# Patient Record
Sex: FEMALE | Race: OTHER | Employment: UNEMPLOYED | URBAN - NONMETROPOLITAN AREA
[De-identification: names, ages, dates, MRNs, and addresses within clinical notes are randomized per-mention and may not be internally consistent; named-entity substitution may affect disease eponyms.]

---

## 2018-11-07 ENCOUNTER — OFFICE VISIT (OUTPATIENT)
Dept: FAMILY MEDICINE CLINIC | Age: 38
End: 2018-11-07
Payer: COMMERCIAL

## 2018-11-07 VITALS
SYSTOLIC BLOOD PRESSURE: 102 MMHG | WEIGHT: 293 LBS | RESPIRATION RATE: 12 BRPM | TEMPERATURE: 98.6 F | OXYGEN SATURATION: 97 % | HEART RATE: 71 BPM | DIASTOLIC BLOOD PRESSURE: 78 MMHG | HEIGHT: 68 IN | BODY MASS INDEX: 44.41 KG/M2

## 2018-11-07 DIAGNOSIS — H04.123 DRY EYES: ICD-10-CM

## 2018-11-07 DIAGNOSIS — M79.18 MYOFASCIAL PAIN: ICD-10-CM

## 2018-11-07 DIAGNOSIS — R13.10 DYSPHAGIA, UNSPECIFIED TYPE: ICD-10-CM

## 2018-11-07 DIAGNOSIS — M25.50 ARTHRALGIA, UNSPECIFIED JOINT: ICD-10-CM

## 2018-11-07 DIAGNOSIS — R20.0 FACIAL NUMBNESS: ICD-10-CM

## 2018-11-07 DIAGNOSIS — F41.9 ANXIETY: ICD-10-CM

## 2018-11-07 DIAGNOSIS — C07 CANCER OF PAROTID GLAND (HCC): Primary | ICD-10-CM

## 2018-11-07 DIAGNOSIS — H92.01 RIGHT EAR PAIN: ICD-10-CM

## 2018-11-07 DIAGNOSIS — Z13.220 SCREENING FOR HYPERLIPIDEMIA: ICD-10-CM

## 2018-11-07 DIAGNOSIS — G47.30 SLEEP APNEA, UNSPECIFIED TYPE: ICD-10-CM

## 2018-11-07 DIAGNOSIS — H91.91 HEARING DIFFICULTY OF RIGHT EAR: ICD-10-CM

## 2018-11-07 DIAGNOSIS — M54.2 NECK PAIN: ICD-10-CM

## 2018-11-07 DIAGNOSIS — E03.9 HYPOTHYROIDISM, UNSPECIFIED TYPE: ICD-10-CM

## 2018-11-07 DIAGNOSIS — E55.9 VITAMIN D DEFICIENCY: ICD-10-CM

## 2018-11-07 DIAGNOSIS — H53.8 BLURRY VISION: ICD-10-CM

## 2018-11-07 DIAGNOSIS — Z00.00 BLOOD TESTS FOR ROUTINE GENERAL PHYSICAL EXAMINATION: ICD-10-CM

## 2018-11-07 DIAGNOSIS — Z13.31 POSITIVE DEPRESSION SCREENING: ICD-10-CM

## 2018-11-07 DIAGNOSIS — Z11.4 ENCOUNTER FOR SCREENING FOR HIV: ICD-10-CM

## 2018-11-07 DIAGNOSIS — F33.0 MILD EPISODE OF RECURRENT MAJOR DEPRESSIVE DISORDER (HCC): ICD-10-CM

## 2018-11-07 DIAGNOSIS — Z30.432 ENCOUNTER FOR REMOVAL OF INTRAUTERINE CONTRACEPTIVE DEVICE (IUD): ICD-10-CM

## 2018-11-07 DIAGNOSIS — Z23 NEED FOR PROPHYLACTIC VACCINATION AND INOCULATION AGAINST INFLUENZA: ICD-10-CM

## 2018-11-07 DIAGNOSIS — R26.89 IMBALANCE: ICD-10-CM

## 2018-11-07 LAB — HBA1C MFR BLD: 5.6 % (ref 4.3–5.7)

## 2018-11-07 PROCEDURE — 90471 IMMUNIZATION ADMIN: CPT | Performed by: NURSE PRACTITIONER

## 2018-11-07 PROCEDURE — 4004F PT TOBACCO SCREEN RCVD TLK: CPT | Performed by: NURSE PRACTITIONER

## 2018-11-07 PROCEDURE — G8431 POS CLIN DEPRES SCRN F/U DOC: HCPCS | Performed by: NURSE PRACTITIONER

## 2018-11-07 PROCEDURE — G8417 CALC BMI ABV UP PARAM F/U: HCPCS | Performed by: NURSE PRACTITIONER

## 2018-11-07 PROCEDURE — 90688 IIV4 VACCINE SPLT 0.5 ML IM: CPT | Performed by: NURSE PRACTITIONER

## 2018-11-07 PROCEDURE — 96160 PT-FOCUSED HLTH RISK ASSMT: CPT | Performed by: NURSE PRACTITIONER

## 2018-11-07 PROCEDURE — 99204 OFFICE O/P NEW MOD 45 MIN: CPT | Performed by: NURSE PRACTITIONER

## 2018-11-07 PROCEDURE — G8482 FLU IMMUNIZE ORDER/ADMIN: HCPCS | Performed by: NURSE PRACTITIONER

## 2018-11-07 PROCEDURE — G8427 DOCREV CUR MEDS BY ELIG CLIN: HCPCS | Performed by: NURSE PRACTITIONER

## 2018-11-07 ASSESSMENT — PATIENT HEALTH QUESTIONNAIRE - PHQ9
6. FEELING BAD ABOUT YOURSELF - OR THAT YOU ARE A FAILURE OR HAVE LET YOURSELF OR YOUR FAMILY DOWN: 3
8. MOVING OR SPEAKING SO SLOWLY THAT OTHER PEOPLE COULD HAVE NOTICED. OR THE OPPOSITE, BEING SO FIGETY OR RESTLESS THAT YOU HAVE BEEN MOVING AROUND A LOT MORE THAN USUAL: 1
SUM OF ALL RESPONSES TO PHQ QUESTIONS 1-9: 22
4. FEELING TIRED OR HAVING LITTLE ENERGY: 3
9. THOUGHTS THAT YOU WOULD BE BETTER OFF DEAD, OR OF HURTING YOURSELF: 1
5. POOR APPETITE OR OVEREATING: 3
2. FEELING DOWN, DEPRESSED OR HOPELESS: 3
1. LITTLE INTEREST OR PLEASURE IN DOING THINGS: 2
3. TROUBLE FALLING OR STAYING ASLEEP: 3
7. TROUBLE CONCENTRATING ON THINGS, SUCH AS READING THE NEWSPAPER OR WATCHING TELEVISION: 3
10. IF YOU CHECKED OFF ANY PROBLEMS, HOW DIFFICULT HAVE THESE PROBLEMS MADE IT FOR YOU TO DO YOUR WORK, TAKE CARE OF THINGS AT HOME, OR GET ALONG WITH OTHER PEOPLE: 2
SUM OF ALL RESPONSES TO PHQ QUESTIONS 1-9: 22
SUM OF ALL RESPONSES TO PHQ9 QUESTIONS 1 & 2: 5

## 2018-11-07 ASSESSMENT — ENCOUNTER SYMPTOMS
CHEST TIGHTNESS: 0
BACK PAIN: 1
TROUBLE SWALLOWING: 1
GASTROINTESTINAL NEGATIVE: 1
EYE ITCHING: 1
EYE DISCHARGE: 1
SHORTNESS OF BREATH: 1

## 2018-11-07 NOTE — PATIENT INSTRUCTIONS
It lowers the chance of getting and spreading the flu. The vaccine is very important for people who are at high risk for getting other health problems from the flu. This includes:  · Anyone 48years of age or older. · People who live in a long-term care center, such as a nursing home. · All children 6 months through 25years of age. · Adults and children 6 months and older who have long-term heart or lung problems, such as asthma. · Adults and children 6 months and older who needed medical care or were in a hospital during the past year because of diabetes, chronic kidney disease, or a weak immune system (including HIV or AIDS). · Women who will be pregnant during the flu season. · People who have any condition that can make it hard to breathe or swallow (such as a brain injury or muscle disorders). · People who can give the flu to others who are at high risk for problems from the flu. This includes all health care workers and close contacts of people age 72 or older. Who should not get the flu vaccine? The person who gives the vaccine may tell you not to get it if you:  · Have a severe allergy to eggs or any part of the vaccine. · Have had a severe reaction to a flu vaccine in the past.  · Have had Guillain-Barré syndrome (GBS). · Are sick with a fever. (Get the vaccine when symptoms are gone.)  How can you care for yourself at home? · If you or your child has a sore arm or a slight fever after the shot, take an over-the-counter pain medicine, such as acetaminophen (Tylenol) or ibuprofen (Advil, Motrin). Read and follow all instructions on the label. Do not give aspirin to anyone younger than 20. It has been linked to Reye syndrome, a serious illness. · Do not take two or more pain medicines at the same time unless the doctor told you to. Many pain medicines have acetaminophen, which is Tylenol. Too much acetaminophen (Tylenol) can be harmful. When should you call for help?   Call 911 anytime you think Health. If you have questions about a medical condition or this instruction, always ask your healthcare professional. [address] any warranty or liability for your use of this information. Patient Education        Neck: Exercises  Your Care Instructions  Here are some examples of typical rehabilitation exercises for your condition. Start each exercise slowly. Ease off the exercise if you start to have pain. Your doctor or physical therapist will tell you when you can start these exercises and which ones will work best for you. How to do the exercises  Neck stretch    4. This stretch works best if you keep your shoulder down as you lean away from it. To help you remember to do this, start by relaxing your shoulders and lightly holding on to your thighs or your chair. 5. Tilt your head toward your shoulder and hold for 15 to 30 seconds. Let the weight of your head stretch your muscles. 6. If you would like a little added stretch, use your hand to gently and steadily pull your head toward your shoulder. For example, keeping your right shoulder down, lean your head to the left. 7. Repeat 2 to 4 times toward each shoulder. Diagonal neck stretch    7. Turn your head slightly toward the direction you will be stretching, and tilt your head diagonally toward your chest and hold for 15 to 30 seconds. 8. If you would like a little added stretch, use your hand to gently and steadily pull your head forward on the diagonal.  9. Repeat 2 to 4 times toward each side. Dorsal glide stretch    1. Sit or stand tall and look straight ahead. 2. Slowly tuck your chin as you glide your head backward over your body  3. Hold for a count of 6, and then relax for up to 10 seconds. 4. Repeat 8 to 12 times. Chest and shoulder stretch    1. Sit or stand tall and glide your head backward as in the dorsal glide stretch. 2. Raise both arms so that your hands are next to your ears.   3. Take a deep exercise    5. Lie on your back with your knees bent and your feet flat on the floor. 6. Bring one knee to your chest, keeping the other foot flat on the floor (or keeping the other leg straight, whichever feels better on your lower back). 7. Keep your lower back pressed to the floor. Hold for at least 15 to 30 seconds. 8. Relax, and lower the knee to the starting position. 9. Repeat with the other leg. Repeat 2 to 4 times with each leg. 10. To get more stretch, put your other leg flat on the floor while pulling your knee to your chest.    Curl-ups    6. Lie on the floor on your back with your knees bent at a 90-degree angle. Your feet should be flat on the floor, about 12 inches from your buttocks. 7. Cross your arms over your chest. If this bothers your neck, try putting your hands behind your neck (not your head), with your elbows spread apart. 8. Slowly tighten your belly muscles and raise your shoulder blades off the floor. 9. Keep your head in line with your body, and do not press your chin to your chest.  10. Hold this position for 1 or 2 seconds, then slowly lower yourself back down to the floor. 11. Repeat 8 to 12 times. Pelvic tilt exercise    3. Lie on your back with your knees bent. 4. \"Brace\" your stomach. This means to tighten your muscles by pulling in and imagining your belly button moving toward your spine. You should feel like your back is pressing to the floor and your hips and pelvis are rocking back. 5. Hold for about 6 seconds while you breathe smoothly. 6. Repeat 8 to 12 times. Heel dig bridging    1. Lie on your back with both knees bent and your ankles bent so that only your heels are digging into the floor. Your knees should be bent about 90 degrees. 2. Then push your heels into the floor, squeeze your buttocks, and lift your hips off the floor until your shoulders, hips, and knees are all in a straight line.   3. Hold for about 6 seconds as you continue to breathe (life-threatening) allergies. If you ever had a life-threatening allergic reaction after a dose of flu vaccine, or have a severe allergy to any part of this vaccine, you may be advised not to get vaccinated. Most, but not all, types of flu vaccine contain a small amount of egg protein. · If you ever had Guillain-Barré syndrome (also called GBS) Some people with a history of GBS should not get this vaccine. This should be discussed with your doctor. · If you are not feeling well. It is usually okay to get flu vaccine when you have a mild illness, but you might be asked to come back when you feel better. Risks of a vaccine reaction  With any medicine, including vaccines, there is a chance of reactions. These are usually mild and go away on their own, but serious reactions are also possible. Most people who get a flu shot do not have any problems with it. Minor problems following a flu shot include:  · Soreness, redness, or swelling where the shot was given  · Hoarseness  · Sore, red or itchy eyes  · Cough  · Fever  · Aches  · Headache  · Itching  · Fatigue  If these problems occur, they usually begin soon after the shot and last 1 or 2 days. More serious problems following a flu shot can include the following:  · There may be a small increased risk of Guillain-Barré Syndrome (GBS) after inactivated flu vaccine. This risk has been estimated at 1 or 2 additional cases per million people vaccinated. This is much lower than the risk of severe complications from flu, which can be prevented by flu vaccine. · Anastasiya Caputo children who get the flu shot along with pneumococcal vaccine (PCV13) and/or DTaP vaccine at the same time might be slightly more likely to have a seizure caused by fever. Ask your doctor for more information.  Tell your doctor if a child who is getting flu vaccine has ever had a seizure  Problems that could happen after any injected vaccine:  · People sometimes faint after a medical procedure, including

## 2018-11-07 NOTE — PROGRESS NOTES
On the basis of positive PHQ-9 screening (PHQ-9 Total Score: 22), the following plan was implemented: referral to psychiatry provided.   Patient will follow-up in 1 month(s) with psychiatrist.

## 2018-11-07 NOTE — PROGRESS NOTES
prescriptions on file. No current facility-administered medications for this visit. No Known Allergies    Health Maintenance   Topic Date Due    HIV screen  05/25/1994    DTaP/Tdap/Td vaccine (1 - Tdap) 05/25/1998    Cervical cancer screen  05/25/2000    Flu vaccine  Completed       Subjective:      Review of Systems   Constitutional: Positive for fatigue. HENT: Positive for dental problem, drooling, ear discharge, hearing loss (right ear, canal is very small) and trouble swallowing. Eyes: Positive for discharge (Right eye does not close all the way) and itching. Respiratory: Positive for shortness of breath (occasionally ). Negative for chest tightness. Cardiovascular: Negative for chest pain. Gastrointestinal: Negative. Endocrine: Positive for cold intolerance (gets muscle spasms ). Genitourinary: Negative for difficulty urinating, dysuria, vaginal bleeding (no periods- has mirena) and vaginal discharge. Musculoskeletal: Positive for back pain and gait problem (balance problem ). Objective:     Vitals:    11/07/18 1106   BP: 102/78   Pulse: 71   Resp: 12   Temp: 98.6 °F (37 °C)   TempSrc: Oral   SpO2: 97%   Weight: (!) 322 lb 12.8 oz (146.4 kg)   Height: 5' 7.72\" (1.72 m)       Body mass index is 49.49 kg/m². Wt Readings from Last 3 Encounters:   11/07/18 (!) 322 lb 12.8 oz (146.4 kg)     BP Readings from Last 3 Encounters:   11/07/18 102/78       Physical Exam   Constitutional: She is oriented to person, place, and time. She appears well-developed and well-nourished. No distress. HENT:   Head: Normocephalic and atraumatic. Right Ear: Hearing and external ear normal. No swelling. Left Ear: Hearing and external ear normal. No swelling. Ears:    Nose: No mucosal edema or rhinorrhea. Right sinus exhibits no maxillary sinus tenderness and no frontal sinus tenderness. Left sinus exhibits no maxillary sinus tenderness and no frontal sinus tenderness.    Mouth/Throat: Oropharynx is clear and moist. No oropharyngeal exudate or posterior oropharyngeal erythema. Eyes: Pupils are equal, round, and reactive to light. Conjunctivae are normal. Right eye exhibits no discharge. Left eye exhibits no discharge. Neck: Normal range of motion. Cardiovascular: Normal rate. No lower extremity edema   Pulmonary/Chest: Effort normal and breath sounds normal. No respiratory distress. Musculoskeletal: She exhibits no tenderness or deformity. Full ROM of upper and lower extremities    Neurological: She is alert and oriented to person, place, and time. Coordination and gait normal.   Skin: Skin is warm and dry. No rash noted. She is not diaphoretic. No cyanosis. Nails show no clubbing. Psychiatric: She has a normal mood and affect. Her speech is normal and behavior is normal. Judgment and thought content normal. Cognition and memory are normal.     Lab Results   Component Value Date    LABA1C 5.6 11/07/2018       Imaging Results:    No results found. Assessment:      Diagnosis Orders   1. Cancer of parotid gland [facility])  [facility] for Mayi Westfall MD   2. Myofascial pain     3. Imbalance     4. Neck pain     5. Need for prophylactic vaccination and inoculation against influenza  INFLUENZA, QUADV, 3 YRS AND OLDER, IM, MDV, 0.5ML ([facility])   6. Encounter for screening for HIV  HIV Screen   7. Positive depression screening  Positive Screen for Clinical Depression with a Documented Follow-up Plan    8. Arthralgia, unspecified joint  CBC Auto Differential    Hepatic Function Panel    Lipid Panel    Magnesium    Comprehensive Metabolic Panel    Rheumatoid Factor    Sedimentation Rate    FIDENCIO Screen with Reflex   9. Facial numbness     10. Sleep apnea, unspecified type     11. Blood tests for routine general physical examination  CBC Auto Differential    Hepatic Function Panel    Lipid Panel    Magnesium    Comprehensive Metabolic Panel   12.  Screening for hyperlipidemia  CBC Auto Differential    Hepatic Function Panel    Lipid Panel    Magnesium    Comprehensive Metabolic Panel   13. Hypothyroidism, unspecified type  T4    TSH with Reflex    Comprehensive Metabolic Panel   14. Vitamin D deficiency  Vitamin D 25 Hydroxy   15. Encounter for removal of intrauterine contraceptive device (IUD)  [facility]   16. Anxiety     17. Mild episode of recurrent major depressive disorder [facility])  [facility], [place]    [address] [place] [address] Dr Cristel Hairston, [address]   18. Dry eyes  [facility] [address] [facility] [address] Tushar Sullivan MD ([place])   19. Leanna Logan MD ([place])   20. Dysphagia, unspecified type  [facility] Sandoval MD ([place])   21. Right ear pain  Kenny Mcguire MD   22. Hearing difficulty of right ear  Kenny Mcguire MD       Plan:         A1C today    Flu shot today    Referral to OBGYN     Referral to Psychiatry     · Will order basic blood work and required blood screening test  · Will order the necessary vaccinations  · Discussed the Genesight testing - she will look into it and let us know    · Will refer to a new Lung doctor for the CPAP  · Will refer to an ENT  · Will refer to an eye doctor  · Will refer to Dr. Kincaid [facility] - Psychology  · Will refer to a new Psychiatrist   · Will refer to a new OB-Gyn      Please call you insurance company before getting labs done, see where they would like you to have them completed. If the cost is too much please call the office and let us know before you have them done and we will do a couple at a time to decrease the cost.        No Follow-up on file.     Orders Placed:  Orders Placed This Encounter   Procedures    INFLUENZA, QUADV, 3 YRS AND OLDER,

## 2018-11-07 NOTE — PROGRESS NOTES
Health Maintenance Due   Topic Date Due    HIV screen  05/25/1994    DTaP/Tdap/Td vaccine (1 - Tdap) 05/25/1998    Cervical cancer screen  05/25/2000    Flu vaccine (1) 09/01/2018

## 2018-11-28 ENCOUNTER — OFFICE VISIT (OUTPATIENT)
Dept: PSYCHIATRY | Age: 38
End: 2018-11-28
Payer: COMMERCIAL

## 2018-11-28 DIAGNOSIS — F32.A DEPRESSIVE DISORDER: ICD-10-CM

## 2018-11-28 DIAGNOSIS — F41.9 ANXIETY: ICD-10-CM

## 2018-11-28 DIAGNOSIS — F43.10 PTSD (POST-TRAUMATIC STRESS DISORDER): Primary | ICD-10-CM

## 2018-11-28 DIAGNOSIS — F42.3 HOARDING DISORDER: ICD-10-CM

## 2018-11-28 DIAGNOSIS — F33.1 MODERATE EPISODE OF RECURRENT MAJOR DEPRESSIVE DISORDER (HCC): ICD-10-CM

## 2018-11-28 PROCEDURE — 90792 PSYCH DIAG EVAL W/MED SRVCS: CPT | Performed by: PSYCHIATRY & NEUROLOGY

## 2018-11-28 PROCEDURE — 4004F PT TOBACCO SCREEN RCVD TLK: CPT | Performed by: PSYCHIATRY & NEUROLOGY

## 2018-11-28 RX ORDER — FLUOXETINE HYDROCHLORIDE 40 MG/1
40 CAPSULE ORAL DAILY
Qty: 30 CAPSULE | Refills: 3 | Status: SHIPPED | OUTPATIENT
Start: 2018-11-28 | End: 2019-03-08 | Stop reason: SDUPTHER

## 2018-11-28 RX ORDER — CLONAZEPAM 0.5 MG/1
0.5 TABLET ORAL NIGHTLY PRN
Qty: 60 TABLET | Refills: 0 | Status: SHIPPED | OUTPATIENT
Start: 2018-11-28 | End: 2018-12-26 | Stop reason: SDUPTHER

## 2018-11-28 RX ORDER — FLUOXETINE 10 MG/1
10 CAPSULE ORAL DAILY
Qty: 30 CAPSULE | Refills: 3 | Status: SHIPPED | OUTPATIENT
Start: 2018-11-28 | End: 2019-02-08

## 2018-11-28 NOTE — PROGRESS NOTES
Experience/[place] Status:   no   Spiritual Beliefs:   Raised fundamentalist Jainism, no longer            Mental Status Evaluation:   Appearance: Neatly groomed, appropriately dressed   Behavior: Cooperative and Appropriate   Eye Contact: Maintained good eye contact   Psychomotor Activity: Normal     Speech: Rate, volume, and prosody were normal   Mood: Sad   Affect: Full range, appropriate, and mood congruent   Thought Process: Goal directed and coherent   Thought content: The thought content was appropriate to questions. The patient denies any suicidal ideation, homicidal ideation, or paranoid ideation. There are no signs of psychosis    Perceptions: Perceptions were normal, the patient denied any auditory, tactile or visual hallucinations   Cognition: Patient is alert and oriented to time, place and person, no gross cognitive deficits   Judgment: Judgment appeared normal and appropriate   Insight: Good insight into illness   Impulse control: Fair     Assessment:   Severe trauma history now with multiple diagnoses contributing to low mood, anxiety, and difficulty functioning     Diagnosis:   1. PTSD (post-traumatic stress disorder)    2. Depressive disorder    3. Hoarding disorder    4. Anxiety    5.  Moderate episode of recurrent major depressive disorder (HCC)        Plan:  Begin trial fluoxetine  Clonazepam prn anxiety  To schedule appt with Arabella Edmonds PhD  Will get ordered lab work done  Return x 4 weeks

## 2018-12-05 DIAGNOSIS — H92.01 RIGHT EAR PAIN: ICD-10-CM

## 2018-12-05 DIAGNOSIS — H91.93 HEARING DIFFICULTY OF BOTH EARS: Primary | ICD-10-CM

## 2018-12-06 ENCOUNTER — HOSPITAL ENCOUNTER (OUTPATIENT)
Age: 38
Discharge: HOME | End: 2018-12-06
Payer: COMMERCIAL

## 2018-12-06 ENCOUNTER — HOSPITAL ENCOUNTER (OUTPATIENT)
Dept: AUDIOLOGY | Age: 38
Discharge: HOME | End: 2018-12-06
Payer: COMMERCIAL

## 2018-12-06 ENCOUNTER — OFFICE VISIT (OUTPATIENT)
Dept: ENT CLINIC | Age: 38
End: 2018-12-06
Payer: COMMERCIAL

## 2018-12-06 VITALS
HEART RATE: 72 BPM | HEIGHT: 67 IN | BODY MASS INDEX: 45.99 KG/M2 | RESPIRATION RATE: 20 BRPM | WEIGHT: 293 LBS | DIASTOLIC BLOOD PRESSURE: 70 MMHG | TEMPERATURE: 97.9 F | SYSTOLIC BLOOD PRESSURE: 110 MMHG

## 2018-12-06 DIAGNOSIS — E03.9 HYPOTHYROIDISM, UNSPECIFIED TYPE: ICD-10-CM

## 2018-12-06 DIAGNOSIS — Z00.00 BLOOD TESTS FOR ROUTINE GENERAL PHYSICAL EXAMINATION: ICD-10-CM

## 2018-12-06 DIAGNOSIS — Z11.4 ENCOUNTER FOR SCREENING FOR HIV: ICD-10-CM

## 2018-12-06 DIAGNOSIS — Z13.220 SCREENING FOR HYPERLIPIDEMIA: ICD-10-CM

## 2018-12-06 DIAGNOSIS — H92.11 DRAINAGE FROM EAR, RIGHT: Primary | ICD-10-CM

## 2018-12-06 DIAGNOSIS — E55.9 VITAMIN D DEFICIENCY: ICD-10-CM

## 2018-12-06 DIAGNOSIS — Z85.818: ICD-10-CM

## 2018-12-06 DIAGNOSIS — M25.50 ARTHRALGIA, UNSPECIFIED JOINT: ICD-10-CM

## 2018-12-06 DIAGNOSIS — R68.2 DRY MOUTH: ICD-10-CM

## 2018-12-06 LAB
ALBUMIN SERPL-MCNC: 4.1 G/DL (ref 3.5–5.1)
ALP BLD-CCNC: 77 U/L (ref 38–126)
ALT SERPL-CCNC: 12 U/L (ref 11–66)
ANION GAP SERPL CALCULATED.3IONS-SCNC: 13 MEQ/L (ref 8–16)
AST SERPL-CCNC: 13 U/L (ref 5–40)
BASOPHILS # BLD: 0.6 %
BASOPHILS ABSOLUTE: 0.1 THOU/MM3 (ref 0–0.1)
BILIRUB SERPL-MCNC: 0.4 MG/DL (ref 0.3–1.2)
BILIRUBIN DIRECT: < 0.2 MG/DL (ref 0–0.3)
BUN BLDV-MCNC: 15 MG/DL (ref 7–22)
CALCIUM SERPL-MCNC: 9.5 MG/DL (ref 8.5–10.5)
CHLORIDE BLD-SCNC: 103 MEQ/L (ref 98–111)
CHOLESTEROL, TOTAL: 156 MG/DL (ref 100–199)
CO2: 26 MEQ/L (ref 23–33)
CREAT SERPL-MCNC: 0.8 MG/DL (ref 0.4–1.2)
EOSINOPHIL # BLD: 1.7 %
EOSINOPHILS ABSOLUTE: 0.1 THOU/MM3 (ref 0–0.4)
ERYTHROCYTE [DISTWIDTH] IN BLOOD BY AUTOMATED COUNT: 13.5 % (ref 11.5–14.5)
ERYTHROCYTE [DISTWIDTH] IN BLOOD BY AUTOMATED COUNT: 41.9 FL (ref 35–45)
GFR SERPL CREATININE-BSD FRML MDRD: 80 ML/MIN/1.73M2
GLUCOSE BLD-MCNC: 95 MG/DL (ref 70–108)
HCT VFR BLD CALC: 41.2 % (ref 37–47)
HDLC SERPL-MCNC: 36 MG/DL
HEMOGLOBIN: 13.4 GM/DL (ref 12–16)
IMMATURE GRANS (ABS): 0.03 THOU/MM3 (ref 0–0.07)
IMMATURE GRANULOCYTES: 0.4 %
LDL CHOLESTEROL CALCULATED: 98 MG/DL
LYMPHOCYTES # BLD: 16.4 %
LYMPHOCYTES ABSOLUTE: 1.4 THOU/MM3 (ref 1–4.8)
MAGNESIUM: 2.3 MG/DL (ref 1.6–2.4)
MCH RBC QN AUTO: 28 PG (ref 26–33)
MCHC RBC AUTO-ENTMCNC: 32.5 GM/DL (ref 32.2–35.5)
MCV RBC AUTO: 86.2 FL (ref 81–99)
MONOCYTES # BLD: 4.7 %
MONOCYTES ABSOLUTE: 0.4 THOU/MM3 (ref 0.4–1.3)
NUCLEATED RED BLOOD CELLS: 0 /100 WBC
PLATELET # BLD: 238 THOU/MM3 (ref 130–400)
PMV BLD AUTO: 9.6 FL (ref 9.4–12.4)
POTASSIUM SERPL-SCNC: 4.3 MEQ/L (ref 3.5–5.2)
RBC # BLD: 4.78 MILL/MM3 (ref 4.2–5.4)
RHEUMATOID FACTOR: 15 IU/ML (ref 0–13)
SEDIMENTATION RATE, ERYTHROCYTE: 20 MM/HR (ref 0–20)
SEG NEUTROPHILS: 76.2 %
SEGMENTED NEUTROPHILS ABSOLUTE COUNT: 6.4 THOU/MM3 (ref 1.8–7.7)
SODIUM BLD-SCNC: 142 MEQ/L (ref 135–145)
T4 FREE: 0.81 NG/DL (ref 0.93–1.76)
THYROXINE (T4): 6.7 UG/DL (ref 4.5–12)
TOTAL PROTEIN: 7.5 G/DL (ref 6.1–8)
TRIGL SERPL-MCNC: 111 MG/DL (ref 0–199)
TSH SERPL DL<=0.05 MIU/L-ACNC: 5 UIU/ML (ref 0.4–4.2)
VITAMIN D 25-HYDROXY: 15 NG/ML (ref 30–100)
WBC # BLD: 8.4 THOU/MM3 (ref 4.8–10.8)

## 2018-12-06 PROCEDURE — 84436 ASSAY OF TOTAL THYROXINE: CPT

## 2018-12-06 PROCEDURE — 86430 RHEUMATOID FACTOR TEST QUAL: CPT

## 2018-12-06 PROCEDURE — 99203 OFFICE O/P NEW LOW 30 MIN: CPT | Performed by: OTOLARYNGOLOGY

## 2018-12-06 PROCEDURE — G8482 FLU IMMUNIZE ORDER/ADMIN: HCPCS | Performed by: OTOLARYNGOLOGY

## 2018-12-06 PROCEDURE — 4004F PT TOBACCO SCREEN RCVD TLK: CPT | Performed by: OTOLARYNGOLOGY

## 2018-12-06 PROCEDURE — 87389 HIV-1 AG W/HIV-1&-2 AB AG IA: CPT

## 2018-12-06 PROCEDURE — 92567 TYMPANOMETRY: CPT | Performed by: AUDIOLOGIST

## 2018-12-06 PROCEDURE — 80053 COMPREHEN METABOLIC PANEL: CPT

## 2018-12-06 PROCEDURE — 92557 COMPREHENSIVE HEARING TEST: CPT | Performed by: AUDIOLOGIST

## 2018-12-06 PROCEDURE — 83735 ASSAY OF MAGNESIUM: CPT

## 2018-12-06 PROCEDURE — G8417 CALC BMI ABV UP PARAM F/U: HCPCS | Performed by: OTOLARYNGOLOGY

## 2018-12-06 PROCEDURE — 85651 RBC SED RATE NONAUTOMATED: CPT

## 2018-12-06 PROCEDURE — 82248 BILIRUBIN DIRECT: CPT

## 2018-12-06 PROCEDURE — 84443 ASSAY THYROID STIM HORMONE: CPT

## 2018-12-06 PROCEDURE — 80061 LIPID PANEL: CPT

## 2018-12-06 PROCEDURE — 84439 ASSAY OF FREE THYROXINE: CPT

## 2018-12-06 PROCEDURE — G8427 DOCREV CUR MEDS BY ELIG CLIN: HCPCS | Performed by: OTOLARYNGOLOGY

## 2018-12-06 PROCEDURE — 85025 COMPLETE CBC W/AUTO DIFF WBC: CPT

## 2018-12-06 PROCEDURE — 36415 COLL VENOUS BLD VENIPUNCTURE: CPT

## 2018-12-06 PROCEDURE — 86038 ANTINUCLEAR ANTIBODIES: CPT

## 2018-12-06 PROCEDURE — 82306 VITAMIN D 25 HYDROXY: CPT

## 2018-12-06 RX ORDER — PILOCARPINE HYDROCHLORIDE 5 MG/1
5 TABLET, FILM COATED ORAL 3 TIMES DAILY
Qty: 30 TABLET | Refills: 0 | Status: SHIPPED | OUTPATIENT
Start: 2018-12-06 | End: 2019-01-04 | Stop reason: SDUPTHER

## 2018-12-06 RX ORDER — CIPROFLOXACIN AND DEXAMETHASONE 3; 1 MG/ML; MG/ML
4 SUSPENSION/ DROPS AURICULAR (OTIC) 2 TIMES DAILY
Qty: 1 BOTTLE | Refills: 0 | Status: SHIPPED | OUTPATIENT
Start: 2018-12-06 | End: 2019-03-14

## 2018-12-06 ASSESSMENT — ENCOUNTER SYMPTOMS
SINUS PRESSURE: 0
BLOOD IN STOOL: 0
ABDOMINAL PAIN: 0
ABDOMINAL DISTENTION: 0
PHOTOPHOBIA: 0
COUGH: 0
CONSTIPATION: 0
VOMITING: 0
CHEST TIGHTNESS: 0
SORE THROAT: 0
EYE PAIN: 0
RHINORRHEA: 0
EYE ITCHING: 0
ANAL BLEEDING: 0
COLOR CHANGE: 0
NAUSEA: 0
RECTAL PAIN: 0
TROUBLE SWALLOWING: 1
STRIDOR: 0
APNEA: 1
DIARRHEA: 0
CHOKING: 0
FACIAL SWELLING: 1
EYE REDNESS: 0
SHORTNESS OF BREATH: 0
EYE DISCHARGE: 0
WHEEZING: 0
BACK PAIN: 1
VOICE CHANGE: 0

## 2018-12-06 NOTE — PROGRESS NOTES
ACCOUNT #: [de-identified]      AUDIOLOGICAL EVALUATION      REASON FOR TESTING:  The patient is noticing decreasing hearing and tinnitus in her right ear during the last 9 months. She also states that her right ear feels \"plugged\". 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 The tumor was removed and she did receive radiation 6 years ago. Following treatment she has been somewhat imbalanced although her balance problems are increasing and she is experiencing vertigo when laying down or sitting up and with head turns. She has also had recurring problems with cerumen and debris accumulation in her right ear. She commented that she removed foul smelling debris from her right ear several months ago. Audiogram completed after ENT physician evaluation on the same date. OTOSCOPY: Clear canal with normal appearing tympanic membrane- left. Surgically altered EAC with what appears to be dark debris or cerumen near the tympanic membrane- Right. The pinna is also very firm.        AUDIOGRAM                    Reliability: good  Audiometer Used:  GSI-61    PURE TONES     RE    LE     []   [x] WNL        []   [] Mild    [x]   [] Moderate       []   [] Mod-Severe   [x]   [] Severe    []   [] Profound    TYPE     RE    LE    [x]   [] SNHL    []   []  Conductive HL    [x]   [] Mixed HL      CONFIGURATION    RE    LE    []   [x] Essentially Flat     [x]   []  Sloping  []   [] Steeply Sloping  []   []  Rising  []   [] Cookie Bite    SPEECH AUDIOMETRY   Right Left Sound Field Aided   PTA 42 dB 10 dB     SRT 35 dB 5 dB     SAT       MASKING 45 dB      % WRS   QUIET 28% 100%      40 dBSL 40 dBSL     %WRS   NOISE              [facility]            Live Voice  [x]     Recorded  []     List   []     WORD RECOGNITION   RE    LE  []   [x]  Excellent    []   []  Good  []   [] Fair  []   [] Poor  [x]   [] Very Poor      TYMPANOGRAMS  RE    LE  []   [x]  WNL    []   []  WNL w/reduced mobility  []

## 2018-12-07 ENCOUNTER — TELEPHONE (OUTPATIENT)
Dept: FAMILY MEDICINE CLINIC | Age: 38
End: 2018-12-07

## 2018-12-07 DIAGNOSIS — E03.9 HYPOTHYROIDISM, UNSPECIFIED TYPE: Primary | ICD-10-CM

## 2018-12-07 LAB — HIV-2 AB: NEGATIVE

## 2018-12-07 RX ORDER — CHOLECALCIFEROL (VITAMIN D3) 50 MCG
2000 TABLET ORAL DAILY
Qty: 30 TABLET | Refills: 5 | Status: SHIPPED | OUTPATIENT
Start: 2018-12-07 | End: 2019-02-14

## 2018-12-08 LAB — ANA SCREEN: NORMAL

## 2018-12-10 RX ORDER — LEVOTHYROXINE SODIUM 0.03 MG/1
25 TABLET ORAL DAILY
Qty: 90 TABLET | Refills: 1 | Status: SHIPPED | OUTPATIENT
Start: 2018-12-10 | End: 2019-02-14

## 2018-12-10 NOTE — TELEPHONE ENCOUNTER
I sent in the synthroid at 25mcg - a very small dose to see it that will be enough - but let's go over all the other labs and not get the tsh again since we put you on the medication for about 6 weeks and then get the labs at that time between visits

## 2018-12-19 ENCOUNTER — OFFICE VISIT (OUTPATIENT)
Dept: FAMILY MEDICINE CLINIC | Age: 38
End: 2018-12-19
Payer: COMMERCIAL

## 2018-12-19 VITALS
DIASTOLIC BLOOD PRESSURE: 86 MMHG | HEART RATE: 73 BPM | OXYGEN SATURATION: 98 % | SYSTOLIC BLOOD PRESSURE: 126 MMHG | TEMPERATURE: 98.4 F

## 2018-12-19 DIAGNOSIS — F33.9 RECURRENT MAJOR DEPRESSIVE DISORDER, REMISSION STATUS UNSPECIFIED (HCC): ICD-10-CM

## 2018-12-19 DIAGNOSIS — H91.91 HEARING LOSS OF RIGHT EAR, UNSPECIFIED HEARING LOSS TYPE: ICD-10-CM

## 2018-12-19 DIAGNOSIS — E03.9 HYPOTHYROIDISM, UNSPECIFIED TYPE: ICD-10-CM

## 2018-12-19 DIAGNOSIS — E78.6 LOW HDL (UNDER 40): ICD-10-CM

## 2018-12-19 DIAGNOSIS — G47.30 SLEEP APNEA, UNSPECIFIED TYPE: ICD-10-CM

## 2018-12-19 DIAGNOSIS — H04.123 DRY EYES: ICD-10-CM

## 2018-12-19 DIAGNOSIS — F41.1 GAD (GENERALIZED ANXIETY DISORDER): ICD-10-CM

## 2018-12-19 DIAGNOSIS — R76.8 ELEVATED RHEUMATOID FACTOR: ICD-10-CM

## 2018-12-19 DIAGNOSIS — H53.8 BLURRY VISION: ICD-10-CM

## 2018-12-19 DIAGNOSIS — E55.9 VITAMIN D DEFICIENCY: ICD-10-CM

## 2018-12-19 DIAGNOSIS — M25.50 POLYARTHRALGIA: Primary | ICD-10-CM

## 2018-12-19 DIAGNOSIS — E28.2 PCOS (POLYCYSTIC OVARIAN SYNDROME): ICD-10-CM

## 2018-12-19 PROCEDURE — 99214 OFFICE O/P EST MOD 30 MIN: CPT | Performed by: NURSE PRACTITIONER

## 2018-12-19 RX ORDER — LEVOTHYROXINE SODIUM 0.05 MG/1
50 TABLET ORAL DAILY
Qty: 30 TABLET | Refills: 3 | Status: SHIPPED | OUTPATIENT
Start: 2019-01-02 | End: 2019-03-14 | Stop reason: SDUPTHER

## 2018-12-19 RX ORDER — CHLORAL HYDRATE 500 MG
1000 CAPSULE ORAL DAILY
Qty: 30 CAPSULE | Refills: 2 | Status: SHIPPED | OUTPATIENT
Start: 2018-12-19 | End: 2019-03-14 | Stop reason: SDUPTHER

## 2018-12-19 ASSESSMENT — ENCOUNTER SYMPTOMS
EYE PAIN: 0
SHORTNESS OF BREATH: 0
SORE THROAT: 0
EYE DISCHARGE: 1
COUGH: 0
TROUBLE SWALLOWING: 0
RHINORRHEA: 0

## 2018-12-19 NOTE — PATIENT INSTRUCTIONS
sugar.  What else should you know? · Limit saturated fat, such as the fat from meat and dairy products. This is a healthy choice because people who have diabetes are at higher risk of heart disease. So choose lean cuts of meat and nonfat or low-fat dairy products. Use olive or canola oil instead of butter or shortening when cooking. · Don't skip meals. Your blood sugar may drop too low if you skip meals and take insulin or certain medicines for diabetes. · Check with your doctor before you drink alcohol. Alcohol can cause your blood sugar to drop too low. Alcohol can also cause a bad reaction if you take certain diabetes medicines. Follow-up care is a key part of your treatment and safety. Be sure to make and go to all appointments, and call your doctor if you are having problems. It's also a good idea to know your test results and keep a list of the medicines you take. Where can you learn more? Go to https://PhotobucketpekanaInstallFree.Flavourly. org and sign in to your [employer] Website account. Enter X938 in the Atterocor box to learn more about \"Learning About Diabetes Food Guidelines. \"     If you do not have an account, please click on the \"Sign Up Now\" link. Current as of: December 7, 2017  Content Version: 11.8  © 9386-9982 Healthwise, Incorporated. Care instructions adapted under license by [facility] ([facility]). If you have questions about a medical condition or this instruction, always ask your healthcare professional. [place] [address] any warranty or liability for your use of this information.

## 2018-12-19 NOTE — PROGRESS NOTES
[address] [facility] [address]  Dept: [phone]  Dept Fax: [phone]  Loc: [facility]       Chief Complaint   Patient presents with    Discuss Labs     tsh not completed = she call back stating she did not audra her medication = nurse said it was okay to wait on the tsh        Nurses Notes reviewed and I agree except as noted in the HPI. HISTORY OF PRESENT ILLNESS   Zohaib Whitley is a 43 y.o. female who presents for follow up. TSH - Borderline elevate. History of hypothyroid. Recently started on Levothyroxine. Has been taking for 2 weeks. Doesn't really notice a change for the better with her symptoms. Polyarthralgia - Elevated RF. Negative FIDENCIO and Sed Rate. Refer to Rheumatology. Vit D Deficiency - Will check on increasing Vit D to 4,000. Will send "[employer] message. Cholesterol - HDL low, LDL borderline. Start Fish Oil 1000 mg daily. Diet/exercise. PCOS/implantable contraceptive - had appt with ObGYN. Not scheduled for surgery yet. Has to meet with the  \"Sharlene\" first.    [facility] [place] [facility] Dr. Shanice Ayala. Dx with PTSD. Made her happy to get Dx. Also OCD, Major Depression, and CORIE. On Fluoxetine 20 mg with plans to increase, Clonazepam for 1-2 weeks. Has follow up plan in place with Dr. Shanice Ayala. To also f/u with Dr. Radha Welch 1/2/19. Dry eyes/Blurry visition - Rx eye drop and eye gel for under eyelid for sleep. Getting new glasses. ENT - Hearing loss right ear. Working on cerumen impaction with ENT. Using ear wax softener. F/u with ENT 12/28/18. Dysphagia - Miss GI appointment, plans to reschedule. Sleep apnea - On BiPAP. Has not made appt for Pulmonary yet. REVIEW OF SYSTEMS     Review of Systems   Constitutional: Negative for chills, diaphoresis, fatigue, fever and unexpected weight change.    HENT: Positive for ear discharge (followed by ENT). Negative for congestion, ear pain, rhinorrhea, sore throat and trouble swallowing. Eyes: Positive for discharge (eye doesn't fully close at times) and visual disturbance (blurry vision, dry eyes). Negative for pain. Respiratory: Negative for cough and shortness of breath. Cardiovascular: Negative for chest pain, palpitations and leg swelling. Endocrine: Positive for cold intolerance (\"I'm always cold. \"). Negative for heat intolerance, polydipsia, polyphagia and polyuria. Musculoskeletal: Positive for arthralgias (polyarthralgia). Negative for neck pain and neck stiffness. Skin: Negative for rash. Neurological: Negative for dizziness, light-headedness and headaches. Hematological: Negative for adenopathy. Psychiatric/Behavioral: Positive for decreased concentration. Negative for confusion, hallucinations, self-injury, sleep disturbance and suicidal ideas. The patient is nervous/anxious. Recent diagnosis of MDD, CORIE, OCD, PTSD       PAST MEDICAL HISTORY         Diagnosis Date    Anxiety     Cancer ([facility] [address]     Depression     Hypothyroidism     PTSD (post-traumatic stress disorder)        SURGICAL HISTORY     Patient  has a past surgical history that includes tumor excision; Nerve graft; [place] tooth extraction; and Tympanostomy tube placement.     CURRENT MEDICATIONS       Outpatient Medications Prior to Visit   Medication Sig Dispense Refill    levothyroxine (SYNTHROID) 25 MCG tablet Take 1 tablet by mouth daily 90 tablet 1    Cholecalciferol (VITAMIN D) 2000 units TABS tablet Take 1 tablet by mouth daily 30 tablet 5    Nicotine (NICODERM CQ TD) Place onto the skin daily      pilocarpine (SALAGEN) 5 MG tablet Take 1 tablet by mouth 3 times daily 30 tablet 0    ciprofloxacin-dexamethasone (CIPRODEX) 0.3-0.1 % otic suspension Place 4 drops into the right ear 2 times daily 1 Bottle 0    FLUoxetine (PROZAC) 10 MG capsule Take 1 capsule by mouth daily Increase as orders placed in the encounter. CLINICAL COURSE COURSE:     Vitals:    12/19/18 1227   BP: 126/86   Site: Right Upper Arm   Position: Sitting   Cuff Size: Large Adult   Pulse: 73   Temp: 98.4 °F (36.9 °C)   TempSrc: Oral   SpO2: 98%         PROCEDURES:  None  FINAL IMPRESSION      1. Polyarthralgia    2. Elevated rheumatoid factor    3. Hypothyroidism, unspecified type    4. Vitamin D deficiency    5. Low HDL (under 40)    6. PCOS (polycystic ovarian syndrome)    7. Recurrent major depressive disorder, remission status unspecified ([facility] [address]    8. CORIE (generalized anxiety disorder)    9. Dry eyes    10. Blurry vision    11. Hearing loss of right ear, unspecified hearing loss type    12. Sleep apnea, unspecified type         DISPOSITION/PLAN     TSH - After speaking with Dr. Leigha Diaz. Increase to 50 mcg in 2 weeks and recheck TSH. Polyarthralgia - Elevated RF. Negative FIDENCIO and Sed Rate. Refer to Rheumatology. Vit D Deficiency - Will check on increasing Vit D to 4,000. Will send [employer] message. Per Dr. Darren dalal to increase Vit D to 4,000 units daily. Advised to take 4,000 units until bottle finished. Then start 5,000 units daily. Cholesterol - HDL low, LDL borderline. Start Fish Oil 1000 mg daily. Diet/exercise. PCOS/implantable contraceptive - had appt with ObGYN. Not scheduled for surgery yet. Has to meet with the  \"Sharlene\" first.    Maria Esther Howard. Dx with PTSD. Made her happy to get Dx. Also OCD, Major Depression, and CORIE. On Fluoxetine 20 mg with plans to increase, Clonazepam for 1-2 weeks. Has follow up plan in place with Dr. Vincent Howard. To also f/u with Dr. Librado Schmidt 1/2/19. Dry eyes/Blurry visition - Rx eye drop and eye gel for under eyelid for sleep. Getting new glasses. ENT - Hearing loss right ear. Working on cerumen impaction with ENT. Using ear wax softener. F/u with ENT 12/28/18.     Dysphagia - Miss GI appointment, plans to Bilateral Helical Rim Advancement Flap Text: The defect edges were debeveled with a #15 blade scalpel.  Given the location of the defect and the proximity to free margins (helical rim) a bilateral helical rim advancement flap was deemed most appropriate.  Using a sterile surgical marker, the appropriate advancement flaps were drawn incorporating the defect and placing the expected incisions between the helical rim and antihelix where possible.  The area thus outlined was incised through and through with a #15 scalpel blade.  With a skin hook and iris scissors, the flaps were gently and sharply undermined and freed up.

## 2018-12-26 ENCOUNTER — OFFICE VISIT (OUTPATIENT)
Dept: PSYCHIATRY | Age: 38
End: 2018-12-26
Payer: COMMERCIAL

## 2018-12-26 DIAGNOSIS — F41.9 ANXIETY: ICD-10-CM

## 2018-12-26 DIAGNOSIS — F32.A DEPRESSIVE DISORDER: Primary | ICD-10-CM

## 2018-12-26 PROCEDURE — 99214 OFFICE O/P EST MOD 30 MIN: CPT | Performed by: PSYCHIATRY & NEUROLOGY

## 2018-12-26 PROCEDURE — 4004F PT TOBACCO SCREEN RCVD TLK: CPT | Performed by: PSYCHIATRY & NEUROLOGY

## 2018-12-26 PROCEDURE — G8428 CUR MEDS NOT DOCUMENT: HCPCS | Performed by: PSYCHIATRY & NEUROLOGY

## 2018-12-26 PROCEDURE — G8482 FLU IMMUNIZE ORDER/ADMIN: HCPCS | Performed by: PSYCHIATRY & NEUROLOGY

## 2018-12-26 PROCEDURE — G8417 CALC BMI ABV UP PARAM F/U: HCPCS | Performed by: PSYCHIATRY & NEUROLOGY

## 2018-12-26 RX ORDER — CLONAZEPAM 0.5 MG/1
0.5 TABLET ORAL NIGHTLY PRN
Qty: 60 TABLET | Refills: 0 | Status: ON HOLD | OUTPATIENT
Start: 2018-12-26 | End: 2019-02-21 | Stop reason: HOSPADM

## 2018-12-26 NOTE — LETTER
[address] [facility]  Psychiatry  [address]  Phone: [phone]  Fax: [phone]    Pedro Almazan MD    December 26, 2018      Kortney Freeman    To Whom It May Concern:  Ms. Teresa Cardenas is under my care for the treatment of anxiety and depression. Due to the episodic nature of these conditions there are days when she is unable to leave the house, these times are currently unpredictable. She is compliant with all recommended treatment. Sincerely,  Pedro Almazan                  .

## 2019-01-02 ENCOUNTER — OFFICE VISIT (OUTPATIENT)
Dept: BEHAVIORAL/MENTAL HEALTH CLINIC | Age: 39
End: 2019-01-02
Payer: COMMERCIAL

## 2019-01-02 DIAGNOSIS — F32.2 MAJOR DEPRESSIVE DISORDER, SINGLE EPISODE, SEVERE WITH MELANCHOLIC FEATURES (HCC): Primary | ICD-10-CM

## 2019-01-02 DIAGNOSIS — F43.10 PTSD (POST-TRAUMATIC STRESS DISORDER): ICD-10-CM

## 2019-01-02 PROCEDURE — 90791 PSYCH DIAGNOSTIC EVALUATION: CPT | Performed by: PSYCHOLOGIST

## 2019-01-07 ENCOUNTER — OFFICE VISIT (OUTPATIENT)
Dept: ENT CLINIC | Age: 39
End: 2019-01-07
Payer: COMMERCIAL

## 2019-01-07 VITALS
HEART RATE: 84 BPM | SYSTOLIC BLOOD PRESSURE: 138 MMHG | WEIGHT: 293 LBS | DIASTOLIC BLOOD PRESSURE: 92 MMHG | RESPIRATION RATE: 18 BRPM | BODY MASS INDEX: 45.99 KG/M2 | HEIGHT: 67 IN | TEMPERATURE: 98 F

## 2019-01-07 DIAGNOSIS — H72.01 CENTRAL PERFORATION OF TYMPANIC MEMBRANE OF RIGHT EAR: ICD-10-CM

## 2019-01-07 DIAGNOSIS — H66.001 RIGHT ACUTE SUPPURATIVE OTITIS MEDIA: Primary | ICD-10-CM

## 2019-01-07 PROCEDURE — 99213 OFFICE O/P EST LOW 20 MIN: CPT | Performed by: NURSE PRACTITIONER

## 2019-01-07 PROCEDURE — 4004F PT TOBACCO SCREEN RCVD TLK: CPT | Performed by: NURSE PRACTITIONER

## 2019-01-07 PROCEDURE — G8482 FLU IMMUNIZE ORDER/ADMIN: HCPCS | Performed by: NURSE PRACTITIONER

## 2019-01-07 PROCEDURE — G8427 DOCREV CUR MEDS BY ELIG CLIN: HCPCS | Performed by: NURSE PRACTITIONER

## 2019-01-07 PROCEDURE — G8417 CALC BMI ABV UP PARAM F/U: HCPCS | Performed by: NURSE PRACTITIONER

## 2019-01-07 RX ORDER — CIPROFLOXACIN HYDROCHLORIDE 3.5 MG/ML
SOLUTION/ DROPS TOPICAL
Qty: 1 BOTTLE | Refills: 2 | Status: SHIPPED | OUTPATIENT
Start: 2019-01-07 | End: 2019-02-14

## 2019-01-07 RX ORDER — CIPROFLOXACIN HYDROCHLORIDE 3.5 MG/ML
SOLUTION/ DROPS TOPICAL
Qty: 1 BOTTLE | Refills: 2 | Status: SHIPPED | OUTPATIENT
Start: 2019-01-07 | End: 2019-01-07 | Stop reason: SDUPTHER

## 2019-01-07 ASSESSMENT — ENCOUNTER SYMPTOMS
SORE THROAT: 0
ANAL BLEEDING: 0
CONSTIPATION: 0
TROUBLE SWALLOWING: 0
FACIAL SWELLING: 0
WHEEZING: 0
EYE PAIN: 0
RECTAL PAIN: 0
EYE ITCHING: 0
EYE REDNESS: 0
DIARRHEA: 0
SHORTNESS OF BREATH: 0
EYE DISCHARGE: 0
ABDOMINAL DISTENTION: 0
COLOR CHANGE: 0
VOMITING: 0
STRIDOR: 0
BACK PAIN: 0
ABDOMINAL PAIN: 0
BLOOD IN STOOL: 0
NAUSEA: 0
SINUS PRESSURE: 0
RHINORRHEA: 0
COUGH: 0
APNEA: 0
CHEST TIGHTNESS: 0
VOICE CHANGE: 0
CHOKING: 0
PHOTOPHOBIA: 0

## 2019-01-08 RX ORDER — PILOCARPINE HYDROCHLORIDE 5 MG/1
TABLET, FILM COATED ORAL
Qty: 30 TABLET | Refills: 0 | Status: SHIPPED | OUTPATIENT
Start: 2019-01-08 | End: 2019-01-23 | Stop reason: SDUPTHER

## 2019-01-09 LAB
AEROBIC CULTURE: NORMAL
GRAM STAIN RESULT: NORMAL

## 2019-01-10 RX ORDER — AMOXICILLIN AND CLAVULANATE POTASSIUM 875; 125 MG/1; MG/1
1 TABLET, FILM COATED ORAL 2 TIMES DAILY
Qty: 28 TABLET | Refills: 0 | Status: SHIPPED | OUTPATIENT
Start: 2019-01-10 | End: 2019-01-24

## 2019-01-23 ENCOUNTER — OFFICE VISIT (OUTPATIENT)
Dept: BEHAVIORAL/MENTAL HEALTH CLINIC | Age: 39
End: 2019-01-23
Payer: COMMERCIAL

## 2019-01-23 ENCOUNTER — TELEPHONE (OUTPATIENT)
Dept: ENT CLINIC | Age: 39
End: 2019-01-23

## 2019-01-23 ENCOUNTER — OFFICE VISIT (OUTPATIENT)
Dept: ENT CLINIC | Age: 39
End: 2019-01-23
Payer: COMMERCIAL

## 2019-01-23 VITALS
RESPIRATION RATE: 16 BRPM | TEMPERATURE: 98.1 F | WEIGHT: 293 LBS | SYSTOLIC BLOOD PRESSURE: 136 MMHG | DIASTOLIC BLOOD PRESSURE: 78 MMHG | HEART RATE: 68 BPM | BODY MASS INDEX: 51.69 KG/M2

## 2019-01-23 DIAGNOSIS — H66.011 ACUTE SUPPURATIVE OTITIS MEDIA OF RIGHT EAR WITH SPONTANEOUS RUPTURE OF TYMPANIC MEMBRANE, RECURRENCE NOT SPECIFIED: Primary | ICD-10-CM

## 2019-01-23 DIAGNOSIS — K11.7 XEROSTOMIA DUE TO HYPOSECRETION OF SALIVARY GLAND: ICD-10-CM

## 2019-01-23 DIAGNOSIS — H72.01 CENTRAL PERFORATION OF TYMPANIC MEMBRANE OF RIGHT EAR: ICD-10-CM

## 2019-01-23 DIAGNOSIS — F43.10 PTSD (POST-TRAUMATIC STRESS DISORDER): ICD-10-CM

## 2019-01-23 DIAGNOSIS — F32.2 MAJOR DEPRESSIVE DISORDER, SINGLE EPISODE, SEVERE WITH MELANCHOLIC FEATURES (HCC): Primary | ICD-10-CM

## 2019-01-23 PROCEDURE — 90832 PSYTX W PT 30 MINUTES: CPT | Performed by: PSYCHOLOGIST

## 2019-01-23 PROCEDURE — G8482 FLU IMMUNIZE ORDER/ADMIN: HCPCS | Performed by: NURSE PRACTITIONER

## 2019-01-23 PROCEDURE — 4004F PT TOBACCO SCREEN RCVD TLK: CPT | Performed by: NURSE PRACTITIONER

## 2019-01-23 PROCEDURE — 99213 OFFICE O/P EST LOW 20 MIN: CPT | Performed by: NURSE PRACTITIONER

## 2019-01-23 PROCEDURE — G8417 CALC BMI ABV UP PARAM F/U: HCPCS | Performed by: NURSE PRACTITIONER

## 2019-01-23 PROCEDURE — G8427 DOCREV CUR MEDS BY ELIG CLIN: HCPCS | Performed by: NURSE PRACTITIONER

## 2019-01-23 RX ORDER — PILOCARPINE HYDROCHLORIDE 5 MG/1
5 TABLET, FILM COATED ORAL 3 TIMES DAILY
Qty: 90 TABLET | Refills: 3 | Status: SHIPPED | OUTPATIENT
Start: 2019-01-23 | End: 2020-03-12

## 2019-01-23 ASSESSMENT — ENCOUNTER SYMPTOMS
CHOKING: 0
SINUS PRESSURE: 0
ABDOMINAL PAIN: 0
EYE PAIN: 0
ABDOMINAL DISTENTION: 0
PHOTOPHOBIA: 0
VOMITING: 0
SHORTNESS OF BREATH: 0
VOICE CHANGE: 0
CHEST TIGHTNESS: 0
TROUBLE SWALLOWING: 0
RECTAL PAIN: 0
ANAL BLEEDING: 0
STRIDOR: 0
EYE ITCHING: 0
SORE THROAT: 0
DIARRHEA: 0
WHEEZING: 0
CONSTIPATION: 0
BLOOD IN STOOL: 0
RHINORRHEA: 0
APNEA: 0
EYE REDNESS: 0
NAUSEA: 0
FACIAL SWELLING: 0
COLOR CHANGE: 0
BACK PAIN: 0
COUGH: 0
EYE DISCHARGE: 0

## 2019-01-23 ASSESSMENT — PATIENT HEALTH QUESTIONNAIRE - PHQ9
SUM OF ALL RESPONSES TO PHQ9 QUESTIONS 1 & 2: 5
3. TROUBLE FALLING OR STAYING ASLEEP: 3
5. POOR APPETITE OR OVEREATING: 3
4. FEELING TIRED OR HAVING LITTLE ENERGY: 3
1. LITTLE INTEREST OR PLEASURE IN DOING THINGS: 2
10. IF YOU CHECKED OFF ANY PROBLEMS, HOW DIFFICULT HAVE THESE PROBLEMS MADE IT FOR YOU TO DO YOUR WORK, TAKE CARE OF THINGS AT HOME, OR GET ALONG WITH OTHER PEOPLE: 2
SUM OF ALL RESPONSES TO PHQ QUESTIONS 1-9: 20
SUM OF ALL RESPONSES TO PHQ QUESTIONS 1-9: 20
6. FEELING BAD ABOUT YOURSELF - OR THAT YOU ARE A FAILURE OR HAVE LET YOURSELF OR YOUR FAMILY DOWN: 3
7. TROUBLE CONCENTRATING ON THINGS, SUCH AS READING THE NEWSPAPER OR WATCHING TELEVISION: 3
2. FEELING DOWN, DEPRESSED OR HOPELESS: 3
8. MOVING OR SPEAKING SO SLOWLY THAT OTHER PEOPLE COULD HAVE NOTICED. OR THE OPPOSITE, BEING SO FIGETY OR RESTLESS THAT YOU HAVE BEEN MOVING AROUND A LOT MORE THAN USUAL: 0
9. THOUGHTS THAT YOU WOULD BE BETTER OFF DEAD, OR OF HURTING YOURSELF: 0

## 2019-01-25 LAB
AEROBIC CULTURE: NORMAL
GRAM STAIN RESULT: NORMAL

## 2019-02-08 ENCOUNTER — OFFICE VISIT (OUTPATIENT)
Dept: PSYCHIATRY | Age: 39
End: 2019-02-08
Payer: COMMERCIAL

## 2019-02-08 ENCOUNTER — TELEPHONE (OUTPATIENT)
Dept: PSYCHIATRY | Age: 39
End: 2019-02-08

## 2019-02-08 DIAGNOSIS — F33.1 MODERATE EPISODE OF RECURRENT MAJOR DEPRESSIVE DISORDER (HCC): ICD-10-CM

## 2019-02-08 DIAGNOSIS — F43.10 PTSD (POST-TRAUMATIC STRESS DISORDER): Primary | ICD-10-CM

## 2019-02-08 PROCEDURE — 90833 PSYTX W PT W E/M 30 MIN: CPT | Performed by: PSYCHIATRY & NEUROLOGY

## 2019-02-08 PROCEDURE — G8417 CALC BMI ABV UP PARAM F/U: HCPCS | Performed by: PSYCHIATRY & NEUROLOGY

## 2019-02-08 PROCEDURE — 4004F PT TOBACCO SCREEN RCVD TLK: CPT | Performed by: PSYCHIATRY & NEUROLOGY

## 2019-02-08 PROCEDURE — G8482 FLU IMMUNIZE ORDER/ADMIN: HCPCS | Performed by: PSYCHIATRY & NEUROLOGY

## 2019-02-08 PROCEDURE — G8428 CUR MEDS NOT DOCUMENT: HCPCS | Performed by: PSYCHIATRY & NEUROLOGY

## 2019-02-08 PROCEDURE — 99213 OFFICE O/P EST LOW 20 MIN: CPT | Performed by: PSYCHIATRY & NEUROLOGY

## 2019-02-08 RX ORDER — PRAZOSIN HYDROCHLORIDE 2 MG/1
4 CAPSULE ORAL NIGHTLY
Qty: 60 CAPSULE | Refills: 3 | Status: SHIPPED | OUTPATIENT
Start: 2019-02-08 | End: 2019-02-08

## 2019-02-08 RX ORDER — PRAZOSIN HYDROCHLORIDE 1 MG/1
4 CAPSULE ORAL NIGHTLY
Qty: 120 CAPSULE | Refills: 3 | Status: SHIPPED | OUTPATIENT
Start: 2019-02-08 | End: 2019-12-11 | Stop reason: SDUPTHER

## 2019-02-11 LAB
FUNGUS IDENTIFIED: NORMAL
FUNGUS SMEAR: NORMAL

## 2019-02-13 ENCOUNTER — OFFICE VISIT (OUTPATIENT)
Dept: BEHAVIORAL/MENTAL HEALTH CLINIC | Age: 39
End: 2019-02-13
Payer: COMMERCIAL

## 2019-02-13 DIAGNOSIS — F43.10 PTSD (POST-TRAUMATIC STRESS DISORDER): ICD-10-CM

## 2019-02-13 DIAGNOSIS — F32.2 MAJOR DEPRESSIVE DISORDER, SINGLE EPISODE, SEVERE WITH MELANCHOLIC FEATURES (HCC): Primary | ICD-10-CM

## 2019-02-13 PROCEDURE — 90834 PSYTX W PT 45 MINUTES: CPT | Performed by: PSYCHOLOGIST

## 2019-02-14 RX ORDER — M-VIT,TX,IRON,MINS/CALC/FOLIC 27MG-0.4MG
1 TABLET ORAL DAILY
COMMUNITY
End: 2021-04-07

## 2019-02-14 RX ORDER — AMOXICILLIN AND CLAVULANATE POTASSIUM 875; 125 MG/1; MG/1
1 TABLET, FILM COATED ORAL 2 TIMES DAILY
COMMUNITY
End: 2019-03-14

## 2019-02-21 ENCOUNTER — ANESTHESIA EVENT (OUTPATIENT)
Dept: OPERATING ROOM | Age: 39
End: 2019-02-21
Payer: COMMERCIAL

## 2019-02-21 ENCOUNTER — HOSPITAL ENCOUNTER (OUTPATIENT)
Age: 39
Setting detail: OUTPATIENT SURGERY
Discharge: HOME | End: 2019-02-21
Attending: OBSTETRICS & GYNECOLOGY | Admitting: OBSTETRICS & GYNECOLOGY
Payer: COMMERCIAL

## 2019-02-21 ENCOUNTER — ANESTHESIA (OUTPATIENT)
Dept: OPERATING ROOM | Age: 39
End: 2019-02-21
Payer: COMMERCIAL

## 2019-02-21 VITALS
DIASTOLIC BLOOD PRESSURE: 79 MMHG | SYSTOLIC BLOOD PRESSURE: 144 MMHG | RESPIRATION RATE: 23 BRPM | OXYGEN SATURATION: 91 %

## 2019-02-21 VITALS
WEIGHT: 293 LBS | HEART RATE: 67 BPM | HEIGHT: 68 IN | RESPIRATION RATE: 12 BRPM | BODY MASS INDEX: 44.41 KG/M2 | OXYGEN SATURATION: 95 % | TEMPERATURE: 97 F | DIASTOLIC BLOOD PRESSURE: 93 MMHG | SYSTOLIC BLOOD PRESSURE: 132 MMHG

## 2019-02-21 PROBLEM — Z30.433 ENCOUNTER FOR REMOVAL AND REINSERTION OF INTRAUTERINE CONTRACEPTIVE DEVICE (IUD): Status: ACTIVE | Noted: 2019-02-21

## 2019-02-21 LAB — PREGNANCY, URINE: NEGATIVE

## 2019-02-21 PROCEDURE — 81025 URINE PREGNANCY TEST: CPT

## 2019-02-21 PROCEDURE — 3600000012 HC SURGERY LEVEL 2 ADDTL 15MIN: Performed by: OBSTETRICS & GYNECOLOGY

## 2019-02-21 PROCEDURE — 3600000002 HC SURGERY LEVEL 2 BASE: Performed by: OBSTETRICS & GYNECOLOGY

## 2019-02-21 PROCEDURE — 3700000001 HC ADD 15 MINUTES (ANESTHESIA): Performed by: OBSTETRICS & GYNECOLOGY

## 2019-02-21 PROCEDURE — 2580000003 HC RX 258: Performed by: OBSTETRICS & GYNECOLOGY

## 2019-02-21 PROCEDURE — 6360000002 HC RX W HCPCS: Performed by: REGISTERED NURSE

## 2019-02-21 PROCEDURE — 7100000011 HC PHASE II RECOVERY - ADDTL 15 MIN: Performed by: OBSTETRICS & GYNECOLOGY

## 2019-02-21 PROCEDURE — 7100000010 HC PHASE II RECOVERY - FIRST 15 MIN: Performed by: OBSTETRICS & GYNECOLOGY

## 2019-02-21 PROCEDURE — 3700000000 HC ANESTHESIA ATTENDED CARE: Performed by: OBSTETRICS & GYNECOLOGY

## 2019-02-21 PROCEDURE — 2709999900 HC NON-CHARGEABLE SUPPLY: Performed by: OBSTETRICS & GYNECOLOGY

## 2019-02-21 RX ORDER — SODIUM CHLORIDE 9 MG/ML
INJECTION, SOLUTION INTRAVENOUS CONTINUOUS
Status: DISCONTINUED | OUTPATIENT
Start: 2019-02-21 | End: 2019-02-21 | Stop reason: HOSPADM

## 2019-02-21 RX ORDER — IBUPROFEN 800 MG/1
800 TABLET ORAL EVERY 8 HOURS PRN
Status: DISCONTINUED | OUTPATIENT
Start: 2019-02-21 | End: 2019-02-21 | Stop reason: HOSPADM

## 2019-02-21 RX ORDER — ONDANSETRON 2 MG/ML
4 INJECTION INTRAMUSCULAR; INTRAVENOUS EVERY 6 HOURS PRN
Status: DISCONTINUED | OUTPATIENT
Start: 2019-02-21 | End: 2019-02-21 | Stop reason: HOSPADM

## 2019-02-21 RX ORDER — PROPOFOL 10 MG/ML
INJECTION, EMULSION INTRAVENOUS CONTINUOUS PRN
Status: DISCONTINUED | OUTPATIENT
Start: 2019-02-21 | End: 2019-02-21 | Stop reason: SDUPTHER

## 2019-02-21 RX ORDER — SODIUM CHLORIDE 0.9 % (FLUSH) 0.9 %
10 SYRINGE (ML) INJECTION PRN
Status: DISCONTINUED | OUTPATIENT
Start: 2019-02-21 | End: 2019-02-21 | Stop reason: HOSPADM

## 2019-02-21 RX ORDER — SODIUM CHLORIDE 0.9 % (FLUSH) 0.9 %
10 SYRINGE (ML) INJECTION EVERY 12 HOURS SCHEDULED
Status: DISCONTINUED | OUTPATIENT
Start: 2019-02-21 | End: 2019-02-21 | Stop reason: HOSPADM

## 2019-02-21 RX ORDER — DOCUSATE SODIUM 100 MG/1
100 CAPSULE, LIQUID FILLED ORAL 2 TIMES DAILY
Status: DISCONTINUED | OUTPATIENT
Start: 2019-02-21 | End: 2019-02-21 | Stop reason: HOSPADM

## 2019-02-21 RX ORDER — PROPOFOL 10 MG/ML
INJECTION, EMULSION INTRAVENOUS PRN
Status: DISCONTINUED | OUTPATIENT
Start: 2019-02-21 | End: 2019-02-21 | Stop reason: SDUPTHER

## 2019-02-21 RX ORDER — TRAMADOL HYDROCHLORIDE 50 MG/1
50 TABLET ORAL EVERY 6 HOURS PRN
Status: DISCONTINUED | OUTPATIENT
Start: 2019-02-21 | End: 2019-02-21 | Stop reason: HOSPADM

## 2019-02-21 RX ADMIN — SODIUM CHLORIDE: 9 INJECTION, SOLUTION INTRAVENOUS at 08:18

## 2019-02-21 RX ADMIN — PROPOFOL 100 MCG/KG/MIN: 10 INJECTION, EMULSION INTRAVENOUS at 08:22

## 2019-02-21 RX ADMIN — PROPOFOL 60 MG: 10 INJECTION, EMULSION INTRAVENOUS at 08:22

## 2019-02-21 ASSESSMENT — PULMONARY FUNCTION TESTS
PIF_VALUE: 0
PIF_VALUE: 1
PIF_VALUE: 0

## 2019-02-21 ASSESSMENT — PAIN - FUNCTIONAL ASSESSMENT: PAIN_FUNCTIONAL_ASSESSMENT: 0-10

## 2019-02-21 ASSESSMENT — PAIN SCALES - GENERAL: PAINLEVEL_OUTOF10: 0

## 2019-02-25 LAB
FUNGUS IDENTIFIED: NORMAL
FUNGUS SMEAR: NORMAL

## 2019-03-08 ENCOUNTER — OFFICE VISIT (OUTPATIENT)
Dept: PSYCHIATRY | Age: 39
End: 2019-03-08
Payer: COMMERCIAL

## 2019-03-08 ENCOUNTER — OFFICE VISIT (OUTPATIENT)
Dept: PSYCHOLOGY | Age: 39
End: 2019-03-08
Payer: COMMERCIAL

## 2019-03-08 DIAGNOSIS — F33.1 MAJOR DEPRESSIVE DISORDER, RECURRENT EPISODE, MODERATE (HCC): Primary | ICD-10-CM

## 2019-03-08 DIAGNOSIS — F43.10 PTSD (POST-TRAUMATIC STRESS DISORDER): ICD-10-CM

## 2019-03-08 DIAGNOSIS — F33.1 MODERATE EPISODE OF RECURRENT MAJOR DEPRESSIVE DISORDER (HCC): Primary | ICD-10-CM

## 2019-03-08 PROCEDURE — G8482 FLU IMMUNIZE ORDER/ADMIN: HCPCS | Performed by: PSYCHIATRY & NEUROLOGY

## 2019-03-08 PROCEDURE — 99213 OFFICE O/P EST LOW 20 MIN: CPT | Performed by: PSYCHIATRY & NEUROLOGY

## 2019-03-08 PROCEDURE — 90833 PSYTX W PT W E/M 30 MIN: CPT | Performed by: PSYCHIATRY & NEUROLOGY

## 2019-03-08 PROCEDURE — 90837 PSYTX W PT 60 MINUTES: CPT | Performed by: PSYCHOLOGIST

## 2019-03-08 PROCEDURE — 4004F PT TOBACCO SCREEN RCVD TLK: CPT | Performed by: PSYCHIATRY & NEUROLOGY

## 2019-03-08 PROCEDURE — G8428 CUR MEDS NOT DOCUMENT: HCPCS | Performed by: PSYCHIATRY & NEUROLOGY

## 2019-03-08 PROCEDURE — G8417 CALC BMI ABV UP PARAM F/U: HCPCS | Performed by: PSYCHIATRY & NEUROLOGY

## 2019-03-08 RX ORDER — FLUOXETINE HYDROCHLORIDE 20 MG/1
20 CAPSULE ORAL DAILY
Qty: 30 CAPSULE | Refills: 3 | Status: SHIPPED | OUTPATIENT
Start: 2019-03-08 | End: 2019-04-16 | Stop reason: SDUPTHER

## 2019-03-08 RX ORDER — CLONAZEPAM 0.5 MG/1
0.5 TABLET ORAL 2 TIMES DAILY PRN
Qty: 30 TABLET | Refills: 0 | Status: SHIPPED | OUTPATIENT
Start: 2019-03-08 | End: 2019-12-02 | Stop reason: SDUPTHER

## 2019-03-08 RX ORDER — FLUOXETINE HYDROCHLORIDE 40 MG/1
40 CAPSULE ORAL DAILY
Qty: 30 CAPSULE | Refills: 3 | Status: SHIPPED | OUTPATIENT
Start: 2019-03-08 | End: 2019-03-14

## 2019-03-14 ENCOUNTER — OFFICE VISIT (OUTPATIENT)
Dept: FAMILY MEDICINE CLINIC | Age: 39
End: 2019-03-14
Payer: COMMERCIAL

## 2019-03-14 VITALS
HEIGHT: 68 IN | HEART RATE: 74 BPM | TEMPERATURE: 98.1 F | DIASTOLIC BLOOD PRESSURE: 74 MMHG | RESPIRATION RATE: 12 BRPM | BODY MASS INDEX: 44.41 KG/M2 | OXYGEN SATURATION: 99 % | SYSTOLIC BLOOD PRESSURE: 100 MMHG | WEIGHT: 293 LBS

## 2019-03-14 DIAGNOSIS — B34.9 VIRAL ILLNESS: Primary | ICD-10-CM

## 2019-03-14 DIAGNOSIS — E03.9 HYPOTHYROIDISM, UNSPECIFIED TYPE: ICD-10-CM

## 2019-03-14 PROCEDURE — 99213 OFFICE O/P EST LOW 20 MIN: CPT | Performed by: FAMILY MEDICINE

## 2019-03-14 PROCEDURE — G8482 FLU IMMUNIZE ORDER/ADMIN: HCPCS | Performed by: FAMILY MEDICINE

## 2019-03-14 PROCEDURE — G8427 DOCREV CUR MEDS BY ELIG CLIN: HCPCS | Performed by: FAMILY MEDICINE

## 2019-03-14 PROCEDURE — G8417 CALC BMI ABV UP PARAM F/U: HCPCS | Performed by: FAMILY MEDICINE

## 2019-03-14 PROCEDURE — 4004F PT TOBACCO SCREEN RCVD TLK: CPT | Performed by: FAMILY MEDICINE

## 2019-03-14 RX ORDER — CHLORAL HYDRATE 500 MG
1000 CAPSULE ORAL DAILY
Qty: 30 CAPSULE | Refills: 2 | Status: SHIPPED | OUTPATIENT
Start: 2019-03-14 | End: 2019-07-29 | Stop reason: SDUPTHER

## 2019-03-14 RX ORDER — LEVOTHYROXINE SODIUM 0.05 MG/1
50 TABLET ORAL DAILY
Qty: 30 TABLET | Refills: 3 | Status: SHIPPED | OUTPATIENT
Start: 2019-03-14 | End: 2019-04-16 | Stop reason: SDUPTHER

## 2019-03-14 ASSESSMENT — ENCOUNTER SYMPTOMS
CONSTIPATION: 0
COUGH: 1
SHORTNESS OF BREATH: 1
DIARRHEA: 0
VOMITING: 0
EYE PAIN: 0
RHINORRHEA: 1
NAUSEA: 0
BLOOD IN STOOL: 0
SINUS PRESSURE: 1
ABDOMINAL PAIN: 0
TROUBLE SWALLOWING: 0
SINUS PAIN: 1

## 2019-04-01 ENCOUNTER — OFFICE VISIT (OUTPATIENT)
Dept: PSYCHIATRY | Age: 39
End: 2019-04-01
Payer: COMMERCIAL

## 2019-04-01 DIAGNOSIS — F33.41 RECURRENT MAJOR DEPRESSIVE DISORDER, IN PARTIAL REMISSION (HCC): Primary | ICD-10-CM

## 2019-04-01 DIAGNOSIS — F43.10 PTSD (POST-TRAUMATIC STRESS DISORDER): ICD-10-CM

## 2019-04-01 PROCEDURE — 90833 PSYTX W PT W E/M 30 MIN: CPT | Performed by: PSYCHIATRY & NEUROLOGY

## 2019-04-01 PROCEDURE — 99212 OFFICE O/P EST SF 10 MIN: CPT | Performed by: PSYCHIATRY & NEUROLOGY

## 2019-04-01 PROCEDURE — G8417 CALC BMI ABV UP PARAM F/U: HCPCS | Performed by: PSYCHIATRY & NEUROLOGY

## 2019-04-01 PROCEDURE — G8428 CUR MEDS NOT DOCUMENT: HCPCS | Performed by: PSYCHIATRY & NEUROLOGY

## 2019-04-01 PROCEDURE — 4004F PT TOBACCO SCREEN RCVD TLK: CPT | Performed by: PSYCHIATRY & NEUROLOGY

## 2019-04-01 NOTE — PROGRESS NOTES
PSYCHIATRIC FOLLOW UP NOTE     PATIENT NAME: Grace Freeman     : 1980   DATE of VISIT: 2019   Chief Complaint   Patient presents with    Depression          Interval History: Today I met with Ms. Leon Camilo in follow up. We spent 24 minutes on psychotherapy and the remainder on symptom evaluation and medication management. She has been doing some part time work at [employer] and reports that she made a friend. Mood and sleep seem somewhat improved. Notes that it is still a conscious effort to get out every day and be sociable. Current meds:   Current Outpatient Medications   Medication Sig Dispense Refill    levothyroxine (SYNTHROID) 50 MCG tablet Take 1 tablet by mouth Daily 30 tablet 3    Cholecalciferol (VITAMIN D3) 5000 units CAPS Take 1 capsule by mouth daily 30 capsule 3    Omega-3 Fatty Acids (FISH OIL) 1000 MG CAPS Take 1 capsule by mouth daily 30 capsule 2    FLUoxetine (PROZAC) 20 MG capsule Take 1 capsule by mouth daily (Patient taking differently: Take 60 mg by mouth daily ) 30 capsule 3    clonazePAM (KLONOPIN) 0.5 MG tablet Take 1 tablet by mouth 2 times daily as needed for Anxiety for up to 30 days. 30 tablet 0    Multiple Vitamins-Minerals (THERAPEUTIC MULTIVITAMIN-MINERALS) tablet Take 1 tablet by mouth daily      prazosin (MINIPRESS) 1 MG capsule Take 4 capsules by mouth nightly Take 1 capsule at night for 4 nights and increase by 1 mg every 4 nights up to a dose of 4 mg nightly 120 capsule 3    pilocarpine (SALAGEN) 5 MG tablet Take 1 tablet by mouth 3 times daily 90 tablet 3     No current facility-administered medications for this visit.          Mental Status Exam:   Appearance: Neatly groomed, appropriately dressed   Gait:normal  Behavior: Cooperative and Appropriate   Eye Contact: Maintained good eye contact   Psychomotor Activity: Normal   Speech: Rate, volume, and prosody were normal   Mood: Ok   Affect: Full range, appropriate, and mood congruent   Thought Process: Goal

## 2019-04-16 ENCOUNTER — OFFICE VISIT (OUTPATIENT)
Dept: FAMILY MEDICINE CLINIC | Age: 39
End: 2019-04-16
Payer: COMMERCIAL

## 2019-04-16 VITALS
TEMPERATURE: 97.8 F | DIASTOLIC BLOOD PRESSURE: 82 MMHG | OXYGEN SATURATION: 97 % | BODY MASS INDEX: 45.99 KG/M2 | SYSTOLIC BLOOD PRESSURE: 138 MMHG | WEIGHT: 293 LBS | HEART RATE: 75 BPM | HEIGHT: 67 IN

## 2019-04-16 DIAGNOSIS — F33.1 MODERATE EPISODE OF RECURRENT MAJOR DEPRESSIVE DISORDER (HCC): Primary | ICD-10-CM

## 2019-04-16 DIAGNOSIS — M54.2 NECK PAIN: ICD-10-CM

## 2019-04-16 DIAGNOSIS — H83.2X1 BALANCE PROBLEM DUE TO VESTIBULAR DYSFUNCTION OF RIGHT EAR: ICD-10-CM

## 2019-04-16 PROBLEM — H81.91: Status: ACTIVE | Noted: 2019-04-16

## 2019-04-16 PROCEDURE — G8417 CALC BMI ABV UP PARAM F/U: HCPCS | Performed by: FAMILY MEDICINE

## 2019-04-16 PROCEDURE — 4004F PT TOBACCO SCREEN RCVD TLK: CPT | Performed by: FAMILY MEDICINE

## 2019-04-16 PROCEDURE — G8427 DOCREV CUR MEDS BY ELIG CLIN: HCPCS | Performed by: FAMILY MEDICINE

## 2019-04-16 PROCEDURE — 99214 OFFICE O/P EST MOD 30 MIN: CPT | Performed by: FAMILY MEDICINE

## 2019-04-16 RX ORDER — LEVOTHYROXINE SODIUM 0.05 MG/1
50 TABLET ORAL DAILY
Qty: 30 TABLET | Refills: 3 | Status: SHIPPED | OUTPATIENT
Start: 2019-04-16 | End: 2019-10-23 | Stop reason: SDUPTHER

## 2019-04-16 RX ORDER — FLUOXETINE HYDROCHLORIDE 20 MG/1
60 CAPSULE ORAL DAILY
Qty: 90 CAPSULE | Refills: 3 | Status: SHIPPED | OUTPATIENT
Start: 2019-04-16 | End: 2019-12-11 | Stop reason: DRUGHIGH

## 2019-04-16 NOTE — PROGRESS NOTES
[address] [facility] [address] [facility]  [address]  Dept: [phone]  Dept Fax: [phone]  Loc: [address] Maintenance Due   Topic Date Due    Cervical cancer screen  05/25/2000 830           Patient:  Mino Freeman  Visit Date: 4/16/2019    ANTICIPATORY GUIDANCE : ADULT     WELL QUESTIONS  1. How many cups of caffeine do you drink per day? 2 cups of coffee   2. Do you exercise a minimum of 30 minutes per day, above normal activity? No, on average the patient performs 0 minutes of exercise per day    3. Do you eat a minimum of 8-10 servings of fruits and vegetables per day? No, on average the patient consumes 0 servings of fruits and vegetables per day  4. Do you drink alcohol? No  5.  How many oz of water do you drink per day?  (64 oz per day recommended) 2 bottles   EDUCATION PROVIDED  Discussed and/or Handout Given on the following items:            [] Caffeine Use: Limit to 2 cups per day   (400mg of caffeine a day)     [] Exercise: Ideal 30 minutes per day, above normal activity              [] Eating Fruits and Vegetables: Studies show 8-10 servings per day decrease BP  [] Alcohol: Ideal maximum of one cup per day for females and two cups per day for a male         Dylan Coles is a 43 y.o. female who presents today for:  Chief Complaint   Patient presents with    Annual Exam       Goals      Exercise 3x per week (30 min per time)           HPI:     HPI   Her balance has not been well - she needs a nubbin for the cane - wondering how to get that - her cane slips = was told she has the vestibular issues and is a fall risk - and was told to get a cane - did not get a prescription -but wondering about it.    taking only the 40mg of the prozac the last few weeks as she did not have the extra 20mg to take - she can't find the bottle of the 20s - she had a better response to the 60mg - and will see Dr Cooper Sensing mouth daily 30 capsule 2    Multiple Vitamins-Minerals (THERAPEUTIC MULTIVITAMIN-MINERALS) tablet Take 1 tablet by mouth daily Indications: once weekly when remembered       pilocarpine (SALAGEN) 5 MG tablet Take 1 tablet by mouth 3 times daily 90 tablet 3    clonazePAM (KLONOPIN) 0.5 MG tablet Take 1 tablet by mouth 2 times daily as needed for Anxiety for up to 30 days. 30 tablet 0    prazosin (MINIPRESS) 1 MG capsule Take 4 capsules by mouth nightly Take 1 capsule at night for 4 nights and increase by 1 mg every 4 nights up to a dose of 4 mg nightly 120 capsule 3     No current facility-administered medications for this visit. No Known Allergies    Health Maintenance   Topic Date Due    Cervical cancer screen  05/25/2000    DTaP/Tdap/Td vaccine (2 - Td) 10/06/2027    Flu vaccine  Completed    Pneumococcal 0-64 years Vaccine  Completed    HIV screen  Completed       Subjective:      Review of Systems   Constitutional: Negative for chills, fatigue and fever. HENT: Negative for ear pain, postnasal drip and trouble swallowing. Eyes: Negative for pain and visual disturbance. Respiratory: Negative for cough and shortness of breath. Cardiovascular: Negative for chest pain and palpitations. Gastrointestinal: Negative for abdominal pain, blood in stool, constipation, diarrhea, nausea and vomiting. Musculoskeletal: Positive for myalgias and neck pain. Skin: Negative for rash. Neurological: Negative for headaches. Objective:     Vitals:    04/16/19 1415 04/16/19 1532   BP: (!) 133/91 138/82   Site: Left Upper Arm Left Upper Arm   Position: Sitting Sitting   Cuff Size: Large Adult Large Adult   Pulse: 75    Temp: 97.8 °F (36.6 °C)    TempSrc: Oral    SpO2: 97%    Weight: (!) 316 lb 6.4 oz (143.5 kg)    Height: 5' 6.54\" (1.69 m)        Body mass index is 50.25 kg/m².     Wt Readings from Last 3 Encounters:   04/16/19 (!) 316 lb 6.4 oz (143.5 kg)   03/14/19 (!) 320 lb (145.2 kg)   02/21/19 (!) 326 lb 9.6 oz (148.1 kg)     BP Readings from Last 3 Encounters:   04/16/19 138/82   03/14/19 100/74   02/21/19 (!) 132/93       Physical Exam   Constitutional: She is oriented to person, place, and time. She appears well-developed and well-nourished. No distress. HENT:   Head: Normocephalic and atraumatic. Right Ear: External ear normal.   Left Ear: External ear normal.   Eyes: Conjunctivae and EOM are normal. Right eye exhibits no discharge. Left eye exhibits no discharge. No scleral icterus. Neck: Normal range of motion. Cardiovascular: Normal rate, regular rhythm and normal heart sounds. No murmur heard. Pulmonary/Chest: Effort normal and breath sounds normal.   Musculoskeletal: She exhibits no edema. Neurological: She is alert and oriented to person, place, and time. No cranial nerve deficit. Skin: Skin is warm and dry. No rash noted. She is not diaphoretic. No erythema. Psychiatric: She has a normal mood and affect. Her behavior is normal. Judgment and thought content normal.   Nursing note and vitals reviewed. Lab Results   Component Value Date    WBC 8.4 12/06/2018    HGB 13.4 12/06/2018    HCT 41.2 12/06/2018     12/06/2018    CHOL 156 12/06/2018    TRIG 111 12/06/2018    HDL 36 12/06/2018    LDLCALC 98 12/06/2018    AST 13 12/06/2018     12/06/2018    K 4.3 12/06/2018     12/06/2018    CREATININE 0.8 12/06/2018    BUN 15 12/06/2018    CO2 26 12/06/2018    TSH 5.000 (H) 12/06/2018    LABA1C 5.6 11/07/2018    LABGLOM 80 (A) 12/06/2018    MG 2.3 12/06/2018    CALCIUM 9.5 12/06/2018    VITD25 15 (L) 12/06/2018       Imaging Results:    No results found. Assessment:      Diagnosis Orders   1. Moderate episode of recurrent major depressive disorder ([address]     2. Neck pain  XR CERVICAL SPINE (2-3 VIEWS)   3. Balance problem due to vestibular dysfunction of right ear  Cane adjustable narrow base quad       Plan:          Will call in the 60mg of the prozac    Will do the synthroid refill and check the labs    Can see you back in a month    Will do the narrow based cane - can call if you need some PT for the vestibular problems but will order the PT for the neck pain and spasms    Will keep an eye on the blood pressure - and may be thinking about the possibility of a medication - will ask how old your mom was when she started with medication       No follow-ups on file. Orders Placed:  Orders Placed This Encounter   Procedures    XR CERVICAL SPINE (2-3 VIEWS)    Cane adjustable narrow base quad     Medications Prescribed:  Orders Placed This Encounter   Medications    levothyroxine (SYNTHROID) 50 MCG tablet     Sig: Take 1 tablet by mouth Daily     Dispense:  30 tablet     Refill:  3    FLUoxetine (PROZAC) 20 MG capsule     Sig: Take 3 capsules by mouth daily     Dispense:  90 capsule     Refill:  3       Future Appointments   Date Time Provider Aure Torres   4/22/2019  1:00 PM TIMOTHY Nogueira.RUTH [address] [facility]   5/1/2019  2:00 PM Kobi Webb MD [address] [place] [address] Dr CARLOS Looney   8/8/2019  7:30 AM DO ALOK Adamson [facility] [place]   4/27/2020  2:30 PM DO ALOK Adamson [facility] [address] [facility]       Patient given educational materials - see patient instructions. Discussed use, benefit, and sideeffects of prescribed medications. All patient questions answered. Pt voiced understanding. Reviewed health maintenance. Instructed to continue current medications, diet and exercise. Patient agreed with treatment plan. Follow up as directed.      Electronically signed by Isaías Thapa DO on 4/17/2019 at 7:28 AM

## 2019-04-16 NOTE — PROGRESS NOTES
Pharmacy Medication History Note      List of current medications patient is taking is complete. Source of information: Patient    Medications removed (include reason, ex. therapy complete or physician discontinued):  · none    Medications added/doses adjusted:  · none    Other notes (ex. Recent course of antibiotics, Coumadin dosing):  · Patient has been taking 40 mg of Prozac instead of prescribed 60mg (20mg and 40mg tablet)- she states she lost her 20 mg tablets  · Patient only takes multivitamin about once weekly, she forgets to take it  · Patient often forgets to take evening dose of prazosin as well  · Patient has an eye drop to help with dry eye but does not recall what brand  · Denies use of other OTC or herbal medications.       Allergies reviewed

## 2019-04-17 ASSESSMENT — ENCOUNTER SYMPTOMS
BLOOD IN STOOL: 0
NAUSEA: 0
SHORTNESS OF BREATH: 0
ABDOMINAL PAIN: 0
DIARRHEA: 0
VOMITING: 0
CONSTIPATION: 0
COUGH: 0
TROUBLE SWALLOWING: 0
EYE PAIN: 0

## 2019-05-01 ENCOUNTER — OFFICE VISIT (OUTPATIENT)
Dept: PSYCHIATRY | Age: 39
End: 2019-05-01
Payer: COMMERCIAL

## 2019-05-01 DIAGNOSIS — F33.41 RECURRENT MAJOR DEPRESSIVE DISORDER, IN PARTIAL REMISSION (HCC): Primary | ICD-10-CM

## 2019-05-01 DIAGNOSIS — F43.10 PTSD (POST-TRAUMATIC STRESS DISORDER): ICD-10-CM

## 2019-05-01 PROCEDURE — 4004F PT TOBACCO SCREEN RCVD TLK: CPT | Performed by: PSYCHIATRY & NEUROLOGY

## 2019-05-01 PROCEDURE — G8428 CUR MEDS NOT DOCUMENT: HCPCS | Performed by: PSYCHIATRY & NEUROLOGY

## 2019-05-01 PROCEDURE — G8417 CALC BMI ABV UP PARAM F/U: HCPCS | Performed by: PSYCHIATRY & NEUROLOGY

## 2019-05-01 PROCEDURE — 99213 OFFICE O/P EST LOW 20 MIN: CPT | Performed by: PSYCHIATRY & NEUROLOGY

## 2019-05-01 PROCEDURE — 90833 PSYTX W PT W E/M 30 MIN: CPT | Performed by: PSYCHIATRY & NEUROLOGY
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
## 2019-05-24 ENCOUNTER — OFFICE VISIT (OUTPATIENT)
Dept: PSYCHOLOGY | Age: 39
End: 2019-05-24
Payer: COMMERCIAL

## 2019-05-24 DIAGNOSIS — F33.1 MAJOR DEPRESSIVE DISORDER, RECURRENT EPISODE, MODERATE (HCC): Primary | ICD-10-CM

## 2019-05-24 PROCEDURE — 90834 PSYTX W PT 45 MINUTES: CPT | Performed by: PSYCHOLOGIST

## 2019-05-24 NOTE — PROGRESS NOTES
to the emergency room and/or call 911 if any suicidal or homicidal ideations. Patient stated understanding and is agreeable to treatment and crisis plan. Provider Signature:  Electronically signed by Roman Apgar, PSY. D on 2019 at 10:40 AM

## 2019-06-03 ENCOUNTER — TELEPHONE (OUTPATIENT)
Dept: PSYCHIATRY | Age: 39
End: 2019-06-03

## 2019-06-12 ENCOUNTER — OFFICE VISIT (OUTPATIENT)
Dept: PSYCHIATRY | Age: 39
End: 2019-06-12
Payer: COMMERCIAL

## 2019-06-12 DIAGNOSIS — F33.0 MILD EPISODE OF RECURRENT MAJOR DEPRESSIVE DISORDER (HCC): Primary | ICD-10-CM

## 2019-06-12 DIAGNOSIS — F43.10 PTSD (POST-TRAUMATIC STRESS DISORDER): ICD-10-CM

## 2019-06-12 PROCEDURE — G8417 CALC BMI ABV UP PARAM F/U: HCPCS | Performed by: PSYCHIATRY & NEUROLOGY

## 2019-06-12 PROCEDURE — 99213 OFFICE O/P EST LOW 20 MIN: CPT | Performed by: PSYCHIATRY & NEUROLOGY

## 2019-06-12 PROCEDURE — G8428 CUR MEDS NOT DOCUMENT: HCPCS | Performed by: PSYCHIATRY & NEUROLOGY

## 2019-06-12 PROCEDURE — 4004F PT TOBACCO SCREEN RCVD TLK: CPT | Performed by: PSYCHIATRY & NEUROLOGY

## 2019-06-12 PROCEDURE — 90833 PSYTX W PT W E/M 30 MIN: CPT | Performed by: PSYCHIATRY & NEUROLOGY

## 2019-06-12 NOTE — PROGRESS NOTES
PSYCHIATRIC FOLLOW UP NOTE     PATIENT NAME: Christina Freeman     : 1980   DATE of VISIT: 2019   Chief Complaint   Patient presents with    Depression          Interval History: Today I met with Ms. Amara Kuhn in follow up. We spent 27 minutes on psychotherapy and the remainder of the time on symptom evaluation and medication management. Sleep ok, appetite ok, pushing herself to participate in activities and be socially active. For the most part is coping and feeling accomplished but spends occassional days inside unable to leave the house       Current meds:   Current Outpatient Medications   Medication Sig Dispense Refill    levothyroxine (SYNTHROID) 50 MCG tablet Take 1 tablet by mouth Daily 30 tablet 3    FLUoxetine (PROZAC) 20 MG capsule Take 3 capsules by mouth daily 90 capsule 3    Cholecalciferol (VITAMIN D3) 5000 units CAPS Take 1 capsule by mouth daily 30 capsule 3    Omega-3 Fatty Acids (FISH OIL) 1000 MG CAPS Take 1 capsule by mouth daily 30 capsule 2    clonazePAM (KLONOPIN) 0.5 MG tablet Take 1 tablet by mouth 2 times daily as needed for Anxiety for up to 30 days. 30 tablet 0    Multiple Vitamins-Minerals (THERAPEUTIC MULTIVITAMIN-MINERALS) tablet Take 1 tablet by mouth daily Indications: once weekly when remembered       prazosin (MINIPRESS) 1 MG capsule Take 4 capsules by mouth nightly Take 1 capsule at night for 4 nights and increase by 1 mg every 4 nights up to a dose of 4 mg nightly 120 capsule 3    pilocarpine (SALAGEN) 5 MG tablet Take 1 tablet by mouth 3 times daily 90 tablet 3     No current facility-administered medications for this visit.          Mental Status Exam:   Appearance: Neatly groomed, appropriately dressed   Gait:walks with cane  Behavior: Cooperative and Appropriate   Eye Contact: Maintained good eye contact   Psychomotor Activity: Normal   Speech: Rate, volume, and prosody were normal   Mood: Ok   Affect: Full range, appropriate, and mood congruent   Thought

## 2019-07-16 ENCOUNTER — OFFICE VISIT (OUTPATIENT)
Dept: PSYCHIATRY | Age: 39
End: 2019-07-16
Payer: COMMERCIAL

## 2019-07-16 DIAGNOSIS — F33.41 RECURRENT MAJOR DEPRESSIVE DISORDER, IN PARTIAL REMISSION (HCC): Primary | ICD-10-CM

## 2019-07-16 DIAGNOSIS — F43.10 PTSD (POST-TRAUMATIC STRESS DISORDER): ICD-10-CM

## 2019-07-16 PROCEDURE — 99213 OFFICE O/P EST LOW 20 MIN: CPT | Performed by: PSYCHIATRY & NEUROLOGY

## 2019-07-16 PROCEDURE — G8428 CUR MEDS NOT DOCUMENT: HCPCS | Performed by: PSYCHIATRY & NEUROLOGY

## 2019-07-16 PROCEDURE — G8417 CALC BMI ABV UP PARAM F/U: HCPCS | Performed by: PSYCHIATRY & NEUROLOGY

## 2019-07-16 PROCEDURE — 4004F PT TOBACCO SCREEN RCVD TLK: CPT | Performed by: PSYCHIATRY & NEUROLOGY

## 2019-07-16 PROCEDURE — 90833 PSYTX W PT W E/M 30 MIN: CPT | Performed by: PSYCHIATRY & NEUROLOGY

## 2019-07-26 ENCOUNTER — OFFICE VISIT (OUTPATIENT)
Dept: PSYCHOLOGY | Age: 39
End: 2019-07-26
Payer: COMMERCIAL

## 2019-07-26 DIAGNOSIS — F33.1 MAJOR DEPRESSIVE DISORDER, RECURRENT EPISODE, MODERATE (HCC): Primary | ICD-10-CM

## 2019-07-26 PROCEDURE — 90834 PSYTX W PT 45 MINUTES: CPT | Performed by: PSYCHOLOGIST

## 2019-07-29 RX ORDER — OMEGA-3 FATTY ACIDS/FISH OIL 300-500 MG
CAPSULE ORAL
Qty: 30 CAPSULE | Refills: 2 | Status: SHIPPED | OUTPATIENT
Start: 2019-07-29 | End: 2020-03-13

## 2019-08-08 ENCOUNTER — TELEPHONE (OUTPATIENT)
Dept: FAMILY MEDICINE CLINIC | Age: 39
End: 2019-08-08

## 2019-08-08 ENCOUNTER — TELEPHONE (OUTPATIENT)
Dept: PSYCHIATRY | Age: 39
End: 2019-08-08

## 2019-08-08 ENCOUNTER — TELEPHONE (OUTPATIENT)
Dept: ENT CLINIC | Age: 39
End: 2019-08-08

## 2019-08-08 ENCOUNTER — NURSE ONLY (OUTPATIENT)
Dept: LAB | Age: 39
End: 2019-08-08

## 2019-08-08 ENCOUNTER — OFFICE VISIT (OUTPATIENT)
Dept: FAMILY MEDICINE CLINIC | Age: 39
End: 2019-08-08
Payer: COMMERCIAL

## 2019-08-08 VITALS
DIASTOLIC BLOOD PRESSURE: 86 MMHG | HEART RATE: 73 BPM | BODY MASS INDEX: 45.99 KG/M2 | RESPIRATION RATE: 12 BRPM | HEIGHT: 67 IN | SYSTOLIC BLOOD PRESSURE: 115 MMHG | TEMPERATURE: 98.4 F | WEIGHT: 293 LBS | OXYGEN SATURATION: 98 %

## 2019-08-08 DIAGNOSIS — E03.9 HYPOTHYROIDISM, UNSPECIFIED TYPE: ICD-10-CM

## 2019-08-08 DIAGNOSIS — R13.12 OROPHARYNGEAL DYSPHAGIA: Primary | ICD-10-CM

## 2019-08-08 DIAGNOSIS — M54.2 NECK PAIN: ICD-10-CM

## 2019-08-08 DIAGNOSIS — F33.1 MODERATE EPISODE OF RECURRENT MAJOR DEPRESSIVE DISORDER (HCC): ICD-10-CM

## 2019-08-08 DIAGNOSIS — G47.39 OTHER SLEEP APNEA: ICD-10-CM

## 2019-08-08 DIAGNOSIS — M25.552 PAIN OF LEFT HIP JOINT: ICD-10-CM

## 2019-08-08 DIAGNOSIS — K04.7 TOOTH INFECTION: ICD-10-CM

## 2019-08-08 DIAGNOSIS — C80.1 ADENOID CYSTIC CARCINOMA (HCC): ICD-10-CM

## 2019-08-08 DIAGNOSIS — E03.9 ACQUIRED HYPOTHYROIDISM: ICD-10-CM

## 2019-08-08 DIAGNOSIS — E55.9 VITAMIN D DEFICIENCY: ICD-10-CM

## 2019-08-08 DIAGNOSIS — R53.83 FATIGUE, UNSPECIFIED TYPE: ICD-10-CM

## 2019-08-08 DIAGNOSIS — E78.6 LOW HDL (UNDER 40): ICD-10-CM

## 2019-08-08 DIAGNOSIS — H83.2X1 BALANCE PROBLEM DUE TO VESTIBULAR DYSFUNCTION OF RIGHT EAR: ICD-10-CM

## 2019-08-08 LAB
THYROXINE (T4): 6.3 UG/DL (ref 4.5–12)
TSH SERPL DL<=0.05 MIU/L-ACNC: 4.74 UIU/ML (ref 0.4–4.2)

## 2019-08-08 PROCEDURE — 99214 OFFICE O/P EST MOD 30 MIN: CPT | Performed by: FAMILY MEDICINE

## 2019-08-08 PROCEDURE — G8428 CUR MEDS NOT DOCUMENT: HCPCS | Performed by: FAMILY MEDICINE

## 2019-08-08 PROCEDURE — 4004F PT TOBACCO SCREEN RCVD TLK: CPT | Performed by: FAMILY MEDICINE

## 2019-08-08 PROCEDURE — G8417 CALC BMI ABV UP PARAM F/U: HCPCS | Performed by: FAMILY MEDICINE

## 2019-08-08 RX ORDER — CAPSAICIN 0.025 %
CREAM (GRAM) TOPICAL
Qty: 1 TUBE | Refills: 1 | Status: SHIPPED | OUTPATIENT
Start: 2019-08-08 | End: 2019-09-07

## 2019-08-08 RX ORDER — ACETAMINOPHEN 500 MG
1000 TABLET ORAL EVERY 6 HOURS PRN
COMMUNITY

## 2019-08-08 RX ORDER — AMOXICILLIN 500 MG/1
500 CAPSULE ORAL 3 TIMES DAILY
Qty: 30 CAPSULE | Refills: 0 | Status: SHIPPED | OUTPATIENT
Start: 2019-08-08 | End: 2019-08-18

## 2019-08-08 RX ORDER — IBUPROFEN 200 MG
600 TABLET ORAL EVERY 6 HOURS PRN
COMMUNITY

## 2019-08-08 ASSESSMENT — ENCOUNTER SYMPTOMS
EYE PAIN: 0
BLOOD IN STOOL: 0
SHORTNESS OF BREATH: 0
CONSTIPATION: 0
ABDOMINAL PAIN: 0
COUGH: 0
VOMITING: 0
TROUBLE SWALLOWING: 0
NAUSEA: 0
DIARRHEA: 0

## 2019-08-08 NOTE — TELEPHONE ENCOUNTER
Advised pt of results and she is taking with all her other pills. I advised her to take 1 hour before anything else and without food. She will start that tomorrow.

## 2019-08-08 NOTE — TELEPHONE ENCOUNTER
Shorty Mayorga, I spoke to Yasmany Queen and told her I would find something for her next week and that you would call her in the morning. Can you please offer her next Wednesday 8/14 at 2?   Thank you

## 2019-08-08 NOTE — PROGRESS NOTES
graft) - radiation    TYMPANOSTOMY TUBE PLACEMENT      age 14years old   [facility] WISDOM TOOTH EXTRACTION  2018     Family History   Problem Relation Age of Onset    Diabetes Mother     Arthritis Mother     Obesity Mother     Depression Mother     No Known Problems Father     Depression Brother      Social History     Tobacco Use    Smoking status: Current Every Day Smoker     Packs/day: 0.50     Years: 20.00     Pack years: 10.00     Types: Cigarettes     Start date: 4/16/1999    Smokeless tobacco: Never Used    Tobacco comment: wearing the patch for the first time today   Substance Use Topics    Alcohol use: Yes     Comment: social      Current Outpatient Medications   Medication Sig Dispense Refill    acetaminophen (TYLENOL) 500 MG tablet Take 1,000 mg by mouth every 6 hours as needed for Pain      ibuprofen (ADVIL;MOTRIN) 200 MG tablet Take 600 mg by mouth every 6 hours as needed for Pain      amoxicillin (AMOXIL) 500 MG capsule Take 1 capsule by mouth 3 times daily for 10 days 30 capsule 0    capsaicin (ZOSTRIX) 0.025 % cream Apply topically 2 times daily. 1 Tube 1    Omega-3 Fatty Acids (RA FISH OIL) 1000 MG CAPS take 1 capsule by mouth once daily 30 capsule 2    levothyroxine (SYNTHROID) 50 MCG tablet Take 1 tablet by mouth Daily 30 tablet 3    FLUoxetine (PROZAC) 20 MG capsule Take 3 capsules by mouth daily 90 capsule 3    Cholecalciferol (VITAMIN D3) 5000 units CAPS Take 1 capsule by mouth daily 30 capsule 3    clonazePAM (KLONOPIN) 0.5 MG tablet Take 1 tablet by mouth 2 times daily as needed for Anxiety for up to 30 days.  30 tablet 0    pilocarpine (SALAGEN) 5 MG tablet Take 1 tablet by mouth 3 times daily 90 tablet 3    Multiple Vitamins-Minerals (THERAPEUTIC MULTIVITAMIN-MINERALS) tablet Take 1 tablet by mouth daily Indications: once weekly when remembered       prazosin (MINIPRESS) 1 MG capsule Take 4 capsules by mouth nightly Take 1 capsule at night for 4 nights and increase by 1 mg every 4 nights up to a dose of 4 mg nightly (Patient not taking: Reported on 8/8/2019) 120 capsule 3     No current facility-administered medications for this visit. No Known Allergies    Health Maintenance   Topic Date Due    Cervical cancer screen  05/25/2000    Flu vaccine (1) 09/01/2019    Lipid screen  12/06/2023    DTaP/Tdap/Td vaccine (2 - Td) 10/06/2027    Pneumococcal 0-64 years Vaccine  Completed    HIV screen  Completed       Subjective:      Review of Systems   Constitutional: Positive for fatigue. Negative for chills and fever. HENT: Negative for ear pain, postnasal drip and trouble swallowing. Eyes: Negative for pain and visual disturbance. Respiratory: Negative for cough and shortness of breath. Cardiovascular: Negative for chest pain and palpitations. Gastrointestinal: Negative for abdominal pain, blood in stool, constipation, diarrhea, nausea and vomiting. Skin: Negative for rash. Neurological: Negative for headaches. Sleep apnea she had also before thesurgery    Objective:     Vitals:    08/08/19 0737   BP: 115/86   Pulse: 73   Resp: 12   Temp: 98.4 °F (36.9 °C)   TempSrc: Oral   SpO2: 98%   Weight: (!) 315 lb 12.8 oz (143.2 kg)   Height: 5' 6.54\" (1.69 m)       Body mass index is 50.16 kg/m². Wt Readings from Last 3 Encounters:   08/08/19 (!) 315 lb 12.8 oz (143.2 kg)   04/16/19 (!) 316 lb 6.4 oz (143.5 kg)   03/14/19 (!) 320 lb (145.2 kg)     BP Readings from Last 3 Encounters:   08/08/19 115/86   04/16/19 138/82   03/14/19 100/74       Physical Exam   Constitutional: She is oriented to person, place, and time. She appears well-developed and well-nourished. No distress. HENT:   Head: Normocephalic and atraumatic. Right Ear: External ear normal.   Left Ear: External ear normal.   Eyes: Conjunctivae and EOM are normal. Right eye exhibits no discharge. Left eye exhibits no discharge. No scleral icterus. Neck: Normal range of motion.    Cardiovascular: Normal plan.Follow up as directed.      Electronically signed by Yareli Mccray DO on 8/8/2019 at 8:25 AM

## 2019-08-08 NOTE — PATIENT INSTRUCTIONS
range-of-motion exercises. Ask your doctor for exercises that will make the muscles around the hip joint stronger. Do these as directed. · You can slowly return to the activity that caused the pain, but do it with less effort until you can do it without pain or swelling. Be sure to warm up before and stretch after you do the activity. When should you call for help? Call your doctor now or seek immediate medical care if:    · You have a fever.     · You have increased swelling or redness in your hip.     · You cannot use your hip, or the pain in your hip gets worse.    Watch closely for changes in your health, and be sure to contact your doctor if:    · You have pain for 2 weeks or longer despite home treatment. Where can you learn more? Go to [email or web address] and sign in to your [employer] account. Enter D036 in the Wireless Environment box to learn more about \"Hip Bursitis: Care Instructions. \"     If you do not have an account, please click on the \"Sign Up Now\" link. Current as of: September 20, 2018  Content Version: 12.1  © 5795-8268 [employer]. Care instructions adapted under license by [facility] ([facility]). If you have questions about a medical condition or this instruction, always ask your healthcare professional. [address] any warranty or liability for your use of this information. Patient Education        Hip Bursitis: Exercises  Introduction  Here are some examples of exercises for you to try. The exercises may be suggested for a condition or for rehabilitation. Start each exercise slowly. Ease off the exercises if you start to have pain. You will be told when to start these exercises and which ones will work best for you. How to do the exercises  Hip rotator stretch    1. Lie on your back with both knees bent and your feet flat on the floor. 2. Put the ankle of your affected leg on your opposite thigh near your knee.   3. Use your

## 2019-08-08 NOTE — TELEPHONE ENCOUNTER
----- Message from Fco Moeller DO sent at 8/8/2019  5:16 PM EDT -----  tsh is still a little high which means we may need to increase the synthroid dose - or we can ask if you are taking it with food? Ideally you would take it 1 hour before any other meds or food . . .

## 2019-08-14 ENCOUNTER — OFFICE VISIT (OUTPATIENT)
Dept: PSYCHIATRY | Age: 39
End: 2019-08-14
Payer: COMMERCIAL

## 2019-08-14 DIAGNOSIS — F43.10 PTSD (POST-TRAUMATIC STRESS DISORDER): ICD-10-CM

## 2019-08-14 DIAGNOSIS — F33.1 MODERATE EPISODE OF RECURRENT MAJOR DEPRESSIVE DISORDER (HCC): Primary | ICD-10-CM

## 2019-08-14 PROCEDURE — 4004F PT TOBACCO SCREEN RCVD TLK: CPT | Performed by: PSYCHIATRY & NEUROLOGY

## 2019-08-14 PROCEDURE — G8417 CALC BMI ABV UP PARAM F/U: HCPCS | Performed by: PSYCHIATRY & NEUROLOGY

## 2019-08-14 PROCEDURE — G8428 CUR MEDS NOT DOCUMENT: HCPCS | Performed by: PSYCHIATRY & NEUROLOGY

## 2019-08-14 PROCEDURE — 90833 PSYTX W PT W E/M 30 MIN: CPT | Performed by: PSYCHIATRY & NEUROLOGY

## 2019-08-14 PROCEDURE — 99213 OFFICE O/P EST LOW 20 MIN: CPT | Performed by: PSYCHIATRY & NEUROLOGY

## 2019-08-16 ENCOUNTER — OFFICE VISIT (OUTPATIENT)
Dept: PSYCHOLOGY | Age: 39
End: 2019-08-16
Payer: COMMERCIAL

## 2019-08-16 DIAGNOSIS — F33.1 MAJOR DEPRESSIVE DISORDER, RECURRENT EPISODE, MODERATE (HCC): Primary | ICD-10-CM

## 2019-08-16 PROCEDURE — 90834 PSYTX W PT 45 MINUTES: CPT | Performed by: PSYCHOLOGIST

## 2019-08-16 NOTE — PATIENT INSTRUCTIONS
1.  \"Life is short, so here's the deal, don't be fake, just be real.\"    2.  Return to see Dr. Gaudencio Jeronimo in 2-4 weeks.

## 2019-09-05 RX ORDER — CHOLECALCIFEROL (VITAMIN D3) 125 MCG
CAPSULE ORAL
Qty: 30 CAPSULE | Refills: 3 | Status: SHIPPED | OUTPATIENT
Start: 2019-09-05 | End: 2020-03-13

## 2019-09-13 ENCOUNTER — OFFICE VISIT (OUTPATIENT)
Dept: ENT CLINIC | Age: 39
End: 2019-09-13
Payer: COMMERCIAL

## 2019-09-13 VITALS
SYSTOLIC BLOOD PRESSURE: 122 MMHG | DIASTOLIC BLOOD PRESSURE: 70 MMHG | HEART RATE: 68 BPM | RESPIRATION RATE: 20 BRPM | BODY MASS INDEX: 45.99 KG/M2 | TEMPERATURE: 98 F | HEIGHT: 67 IN | WEIGHT: 293 LBS

## 2019-09-13 DIAGNOSIS — H91.91 HEARING LOSS OF RIGHT EAR, UNSPECIFIED HEARING LOSS TYPE: ICD-10-CM

## 2019-09-13 DIAGNOSIS — C80.1 ADENOID CYSTIC CARCINOMA (HCC): ICD-10-CM

## 2019-09-13 DIAGNOSIS — T66.XXXD ADVERSE EFFECT OF RADIATION, SUBSEQUENT ENCOUNTER: ICD-10-CM

## 2019-09-13 DIAGNOSIS — H65.21 RIGHT CHRONIC SEROUS OTITIS MEDIA: ICD-10-CM

## 2019-09-13 DIAGNOSIS — H61.21 IMPACTED CERUMEN OF RIGHT EAR: ICD-10-CM

## 2019-09-13 DIAGNOSIS — R13.12 OROPHARYNGEAL DYSPHAGIA: Primary | ICD-10-CM

## 2019-09-13 PROCEDURE — 4004F PT TOBACCO SCREEN RCVD TLK: CPT | Performed by: OTOLARYNGOLOGY

## 2019-09-13 PROCEDURE — G8417 CALC BMI ABV UP PARAM F/U: HCPCS | Performed by: OTOLARYNGOLOGY

## 2019-09-13 PROCEDURE — 92504 EAR MICROSCOPY EXAMINATION: CPT | Performed by: OTOLARYNGOLOGY

## 2019-09-13 PROCEDURE — 31575 DIAGNOSTIC LARYNGOSCOPY: CPT | Performed by: OTOLARYNGOLOGY

## 2019-09-13 PROCEDURE — G8427 DOCREV CUR MEDS BY ELIG CLIN: HCPCS | Performed by: OTOLARYNGOLOGY

## 2019-09-13 PROCEDURE — 99214 OFFICE O/P EST MOD 30 MIN: CPT | Performed by: OTOLARYNGOLOGY

## 2019-09-13 ASSESSMENT — ENCOUNTER SYMPTOMS
VOMITING: 0
TROUBLE SWALLOWING: 0
SHORTNESS OF BREATH: 0
VOICE CHANGE: 0
SINUS PRESSURE: 0
FACIAL SWELLING: 0
CHOKING: 0
ABDOMINAL PAIN: 0
COLOR CHANGE: 0
SORE THROAT: 0
DIARRHEA: 0
CHEST TIGHTNESS: 0
NAUSEA: 0
WHEEZING: 0
RHINORRHEA: 0
COUGH: 0
STRIDOR: 0
APNEA: 0

## 2019-09-13 NOTE — PROGRESS NOTES
[facility]  [facility]  Dept: [phone]  Dept Fax: [phone]  Loc: [phone]    Yony Macias is a 35 y.o. female who was referred Pop Lawrence DO for:  Chief Complaint   Patient presents with   [facility] Check-Up     Patient here due to Oropharyngeal dysphagia and Adenoid cystic carcinoma   . HPI:     Yony Macias is a 35 y.o. female who presents today for evaluation of her ears. She has Oropharyngeal dysphagia and adenoid cystic carcinoma. She has hearing loss. She has balance issues and uses a cane. She had SNHL and fluid in ear. She did not have a tube put in her ear. When she blew her nose she could hear the fhole. Went on antibiotic. She had a hearing test about a year ago and thinks her hearing has gotten worse. She has ringing in her ears and pain. No dramatic increase in pain. She can't keep her ear dry. She has a hole in her ear. Her ear canal is small and builds up wax. She last used antibiotic ear drops 9 months ago with Dr. Kingsley Key. They did a full nerve graft. It has been since 2011. Her right eye and eye lid does not close all the way. She used paper tape when she was first out of surgery. She sleeps with a sleeping mask and uses eye drops. She can close her right side of her mouth better. She has trouble swallowing soft foods and hard foods. When she swallows it feels like it is stuck or getting caught in throat. She is fine when she drinks liquids. Her throat is constantly dry. Her teeth are deteorating. She did not have floride treatments. She did not have them removed before surgery. She had hyperbariac treatments. She is still following with [facility] with her cancer. She has a follow up in October. She cannot find a dentist in [facility] that accepts her insurance. Trouble swallowing in the last 2 years. Due to treatments.   She SURGERY CENTER OR    SELECTIVE NECK DISSECTION Right 10/2011    Radiacal Neck for CA    TUMOR EXCISION  2011    excision salivary gland (radical neck disection & nerve graft) - radiation    TYMPANOSTOMY TUBE PLACEMENT      age 14years old   [facility] WISDOM TOOTH EXTRACTION  2018     Family History   Problem Relation Age of Onset    Diabetes Mother     Arthritis Mother     Obesity Mother     Depression Mother     No Known Problems Father     Depression Brother      Social History     Tobacco Use    Smoking status: Current Every Day Smoker     Packs/day: 0.50     Years: 20.00     Pack years: 10.00     Types: Cigarettes     Start date: 4/16/1999    Smokeless tobacco: Never Used    Tobacco comment: wearing the patch for the first time today   Substance Use Topics    Alcohol use: Yes     Comment: social       Subjective:       Review of Systems   Constitutional: Negative for activity change, appetite change, chills, diaphoresis, fatigue, fever and unexpected weight change. HENT: Negative for congestion, dental problem, ear discharge, ear pain, facial swelling, hearing loss, mouth sores, nosebleeds, postnasal drip, rhinorrhea, sinus pressure, sneezing, sore throat, tinnitus, trouble swallowing and voice change. Eyes: Negative for visual disturbance. Respiratory: Negative for apnea, cough, choking, chest tightness, shortness of breath, wheezing and stridor. Cardiovascular: Negative for chest pain, palpitations and leg swelling. Gastrointestinal: Negative for abdominal pain, diarrhea, nausea and vomiting. Endocrine: Negative for cold intolerance, heat intolerance, polydipsia and polyuria. Genitourinary: Negative for dysuria, enuresis and hematuria. Musculoskeletal: Negative for arthralgias, gait problem, neck pain and neck stiffness. Skin: Negative for color change and rash. Allergic/Immunologic: Negative for environmental allergies, food allergies and immunocompromised state.    Neurological: Negative for dizziness, syncope, facial asymmetry, speech difficulty, light-headedness and headaches. Hematological: Negative for adenopathy. Does not bruise/bleed easily. Psychiatric/Behavioral: Negative for confusion and sleep disturbance. The patient is not nervous/anxious. Objective:     /70 (Site: Right Lower Arm, Position: Sitting, Cuff Size: Medium Adult)   Pulse 68   Temp 98 °F (36.7 °C) (Oral)   Resp 20   Ht 5' 6.5\" (1.689 m)   Wt (!) 321 lb 6.4 oz (145.8 kg)   BMI 51.10 kg/m²     Physical Exam   Constitutional: She is oriented to person, place, and time. She appears well-developed and well-nourished. She is cooperative. HENT:   Head: Normocephalic and atraumatic. Head is without laceration. Right Ear: Hearing, external ear and ear canal normal. No drainage or swelling. Tympanic membrane is not perforated and not erythematous. No middle ear effusion. Left Ear: Hearing, tympanic membrane, external ear and ear canal normal. No drainage or swelling. Tympanic membrane is not perforated and not erythematous. No middle ear effusion. Nose: Nose normal. No mucosal edema, rhinorrhea or septal deviation. Mouth/Throat: Uvula is midline, oropharynx is clear and moist and mucous membranes are normal. Mucous membranes are not pale and not dry. No oral lesions. No uvula swelling. No oropharyngeal exudate, posterior oropharyngeal edema or posterior oropharyngeal erythema. Ears: Right cerumen impaction, small external auditory canal.  Turbinates: normal  LIps: lips normal    Teeth and gums:poor dentition   Mallampati 1  Tonsils:Unremarkable  Base of tongue: symmetric,  Larynx, mirror exam: unable due to gag reflex     Eyes: Right eye exhibits normal extraocular motion and no nystagmus. Left eye exhibits normal extraocular motion and no nystagmus. Conjugate gaze   Neck: Trachea normal and phonation normal. Neck supple. No tracheal deviation present.  No thyroid mass and no thyromegaly

## 2019-09-18 RX ORDER — FLUOXETINE HYDROCHLORIDE 40 MG/1
CAPSULE ORAL
Qty: 30 CAPSULE | Refills: 3 | Status: SHIPPED | OUTPATIENT
Start: 2019-09-18 | End: 2019-12-11

## 2019-10-02 ENCOUNTER — HOSPITAL ENCOUNTER (OUTPATIENT)
Dept: AUDIOLOGY | Age: 39
Discharge: HOME | End: 2019-10-02
Payer: COMMERCIAL

## 2019-10-02 PROCEDURE — 92567 TYMPANOMETRY: CPT | Performed by: AUDIOLOGIST

## 2019-10-02 PROCEDURE — 92557 COMPREHENSIVE HEARING TEST: CPT | Performed by: AUDIOLOGIST

## 2019-10-04 ENCOUNTER — OFFICE VISIT (OUTPATIENT)
Dept: ENT CLINIC | Age: 39
End: 2019-10-04
Payer: COMMERCIAL

## 2019-10-04 VITALS
WEIGHT: 293 LBS | SYSTOLIC BLOOD PRESSURE: 104 MMHG | TEMPERATURE: 98 F | DIASTOLIC BLOOD PRESSURE: 82 MMHG | RESPIRATION RATE: 14 BRPM | HEART RATE: 64 BPM | BODY MASS INDEX: 45.99 KG/M2 | HEIGHT: 67 IN

## 2019-10-04 DIAGNOSIS — H72.01 CENTRAL PERFORATION OF TYMPANIC MEMBRANE OF RIGHT EAR: ICD-10-CM

## 2019-10-04 DIAGNOSIS — H90.A31 MIXED CONDUCTIVE AND SENSORINEURAL HEARING LOSS OF RIGHT EAR WITH RESTRICTED HEARING OF LEFT EAR: Primary | ICD-10-CM

## 2019-10-04 DIAGNOSIS — T66.XXXD ADVERSE EFFECT OF RADIATION, SUBSEQUENT ENCOUNTER: ICD-10-CM

## 2019-10-04 DIAGNOSIS — H61.21 IMPACTED CERUMEN OF RIGHT EAR: ICD-10-CM

## 2019-10-04 DIAGNOSIS — Z85.818: ICD-10-CM

## 2019-10-04 PROCEDURE — G8417 CALC BMI ABV UP PARAM F/U: HCPCS | Performed by: OTOLARYNGOLOGY

## 2019-10-04 PROCEDURE — G8484 FLU IMMUNIZE NO ADMIN: HCPCS | Performed by: OTOLARYNGOLOGY

## 2019-10-04 PROCEDURE — 4004F PT TOBACCO SCREEN RCVD TLK: CPT | Performed by: OTOLARYNGOLOGY

## 2019-10-04 PROCEDURE — 69210 REMOVE IMPACTED EAR WAX UNI: CPT | Performed by: OTOLARYNGOLOGY

## 2019-10-04 PROCEDURE — G8427 DOCREV CUR MEDS BY ELIG CLIN: HCPCS | Performed by: OTOLARYNGOLOGY

## 2019-10-04 PROCEDURE — 99213 OFFICE O/P EST LOW 20 MIN: CPT | Performed by: OTOLARYNGOLOGY

## 2019-10-04 ASSESSMENT — ENCOUNTER SYMPTOMS
ABDOMINAL PAIN: 0
SINUS PRESSURE: 0
APNEA: 0
CHEST TIGHTNESS: 0
RHINORRHEA: 0
SHORTNESS OF BREATH: 0
DIARRHEA: 0
COLOR CHANGE: 0
WHEEZING: 0
SORE THROAT: 0
COUGH: 0
NAUSEA: 0
STRIDOR: 0
FACIAL SWELLING: 0
VOICE CHANGE: 0
VOMITING: 0
TROUBLE SWALLOWING: 0
CHOKING: 0

## 2019-10-23 ENCOUNTER — TELEPHONE (OUTPATIENT)
Dept: FAMILY MEDICINE CLINIC | Age: 39
End: 2019-10-23

## 2019-10-23 RX ORDER — LEVOTHYROXINE SODIUM 0.05 MG/1
TABLET ORAL
Qty: 30 TABLET | Refills: 3 | Status: SHIPPED | OUTPATIENT
Start: 2019-10-23 | End: 2020-03-27 | Stop reason: SDUPTHER

## 2019-11-08 ENCOUNTER — OFFICE VISIT (OUTPATIENT)
Dept: ENT CLINIC | Age: 39
End: 2019-11-08
Payer: COMMERCIAL

## 2019-11-08 ENCOUNTER — HOSPITAL ENCOUNTER (OUTPATIENT)
Dept: AUDIOLOGY | Age: 39
Discharge: HOME | End: 2019-11-08
Payer: COMMERCIAL

## 2019-11-08 VITALS
SYSTOLIC BLOOD PRESSURE: 110 MMHG | HEIGHT: 67 IN | WEIGHT: 293 LBS | HEART RATE: 72 BPM | TEMPERATURE: 98.1 F | DIASTOLIC BLOOD PRESSURE: 80 MMHG | BODY MASS INDEX: 45.99 KG/M2 | RESPIRATION RATE: 20 BRPM

## 2019-11-08 DIAGNOSIS — Z85.818: ICD-10-CM

## 2019-11-08 DIAGNOSIS — H93.291 IMPAIRMENT OF AUDITORY DISCRIMINATION OF RIGHT EAR: ICD-10-CM

## 2019-11-08 DIAGNOSIS — C80.1 ADENOID CYSTIC CARCINOMA (HCC): ICD-10-CM

## 2019-11-08 DIAGNOSIS — H90.A31 MIXED CONDUCTIVE AND SENSORINEURAL HEARING LOSS OF RIGHT EAR WITH RESTRICTED HEARING OF LEFT EAR: Primary | ICD-10-CM

## 2019-11-08 DIAGNOSIS — H65.31 CHRONIC MUCOID OTITIS MEDIA, RIGHT EAR: ICD-10-CM

## 2019-11-08 DIAGNOSIS — H73.891 CRUSTED TYMPANIC MEMBRANE, RIGHT: ICD-10-CM

## 2019-11-08 PROCEDURE — G8427 DOCREV CUR MEDS BY ELIG CLIN: HCPCS | Performed by: OTOLARYNGOLOGY

## 2019-11-08 PROCEDURE — 92567 TYMPANOMETRY: CPT | Performed by: AUDIOLOGIST

## 2019-11-08 PROCEDURE — G8417 CALC BMI ABV UP PARAM F/U: HCPCS | Performed by: OTOLARYNGOLOGY

## 2019-11-08 PROCEDURE — 99213 OFFICE O/P EST LOW 20 MIN: CPT | Performed by: OTOLARYNGOLOGY

## 2019-11-08 PROCEDURE — 4004F PT TOBACCO SCREEN RCVD TLK: CPT | Performed by: OTOLARYNGOLOGY

## 2019-11-08 PROCEDURE — 92504 EAR MICROSCOPY EXAMINATION: CPT | Performed by: OTOLARYNGOLOGY

## 2019-11-08 PROCEDURE — G8484 FLU IMMUNIZE NO ADMIN: HCPCS | Performed by: OTOLARYNGOLOGY

## 2019-11-08 PROCEDURE — 92557 COMPREHENSIVE HEARING TEST: CPT | Performed by: AUDIOLOGIST

## 2019-11-08 ASSESSMENT — ENCOUNTER SYMPTOMS
STRIDOR: 0
CHOKING: 0
DIARRHEA: 0
VOMITING: 0
VOICE CHANGE: 0
RHINORRHEA: 0
FACIAL SWELLING: 0
SHORTNESS OF BREATH: 0
WHEEZING: 0
CHEST TIGHTNESS: 0
TROUBLE SWALLOWING: 0
APNEA: 0
SORE THROAT: 0
SINUS PRESSURE: 0
COUGH: 0
COLOR CHANGE: 0
NAUSEA: 0
ABDOMINAL PAIN: 0

## 2019-11-14 ENCOUNTER — TELEPHONE (OUTPATIENT)
Dept: ENT CLINIC | Age: 39
End: 2019-11-14

## 2019-11-18 ENCOUNTER — OFFICE VISIT (OUTPATIENT)
Dept: ENT CLINIC | Age: 39
End: 2019-11-18
Payer: COMMERCIAL

## 2019-11-18 DIAGNOSIS — H92.11 OTORRHEA OF RIGHT EAR: Primary | ICD-10-CM

## 2019-11-18 PROCEDURE — G8417 CALC BMI ABV UP PARAM F/U: HCPCS | Performed by: PHYSICIAN ASSISTANT

## 2019-11-18 PROCEDURE — G8484 FLU IMMUNIZE NO ADMIN: HCPCS | Performed by: PHYSICIAN ASSISTANT

## 2019-11-18 PROCEDURE — 4004F PT TOBACCO SCREEN RCVD TLK: CPT | Performed by: PHYSICIAN ASSISTANT

## 2019-11-18 PROCEDURE — G8428 CUR MEDS NOT DOCUMENT: HCPCS | Performed by: PHYSICIAN ASSISTANT

## 2019-11-18 PROCEDURE — 99212 OFFICE O/P EST SF 10 MIN: CPT | Performed by: PHYSICIAN ASSISTANT

## 2019-11-18 RX ORDER — CIPROFLOXACIN HYDROCHLORIDE 3.5 MG/ML
4 SOLUTION/ DROPS TOPICAL 2 TIMES DAILY
Qty: 5 ML | Refills: 1 | Status: SHIPPED | OUTPATIENT
Start: 2019-11-18 | End: 2020-12-14

## 2019-11-18 ASSESSMENT — ENCOUNTER SYMPTOMS
ALLERGIC/IMMUNOLOGIC NEGATIVE: 1
CHOKING: 0
DIARRHEA: 0
SINUS PRESSURE: 0
COUGH: 0
SHORTNESS OF BREATH: 0
GASTROINTESTINAL NEGATIVE: 1
COLOR CHANGE: 0
WHEEZING: 0
EYE PAIN: 0
SORE THROAT: 0
CHEST TIGHTNESS: 0
FACIAL SWELLING: 0
ABDOMINAL PAIN: 0
TROUBLE SWALLOWING: 0
EYE DISCHARGE: 0
APNEA: 0
RESPIRATORY NEGATIVE: 1
STRIDOR: 0
RHINORRHEA: 0
NAUSEA: 0
VOMITING: 0
VOICE CHANGE: 0

## 2019-11-23 LAB
AEROBIC CULTURE: NORMAL
ANAEROBIC CULTURE: NORMAL
GRAM STAIN RESULT: NORMAL

## 2019-12-02 DIAGNOSIS — F43.10 PTSD (POST-TRAUMATIC STRESS DISORDER): ICD-10-CM

## 2019-12-02 DIAGNOSIS — F33.1 MODERATE EPISODE OF RECURRENT MAJOR DEPRESSIVE DISORDER (HCC): ICD-10-CM

## 2019-12-02 RX ORDER — PRAZOSIN HYDROCHLORIDE 1 MG/1
4 CAPSULE ORAL NIGHTLY
Qty: 120 CAPSULE | Refills: 3 | OUTPATIENT
Start: 2019-12-02

## 2019-12-02 RX ORDER — CLONAZEPAM 0.5 MG/1
0.5 TABLET ORAL 2 TIMES DAILY PRN
Qty: 20 TABLET | Refills: 0 | Status: SHIPPED | OUTPATIENT
Start: 2019-12-02 | End: 2019-12-11 | Stop reason: SDUPTHER

## 2019-12-04 ENCOUNTER — TELEPHONE (OUTPATIENT)
Dept: ENT CLINIC | Age: 39
End: 2019-12-04

## 2019-12-11 ENCOUNTER — OFFICE VISIT (OUTPATIENT)
Dept: PSYCHIATRY | Age: 39
End: 2019-12-11
Payer: COMMERCIAL

## 2019-12-11 ENCOUNTER — OFFICE VISIT (OUTPATIENT)
Dept: FAMILY MEDICINE CLINIC | Age: 39
End: 2019-12-11
Payer: COMMERCIAL

## 2019-12-11 VITALS
TEMPERATURE: 98.1 F | DIASTOLIC BLOOD PRESSURE: 84 MMHG | WEIGHT: 293 LBS | HEIGHT: 66 IN | BODY MASS INDEX: 47.09 KG/M2 | OXYGEN SATURATION: 99 % | RESPIRATION RATE: 12 BRPM | SYSTOLIC BLOOD PRESSURE: 100 MMHG | HEART RATE: 74 BPM

## 2019-12-11 DIAGNOSIS — Z23 NEED FOR INFLUENZA VACCINATION: ICD-10-CM

## 2019-12-11 DIAGNOSIS — E03.9 ACQUIRED HYPOTHYROIDISM: ICD-10-CM

## 2019-12-11 DIAGNOSIS — H83.2X1 BALANCE PROBLEM DUE TO VESTIBULAR DYSFUNCTION OF RIGHT EAR: ICD-10-CM

## 2019-12-11 DIAGNOSIS — R53.1 WEAKNESS: ICD-10-CM

## 2019-12-11 DIAGNOSIS — R26.9 GAIT ABNORMALITY: ICD-10-CM

## 2019-12-11 DIAGNOSIS — M54.2 NECK PAIN: ICD-10-CM

## 2019-12-11 DIAGNOSIS — F43.10 PTSD (POST-TRAUMATIC STRESS DISORDER): ICD-10-CM

## 2019-12-11 DIAGNOSIS — R53.83 FATIGUE, UNSPECIFIED TYPE: ICD-10-CM

## 2019-12-11 DIAGNOSIS — Z12.39 BREAST CANCER SCREENING: ICD-10-CM

## 2019-12-11 DIAGNOSIS — G47.39 OTHER SLEEP APNEA: ICD-10-CM

## 2019-12-11 DIAGNOSIS — C80.1 ADENOID CYSTIC CARCINOMA (HCC): ICD-10-CM

## 2019-12-11 DIAGNOSIS — C07 CANCER OF PAROTID GLAND (HCC): ICD-10-CM

## 2019-12-11 DIAGNOSIS — G89.29 CHRONIC RIGHT SHOULDER PAIN: ICD-10-CM

## 2019-12-11 DIAGNOSIS — M25.50 POLYARTHRALGIA: ICD-10-CM

## 2019-12-11 DIAGNOSIS — M25.511 CHRONIC RIGHT SHOULDER PAIN: ICD-10-CM

## 2019-12-11 DIAGNOSIS — F33.1 MODERATE EPISODE OF RECURRENT MAJOR DEPRESSIVE DISORDER (HCC): ICD-10-CM

## 2019-12-11 DIAGNOSIS — R13.12 OROPHARYNGEAL DYSPHAGIA: Primary | ICD-10-CM

## 2019-12-11 PROCEDURE — 4004F PT TOBACCO SCREEN RCVD TLK: CPT | Performed by: PSYCHIATRY & NEUROLOGY

## 2019-12-11 PROCEDURE — G8482 FLU IMMUNIZE ORDER/ADMIN: HCPCS | Performed by: PSYCHIATRY & NEUROLOGY

## 2019-12-11 PROCEDURE — 99213 OFFICE O/P EST LOW 20 MIN: CPT | Performed by: PSYCHIATRY & NEUROLOGY

## 2019-12-11 PROCEDURE — 4004F PT TOBACCO SCREEN RCVD TLK: CPT | Performed by: NURSE PRACTITIONER

## 2019-12-11 PROCEDURE — G8417 CALC BMI ABV UP PARAM F/U: HCPCS | Performed by: NURSE PRACTITIONER

## 2019-12-11 PROCEDURE — 99214 OFFICE O/P EST MOD 30 MIN: CPT | Performed by: NURSE PRACTITIONER

## 2019-12-11 PROCEDURE — 90686 IIV4 VACC NO PRSV 0.5 ML IM: CPT | Performed by: NURSE PRACTITIONER

## 2019-12-11 PROCEDURE — 90471 IMMUNIZATION ADMIN: CPT | Performed by: NURSE PRACTITIONER

## 2019-12-11 PROCEDURE — G8417 CALC BMI ABV UP PARAM F/U: HCPCS | Performed by: PSYCHIATRY & NEUROLOGY

## 2019-12-11 PROCEDURE — G8482 FLU IMMUNIZE ORDER/ADMIN: HCPCS | Performed by: NURSE PRACTITIONER

## 2019-12-11 PROCEDURE — 90833 PSYTX W PT W E/M 30 MIN: CPT | Performed by: PSYCHIATRY & NEUROLOGY

## 2019-12-11 PROCEDURE — G8428 CUR MEDS NOT DOCUMENT: HCPCS | Performed by: PSYCHIATRY & NEUROLOGY

## 2019-12-11 PROCEDURE — G8427 DOCREV CUR MEDS BY ELIG CLIN: HCPCS | Performed by: NURSE PRACTITIONER

## 2019-12-11 RX ORDER — FLUOXETINE HYDROCHLORIDE 40 MG/1
40 CAPSULE ORAL DAILY
Qty: 30 CAPSULE | Refills: 3 | Status: SHIPPED | OUTPATIENT
Start: 2019-12-11 | End: 2020-02-19 | Stop reason: SDUPTHER

## 2019-12-11 RX ORDER — CLONAZEPAM 0.5 MG/1
0.5 TABLET ORAL 2 TIMES DAILY PRN
Qty: 60 TABLET | Refills: 1 | Status: SHIPPED | OUTPATIENT
Start: 2019-12-11 | End: 2021-03-11 | Stop reason: SDUPTHER

## 2019-12-11 RX ORDER — FLUOXETINE HYDROCHLORIDE 20 MG/1
20 CAPSULE ORAL DAILY
Qty: 30 CAPSULE | Refills: 3 | Status: SHIPPED | OUTPATIENT
Start: 2019-12-11 | End: 2020-02-19 | Stop reason: SDUPTHER

## 2019-12-11 RX ORDER — PRAZOSIN HYDROCHLORIDE 2 MG/1
4 CAPSULE ORAL NIGHTLY
Qty: 60 CAPSULE | Refills: 2 | Status: SHIPPED | OUTPATIENT
Start: 2019-12-11 | End: 2020-02-19 | Stop reason: SDUPTHER

## 2019-12-12 PROBLEM — R26.9 GAIT ABNORMALITY: Status: ACTIVE | Noted: 2019-12-12

## 2019-12-12 PROBLEM — C07 CANCER OF PAROTID GLAND (HCC): Status: ACTIVE | Noted: 2019-12-12

## 2019-12-12 PROBLEM — R53.83 FATIGUE: Status: ACTIVE | Noted: 2019-12-12

## 2019-12-12 ASSESSMENT — ENCOUNTER SYMPTOMS
BLOOD IN STOOL: 0
SINUS PRESSURE: 1
COUGH: 0
ANAL BLEEDING: 0
RHINORRHEA: 0
EYE DISCHARGE: 0
SHORTNESS OF BREATH: 0
CONSTIPATION: 0
NAUSEA: 0
COLOR CHANGE: 0
SINUS PAIN: 1
ABDOMINAL PAIN: 0
DIARRHEA: 0
EYE REDNESS: 0
TROUBLE SWALLOWING: 1
ABDOMINAL DISTENTION: 0
SORE THROAT: 0

## 2019-12-23 LAB
FUNGUS IDENTIFIED: NORMAL
FUNGUS SMEAR: NORMAL

## 2019-12-27 ENCOUNTER — OFFICE VISIT (OUTPATIENT)
Dept: PSYCHOLOGY | Age: 39
End: 2019-12-27
Payer: COMMERCIAL

## 2019-12-27 DIAGNOSIS — F33.1 MAJOR DEPRESSIVE DISORDER, RECURRENT EPISODE, MODERATE (HCC): Primary | ICD-10-CM

## 2019-12-27 PROCEDURE — 90834 PSYTX W PT 45 MINUTES: CPT | Performed by: PSYCHOLOGIST

## 2020-01-20 ENCOUNTER — OFFICE VISIT (OUTPATIENT)
Dept: PSYCHIATRY | Age: 40
End: 2020-01-20
Payer: COMMERCIAL

## 2020-01-20 PROCEDURE — 4004F PT TOBACCO SCREEN RCVD TLK: CPT | Performed by: PSYCHIATRY & NEUROLOGY

## 2020-01-20 PROCEDURE — G8428 CUR MEDS NOT DOCUMENT: HCPCS | Performed by: PSYCHIATRY & NEUROLOGY

## 2020-01-20 PROCEDURE — G8417 CALC BMI ABV UP PARAM F/U: HCPCS | Performed by: PSYCHIATRY & NEUROLOGY

## 2020-01-20 PROCEDURE — G8482 FLU IMMUNIZE ORDER/ADMIN: HCPCS | Performed by: PSYCHIATRY & NEUROLOGY

## 2020-01-20 PROCEDURE — 90833 PSYTX W PT W E/M 30 MIN: CPT | Performed by: PSYCHIATRY & NEUROLOGY

## 2020-01-20 PROCEDURE — 99213 OFFICE O/P EST LOW 20 MIN: CPT | Performed by: PSYCHIATRY & NEUROLOGY

## 2020-01-30 ENCOUNTER — OFFICE VISIT (OUTPATIENT)
Dept: PSYCHOLOGY | Age: 40
End: 2020-01-30
Payer: COMMERCIAL

## 2020-01-30 PROCEDURE — 90837 PSYTX W PT 60 MINUTES: CPT | Performed by: PSYCHOLOGIST

## 2020-01-30 NOTE — PROGRESS NOTES
Behavioral Health Consultation/Psychotherapy Note  Evangelist Chau. Jolly Armando Psy.D. Visit Date:  1/30/2020    Patient:  Gino Lechuga  YOB: 1980  Chief Complaint:  No chief complaint on file. Duration of session:  60 minutes      S:     Ct said she's not been doing the best.  One of the major stressors she's dealing with now is her friend who lives in the neighboring [facility], who has exhibited some recent psychotic symptoms. She knows he needs psychiatric help but she's afraid to tell him since she believes he will think she's part of \"the enemy. \"  She said he's made references to angels telling him he's not bryan, and not mentally ill, and how demons try to tell him he is ill and needs help. She said he has a history of bizarre behavior, but not the delusional thinking like this. She's been avoiding him and doesn't know what to do. We discussed how it may be difficult but it will be best for both if she tells him the truth, even if it costs her a friendship. She wanted to think things over. 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 She clarified that the money she makes from this job is put towards her own poetry submissions to [employer] for [employer]. 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         O:    Appearance    Patient presents as alert, oriented, and cooperative, with moderate distress, tearful  Appetite abnormal: fluctuates, but often low  Sleep disturbance Yes  Loss of pleasure Yes  Speech    normal rate, normal volume, well articulated and clear and understandable  Mood    Depressed  Low self-esteem  Affect    depressed affect  Thought Process    linear, goal directed, coherent and linear and coherent  Insight    Great  Judgment    Intact  Memory    recent and remote memory intact  Suicide Assessment    no suicidal ideations      A:    1. Major depressive disorder, recurrent episode, moderate ([address]      Pt really seems to appreciate the validation of her experience.  She has great insight into her problems and participates and listens well.  Continued psychotherapy needed. P:    Return in 2-4 weeks. All questions about treatment plan answered. Patient instructed to go immediately to the emergency room and/or call 911 if any suicidal or homicidal ideations. Patient stated understanding and is agreeable to treatment and crisis plan.           Provider Signature:  Electronically signed by Roberto Lopez PSYD on 1/30/2020 at 4:10 PM

## 2020-02-19 ENCOUNTER — OFFICE VISIT (OUTPATIENT)
Dept: PSYCHIATRY | Age: 40
End: 2020-02-19
Payer: COMMERCIAL

## 2020-02-19 PROCEDURE — 99214 OFFICE O/P EST MOD 30 MIN: CPT | Performed by: PSYCHIATRY & NEUROLOGY

## 2020-02-19 PROCEDURE — G8428 CUR MEDS NOT DOCUMENT: HCPCS | Performed by: PSYCHIATRY & NEUROLOGY

## 2020-02-19 PROCEDURE — 4004F PT TOBACCO SCREEN RCVD TLK: CPT | Performed by: PSYCHIATRY & NEUROLOGY

## 2020-02-19 PROCEDURE — G8482 FLU IMMUNIZE ORDER/ADMIN: HCPCS | Performed by: PSYCHIATRY & NEUROLOGY

## 2020-02-19 PROCEDURE — 90833 PSYTX W PT W E/M 30 MIN: CPT | Performed by: PSYCHIATRY & NEUROLOGY

## 2020-02-19 PROCEDURE — G8417 CALC BMI ABV UP PARAM F/U: HCPCS | Performed by: PSYCHIATRY & NEUROLOGY

## 2020-02-19 RX ORDER — FLUOXETINE HYDROCHLORIDE 20 MG/1
20 CAPSULE ORAL DAILY
Qty: 30 CAPSULE | Refills: 3 | Status: SHIPPED | OUTPATIENT
Start: 2020-02-19 | End: 2020-05-28

## 2020-02-19 RX ORDER — FLUOXETINE HYDROCHLORIDE 40 MG/1
40 CAPSULE ORAL DAILY
Qty: 30 CAPSULE | Refills: 3 | Status: SHIPPED | OUTPATIENT
Start: 2020-02-19 | End: 2020-05-28 | Stop reason: SDUPTHER

## 2020-02-19 RX ORDER — PRAZOSIN HYDROCHLORIDE 2 MG/1
2 CAPSULE ORAL NIGHTLY
Qty: 30 CAPSULE | Refills: 2 | Status: SHIPPED | OUTPATIENT
Start: 2020-02-19 | End: 2020-05-28

## 2020-02-19 RX ORDER — PRAZOSIN HYDROCHLORIDE 5 MG/1
5 CAPSULE ORAL NIGHTLY
Qty: 30 CAPSULE | Refills: 3 | Status: SHIPPED | OUTPATIENT
Start: 2020-02-19 | End: 2020-05-28 | Stop reason: SDUPTHER

## 2020-02-19 NOTE — PROGRESS NOTES
PSYCHIATRIC FOLLOW UP NOTE     PATIENT NAME: Edenilson Freeman     : 1980   DATE of VISIT: 2020   Chief Complaint   Patient presents with    Depression          Interval History: Today I met with Ms. Rodolfo Hope in follow up. We spent 25 minutes on psychotherapy and the remainder of the time on symptom evaluation and medication management. She has resumed taking prazosin regularly with no effect on nightmares. Has not taken fluoxetine or thyroid x 3 days. Mood is variable. Rosalie Olivarez is making an effort to participate in more activities, has resumed going to [employer], is working at [employer]     Current meds:   Current Outpatient Medications   Medication Sig Dispense Refill    prazosin (MINIPRESS) 2 MG capsule Take 1 capsule by mouth nightly 30 capsule 2    prazosin (MINIPRESS) 5 MG capsule Take 1 capsule by mouth nightly 30 capsule 3    FLUoxetine (PROZAC) 20 MG capsule Take 1 capsule by mouth daily 30 capsule 3    FLUoxetine (PROZAC) 40 MG capsule Take 1 capsule by mouth daily 30 capsule 3    clonazePAM (KLONOPIN) 0.5 MG tablet Take 1 tablet by mouth 2 times daily as needed for Anxiety for up to 60 days.  60 tablet 1    ciprofloxacin (CILOXAN) 0.3 % ophthalmic solution Place 4 drops in ear(s) 2 times daily right ear, keep instilled ear up for 10 min 5 mL 1    levothyroxine (SYNTHROID) 50 MCG tablet take 1 tablet by mouth once daily 30 tablet 3    RA VITAMIN D-3 5000 units CAPS capsule take 1 capsule by mouth once daily 30 capsule 3    acetaminophen (TYLENOL) 500 MG tablet Take 1,000 mg by mouth every 6 hours as needed for Pain      ibuprofen (ADVIL;MOTRIN) 200 MG tablet Take 600 mg by mouth every 6 hours as needed for Pain      Omega-3 Fatty Acids (RA FISH OIL) 1000 MG CAPS take 1 capsule by mouth once daily 30 capsule 2    Multiple Vitamins-Minerals (THERAPEUTIC MULTIVITAMIN-MINERALS) tablet Take 1 tablet by mouth daily Indications: once weekly when remembered       pilocarpine (SALAGEN) 5 MG

## 2020-03-11 ENCOUNTER — OFFICE VISIT (OUTPATIENT)
Dept: PSYCHIATRY | Age: 40
End: 2020-03-11
Payer: COMMERCIAL

## 2020-03-11 PROCEDURE — 99213 OFFICE O/P EST LOW 20 MIN: CPT | Performed by: PSYCHIATRY & NEUROLOGY

## 2020-03-11 PROCEDURE — 90833 PSYTX W PT W E/M 30 MIN: CPT | Performed by: PSYCHIATRY & NEUROLOGY

## 2020-03-11 PROCEDURE — G8417 CALC BMI ABV UP PARAM F/U: HCPCS | Performed by: PSYCHIATRY & NEUROLOGY

## 2020-03-11 PROCEDURE — 4004F PT TOBACCO SCREEN RCVD TLK: CPT | Performed by: PSYCHIATRY & NEUROLOGY

## 2020-03-11 PROCEDURE — G8482 FLU IMMUNIZE ORDER/ADMIN: HCPCS | Performed by: PSYCHIATRY & NEUROLOGY

## 2020-03-11 PROCEDURE — G8428 CUR MEDS NOT DOCUMENT: HCPCS | Performed by: PSYCHIATRY & NEUROLOGY

## 2020-03-11 NOTE — PROGRESS NOTES
PSYCHIATRIC FOLLOW UP NOTE     PATIENT NAME: Ten Freeman     : 1980   DATE of VISIT: 3/11/2020   Chief Complaint   Patient presents with    Depression          Interval History: Today I met with Ms. Yvette Brianne in follow up. We spent 27 minutes on psychotherapy and the remainder of the time on symptom evaluation and medication management. Today is our last appointment. Treasure Rosas continues to have difficult days, trouble getting the day started, realistic worries about her health, as well as worries about the world and fears of other people. Nevertheless, she continues to push herself more and more; she is now working part time, going to writers' group, and has just begun a creative writing class at [employer]. Current meds:   Current Outpatient Medications   Medication Sig Dispense Refill    prazosin (MINIPRESS) 2 MG capsule Take 1 capsule by mouth nightly 30 capsule 2    prazosin (MINIPRESS) 5 MG capsule Take 1 capsule by mouth nightly 30 capsule 3    FLUoxetine (PROZAC) 20 MG capsule Take 1 capsule by mouth daily 30 capsule 3    FLUoxetine (PROZAC) 40 MG capsule Take 1 capsule by mouth daily 30 capsule 3    clonazePAM (KLONOPIN) 0.5 MG tablet Take 1 tablet by mouth 2 times daily as needed for Anxiety for up to 60 days.  60 tablet 1    ciprofloxacin (CILOXAN) 0.3 % ophthalmic solution Place 4 drops in ear(s) 2 times daily right ear, keep instilled ear up for 10 min 5 mL 1    levothyroxine (SYNTHROID) 50 MCG tablet take 1 tablet by mouth once daily 30 tablet 3    RA VITAMIN D-3 5000 units CAPS capsule take 1 capsule by mouth once daily 30 capsule 3    acetaminophen (TYLENOL) 500 MG tablet Take 1,000 mg by mouth every 6 hours as needed for Pain      ibuprofen (ADVIL;MOTRIN) 200 MG tablet Take 600 mg by mouth every 6 hours as needed for Pain      Omega-3 Fatty Acids (RA FISH OIL) 1000 MG CAPS take 1 capsule by mouth once daily 30 capsule 2    Multiple Vitamins-Minerals (THERAPEUTIC MULTIVITAMIN-MINERALS)

## 2020-03-12 RX ORDER — PILOCARPINE HYDROCHLORIDE 5 MG/1
TABLET, FILM COATED ORAL
Qty: 90 TABLET | Refills: 3 | Status: SHIPPED | OUTPATIENT
Start: 2020-03-12 | End: 2020-05-28 | Stop reason: SDUPTHER

## 2020-03-12 NOTE — TELEPHONE ENCOUNTER
Last visit- 12/11/2019  Next visit- 4/27/2020 with Dr. Jak Matos    Requested Prescriptions     Pending Prescriptions Disp Refills    Omega-3 Fatty Acids (RA FISH OIL) 1000 MG CAPS [Pharmacy Med Name: RA FISH OIL 1,000 MG SOFTGEL] 30 capsule 2     Sig: TAKE 1 SOFTGEL BY MOUTH ONCE DAILY       Please approve/deny

## 2020-03-13 RX ORDER — OMEGA-3 FATTY ACIDS/FISH OIL 300-500 MG
CAPSULE ORAL
Qty: 30 CAPSULE | Refills: 2 | Status: SHIPPED | OUTPATIENT
Start: 2020-03-13 | End: 2020-05-28 | Stop reason: SDUPTHER

## 2020-03-13 RX ORDER — CHOLECALCIFEROL (VITAMIN D3) 125 MCG
CAPSULE ORAL
Qty: 30 CAPSULE | Refills: 3 | Status: SHIPPED | OUTPATIENT
Start: 2020-03-13 | End: 2020-05-28 | Stop reason: SDUPTHER

## 2020-03-27 RX ORDER — LEVOTHYROXINE SODIUM 0.05 MG/1
TABLET ORAL
Qty: 30 TABLET | Refills: 3 | Status: SHIPPED | OUTPATIENT
Start: 2020-03-27 | End: 2020-05-28 | Stop reason: SDUPTHER

## 2020-03-27 RX ORDER — LEVOTHYROXINE SODIUM 0.05 MG/1
TABLET ORAL
Qty: 30 TABLET | Refills: 3 | OUTPATIENT
Start: 2020-03-27

## 2020-03-27 NOTE — TELEPHONE ENCOUNTER
Trudy Freeman called requesting a refill on the following medications:  Requested Prescriptions     Pending Prescriptions Disp Refills    levothyroxine (SYNTHROID) 50 MCG tablet 30 tablet 3     Pharmacy verified:  [email or web address]      Date of last visit:  12/11/19  Date of next visit (if applicable): 2/15/1186

## 2020-04-01 ENCOUNTER — TELEPHONE (OUTPATIENT)
Dept: FAMILY MEDICINE CLINIC | Age: 40
End: 2020-04-01

## 2020-05-28 ENCOUNTER — TELEMEDICINE (OUTPATIENT)
Dept: FAMILY MEDICINE CLINIC | Age: 40
End: 2020-05-28
Payer: COMMERCIAL

## 2020-05-28 PROCEDURE — G8427 DOCREV CUR MEDS BY ELIG CLIN: HCPCS | Performed by: FAMILY MEDICINE

## 2020-05-28 PROCEDURE — 99214 OFFICE O/P EST MOD 30 MIN: CPT | Performed by: FAMILY MEDICINE

## 2020-05-28 RX ORDER — PRAZOSIN HYDROCHLORIDE 5 MG/1
5 CAPSULE ORAL NIGHTLY
Qty: 30 CAPSULE | Refills: 3 | Status: SHIPPED | OUTPATIENT
Start: 2020-05-28 | End: 2021-03-11 | Stop reason: SDUPTHER

## 2020-05-28 RX ORDER — PILOCARPINE HYDROCHLORIDE 5 MG/1
TABLET, FILM COATED ORAL
Qty: 90 TABLET | Refills: 3 | Status: SHIPPED | OUTPATIENT
Start: 2020-05-28 | End: 2021-01-20

## 2020-05-28 RX ORDER — FLUOXETINE HYDROCHLORIDE 40 MG/1
40 CAPSULE ORAL DAILY
Qty: 30 CAPSULE | Refills: 3 | Status: SHIPPED | OUTPATIENT
Start: 2020-05-28 | End: 2020-11-24

## 2020-05-28 RX ORDER — CHOLECALCIFEROL (VITAMIN D3) 125 MCG
CAPSULE ORAL
Qty: 30 CAPSULE | Refills: 3 | Status: SHIPPED | OUTPATIENT
Start: 2020-05-28 | End: 2020-11-24

## 2020-05-28 RX ORDER — OMEGA-3 FATTY ACIDS/FISH OIL 300-500 MG
CAPSULE ORAL
Qty: 30 CAPSULE | Refills: 2 | Status: SHIPPED | OUTPATIENT
Start: 2020-05-28 | End: 2020-10-07

## 2020-05-28 RX ORDER — LEVOTHYROXINE SODIUM 0.05 MG/1
TABLET ORAL
Qty: 30 TABLET | Refills: 3 | Status: SHIPPED | OUTPATIENT
Start: 2020-05-28 | End: 2020-11-24

## 2020-05-28 RX ORDER — FLUOXETINE HYDROCHLORIDE 20 MG/1
20 CAPSULE ORAL DAILY
Qty: 30 CAPSULE | Refills: 3 | Status: SHIPPED | OUTPATIENT
Start: 2020-05-28 | End: 2020-11-24

## 2020-05-28 ASSESSMENT — ENCOUNTER SYMPTOMS
NAUSEA: 0
EYE PAIN: 0
VOMITING: 0
SHORTNESS OF BREATH: 0
ABDOMINAL PAIN: 0
TROUBLE SWALLOWING: 0
COUGH: 0
CONSTIPATION: 0
DIARRHEA: 0
BLOOD IN STOOL: 0

## 2020-05-28 NOTE — PROGRESS NOTES
acetaminophen (TYLENOL) 500 MG tablet Take 1,000 mg by mouth every 6 hours as needed for Pain      ibuprofen (ADVIL;MOTRIN) 200 MG tablet Take 600 mg by mouth every 6 hours as needed for Pain      Multiple Vitamins-Minerals (THERAPEUTIC MULTIVITAMIN-MINERALS) tablet Take 1 tablet by mouth daily Indications: once weekly when remembered        No current facility-administered medications for this visit. No Known Allergies    Health Maintenance   Topic Date Due    Cervical cancer screen  05/25/2000    TSH testing  08/08/2020    Lipid screen  12/06/2023    DTaP/Tdap/Td vaccine (2 - Td) 10/06/2027    Flu vaccine  Completed    Pneumococcal 0-64 years Vaccine  Completed    HIV screen  Completed    Hepatitis A vaccine  Aged Out    Hepatitis B vaccine  Aged Out    Hib vaccine  Aged Out    Meningococcal (ACWY) vaccine  Aged Out       Subjective:      Review of Systems   Constitutional: Negative for chills, fatigue and fever. HENT: Negative for ear pain, postnasal drip and trouble swallowing. Eyes: Negative for pain and visual disturbance. Respiratory: Negative for cough and shortness of breath. Cardiovascular: Negative for chest pain and palpitations. Gastrointestinal: Negative for abdominal pain, blood in stool, constipation, diarrhea, nausea and vomiting. Genitourinary: Negative for difficulty urinating. Skin: Negative for rash. Neurological: Negative for headaches. Psychiatric/Behavioral: Negative for agitation. Objective:     As this is a video visit new vitals are not able to be obtained - the vitals listed below are from previous visits to our facility. There were no vitals filed for this visit. There is no height or weight on file to calculate BMI.     Wt Readings from Last 3 Encounters:   12/11/19 (!) 324 lb 12.8 oz (147.3 kg)   11/08/19 (!) 330 lb 14.4 oz (150.1 kg)   10/04/19 (!) 329 lb (149.2 kg)     BP Readings from Last 3 Encounters:   12/11/19 100/84   11/08/19 110/80   10/04/19 104/82       Physical Exam  Vitals signs and nursing note reviewed. Constitutional:       General: She is not in acute distress. Appearance: She is well-developed. She is not diaphoretic. HENT:      Head: Normocephalic and atraumatic. Right Ear: External ear normal.      Left Ear: External ear normal.   Eyes:      General: No scleral icterus. Right eye: No discharge. Left eye: No discharge. Conjunctiva/sclera: Conjunctivae normal.   Neck:      Musculoskeletal: Normal range of motion. Pulmonary:      Effort: Pulmonary effort is normal.   Skin:     Findings: No erythema or rash. Neurological:      Mental Status: She is alert and oriented to person, place, and time. Cranial Nerves: No cranial nerve deficit. Psychiatric:         Behavior: Behavior normal.         Thought Content: Thought content normal.         Judgment: Judgment normal.           Lab Results   Component Value Date    WBC 8.4 12/06/2018    HGB 13.4 12/06/2018    HCT 41.2 12/06/2018     12/06/2018    CHOL 156 12/06/2018    TRIG 111 12/06/2018    HDL 36 12/06/2018    LDLCALC 98 12/06/2018    AST 13 12/06/2018     12/06/2018    K 4.3 12/06/2018     12/06/2018    CREATININE 0.8 12/06/2018    BUN 15 12/06/2018    CO2 26 12/06/2018    TSH 4.740 (H) 08/08/2019    LABA1C 5.6 11/07/2018    LABGLOM 80 (A) 12/06/2018    MG 2.3 12/06/2018    CALCIUM 9.5 12/06/2018    VITD25 15 (L) 12/06/2018       Imaging Results:    No results found. Assessment:      Diagnosis Orders   1. Cancer of parotid gland ([address]  MRI IAC POSTERIOR FOSSA W WO CONTRAST    MRI NECK SOFT TISSUE WO CONTRAST   2. Acquired hypothyroidism     3. Moderate episode of recurrent major depressive disorder ([address]     4. Vitamin D deficiency     5. Fatigue, unspecified type     6. Balance problem due to vestibular dysfunction of right ear     7.  Mass of jaw  MRI IAC POSTERIOR FOSSA W WO CONTRAST    MRI NECK SOFT TISSUE WO CONTRAST   8. Infection of right inner ear  MRI IAC POSTERIOR FOSSA W WO CONTRAST    MRI NECK SOFT TISSUE WO CONTRAST       Plan: Will get some labs    Will order the refills of the medications    Will see Dr Christiano Maddox can do the klonopin    Will try to see Kelly Encarnacion also - hard to do the video visit    Will try to see the dentist after the hyperbaric chamber consult    Will try to see hyperbaric oxygen therapy before having teeth removed due to the fear of bone necrosis    Can order the MRI or have the oncologist do that - you will call for that appointment also    Can order the MRI of the ear - as you are still dripping    Will see you back in the next 3 months to see if you had gone to your specialists and how the dizziness and the anxiety is going       Return in about 4 weeks (around 6/25/2020).     Orders Placed:  Orders Placed This Encounter   Procedures    MRI IAC POSTERIOR FOSSA W WO CONTRAST    MRI NECK SOFT TISSUE WO CONTRAST     Medications Prescribed:  Orders Placed This Encounter   Medications    levothyroxine (SYNTHROID) 50 MCG tablet     Sig: take 1 tablet by mouth once daily     Dispense:  30 tablet     Refill:  3    vitamin D (RA VITAMIN D-3) 125 MCG (5000 UT) CAPS capsule     Sig: take 1 capsule by mouth once daily     Dispense:  30 capsule     Refill:  3    Omega-3 Fatty Acids (RA FISH OIL) 1000 MG CAPS     Sig: TAKE 1 SOFTGEL BY MOUTH ONCE DAILY     Dispense:  30 capsule     Refill:  2    pilocarpine (SALAGEN) 5 MG tablet     Sig: take 1 tablet by mouth three times a day     Dispense:  90 tablet     Refill:  3    prazosin (MINIPRESS) 5 MG capsule     Sig: Take 1 capsule by mouth nightly     Dispense:  30 capsule     Refill:  3    FLUoxetine (PROZAC) 40 MG capsule     Sig: Take 1 capsule by mouth daily     Dispense:  30 capsule     Refill:  3    FLUoxetine (PROZAC) 20 MG capsule     Sig: Take 1 capsule by mouth daily     Dispense:  30 capsule     Refill:  3       Future

## 2020-06-17 ENCOUNTER — NURSE ONLY (OUTPATIENT)
Dept: LAB | Age: 40
End: 2020-06-17

## 2020-06-17 LAB
ANION GAP SERPL CALCULATED.3IONS-SCNC: 8 MEQ/L (ref 8–16)
BASOPHILS # BLD: 0.6 %
BASOPHILS ABSOLUTE: 0 THOU/MM3 (ref 0–0.1)
BUN BLDV-MCNC: 10 MG/DL (ref 7–22)
CALCIUM SERPL-MCNC: 9.3 MG/DL (ref 8.5–10.5)
CHLORIDE BLD-SCNC: 104 MEQ/L (ref 98–111)
CHOLESTEROL, TOTAL: 183 MG/DL (ref 100–199)
CO2: 26 MEQ/L (ref 23–33)
CREAT SERPL-MCNC: 0.8 MG/DL (ref 0.4–1.2)
EOSINOPHIL # BLD: 2 %
EOSINOPHILS ABSOLUTE: 0.2 THOU/MM3 (ref 0–0.4)
ERYTHROCYTE [DISTWIDTH] IN BLOOD BY AUTOMATED COUNT: 13.8 % (ref 11.5–14.5)
ERYTHROCYTE [DISTWIDTH] IN BLOOD BY AUTOMATED COUNT: 45 FL (ref 35–45)
GFR SERPL CREATININE-BSD FRML MDRD: 79 ML/MIN/1.73M2
GLUCOSE BLD-MCNC: 98 MG/DL (ref 70–108)
HCT VFR BLD CALC: 43.1 % (ref 37–47)
HDLC SERPL-MCNC: 35 MG/DL
HEMOGLOBIN: 13.5 GM/DL (ref 12–16)
IMMATURE GRANS (ABS): 0.04 THOU/MM3 (ref 0–0.07)
IMMATURE GRANULOCYTES: 0.5 %
LDL CHOLESTEROL CALCULATED: 121 MG/DL
LYMPHOCYTES # BLD: 16 %
LYMPHOCYTES ABSOLUTE: 1.3 THOU/MM3 (ref 1–4.8)
MAGNESIUM: 2.1 MG/DL (ref 1.6–2.4)
MCH RBC QN AUTO: 28 PG (ref 26–33)
MCHC RBC AUTO-ENTMCNC: 31.3 GM/DL (ref 32.2–35.5)
MCV RBC AUTO: 89.2 FL (ref 81–99)
MONOCYTES # BLD: 5.5 %
MONOCYTES ABSOLUTE: 0.5 THOU/MM3 (ref 0.4–1.3)
NUCLEATED RED BLOOD CELLS: 0 /100 WBC
PLATELET # BLD: 234 THOU/MM3 (ref 130–400)
PMV BLD AUTO: 9.7 FL (ref 9.4–12.4)
POTASSIUM SERPL-SCNC: 4.4 MEQ/L (ref 3.5–5.2)
RBC # BLD: 4.83 MILL/MM3 (ref 4.2–5.4)
SEG NEUTROPHILS: 75.4 %
SEGMENTED NEUTROPHILS ABSOLUTE COUNT: 6.3 THOU/MM3 (ref 1.8–7.7)
SODIUM BLD-SCNC: 138 MEQ/L (ref 135–145)
TRIGL SERPL-MCNC: 133 MG/DL (ref 0–199)
TSH SERPL DL<=0.05 MIU/L-ACNC: 3.74 UIU/ML (ref 0.4–4.2)
VITAMIN D 25-HYDROXY: 32 NG/ML (ref 30–100)
WBC # BLD: 8.3 THOU/MM3 (ref 4.8–10.8)

## 2020-06-18 ENCOUNTER — TELEPHONE (OUTPATIENT)
Dept: FAMILY MEDICINE CLINIC | Age: 40
End: 2020-06-18

## 2020-06-18 LAB — THYROXINE (T4): 6.6 UG/DL (ref 4.5–12)

## 2020-06-18 NOTE — TELEPHONE ENCOUNTER
Left detailed message per HIPAA. Informed patient to call office with any questions or concerns that she may have.

## 2020-06-20 LAB — DHEAS (DHEA SULFATE): 62 UG/DL (ref 32–240)

## 2020-06-22 ENCOUNTER — OFFICE VISIT (OUTPATIENT)
Dept: FAMILY MEDICINE CLINIC | Age: 40
End: 2020-06-22
Payer: COMMERCIAL

## 2020-06-22 VITALS
OXYGEN SATURATION: 98 % | DIASTOLIC BLOOD PRESSURE: 82 MMHG | SYSTOLIC BLOOD PRESSURE: 112 MMHG | HEART RATE: 93 BPM | BODY MASS INDEX: 53.84 KG/M2 | TEMPERATURE: 97 F | RESPIRATION RATE: 18 BRPM | WEIGHT: 293 LBS

## 2020-06-22 LAB — DHEA UNCONJUGATED: 0.87 NG/ML (ref 0.63–4.7)

## 2020-06-22 PROCEDURE — G8417 CALC BMI ABV UP PARAM F/U: HCPCS | Performed by: STUDENT IN AN ORGANIZED HEALTH CARE EDUCATION/TRAINING PROGRAM

## 2020-06-22 PROCEDURE — 4004F PT TOBACCO SCREEN RCVD TLK: CPT | Performed by: STUDENT IN AN ORGANIZED HEALTH CARE EDUCATION/TRAINING PROGRAM

## 2020-06-22 PROCEDURE — 99214 OFFICE O/P EST MOD 30 MIN: CPT | Performed by: STUDENT IN AN ORGANIZED HEALTH CARE EDUCATION/TRAINING PROGRAM

## 2020-06-22 PROCEDURE — G8427 DOCREV CUR MEDS BY ELIG CLIN: HCPCS | Performed by: STUDENT IN AN ORGANIZED HEALTH CARE EDUCATION/TRAINING PROGRAM

## 2020-06-22 ASSESSMENT — ENCOUNTER SYMPTOMS
SINUS PAIN: 0
COUGH: 1
SHORTNESS OF BREATH: 0
SORE THROAT: 0
EYE DISCHARGE: 1
BACK PAIN: 1
VOMITING: 0
SINUS PRESSURE: 0
NAUSEA: 0
CONSTIPATION: 0
DIARRHEA: 0
ABDOMINAL PAIN: 0
EYE REDNESS: 0

## 2020-06-22 NOTE — PROGRESS NOTES
S: 38 y.o. female with   Chief Complaint   Patient presents with    Discuss Labs    Discuss Medications    Otalgia     right ear x1 year        HPI: depression and anxiety: 1 month f/u  For labs and prazosin and prozac 60mg daily. Mood little better. MRI head and neck to eval new mass. Has not been done yet. Right ear pain: h/o CA right parotid gland. Lots of drainage from the ear. Tube placed over a year ago in the right side. Does not want to take abx. Stress incontinence symptoms that are new. Right knee pain but doesn't want to do anything about it just yet. BP Readings from Last 3 Encounters:   06/22/20 112/82   12/11/19 100/84   11/08/19 110/80     Wt Readings from Last 3 Encounters:   06/22/20 (!) 338 lb 9.6 oz (153.6 kg)   12/11/19 (!) 324 lb 12.8 oz (147.3 kg)   11/08/19 (!) 330 lb 14.4 oz (150.1 kg)           O: VS:  weight is 338 lb 9.6 oz (153.6 kg) (abnormal). Her temporal temperature is 97 °F (36.1 °C). Her blood pressure is 112/82 and her pulse is 93. Her respiration is 18 and oxygen saturation is 98%. Diagnosis Orders   1. Cancer of parotid gland [facility])  MRI NECK SOFT TISSUE W WO CONTRAST   2. Mass of jaw  MRI NECK SOFT TISSUE W WO CONTRAST   3. Infection of right inner ear  MRI NECK SOFT TISSUE W WO CONTRAST       Plan:  Mri reordered. Encouraged to f/u with ENT and is declining abx. Cont prazosin and prozac at current doses. Stress incontinence education provided. Health Maintenance Due   Topic Date Due    Cervical cancer screen  05/25/2000         Attending Physician Statement  I have discussed the case, including pertinent history and exam findings with the resident. I also have seen the patient and performed key portions of the examination. I agree with the documented assessment and plan as documented by the resident.   GE modifier added to this encounter      Shirley Hastings DO 6/22/2020 3:27 PM

## 2020-06-22 NOTE — PROGRESS NOTES
Jorje Recinos is a 38 y.o. female who presents today for:  Chief Complaint   Patient presents with    Discuss Labs    Discuss Medications    Otalgia     right ear x1 year        Goals      Exercise 3x per week (30 min per time)           HPI:     Heddy Ahumada is doing well today. She is complaining of right-sided ear drainage and knee pain. She follows with ENT in [facility] related to her history of cancer. She was given Cipro eardrops and she thinks that the drops are not working. She states that wakes up with drainage and crust in ear. Hearing loss in R ear. Had cancer on right side with radiation treatment. Knee Pain    The incident occurred 5 to 7 days ago. The injury mechanism is unknown. The pain is present in the right knee. Quality: sharp pointy. The pain is at a severity of 3/10 (7 when started). The pain is mild. Associated symptoms include muscle weakness and tingling. Pertinent negatives include no inability to bear weight, loss of motion, loss of sensation or numbness. She reports no foreign bodies present. The symptoms are aggravated by weight bearing. She has tried acetaminophen and heat (stretching) for the symptoms. The treatment provided moderate relief. Otalgia    There is pain in the right ear. This is a chronic problem. The current episode started more than 1 year ago. The problem occurs constantly. The problem has been unchanged. There has been no fever. The patient is experiencing no pain. Associated symptoms include coughing, ear discharge, headaches and neck pain. Pertinent negatives include no abdominal pain, diarrhea, sore throat or vomiting. She states that her anxiety and depression are stable possibly a little better. She also states that she is having incontinence when she coughs, and laughs. Reviewed recent labs with patient. She states that she has been unable to get the MRIs that were previously ordered. No question data found.     Past Medical History:   Diagnosis Date Psychiatric/Behavioral: Positive for dysphoric mood and sleep disturbance. The patient is nervous/anxious. Objective:     Vitals:    06/22/20 1349   BP: 112/82   Site: Right Upper Arm   Pulse: 93   Resp: 18   Temp: 97 °F (36.1 °C)   TempSrc: Temporal   SpO2: 98%   Weight: (!) 338 lb 9.6 oz (153.6 kg)       Body mass index is 53.84 kg/m². Wt Readings from Last 3 Encounters:   06/22/20 (!) 338 lb 9.6 oz (153.6 kg)   12/11/19 (!) 324 lb 12.8 oz (147.3 kg)   11/08/19 (!) 330 lb 14.4 oz (150.1 kg)     BP Readings from Last 3 Encounters:   06/22/20 112/82   12/11/19 100/84   11/08/19 110/80       Physical Exam  Vitals signs and nursing note reviewed. Constitutional:       General: She is not in acute distress. Appearance: Normal appearance. She is obese. She is not ill-appearing. HENT:      Head: Normocephalic. Comments: R sided surgical scar. Post radiation skin radiation changes present     Right Ear: Drainage present. No tenderness. A PE tube is present. Tympanic membrane is not erythematous. Left Ear: Ear canal and external ear normal. No drainage or tenderness. No PE tube. Tympanic membrane is not erythematous. Nose: Nose normal. No congestion or rhinorrhea. Mouth/Throat:      Mouth: Mucous membranes are moist.   Eyes:      General:         Right eye: No discharge. Left eye: No discharge. Conjunctiva/sclera: Conjunctivae normal.      Pupils: Pupils are equal, round, and reactive to light. Cardiovascular:      Rate and Rhythm: Normal rate and regular rhythm. Pulses: Normal pulses. Heart sounds: Normal heart sounds. No murmur. Pulmonary:      Effort: Pulmonary effort is normal. No respiratory distress. Breath sounds: Normal breath sounds. No wheezing, rhonchi or rales. Abdominal:      General: Abdomen is flat. Bowel sounds are normal. There is no distension. Palpations: Abdomen is soft. Tenderness: There is no abdominal tenderness. this time. Station tube in right eardrum is still in place. She is advised to schedule follow-up appointment with ENT for further management.  - MRI NECK SOFT TISSUE W [address]; Future    4. Stress incontinence in female  Patient advised to limit caffeine and alcohol intake. Also advised to perform [place] exercises. Patient was provided with educational materials. We will follow-up at her next appointment. 5. Moderate episode of recurrent major depressive disorder (HCC)  Stable, will continue current medications. 6. Acute pain of the right knee  The pain is less likely musculoskeletal in nature. Patient provided with stretches for leg and advised to use heat, ice, and Tylenol and ibuprofen for pain relief. Advised to walk 30 minutes/day and increase fruit and vegetable intake. Return in about 2 months (around 8/22/2020) for f/u MRI and anxiety and depression. Medications Prescribed:  No orders of the defined types were placed in this encounter. No future appointments. Patient given educational materials - see patient instructions. Discussed use, benefit, and side effects of prescribed medications. All patient questions answered. Patient voiced understanding. Reviewed health maintenance. Instructed to continue current medications, diet and exercise. Patient agreed with treatment plan. Follow up as directed.      Electronically signed by Chris Galarza MD on 6/22/2020 at 4:48 PM

## 2020-06-22 NOTE — PATIENT INSTRUCTIONS
always ask your healthcare professional. [place] [address] any warranty or liability for your use of this information. Patient Education        Hamstring Strain: Rehab Exercises  Introduction  Here are some examples of exercises for you to try. The exercises may be suggested for a condition or for rehabilitation. Start each exercise slowly. Ease off the exercises if you start to have pain. You will be told when to start these exercises and which ones will work best for you. How to do the exercises  Hamstring set (heel dig)   11. Sit with your affected leg bent. Your good leg should be straight and supported on the floor. 12. Tighten the muscles on the back of your bent leg (hamstring) by pressing your heel into the floor. 13. Hold for about 6 seconds, and then rest for up to 10 seconds. 14. Repeat 8 to 12 times. Hamstring curl   10. Lie on your stomach with your knees straight. Place a pillow under your stomach. If your kneecap is uncomfortable, roll up a washcloth and put it under your leg just above your kneecap. 11. Lift the foot of your affected leg by bending your knee so that you bring your foot up toward your buttock. If this motion hurts, try it without bending your knee quite as far. This may help you avoid any painful motion. 12. Slowly move your leg up and down. 13. Repeat 8 to 12 times. 14. When you can do this exercise with ease and no pain, add some resistance. To do this:  15. Tie the ends of an exercise band together to form a loop. Attach one end of the loop to a secure object or shut a door on it to hold it in place. (Or you can have someone hold one end of the loop to provide resistance.)  16. Loop the other end of the exercise band around the lower part of your affected leg. 17. Repeat steps 1 through 4, slowly pulling back on the exercise band with your leg. Hip extension   15.  Stand facing a wall with your hands on the wall at about chest

## 2020-06-24 ENCOUNTER — TELEPHONE (OUTPATIENT)
Dept: FAMILY MEDICINE CLINIC | Age: 40
End: 2020-06-24

## 2020-07-02 ENCOUNTER — TELEPHONE (OUTPATIENT)
Dept: FAMILY MEDICINE CLINIC | Age: 40
End: 2020-07-02

## 2020-07-02 NOTE — TELEPHONE ENCOUNTER
Orders were sent to [facility] and Auth was done on the Soft neck tissue MRI     Patient is aware

## 2020-08-03 ENCOUNTER — OFFICE VISIT (OUTPATIENT)
Dept: FAMILY MEDICINE CLINIC | Age: 40
End: 2020-08-03
Payer: COMMERCIAL

## 2020-08-03 ENCOUNTER — TELEPHONE (OUTPATIENT)
Dept: FAMILY MEDICINE CLINIC | Age: 40
End: 2020-08-03

## 2020-08-03 VITALS
TEMPERATURE: 97.3 F | SYSTOLIC BLOOD PRESSURE: 122 MMHG | WEIGHT: 293 LBS | OXYGEN SATURATION: 96 % | HEART RATE: 90 BPM | HEIGHT: 67 IN | BODY MASS INDEX: 45.99 KG/M2 | RESPIRATION RATE: 16 BRPM | DIASTOLIC BLOOD PRESSURE: 82 MMHG

## 2020-08-03 PROBLEM — E66.01 MORBIDLY OBESE (HCC): Status: ACTIVE | Noted: 2020-08-03

## 2020-08-03 PROCEDURE — 99214 OFFICE O/P EST MOD 30 MIN: CPT | Performed by: STUDENT IN AN ORGANIZED HEALTH CARE EDUCATION/TRAINING PROGRAM

## 2020-08-03 PROCEDURE — G8417 CALC BMI ABV UP PARAM F/U: HCPCS | Performed by: STUDENT IN AN ORGANIZED HEALTH CARE EDUCATION/TRAINING PROGRAM

## 2020-08-03 PROCEDURE — G8427 DOCREV CUR MEDS BY ELIG CLIN: HCPCS | Performed by: STUDENT IN AN ORGANIZED HEALTH CARE EDUCATION/TRAINING PROGRAM

## 2020-08-03 PROCEDURE — 4004F PT TOBACCO SCREEN RCVD TLK: CPT | Performed by: STUDENT IN AN ORGANIZED HEALTH CARE EDUCATION/TRAINING PROGRAM

## 2020-08-03 RX ORDER — PREDNISONE 20 MG/1
40 TABLET ORAL DAILY
Qty: 10 TABLET | Refills: 0 | Status: SHIPPED | OUTPATIENT
Start: 2020-08-03 | End: 2020-08-08

## 2020-08-03 ASSESSMENT — ENCOUNTER SYMPTOMS
SINUS PRESSURE: 0
VOMITING: 0
ABDOMINAL PAIN: 0
DIARRHEA: 0
EYE PAIN: 0
COUGH: 0
CONSTIPATION: 0
SINUS PAIN: 0
RHINORRHEA: 0
BLOOD IN STOOL: 0
SORE THROAT: 0
SHORTNESS OF BREATH: 0
NAUSEA: 0

## 2020-08-03 NOTE — PROGRESS NOTES
Winter Quiroz is a 38 y.o. female who presents today for:  Chief Complaint   Patient presents with    Follow-up     MRI Brain results completed 07/30/2020       Goals      Exercise 3x per week (30 min per time)           HPI:     Abimael Alonso presents to clinic today for follow-up of brain and head and neck MRIs. I do not have the images here as images were done at [facility].  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 No new lymph nodes were seen however the impression did recommend a possible follow-up PET CT. Patient reports that she has not been seen for surveillance since 2017. [facility] but since moving to [address] [place] [address] she has been unable to get back down to [place] for further follow-up. Patient has a history of right parotid adenoid cystic carcinoma T4N2b. Patient is S/P right radical parotidectomy with facial nerve dissection and sacrifice, gortex sling, partial auriculectomy, skull base and partial temporal bone resection, right neck dissection, facial nerve graft on 10/29/11. She is s/p proton beam radiation treatment in [place], [place], completed in March 2012. Dizziness   This is a chronic problem. The current episode started more than 1 month ago. The problem occurs daily. The problem has been gradually worsening (Over the past week). Associated symptoms include fatigue. Pertinent negatives include no abdominal pain, arthralgias, chest pain, chills, congestion, coughing, fever, headaches, myalgias, nausea, numbness, rash, sore throat, vomiting or weakness. The symptoms are aggravated by walking. She has tried rest for the symptoms. The treatment provided no relief. No question data found.     Past Medical History:   Diagnosis Date    Adenoid cystic carcinoma ([facility] [address] 10/2011    Removed tumor, salivary glands, facial nerve graft    Anxiety     Arthritis     knees & right shoulder, hip    Depression     Hypothyroidism     PTSD (post-traumatic stress disorder)     Sleep apnea       Past Surgical History:   Procedure Laterality Date    EUA PELVIC N/A 2/21/2019    REMOVAL AND REINSERTION OF MIRENA, EXAM UNDER ANESTHESIA, PAP performed by Praveen Arroyo MD at [facility] 10/2011    Radiacal Neck for CA    TUMOR EXCISION  2011    excision salivary gland (radical neck disection & nerve graft) - radiation    TYMPANOSTOMY TUBE PLACEMENT      age 14years old   Maren Greenberg WISDOM TOOTH EXTRACTION  2018     Family History   Problem Relation Age of Onset    Diabetes Mother    Maren Greenberg Arthritis Mother     Obesity Mother     Depression Mother     No Known Problems Father     Depression Brother      Social History     Tobacco Use    Smoking status: Current Every Day Smoker     Packs/day: 0.50     Years: 20.00     Pack years: 10.00     Types: Cigarettes     Start date: 4/16/1999    Smokeless tobacco: Never Used    Tobacco comment: wearing the patch for the first time today   Substance Use Topics    Alcohol use: Yes     Comment: social      Current Outpatient Medications   Medication Sig Dispense Refill    Handicap Placard MISC by Does not apply route Expires on 8/3/2022 1 each 0    predniSONE (DELTASONE) 20 MG tablet Take 2 tablets by mouth daily for 5 days 10 tablet 0    levothyroxine (SYNTHROID) 50 MCG tablet take 1 tablet by mouth once daily 30 tablet 3    vitamin D (RA VITAMIN D-3) 125 MCG (5000 UT) CAPS capsule take 1 capsule by mouth once daily 30 capsule 3    Omega-3 Fatty Acids (RA FISH OIL) 1000 MG CAPS TAKE 1 SOFTGEL BY MOUTH ONCE DAILY 30 capsule 2    pilocarpine (SALAGEN) 5 MG tablet take 1 tablet by mouth three times a day 90 tablet 3    prazosin (MINIPRESS) 5 MG capsule Take 1 capsule by mouth nightly 30 capsule 3    FLUoxetine (PROZAC) 40 MG capsule Take 1 capsule by mouth daily 30 capsule 3    FLUoxetine (PROZAC) 20 MG capsule Take 1 capsule by mouth daily 30 capsule 3    clonazePAM (KLONOPIN) 0.5 MG tablet Take 1 tablet by mouth 2 times daily as needed for Anxiety for up to 60 days. 60 tablet 1    ciprofloxacin (CILOXAN) 0.3 % ophthalmic solution Place 4 drops in ear(s) 2 times daily right ear, keep instilled ear up for 10 min 5 mL 1    acetaminophen (TYLENOL) 500 MG tablet Take 1,000 mg by mouth every 6 hours as needed for Pain      ibuprofen (ADVIL;MOTRIN) 200 MG tablet Take 600 mg by mouth every 6 hours as needed for Pain      Multiple Vitamins-Minerals (THERAPEUTIC MULTIVITAMIN-MINERALS) tablet Take 1 tablet by mouth daily Indications: when remembered        No current facility-administered medications for this visit. No Known Allergies    Health Maintenance   Topic Date Due    Cervical cancer screen  05/25/2000    Flu vaccine (1) 09/01/2020    TSH testing  06/17/2021    Lipid screen  06/17/2025    DTaP/Tdap/Td vaccine (2 - Td) 10/06/2027    Pneumococcal 0-64 years Vaccine  Completed    HIV screen  Completed    Hepatitis A vaccine  Aged Out    Hepatitis B vaccine  Aged Out    Hib vaccine  Aged Out    Meningococcal (ACWY) vaccine  Aged Out       Subjective:      Review of Systems   Constitutional: Positive for fatigue. Negative for chills, fever and unexpected weight change. HENT: Positive for hearing loss (Right-sided). Negative for congestion, ear pain, rhinorrhea, sinus pressure, sinus pain and sore throat. Eyes: Negative for pain and visual disturbance. Respiratory: Negative for cough and shortness of breath. Cardiovascular: Negative for chest pain and palpitations. Gastrointestinal: Negative for abdominal pain, blood in stool, constipation, diarrhea, nausea and vomiting. Genitourinary: Negative for difficulty urinating and dysuria. Musculoskeletal: Negative for arthralgias and myalgias. Skin: Negative for rash. Neurological: Positive for dizziness.  Negative for weakness, light-headedness, numbness and headaches. Psychiatric/Behavioral: Positive for decreased concentration. Negative for dysphoric mood and sleep disturbance. The patient is nervous/anxious. Objective:     Vitals:    08/03/20 1449   BP: 122/82   Site: Right Lower Arm   Position: Sitting   Cuff Size: Medium Adult   Pulse: 90   Resp: 16   Temp: 97.3 °F (36.3 °C)   TempSrc: Temporal   SpO2: 96%   Weight: (!) 340 lb 3.2 oz (154.3 kg)   Height: 5' 6.5\" (1.689 m)       Body mass index is 54.09 kg/m². Wt Readings from Last 3 Encounters:   08/03/20 (!) 340 lb 3.2 oz (154.3 kg)   06/22/20 (!) 338 lb 9.6 oz (153.6 kg)   12/11/19 (!) 324 lb 12.8 oz (147.3 kg)     BP Readings from Last 3 Encounters:   08/03/20 122/82   06/22/20 112/82   12/11/19 100/84       Physical Exam  Vitals signs and nursing note reviewed. Constitutional:       General: She is not in acute distress. Appearance: Normal appearance. She is well-developed. She is morbidly obese. She is not ill-appearing or diaphoretic. HENT:      Head: Normocephalic. Right Ear: External ear normal.      Left Ear: External ear normal.      Nose: Nose normal. No congestion or rhinorrhea. Mouth/Throat:      Mouth: Mucous membranes are moist.      Pharynx: Oropharynx is clear. No oropharyngeal exudate or posterior oropharyngeal erythema. Eyes:      General: No scleral icterus. Right eye: No discharge. Left eye: No discharge. Extraocular Movements: Extraocular movements intact. Conjunctiva/sclera: Conjunctivae normal.      Pupils: Pupils are equal, round, and reactive to light. Neck:      Musculoskeletal: Normal range of motion and neck supple. Cardiovascular:      Rate and Rhythm: Normal rate and regular rhythm. Pulses: Normal pulses. Heart sounds: Normal heart sounds. No murmur. Pulmonary:      Effort: Pulmonary effort is normal. No respiratory distress. Breath sounds: Normal breath sounds. No wheezing, rhonchi or rales.    Abdominal: General: Abdomen is flat. Bowel sounds are normal. There is no distension. Palpations: Abdomen is soft. Tenderness: There is no abdominal tenderness. Musculoskeletal:      Right lower leg: No edema. Left lower leg: No edema. Skin:     General: Skin is warm and dry. Capillary Refill: Capillary refill takes less than 2 seconds. Findings: No erythema or rash. Neurological:      General: No focal deficit present. Mental Status: She is alert and oriented to person, place, and time. Mental status is at baseline. Cranial Nerves: No cranial nerve deficit. Psychiatric:         Attention and Perception: Attention and perception normal.         Mood and Affect: Mood and affect normal.         Speech: Speech normal.         Behavior: Behavior normal. Behavior is cooperative. Thought Content: Thought content normal.         Judgment: Judgment normal.           Lab Results   Component Value Date    WBC 8.3 2020    HGB 13.5 2020    HCT 43.1 2020     2020    CHOL 183 2020    TRIG 133 2020    HDL 35 2020    LDLCALC 121 2020    AST 13 2018     2020    K 4.4 2020     2020    CREATININE 0.8 2020    BUN 10 2020    CO2 26 2020    TSH 3.740 2020    LABA1C 5.6 2018    LABGLOM 79 (A) 2020    MG 2.1 2020    CALCIUM 9.3 2020    VITD25 32 2020       Imaging Results:    Mri Orbits Face Neck W Wo Contrast    Result Date: 2020  Ordering Provider: [address] [place] [address]    Radiology Department  Patient:  Dem[facility]. :  1980   Sex:   [place], [address] [facility]  Location:  [place] [address]   Unit #:   [id number]    Acct #:  [de-identified]    Ordering Phys: Angus Flores MD      Exam Date: 20  Accession #:  [id number]  Exam:  MRI   MRI Orbit Face Neck w/wo Contr  Result: See Report STUDY:   MRI SOFT TISSUE NECK WITH AND WITHOUT CONTRAST    REASON FOR EXAM:   Female, 38years old. 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 COMPARISON:   None.  ___________________________________    FINDINGS:  Postop change status post right parotidectomy. There appears to be  residual fibrosis at the operative site without well-defined focal  enhancing mass. Normal bilateral  spaces. Normal bilateral  parapharyngeal spaces. Normal bilateral carotid spaces. Normal bilateral sublingual and submandibular glands and spaces. Normal visualized nasopharynx. Normal retropharyngeal space. Normal  perivertebral space. There is low signal change noted within the right mandible on T1 which  comes increased signal on T2 and STIR which may be consistent with known  inflammatory disease. .  There is mild enhancement following contrast  administration. Normal visualized bilateral faucial tonsils. The visualized tongue,  tongue base and oropharynx are normal.    The visualized cervical lymph nodes (levels I-VI) are within normal size  limits, and maintain normal morphology. There is no demonstrated solid or  cystic mass lesion. There is no abnormal contrast enhancement. Normal epiglottis, bilateral vallecula and hypopharynx. The  pre-epiglottic and paraglottic adipose spaces are normal.  Normal  visualized bilateral piriform sinuses, aryepiglottic folds, vocal cords,  and arytenoid-cricoid articulations. Normal subglottic trachea. Normal bilateral lobes of the thyroid gland. Normal visualized pulmonary  apices.   There is fluid signal seen within the right mastoid air cells consistent  with inflammatory disease. Normal visualized paranasal sinuses. Normal visualized cervical spine.  ___________________________________    IMPRESSION:  Post surgical changes status post right parotidectomy and postsurgical  fibrosis at the operative site without well-defined enhancing nodule to  suggest recurrent neoplasm however PET scan would be helpful for further  evaluation in this regard if clinically warranted. No pathologic  adenopathy utilizing size and morphologic criteria. . Abnormal signal  intensity within the right mandible which may be due to known infection,  however clinical correlation is recommended  Fluid signal noted within the right mastoid air cells consistent with  mastoiditis of indeterminate chronicity. Electronically Signed:  Vu Yost MD   at 17:34 EDT  Tel [phone], Service support  [phone], Fax [phone]          cc: Beryl Bhardwaj DO; Jorge Cali MD      Dictated by:  Rogelio Calle MD on 20  Technologist:  Jaron Brink RT(MR)  Transcribed by:  Rogelio Calle on 20    Report Signed by:  Urmila Bains on 20      Mri Brain W Wo Contrast    Result Date: 2020  Ordering Provider: [facility]    Radiology Department  Patient:  [facility]  Aure Andre. :  1980   Sex: Eg[place], [address] [facility]  Location:  [place] [address]   Unit #:   [id number]    Acct #:  [de-identified]    Ordering Phys: Beryl Bhardwaj DO      Exam Date: 20  Accession #:  [id number]  Exam:  MRI   MRI Brain w/wo Contrast  Result: See Report      STUDY:   MRI BRAIN WITH AND WITHOUT CONTRAST (ATTENTION INTERNAL AUDITORY  CANALS - I.A.C.''s)    REASON FOR EXAM:   Female, 38years old.   CA OF PAROTID GLAND, LOSS OF  HEARING IN RT EAR AFTER RADIATION FROM PAROTID CA, BLANCE ISSUES    TECHNIQUE:   Standardized multiplanar fat and water weighted pulse  sequences were obtained. IV Gadavist 15 ML was administered for the  contrast portion of the examination. COMPARISON:   July 22 2020  ___________________________________    FINDINGS:  Examination is technically suboptimal.  Post contrast images are not fat  suppressed and detection of contrast enhancement is difficult with  resultant loss of diagnostic accuracy. Diagnostic information is  available. There is right parotid gland resection with post surgical change. Right  mastoid is opacified with mixed fluid and mucosal reactive change. There is new right cranial nerve 8 neuritis with severe enhancement of the  vestibule and semicircular canals and moderate enhancement of the cochlea  consistent with radiation injury. There is post radiation change in the  deep temporal white matter superiorly. There is a small chronic infarct in the right caudate body and adjacent  white matter. Sella turcica is mildly enlarged and has an empty  appearance with the gland flattened along the floor of the sella. There  is no pituitary mass. Contralateral left parotid is unremarkable on the visualized upper portion  images. ___________________________________    IMPRESSION:  1. 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 2.  Right mastoid effusion. 3.  Right parotid resection. 4.  Possible intracranial hypertension. Neurology and ophthalmology  referrals are advised. Electronically Signed:  Larry Richardson,  2020/07/31 at 14:44 EDT  Tel [phone], Service support  [phone], Fax [phone]          cc: [facility]      Dictated by:  Larry Richardson MD on 07/31/20 1441  Technologist:   RT() [facility]  Transcribed by:  Larry Richardson MD on 07/31/20 1449    Report Signed by:  Soumya Fuentes on 07/31/20 1449          Assessment / Plan:     1. Cancer of parotid gland [facility])  Patient has a history of right parotid adenoid cystic carcinoma T4N2b. Patient is S/P right radical parotidectomy with facial nerve dissection and sacrifice, gortex sling, partial auriculectomy, skull base and partial temporal bone resection, right neck dissection, facial nerve graft on 10/29/11. She is s/p proton beam radiation treatment in [place], [place], completed in March 2012. William Caballero has not been seen for surveillance since 2017. Will refer to oncology for surveillance. At this time patient imaging appears to be stable based on report, however we do not have the images. We will send for images from [facility].  The patient can be scheduled with any member of the group, including the provider with the first available appointments. - Eron Madrid MD, Oncology, [facility]    2. Adenoid cystic carcinoma ([facility] [address]  Plan as above  - Eron Madrid MD, Oncology, [facility]    3. Balance problem due to vestibular dysfunction of right ear  Likely secondary to vestibular neuritis as seen on imaging. Patient also states that she has been having decreased hearing on the right side. Will refer to neurology for further work-up. We will follow-up with her at her next appointment in 1 month. We will also prescribe prednisone to see if that helps decrease some of the inflammation on the cranial nerve VIII. - Handicap Placard MISC; by Does not apply route Expires on 8/3/2022  Dispense: 1 each; Refill: 0  - predniSONE (DELTASONE) 20 MG tablet; Take 2 tablets by mouth daily for 5 days  Dispense: 10 tablet; Refill: 0  - Kaycee St MD, Neurology, [facility]    4. Vestibular neuronitis of right ear  Plan as above. - Handicap Placard MISC; by Does not apply route Expires on 8/3/2022  Dispense: 1 each; Refill: 0  - predniSONE (DELTASONE) 20 MG tablet; Take 2 tablets by mouth daily for 5 days  Dispense: 10 tablet; Refill: 0  - Kaycee St MD, Neurology, [facility]    5.  Intracranial hypertension  Intracranial hypertension mentioned on MRI impression however there is no comment in the finding section. We will try to obtain films from outside hospital and will refer to neurology as per above. Was instructed to follow-up with her ophthalmologist.We will follow-up with her at her next appointment in 1 month. - Alesha Niño MD, Neurology, [facility]           Return in about 4 weeks (around 8/31/2020) for f/u dizziness. Medications Prescribed:  Orders Placed This Encounter   Medications    Handicap Placard MISC     Sig: by Does not apply route Expires on 8/3/2022     Dispense:  1 each     Refill:  0    predniSONE (DELTASONE) 20 MG tablet     Sig: Take 2 tablets by mouth daily for 5 days     Dispense:  10 tablet     Refill:  0       No future appointments. Patient given educational materials - see patient instructions. Discussed use, benefit, and side effects of prescribed medications. All patient questions answered. Patient voiced understanding. Reviewed health maintenance. Instructed to continue current medications, diet and exercise. Patient agreed with treatment plan. Follow up as directed.      Electronically signed by Noe Bean MD on 8/3/2020 at 4:57 PM

## 2020-08-03 NOTE — TELEPHONE ENCOUNTER
----- Message from Juliet Callejas DO sent at 8/3/2020  8:00 AM EDT -----  The results are back and there may be some higher pressure in the brain. Can we see you in the office the next few days for an exam and to make some referrals? Can we bring her in to be seen today or tomorrow?

## 2020-08-03 NOTE — PROGRESS NOTES
S: 38 y.o. female with   Chief Complaint   Patient presents with    Follow-up     MRI Brain results completed 2020       Here for reading from the mri of the head - she was worried about the recurrence of cancer    Has not been to an oncologist since moving here    BP Readings from Last 3 Encounters:   20 122/82   20 112/82   19 100/84     Wt Readings from Last 3 Encounters:   20 (!) 340 lb 3.2 oz (154.3 kg)   20 (!) 338 lb 9.6 oz (153.6 kg)   19 (!) 324 lb 12.8 oz (147.3 kg)           O: VS:   Vitals:    20 1449   BP: 122/82   Site: Right Lower Arm   Position: Sitting   Cuff Size: Medium Adult   Pulse: 90   Resp: 16   Temp: 97.3 °F (36.3 °C)   TempSrc: Temporal   SpO2: 96%   Weight: (!) 340 lb 3.2 oz (154.3 kg)   Height: 5' 6.5\" (1.689 m)     Body mass index is 54.09 kg/m². AAO/NAD, appropriate affect for mood  Normocephalic, atraumatic, eyes - conjunctiva and sclera normal,   skin no rashes on exposed areas   Insight, judgement normal and in no acute distress      Lab Results   Component Value Date    WBC 8.3 2020    HGB 13.5 2020    HCT 43.1 2020     2020    CHOL 183 2020    TRIG 133 2020    HDL 35 2020    LDLCALC 121 2020    AST 13 2018     2020    K 4.4 2020     2020    CREATININE 0.8 2020    BUN 10 2020    CO2 26 2020    TSH 3.740 2020    LABA1C 5.6 2018    LABGLOM 79 (A) 2020    MG 2.1 2020    CALCIUM 9.3 2020    VITD25 32 2020       Mri Orbits Face Neck W Wo Contrast    Result Date: 2020  Ordering Provider: [address] [facility]    Radiology Department  Patient:  Eliza Andre. :  1980   [place]:   [place] [address] [facility] [place]  [place] [address]   Unit #:   [id number]    [facility]    Ordering Phys: Audi Chau MD      Exam Date: 20 Accession #:  [id number]  Exam:  MRI   MRI Orbit Face Neck w/wo Contr  Result: See Report      STUDY:   MRI SOFT TISSUE NECK WITH AND WITHOUT CONTRAST    REASON FOR EXAM:   Female, 38years old. 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 COMPARISON:   None.  ___________________________________    FINDINGS:  Postop change status post right parotidectomy. There appears to be  residual fibrosis at the operative site without well-defined focal  enhancing mass. Normal bilateral  spaces. Normal bilateral  parapharyngeal spaces. Normal bilateral carotid spaces. Normal bilateral sublingual and submandibular glands and spaces. Normal visualized nasopharynx. Normal retropharyngeal space. Normal  perivertebral space. There is low signal change noted within the right mandible on T1 which  comes increased signal on T2 and STIR which may be consistent with known  inflammatory disease. .  There is mild enhancement following contrast  administration. Normal visualized bilateral faucial tonsils. The visualized tongue,  tongue base and oropharynx are normal.    The visualized cervical lymph nodes (levels I-VI) are within normal size  limits, and maintain normal morphology. There is no demonstrated solid or  cystic mass lesion. There is no abnormal contrast enhancement. Normal epiglottis, bilateral vallecula and hypopharynx. The  pre-epiglottic and paraglottic adipose spaces are normal.  Normal  visualized bilateral piriform sinuses, aryepiglottic folds, vocal cords,  and arytenoid-cricoid articulations. Normal subglottic trachea.     Normal FROM PAROTID CA, BLANCE ISSUES    TECHNIQUE:   Standardized multiplanar fat and water weighted pulse  sequences were obtained. IV Gadavist 15 ML was administered for the  contrast portion of the examination. COMPARISON:   July 22 2020  ___________________________________        FINDINGS:  Examination is technically suboptimal.  Post contrast images are not fat  suppressed and detection of contrast enhancement is difficult with  resultant loss of diagnostic accuracy. Diagnostic information is  available. There is right parotid gland resection with post surgical change. Right  mastoid is opacified with mixed fluid and mucosal reactive change. There is new right cranial nerve 8 neuritis with severe enhancement of the  vestibule and semicircular canals and moderate enhancement of the cochlea  consistent with radiation injury. There is post radiation change in the  deep temporal white matter superiorly. There is a small chronic infarct in the right caudate body and adjacent  white matter. Sella turcica is mildly enlarged and has an empty  appearance with the gland flattened along the floor of the sella. There  is no pituitary mass. Contralateral left parotid is unremarkable on the visualized upper portion  images. ___________________________________        IMPRESSION:  1. 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 2.  Right mastoid effusion. 3.  Right parotid resection. 4.  Possible intracranial hypertension. Neurology and ophthalmology  referrals are advised.     Electronically Signed:  Idaila Maguire,  2020/07/31 at 14:44 EDT  Tel [phone], Service support  [phone], Fax [phone]          cc: Kiley Colmenares DO      Dictated by:  Idalia Maguire MD on 07/31/20 1444  Technologist:   RT(RENETTA Maldonado  Transcribed by:  Idalia Maguire MD on 07/31/20 1444    Report Signed by:  Avani Sanches on 07/31/20 1444             Diagnosis Orders   1. 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 Balance problem due to vestibular dysfunction of right ear  Handicap Placard MISC    predniSONE (DELTASONE) 20 MG tablet    Mannie Howell MD, Neurology, [place]   4. Vestibular neuronitis of right ear  Handicap Placard MISC    predniSONE (DELTASONE) 20 MG tablet    Mannie Howell MD, Neurology, [place]   5. Intracranial hypertension  Mannie Howell MD, Neurology, [facility]  Refer to oncology    Refer to neurology - mri said possible intracranial htn    Can do steroids for the inner ear inflammation    Will see you back in the next month to ask about symptoms - no pituitary labs as of yet     Return in about 4 weeks (around 8/31/2020) for f/u dizziness. Orders Placed:  Orders Placed This Encounter   Procedures   Agustin Henriquez MD, Oncology, Neetu Link [facility], [facility] [place]     Medications Prescribed:  Orders Placed This Encounter   Medications    Handicap Placard MISC     Sig: by Does not apply route Expires on 8/3/2022     Dispense:  1 each     Refill:  0    predniSONE (DELTASONE) 20 MG tablet     Sig: Take 2 tablets by mouth daily for 5 days     Dispense:  10 tablet     Refill:  0       Future Appointments   Date Time Provider Aure Torres   8/27/2020  2:00 PM EDEN Melgar - [facility] [place]   9/1/2020  1:30 PM Denver Moreno MD Oncology [facility] Due   Topic Date Due    Cervical cancer screen  05/25/2000         Attending Physician Statement  I have discussed the case, including pertinent history and exam findings with the resident. I also have seen the patient and performed key portions of the examination. I agree with the documented assessment and plan as documented by the resident.   GE modifier added to this encounter      Samantha Oakley DO 8/25/2020 8:04 PM

## 2020-08-27 ENCOUNTER — INITIAL CONSULT (OUTPATIENT)
Dept: NEUROLOGY | Age: 40
End: 2020-08-27
Payer: COMMERCIAL

## 2020-08-27 VITALS
DIASTOLIC BLOOD PRESSURE: 72 MMHG | BODY MASS INDEX: 47.09 KG/M2 | HEART RATE: 86 BPM | WEIGHT: 293 LBS | HEIGHT: 66 IN | OXYGEN SATURATION: 97 % | SYSTOLIC BLOOD PRESSURE: 128 MMHG

## 2020-08-27 PROCEDURE — G8417 CALC BMI ABV UP PARAM F/U: HCPCS | Performed by: PSYCHIATRY & NEUROLOGY

## 2020-08-27 PROCEDURE — 4004F PT TOBACCO SCREEN RCVD TLK: CPT | Performed by: PSYCHIATRY & NEUROLOGY

## 2020-08-27 PROCEDURE — G8427 DOCREV CUR MEDS BY ELIG CLIN: HCPCS | Performed by: PSYCHIATRY & NEUROLOGY

## 2020-08-27 PROCEDURE — 99214 OFFICE O/P EST MOD 30 MIN: CPT | Performed by: PSYCHIATRY & NEUROLOGY

## 2020-08-27 NOTE — LETTER
[address] [place] [address]  Dept: [phone]  Dept Fax: [phone]  Loc: [address], APRN - CNP        8/27/2020      Patient:  Rosana Urbano  MRN:  [id number]  YOB: 1980  Date of Visit:  8/27/2020    Dear Dr. Oj Barone,    Thank you for referring Sergio Getting to me for consultation. Please see attached visit summary with my findings. If you have any questions, please do not hesitate to call me.       Sincerely,         EDEN Holder - CNP

## 2020-08-27 NOTE — PATIENT INSTRUCTIONS
1. Obtain MRI brain W/WO contrast as well as MRI orbits, face, and neck W/WO contrast images from [facility]  2. Ophthalmology evaluation  3. Vitamin B12, folate  4. Vitamin B6 level  5. Call with any new symptoms or concerns.

## 2020-08-30 NOTE — PROGRESS NOTES
Chief Complaint   Patient presents with    Consultation     Balance issues, vestibular neuronitis right ear, intracranial hypertension         Shraddha Schneider is a 38 y.o. female who presents today for evaluation of balance trouble and headache for the past 9 years. 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 Her symptoms began following surgery to remove the adenoid cystic carcinoma. She was also treated with radiation. 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 Right mastoid effusion. Right parotid resection. Possible intracranial hypertension. MRI orbits, face, neck done W/WO contrast showed Post surgical changes status post right parotidectomy and postsurgical fibrosis at the operative site without well-defined enhancing nodule to   suggest recurrent neoplasm however PET scan would be helpful for further evaluation in this regard if clinically warranted. She has a follow up with oncology in 2 weeks to address these findings. No pathologic  adenopathy utilizing size and morphologic criteria. . Abnormal signal intensity within the right mandible which may be due to known infection,  however clinical correlation is recommended Fluid signal noted within the right mastoid air cells consistent with  mastoiditis of indeterminate chronicity. She does have headaches. Location of her pain is right occipital area and radiates to her right temple and frontal area. Frequency is 2-3 per week. She can have right eye blurred vision at times. She did have a dilated eye exam 2 years ago that she reports showed no concerning findings. In the past 1 year she has gained 30 pounds, unintentionally. She is not currently on any oral birth control. She is not at risk of pregnancy as she has IUD in place. She  denies chest pain. No shortness of breath, no neck pain. No vision changes. No dysphagia. No fever. No rash.  No weight loss.   History provided by patient accompanied by her . Past Medical History:   Diagnosis Date    Adenoid cystic carcinoma ([facility] [address] 10/2011    Removed tumor, salivary glands, facial nerve graft    Anxiety     Arthritis     knees & right shoulder, hip    Depression     Hypothyroidism     PTSD (post-traumatic stress disorder)     Sleep apnea        Patient Active Problem List   Diagnosis    Moderate episode of recurrent major depressive disorder ([facility] [address] [facility]    Encounter for removal and reinsertion of intrauterine contraceptive device (IUD)    Neck pain    Balance problem due to vestibular dysfunction of right ear    Pain of left hip joint    Vitamin D deficiency    Acquired hypothyroidism    Oropharyngeal dysphagia    Adenoid cystic carcinoma ([facility] [address] [facility]    Other sleep apnea    Cancer of parotid gland (HCC)    Fatigue    Gait abnormality    Morbidly obese (HCC)       No Known Allergies    Current Outpatient Medications   Medication Sig Dispense Refill    Handicap Placard MISC by Does not apply route Expires on 8/3/2022 1 each 0    levothyroxine (SYNTHROID) 50 MCG tablet take 1 tablet by mouth once daily 30 tablet 3    vitamin D (RA VITAMIN D-3) 125 MCG (5000 UT) CAPS capsule take 1 capsule by mouth once daily 30 capsule 3    Omega-3 Fatty Acids (RA FISH OIL) 1000 MG CAPS TAKE 1 SOFTGEL BY MOUTH ONCE DAILY 30 capsule 2    pilocarpine (SALAGEN) 5 MG tablet take 1 tablet by mouth three times a day 90 tablet 3    prazosin (MINIPRESS) 5 MG capsule Take 1 capsule by mouth nightly 30 capsule 3    FLUoxetine (PROZAC) 40 MG capsule Take 1 capsule by mouth daily 30 capsule 3    FLUoxetine (PROZAC) 20 MG capsule Take 1 capsule by mouth daily 30 capsule 3    clonazePAM (KLONOPIN) 0.5 MG tablet Take 1 tablet by mouth 2 times daily as needed for Anxiety for up to 60 days.  60 tablet 1    acetaminophen (TYLENOL) 500 MG tablet Take 1,000 mg by mouth every 6 hours as needed for Pain      ibuprofen (ADVIL;MOTRIN) 200 MG tablet Take 600 mg by mouth every 6 hours as needed for Pain      Multiple Vitamins-Minerals (THERAPEUTIC MULTIVITAMIN-MINERALS) tablet Take 1 tablet by mouth daily Indications: when remembered       ciprofloxacin (CILOXAN) 0.3 % ophthalmic solution Place 4 drops in ear(s) 2 times daily right ear, keep instilled ear up for 10 min (Patient not taking: Reported on 8/27/2020) 5 mL 1     No current facility-administered medications for this visit.         Social History     Socioeconomic History    Marital status: Single     Spouse name: None    Number of children: None    Years of education: None    Highest education level: None   Occupational History    None   Social Needs    Financial resource strain: None    Food insecurity     Worry: None     Inability: None    Transportation needs     Medical: None     Non-medical: None   Tobacco Use    Smoking status: Current Every Day Smoker     Packs/day: 0.50     Years: 20.00     Pack years: 10.00     Types: Cigarettes     Start date: 4/16/1999    Smokeless tobacco: Never Used    Tobacco comment: wearing the patch for the first time today   Substance and Sexual Activity    Alcohol use: Yes     Comment: social    Drug use: Yes     Frequency: 5.0 times per week     Types: Marijuana    Sexual activity: Never     Partners: Male   Lifestyle    Physical activity     Days per week: None     Minutes per session: None    Stress: None   Relationships    Social connections     Talks on phone: None     Gets together: None     Attends Christianity service: None     Active member of club or organization: None     Attends meetings of clubs or organizations: None     Relationship status: None    Intimate partner violence     Fear of current or ex partner: None     Emotionally abused: None     Physically abused: None     Forced sexual activity: None   Other Topics Concern    None   Social History Narrative    None       Family History   Problem Relation Age of Onset    Diabetes Mother    [facility] Arthritis Mother     Obesity Mother     Depression Mother     No Known Problems Father     Depression Brother        Review of Systems   All systems reviewed, and are all negative, except what is mentioned in HPI      Vitals:    08/27/20 1404   BP: 128/72   Site: Left Lower Arm   Position: Sitting   Cuff Size: Small Adult   Pulse: 86   SpO2: 97%   Weight: (!) 336 lb (152.4 kg)   Height: 5' 6\" (1.676 m)       Physical Examination:  General appearance - alert, well appearing, and in no distress, oriented to person, place, and time and over weight  Mental status- Level of Alertness: awake  Orientation: person, place, time  Memory: normal  Fund of Knowledge: normal  Attention/Concentration: normal  Language: normal. Mood is normal.   Neck - supple, no significant adenopathy, carotids upstroke normal bilaterally. There is no axillary lymphadenopathy. There is no carotid bruit. No neck lymphadenopathy. No thyroid enlargement   Neurological -   Cranial Alhmds-YW-PBS:.   Cranial nerve II: Normal   Cranial nerve III: Pupils: equal, round, reactive to light  Cranial nerves III, IV, VI: Extraocular Movements: intact   Cranial nerve V: Facial sensation: intact   Cranial nerve VII:Facial strength: there is right facial asymmetry  Cranial nerve VIII: Hearing: intact on the left, she is deaf with the right ear  Cranial nerve IX: Palate Elevation intact bilaterally  Cranial nerve XI: Shoulder shrug intact bilaterally  Cranial nerve XII: Tongue midline   neck supple without rigidity, there is no limitation of range of motion of the neck. DTR's are intact distal and symmetric  Babinski sign negative  Motor exam is 5/5 in the upper and lower extremities. Normal muscle tone. There is no muscle atrophy. Sensory is intact for light touch.    Coordination: finger to nose,  intact  Gait and station intact, uses cane   Abnormal movement none, vibration normal, proprioception normal  Skin - warm, dry to touch, normal coloration, no rashes, no suspicious skin lesions  Superficial temporal artery pulses are normal.   There is no limitation of range of motion of the neck. There is no resting tremor, no pin rolling, no bradykinesia, no Hypohonia, normal blink rate. Musculoskeletal: Has no hand arthritis, no limitation of ROM in any of the four extremities. There is no leg edema. The Heart was regular in rate and rhythm. No heart murmur  Chest Clear, with  good effort. Abdomen soft, intact bowel sounds. Results for orders placed in visit on 20   MRI BRAIN W WO CONTRAST    Narrative Ordering Provider: [address] [place] [address]        Radiology Department  Patient:  [facility]    Marquita Miranda. :  1980   Sex: Cruz, Laure [facility] [place] [facility]  Location:  [address] [place] [address]   Unit #:   [id number]      Acct #:  [de-identified]      Ordering Phys: Beryl Bhardwaj DO          Exam Date: 20    Accession #:  [id number]    Exam:  MRI   MRI Brain w/wo Contrast    Result: See Report          STUDY:   MRI BRAIN WITH AND WITHOUT CONTRAST (ATTENTION INTERNAL AUDITORY    CANALS - I.A.C.''s)        REASON FOR EXAM:   Female, 38years old. CA OF PAROTID GLAND, LOSS OF    HEARING IN RT EAR AFTER RADIATION FROM PAROTID CA, BLANCE ISSUES        TECHNIQUE:   Standardized multiplanar fat and water weighted pulse    sequences were obtained. IV Gadavist 15 ML was administered for the    contrast portion of the examination. COMPARISON:   2020    ___________________________________        FINDINGS:    Examination is technically suboptimal.  Post contrast images are not fat    suppressed and detection of contrast enhancement is difficult with    resultant loss of diagnostic accuracy. Diagnostic information is    available. There is right parotid gland resection with post surgical change. Right    mastoid is opacified with mixed fluid and mucosal reactive change. There is new right cranial nerve 8 neuritis with severe enhancement of the    vestibule and semicircular canals and moderate enhancement of the cochlea    consistent with radiation injury. There is post radiation change in the    deep temporal white matter superiorly. There is a small chronic infarct in the right caudate body and adjacent    white matter. Sella turcica is mildly enlarged and has an empty    appearance with the gland flattened along the floor of the sella. There    is no pituitary mass. Contralateral left parotid is unremarkable on the visualized upper portion    images. ___________________________________        IMPRESSION:    1. 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 2.  Right mastoid effusion. 3.  Right parotid resection. 4.  Possible intracranial hypertension. Neurology and ophthalmology    referrals are advised. Electronically Signed:    Liza Hess,    2020/07/31 at 14:44 EDT    Tel [phone], Service support  [phone], Fax [phone]                    cc: Chuck Goltz DO            Dictated by:  Liza Hess MD on 07/31/20 1444    Technologist:   RT(MR) Tennis Brittle    Transcribed by:  Liza Hess MD on 07/31/20 1444        Report Signed by:  Ganga Barrow on 07/31/20 1444         We reviewed the patient records from referring provider and available information in the EHR       ASSESSMENT:      Diagnosis Orders   1. Other headache syndrome     2. Visual disturbance        This is a 14-year-old female who presents with headache that is been ongoing for the last 9 years that has increased in frequency and severity. She can have associated blurred vision with her headache. She does have history of adenoid cystic carcinoma and her symptoms began following surgery and radiation to treat this cancer.   She underwent an MRI brain with and without contrast on 7/30/2020 that showed right post radiation cranial nerve VIII neuritis and labyrinthitis. Right mastoid effusion. Right parotid resection. Possible intracranial hypertension. Of note, she has gained approximately 30 pounds in the last 1 year. She is not currently on any oral birth control. We will attempt to obtain the MRI brain with and without contrast and MRI orbits face and neck with and without contrast images from [facility] for review with our radiologist.  She would benefit from formal evaluation with ophthalmology for formal dilated eye exam to evaluate for optic nerve edema. We will consider a lumbar puncture as a next step based on ophthalmologic findings. We will also order lab work checking for vitamin levels. After detailed discussion with the patient and her  we agreed on the following plan       Plan    1. Obtain MRI brain W/WO contrast as well as MRI orbits, face, and neck W/WO contrast images from [facility]  2. Ophthalmology evaluation  3. Consider lumbar puncture as a next step  4. Vitamin B12, folate  5. Vitamin B6 level  6. Call with any new symptoms or concerns.             Ko Dove MD

## 2020-09-16 ENCOUNTER — OFFICE VISIT (OUTPATIENT)
Dept: ONCOLOGY | Age: 40
End: 2020-09-16
Payer: COMMERCIAL

## 2020-09-16 ENCOUNTER — HOSPITAL ENCOUNTER (OUTPATIENT)
Dept: INFUSION THERAPY | Age: 40
Discharge: HOME | End: 2020-09-16
Payer: COMMERCIAL

## 2020-09-16 VITALS
SYSTOLIC BLOOD PRESSURE: 131 MMHG | TEMPERATURE: 97.3 F | HEIGHT: 67 IN | RESPIRATION RATE: 19 BRPM | DIASTOLIC BLOOD PRESSURE: 88 MMHG | BODY MASS INDEX: 45.99 KG/M2 | HEART RATE: 78 BPM | WEIGHT: 293 LBS | OXYGEN SATURATION: 98 %

## 2020-09-16 PROCEDURE — 99205 OFFICE O/P NEW HI 60 MIN: CPT | Performed by: INTERNAL MEDICINE

## 2020-09-16 PROCEDURE — 99211 OFF/OP EST MAY X REQ PHY/QHP: CPT

## 2020-09-16 PROCEDURE — G8427 DOCREV CUR MEDS BY ELIG CLIN: HCPCS | Performed by: INTERNAL MEDICINE

## 2020-09-16 PROCEDURE — 4004F PT TOBACCO SCREEN RCVD TLK: CPT | Performed by: INTERNAL MEDICINE

## 2020-09-16 PROCEDURE — G8417 CALC BMI ABV UP PARAM F/U: HCPCS | Performed by: INTERNAL MEDICINE

## 2020-09-16 NOTE — PATIENT INSTRUCTIONS
1.  Referral to ENT Dr. Domitila Nichols  2. Referral to the physical therapist  3. Referral to the ophthalmologist  4. PET scan in the next 1 to 2 weeks  5. Please obtain actual imaging studies of MRI of the orbit and brain from [facility]  6.   RTC to see me in 1 year

## 2020-09-16 NOTE — PROGRESS NOTES
[facility] [address] [facility] [address], [address] [facility] [address]  [place] [facility]  Dept: [phone]  Dept Fax: [phone]: [phone]    Visit Date:9/16/2020     Jennifer Potts is a 38 y.o. 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 She underwent a right radical parotidectomy with facial nerve dissection and sacrifice, gortex sling, partial auriculectomy, skull base and partial temporal bone resection, right neck dissection and facial nerve graft on October 29, 2011 by Dr. Vania Samuels at [facility]. She was staged as T4, N2b. Subsequently she underwent proton beam radiation treatment in [place] which she completed in March 2012. In January 2013 she had FNA of the 2 cm left cervical lymph node that showed reactive lymphoid hyperplasia. She has numbness of her right face and neck due to Bell's palsy. She has multiple postsurgical complaints including numbness of the right half of the tongue, mouth dryness, neck spasms, mouth drooping and drooling difficulty with speaking attributed to cold. She also has difficulty hearing and occasional right ear pain. At [facility] she was receiving physical therapy till 2018 to help her with those symptoms. She had PET scan in August 2017 that showed persistent asymmetric FDG activity overlying the left tonsillar soft tissues. Similarly, there were some persistent mildly hypermetabolic left cervical lymph nodes  No new FDG avid lesion or lymphadenopathy  to suggest recurrent malignancy/metastatic  disease. The patient moved to [facility] in 2018 and she established care with ENT Dr. Krysten Espinosa.   Primary care physician Dr. Krysten Espinosa ordered MRI of the orbit in July 2020 it showed postsurgical changes from right parotidectomy and postsurgical fibrosis at the operative site without well defined enhancing nodule to suggest recurrent neoplasm. 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 Due to empty sella possible intracranial hypertension.     HPI   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    Removed tumor, salivary glands, facial nerve graft    Anxiety     Arthritis     knees & right shoulder, hip    Depression     Hypothyroidism     PTSD (post-traumatic stress disorder)     Sleep apnea       Past Surgical History:   Procedure Laterality Date    EUA PELVIC N/A 2/21/2019    REMOVAL AND REINSERTION OF MIRENA, EXAM UNDER ANESTHESIA, PAP performed by Ngozi Mccoy MD at [facility] Right 10/2011    Radiacal Neck for CA    TUMOR EXCISION  2011    excision salivary gland (radical neck disection & nerve graft) - radiation    TYMPANOSTOMY TUBE PLACEMENT      age 14years old   Vashti Swann WISDOM TOOTH EXTRACTION  2018      Family History   Problem Relation Age of Onset    Diabetes Mother    Vashti Swann Arthritis Mother     Obesity Mother     Depression Mother     No Known Problems Father     Depression Brother       Social History     Tobacco Use    Smoking status: Current Every Day Smoker     Packs/day: 0.50     Years: 20.00     Pack years: 10.00     Types: Cigarettes     Start date: 4/16/1999    Smokeless tobacco: Never Used    Tobacco comment: wearing the patch for the first time today   Substance Use Topics    Alcohol use: Yes     Comment: social      Current Outpatient Medications   Medication Sig Dispense Refill    Handicap Placard MISC by Does not apply route Expires on 8/3/2022 1 each 0    levothyroxine (SYNTHROID) 50 MCG tablet take 1 tablet by mouth once daily 30 tablet 3    vitamin D (RA VITAMIN D-3) 125 MCG (5000 UT) CAPS capsule take 1 capsule by mouth once daily 30 capsule 3    Omega-3 Fatty Acids (RA FISH OIL) 1000 MG CAPS TAKE 1 SOFTGEL BY MOUTH ONCE DAILY 30 capsule 2    pilocarpine (SALAGEN) 5 MG tablet take 1 tablet by mouth three times a day 90 tablet 3    prazosin (MINIPRESS) 5 MG capsule Take 1 capsule by mouth nightly 30 capsule 3    FLUoxetine (PROZAC) 40 MG capsule Take 1 capsule by mouth daily 30 capsule 3    FLUoxetine (PROZAC) 20 MG capsule Take 1 capsule by mouth daily 30 capsule 3    ciprofloxacin (CILOXAN) 0.3 % ophthalmic solution Place 4 drops in ear(s) 2 times daily right ear, keep instilled ear up for 10 min 5 mL 1    acetaminophen (TYLENOL) 500 MG tablet Take 1,000 mg by mouth every 6 hours as needed for Pain      ibuprofen (ADVIL;MOTRIN) 200 MG tablet Take 600 mg by mouth every 6 hours as needed for Pain      Multiple Vitamins-Minerals (THERAPEUTIC MULTIVITAMIN-MINERALS) tablet Take 1 tablet by mouth daily Indications: when remembered       clonazePAM (KLONOPIN) 0.5 MG tablet Take 1 tablet by mouth 2 times daily as needed for Anxiety for up to 60 days. 60 tablet 1     No current facility-administered medications for this visit. No Known Allergies   Health Maintenance   Topic Date Due    Cervical cancer screen  05/25/2000    Flu vaccine (1) 09/01/2020    TSH testing  06/17/2021    Lipid screen  06/17/2025    DTaP/Tdap/Td vaccine (2 - Td) 10/06/2027    Pneumococcal 0-64 years Vaccine  Completed    HIV screen  Completed    Hepatitis A vaccine  Aged Out    Hepatitis B vaccine  Aged Out    Hib vaccine  Aged Out    Meningococcal (ACWY) vaccine  Aged Out        Subjective:   Review of Systems   Constitutional: Negative for activity change, appetite change, fatigue and fever. HENT: Positive for drooling, ear pain, hearing loss, sinus pressure and voice change. Negative for congestion, dental problem, facial swelling, mouth sores, nosebleeds, sore throat, tinnitus and trouble swallowing. Eyes: Negative for discharge and visual disturbance.    Respiratory: Negative for cough, chest tightness, shortness of breath and wheezing. Cardiovascular: Negative for chest pain, palpitations and leg swelling. Gastrointestinal: Negative for abdominal distention, abdominal pain, blood in stool, constipation, diarrhea, nausea, rectal pain and vomiting. Endocrine: Negative for cold intolerance, polydipsia and polyuria. Genitourinary: Negative for decreased urine volume, difficulty urinating, dysuria, flank pain, hematuria and urgency. Musculoskeletal: Positive for neck pain and neck stiffness. Negative for arthralgias, back pain, gait problem, joint swelling and myalgias. Skin: Negative for color change, rash and wound. Neurological: Positive for dizziness and headaches. Negative for tremors, seizures, speech difficulty, weakness, light-headedness and numbness. Hematological: Negative for adenopathy. Does not bruise/bleed easily. Psychiatric/Behavioral: Negative for confusion and sleep disturbance. The patient is not nervous/anxious. Objective:   Physical Exam  Vitals signs reviewed. Constitutional:       General: She is not in acute distress. Appearance: She is well-developed. HENT:      Head: Normocephalic. Mouth/Throat:      Pharynx: No oropharyngeal exudate. Eyes:      General: No scleral icterus. Right eye: No discharge. Left eye: No discharge. Pupils: Pupils are equal, round, and reactive to light. Neck:      Musculoskeletal: Normal range of motion and neck supple. Thyroid: No thyromegaly. Vascular: No JVD. Trachea: No tracheal deviation. Cardiovascular:      Rate and Rhythm: Normal rate. Heart sounds: Normal heart sounds. No murmur. No friction rub. No gallop. Pulmonary:      Effort: Pulmonary effort is normal. No respiratory distress. Breath sounds: Normal breath sounds. No stridor. No wheezing or rales. Chest:      Chest wall: No tenderness. Abdominal:      General: Bowel sounds are normal. There is no distension. Palpations: Abdomen is soft. There is no mass. Tenderness: There is no abdominal tenderness. There is no rebound. Musculoskeletal: Normal range of motion. Comments: Good range of motion in all four extremities. Lymphadenopathy:      Cervical: No cervical adenopathy. Skin:     General: Skin is warm. Findings: No erythema or rash. Neurological:      Mental Status: She is alert and oriented to person, place, and time. Cranial Nerves: Dysarthria and facial asymmetry present. No cranial nerve deficit. Sensory: Sensory deficit (Right facial numbness and weakness) present. Motor: No abnormal muscle tone. Deep Tendon Reflexes: Reflexes are normal and symmetric. Psychiatric:         Behavior: Behavior normal.         Thought Content: Thought content normal.         Judgment: Judgment normal.         /88 (Site: Left Upper Arm, Position: Sitting, Cuff Size: Medium Adult)   Pulse 78   Temp 97.3 °F (36.3 °C) (Infrared)   Resp 19   Ht 5' 7\" (1.702 m)   Wt (!) 330 lb (149.7 kg)   SpO2 98%   BMI 51.69 kg/m²      ECOG status is 1    Imaging studies and labs:   MRI of the brain on July 30, 2020 showed:  IMPRESSION:      1.  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             2.  Right mastoid effusion.            3.  Right parotid resection.            4.  Possible intracranial hypertension.  Neurology and ophthalmology      referrals are advised.           Lab Results   Component Value Date    WBC 8.3 06/17/2020    HGB 13.5 06/17/2020    HCT 43.1 06/17/2020    MCV 89.2 06/17/2020     06/17/2020       Chemistry        Component Value Date/Time     06/17/2020 1231    K 4.4 06/17/2020 1231     06/17/2020 1231    CO2 26 06/17/2020 1231    BUN 10 06/17/2020 1231    CREATININE 0.8 07/22/2020 1401        Component Value Date/Time    CALCIUM 9.3 06/17/2020 1231    ALKPHOS 77 12/06/2018 1158    AST 13 12/06/2018 1158    ALT 12 12/06/2018 1158    BILITOT 0.4 12/06/2018 1158        TSH on June 17, 2020: 3.74    Assessment/Plan:   1. 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 Patient is s/p right radical parotidectomy with facial nerve dissection and sacrifice, gortex sling, partial auriculectomy, skull base and partial temporal bone resection, right neck dissection and facial nerve graft performed on October 29, 2011 by Dr. Ke Gonzalez at [address] [place] [address]. Subsequently she underwent proton beam radiation treatment in [place] which she completed in March 2012. The patient has multiple sequela of her surgery and radiation treatment. She has numbness of her right face and neck due to Bell's palsy. She has multiple postsurgical complaints including numbness of the right half of the tongue, mouth dryness, neck spasms, mouth drooping and drooling difficulty with speaking attributed to cold. She also has difficulty hearing and occasional right ear pain. At [address] [place] [address] she was receiving physical therapy till 2018 to help her with those symptoms. Referral is made to physical therapy at [facility]. Patient would like to establish care with new ENT specialist.  Referral is made to see Dr. Carolan Bence. 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 It also showed empty sella through cycle raising differential diagnosis of cranial hypertension. The patient is referred to the ophthalmologist.  Her last PET scan in 2017 showed FDG avid in the left tonsillar soft tissue left cervical lymph nodes. Therefore the patient will have PET scan        is     Diagnosis Orders   1. Adenoid cystic carcinoma [facility])  PET CT Skull Base Mid Thigh Restage    Adrienne Jin MD, Otolaryngology, [place]    Ambulatory referral to Physical Therapy    [facility] - Loni Stallworth MD, Opthalmology, [place]   2.  Cancer of parotid gland ([facility])  PET CT Skull Base Mid Thigh Restage    Marina Glass MD, Otolaryngology, [place]    Ambulatory referral to Physical Therapy    MCKENNA Marquez MD, Opthalmologsalazar, [place]   3. History of parotidectomy  PET CT Skull Base Mid Thigh Restage    Marina Glass MD, Otolaryngology, [place]    Ambulatory referral to Physical Therapy    MCKENNA Marquez MD, Opthalmologsalazar, [place]   4. Cranial nerve palsy     5. Encounter for follow-up surveillance of head and neck cancer            No follow-ups on file. Orders Placed:   Orders Placed This Encounter   Procedures    PET CT Skull Base Mid Thigh Restage     Standing Status:   Future     Standing Expiration Date:   9/16/2021   Marina Glass MD, Otolaryngology, [place]     Referral Priority:   Routine     Referral Type:   Eval and Treat     Referral Reason:   Specialty Services Required     Referred to Provider:   Camacho Harvey MD     Requested Specialty:   Otolaryngology     Number of Visits Requested:   1    Ambulatory referral to Physical Therapy     Referral Priority:   Routine     Referral Type:   Eval and Treat     Referral Reason:   Specialty Services Required     Requested Specialty:   Physical Therapy     Number of Visits Requested:   1    MCKENNA Marquez MD, Opthalmology, [place]     Referral Priority:   Routine     Referral Type:   Eval and Treat     Referral Reason:   Specialty Services Required     Referred to Provider:   Zenaida Beatty MD     Requested Specialty:   Ophthalmology     Number of Visits Requested:   1        Medications Prescribed:   No orders of the defined types were placed in this encounter. Discussed use, benefit, and side effectsof prescribed medications. All patient questions answered. Pt voiced understanding. Instructed to continue current medications, diet and exercise. Patient agreed with treatment plan. Follow up as directed.     Electronically signed by Soheila Franco MD on 9/16/20 at 3:52 PM EDT

## 2020-09-17 ENCOUNTER — TELEPHONE (OUTPATIENT)
Dept: ENT CLINIC | Age: 40
End: 2020-09-17

## 2020-09-17 NOTE — TELEPHONE ENCOUNTER
Patient referred to us for   C80.1 (ICD-10-CM) - Adenoid cystic carcinoma ([address]    C07 (ICD-10-CM) - Cancer of parotid gland ([address]    Z90.49 (ICD-10-CM) - History of parotidectomy      Please review chart and advise as to how soon patient needs to be seen.

## 2020-09-17 NOTE — LETTER
[address] EAR, NOSE AND THROAT  [address] [id number] [facility]  Dept: [phone]  Dept Fax: [phone]  [address] [place] [address]          9/22/2020    Dear Ms. Braxton Augustine: We were unable to reach you by phone. Please call our office at your earliest convenience. This message is regarding:  Scheduling an appointment . Please call between the hours of 8:00am and 4:30pm Monday through Friday if possible. Our number is [phone]. Thank you.        [facility] ENT Office

## 2020-09-23 ENCOUNTER — HOSPITAL ENCOUNTER (OUTPATIENT)
Dept: NUCLEAR MEDICINE | Age: 40
Discharge: HOME | End: 2020-09-23

## 2020-09-29 ASSESSMENT — ENCOUNTER SYMPTOMS
RECTAL PAIN: 0
CONSTIPATION: 0
DIARRHEA: 0
CHEST TIGHTNESS: 0
VOICE CHANGE: 1
VOMITING: 0
WHEEZING: 0
NAUSEA: 0
EYE DISCHARGE: 0
TROUBLE SWALLOWING: 0
BACK PAIN: 0
SINUS PRESSURE: 1
COLOR CHANGE: 0
ABDOMINAL DISTENTION: 0
ABDOMINAL PAIN: 0
SHORTNESS OF BREATH: 0
SORE THROAT: 0
BLOOD IN STOOL: 0
FACIAL SWELLING: 0
COUGH: 0

## 2020-10-05 NOTE — TELEPHONE ENCOUNTER
Patient's last appointment was : 8/3/2020  Patient's next appointment is : Visit date not found  Last refilled:  5/28/2020

## 2020-10-07 RX ORDER — OMEGA-3 FATTY ACIDS/FISH OIL 300-500 MG
CAPSULE ORAL
Qty: 30 CAPSULE | Refills: 2 | Status: SHIPPED | OUTPATIENT
Start: 2020-10-07 | End: 2020-10-28 | Stop reason: SDUPTHER

## 2020-10-15 ENCOUNTER — HOSPITAL ENCOUNTER (OUTPATIENT)
Dept: MRI IMAGING | Age: 40
Discharge: HOME | End: 2020-10-15

## 2020-10-15 ENCOUNTER — HOSPITAL ENCOUNTER (OUTPATIENT)
Dept: PET IMAGING | Age: 40
Discharge: HOME | End: 2020-10-15
Payer: COMMERCIAL

## 2020-10-15 PROCEDURE — 3209999900 MRI COMPARISON OF OUTSIDE FILMS

## 2020-10-15 PROCEDURE — 78815 PET IMAGE W/CT SKULL-THIGH: CPT

## 2020-10-15 PROCEDURE — 3430000000 HC RX DIAGNOSTIC RADIOPHARMACEUTICAL: Performed by: INTERNAL MEDICINE

## 2020-10-15 PROCEDURE — A9552 F18 FDG: HCPCS | Performed by: INTERNAL MEDICINE

## 2020-10-15 RX ORDER — FLUDEOXYGLUCOSE F 18 200 MCI/ML
13.2 INJECTION, SOLUTION INTRAVENOUS
Status: COMPLETED | OUTPATIENT
Start: 2020-10-15 | End: 2020-10-15

## 2020-10-15 RX ADMIN — FLUDEOXYGLUCOSE F 18 13.2 MILLICURIE: 200 INJECTION, SOLUTION INTRAVENOUS at 10:22

## 2020-10-28 RX ORDER — OMEGA-3 FATTY ACIDS/FISH OIL 300-500 MG
CAPSULE ORAL
Qty: 30 CAPSULE | Refills: 2 | Status: SHIPPED | OUTPATIENT
Start: 2020-10-28 | End: 2020-12-16 | Stop reason: SDUPTHER

## 2020-10-29 ENCOUNTER — VIRTUAL VISIT (OUTPATIENT)
Dept: PSYCHOLOGY | Age: 40
End: 2020-10-29
Payer: COMMERCIAL

## 2020-10-29 PROCEDURE — 90832 PSYTX W PT 30 MINUTES: CPT | Performed by: PSYCHOLOGIST

## 2020-10-29 NOTE — LETTER
[address]  [facility] [address] [place] [address]  Phone: [phone]  Fax: [phone]    Krystal Salazar PSYD      October 29, 2020       Patient: Elidia Jolly   MR Number: [id number]   YOB: 1980   Date of Visit: 10/29/2020     Dear Provider:    I am referring my patient, Dunia May, to you for evaluation of Major Depressive Disorder, as well as medication management as you see fit. She also has a history of PTSD, and associated anxiety. She  has a past medical history of Adenoid cystic carcinoma ([facility] Anxiety, Arthritis, Depression, Hypothyroidism, PTSD (post-traumatic stress disorder), and Sleep apnea. Her  has a past surgical history that includes tumor excision (2011); [place] tooth extraction (2018); Tympanostomy tube placement; Selective Neck Dissection (Right, 10/2011); and EUA PELVIC (N/A, 2/21/2019). She  reports that she has been smoking cigarettes. She started smoking about 21 years ago. She has a 10.00 pack-year smoking history. She has never used smokeless tobacco. She reports current alcohol use. She reports current drug use. Frequency: 5.00 times per week. Drug: Marijuana. She has a current medication list which includes the following prescription(s): ra fish oil, handicap placard, levothyroxine, ra vitamin d-3, pilocarpine, prazosin, fluoxetine, fluoxetine, clonazepam, ciprofloxacin, acetaminophen, ibuprofen, and therapeutic multivitamin-minerals. She has No Known Allergies. I appreciate your assistance in her care and look forward to your findings and recommendations.     Sincerely,                             Krystal Salazar PSYD

## 2020-10-29 NOTE — PROGRESS NOTES
Behavioral Health Consultation/Psychotherapy Note  Mahnaz Villagomez Psy.D. Visit Date:  10/29/2020    Patient:  Lawyer Ballard  YOB: 1980  Chief Complaint:  Follow-up; Depression; and Stress    Duration of session:  30 minutes      S:     This session was conducted as a telepsychology visit due to [facility] restrictions placed on in-person visits d/t the COVID-19 outbreak. Patient Location: Home       Provider Location ([facility]    Patient gave verbal consent for teleservices and will sign a consent form when feasible. This virtual visit was conducted via interactive/real-time audio/video, using phone (audio only). Ct said she's been doing OK with COVID-19 but decided she should keep this writer updated on her situation. She said she's struggling with her daily life. She had a good friend who  this past spring, and she's still grieving. She also got denied disability and went through the appeal process; this continues to be an ongoing stressor that she is getting further  on. She said she hasn't seen a psychiatrist yet, as she hasn't followed up with R/S with Dr. Daria Macdonald yet. She hasn't been working at [employer]. 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 We discussed and processed this.       She said she would like a new referral to psychiatry, and is requesting records from psychology in support of her disability appeal.          O:    Appetite abnormal: fluctuates, but often low  Sleep disturbance Yes  Loss of pleasure Yes  Speech    normal rate, normal volume, well articulated and clear and understandable  Mood    Depressed  Low self-esteem  Thought Process    linear, goal directed, coherent and linear and coherent  Insight    Great  Judgment    Intact  Memory    recent and remote memory intact  Suicide Assessment    no suicidal ideations      A:    1. Major depressive disorder, recurrent episode, moderate ([facility]         Ct really seems to appreciate the validation of her experience.  She has great insight into her problems and participates and listens well.  Continued psychotherapy needed to assist with depression management, emotional management, and decision making. P:    [facility] Mail record request for client to sign and return so psychological records can be provided to her. Phone session in 2 weeks. All questions about treatment plan answered. Patient instructed to go immediately to the emergency room and/or call 911 if any suicidal or homicidal ideations. Patient stated understanding and is agreeable to treatment and crisis plan.           Provider Signature:  Electronically signed by Karl Lombard, PSYD on 10/29/2020 at 12:03 PM

## 2020-11-12 ENCOUNTER — VIRTUAL VISIT (OUTPATIENT)
Dept: PSYCHOLOGY | Age: 40
End: 2020-11-12
Payer: COMMERCIAL

## 2020-11-12 PROCEDURE — 90832 PSYTX W PT 30 MINUTES: CPT | Performed by: PSYCHOLOGIST

## 2020-11-12 NOTE — PROGRESS NOTES
Behavioral Health Consultation/Psychotherapy Note  Enrico Schofield Psy.D. Visit Date:  11/12/2020    Patient:  Priti Way  YOB: 1980  Chief Complaint:  Follow-up; Depression; and Stress    Duration of session:  30 minutes      S:     This session was conducted as a telepsychology visit due to [facility] restrictions placed on in-person visits d/t the COVID-19 outbreak. Patient Location: Home       Provider Location ([facility]    Patient gave verbal consent for teleservices and will sign a consent form when feasible. This virtual visit was conducted via interactive/real-time audio/video, using phone (audio only). Ct said her aunt has been tested for COVID-19, so she is anxiously awaiting results. She talked about how her energy is increasing a little, so she's been more productive at home. We talked about how she needs to continue to focus on getting her own needs met until she's secure enough to be extending her help toward others again. She talked about wanting to get tested for Autism Spectrum D/O, as well as ADHD. She may need a referral to neuropsychology for this. She also plans to call back to get scheduled with Dr. Karena Rockwell for psychiatry. O:    Appetite abnormal: fluctuates, but often low  Sleep disturbance Yes  Loss of pleasure Yes  Speech    normal rate, normal volume, well articulated and clear and understandable  Mood    Dysthymic, pleasant  Thought Process    linear, goal directed, coherent and linear and coherent  Insight    Great  Judgment    Intact  Memory    recent and remote memory intact  Suicide Assessment    no suicidal ideations    A:    1.  Major depressive disorder, recurrent episode, moderate ([address]        Ct really seems to appreciate the validation of her experience.  She has great insight into her problems and participates and listens well.  Continued psychotherapy needed to assist with depression management, emotional management, and decision making. P:    Phone session in 2 weeks. All questions about treatment plan answered. Patient instructed to go immediately to the emergency room and/or call 911 if any suicidal or homicidal ideations. Patient stated understanding and is agreeable to treatment and crisis plan.           Provider Signature:  Electronically signed by Carmela Ma PSYD on 11/12/2020 at 11:41 AM

## 2020-11-12 NOTE — PATIENT INSTRUCTIONS
Managing Attention Deficits  Increase Attention by:   Decreasing Stress  Increasing Interest in a Task  Prioritizing  Saying No to Unreasonable Demands  Increase Focus by:   Setting Goals  Creating a Ransom Statement  Creating a Not-To-Do List  Managing Impulsivity and Hyperactivity  Decrease Impulsivity by:   Pausing  Increasing Detachment  Asking \"What Did You Mean By That? \"  Managing Anger  Listing Consequences  Translating Complaints into Requests  Practicing Communication Skills  Increasing Positive Self-talk  Decrease Hyperactivity by: Increasing Self-Care  Increasing Exercise and Relaxation  Increase Persistence by:   Managing Emotions  Increasing Awareness of Benefits  Decreasing Self-Criticism  Increasing Rewards  Increase Task Completion by:   Predicting Obstacles  Identifying Unmet Need  Asking for Support  Create Real World Success  Increase Motivation by:   Decreasing Depletion  Noticing Progress  Focusing on Positive Feelings  Asking \"Why do I want to change? \"  Increase Confidence by:    Identifying Strengths  Listing Past Successes  Increase Organization by:   Reframing  Decreasing Emotional Upsets  with Technology  Increase Time Management by:   Taking 10 minutes Each Morning  Building New Habits  Using Prompts and Reminders

## 2020-11-23 NOTE — TELEPHONE ENCOUNTER
Odell Newman pscrxrequest  Refill: Levothyroxine, Fluoxetine 20 mg and 40 mg, Vitamin D  Pharmacy: [facility]  Patient last OV: 8/3/2020    Patient called on 11/20/20 and request was made for refills of these medications. Patient has not heard anything back from provider and pharmacy has not received order for refill.  Please call patient Ph; [phone]

## 2020-11-24 ENCOUNTER — VIRTUAL VISIT (OUTPATIENT)
Dept: PSYCHOLOGY | Age: 40
End: 2020-11-24
Payer: COMMERCIAL

## 2020-11-24 PROCEDURE — 90832 PSYTX W PT 30 MINUTES: CPT | Performed by: PSYCHOLOGIST

## 2020-11-24 RX ORDER — CHOLECALCIFEROL (VITAMIN D3) 125 MCG
CAPSULE ORAL
Qty: 30 CAPSULE | Refills: 3 | OUTPATIENT
Start: 2020-11-24

## 2020-11-24 RX ORDER — FLUOXETINE HYDROCHLORIDE 20 MG/1
20 CAPSULE ORAL DAILY
Qty: 30 CAPSULE | Refills: 3 | OUTPATIENT
Start: 2020-11-24

## 2020-11-24 RX ORDER — LEVOTHYROXINE SODIUM 0.05 MG/1
TABLET ORAL
Qty: 30 TABLET | Refills: 3 | OUTPATIENT
Start: 2020-11-24

## 2020-11-24 RX ORDER — FLUOXETINE HYDROCHLORIDE 40 MG/1
40 CAPSULE ORAL DAILY
Qty: 30 CAPSULE | Refills: 3 | OUTPATIENT
Start: 2020-11-24

## 2020-11-24 NOTE — PROGRESS NOTES
Behavioral Health Consultation/Psychotherapy Note  Yarelis Ovalle. Julio Nowak Psy.D. Visit Date:  11/24/2020    Patient:  Pedro Duong  YOB: 1980  Chief Complaint:  Follow-up; Depression; and Stress    Duration of session:  30 minutes      S:     This session was conducted as a telepsychology visit due to [facility] restrictions placed on in-person visits d/t the COVID-19 outbreak. Patient Location: Home       Provider Location ([facility]): [facility]    Patient gave verbal consent for teleservices and will sign a consent form when feasible. This virtual visit was conducted via interactive/real-time audio/video, using phone (audio only). Ct said she hasn't been able to get her medication refills since last week, which has cause her mood to fluctuate. She also talked about how her right eyelid won't shut properly so she's crying all the time. She talked about how her aunt Greta Armstrong tried to talk to her firmly about her issues but she felt like her aunt bullied her. She said her aunt doesn't know how to talk to her. She has made some lists just to keep her on track, but it's been very difficult organizing, prioritizing and keeping track of things in her life. She's been trying some yoga, and going to bed early and getting up early. She has continued to write poetry and try to incorporate humor in her life, but it's been an ongoing struggle to stay motivated. O:    Appetite abnormal: fluctuates, but often low  Sleep disturbance Yes  Loss of pleasure Yes  Speech    normal rate, normal volume, well articulated and clear and understandable  Mood    Dysthymic, pleasant  Thought Process    linear, goal directed, coherent and linear and coherent  Insight    Great  Judgment    Intact  Memory    recent and remote memory intact  Suicide Assessment    no suicidal ideations    A:    1.  Major depressive disorder, recurrent episode, moderate ([address]        Ct really seems to appreciate the validation of her experience.  She has great insight into her problems and participates and listens well.  Continued psychotherapy needed to assist with depression management, emotional management, and decision making. She has been struggling with day to day tasks, and ADL's not having the motivation or drive to complete these tasks due to the depression. P:    Letter for disability written and notes need printed to provide for ct. Phone session in 2 weeks. All questions about treatment plan answered. Patient instructed to go immediately to the emergency room and/or call 911 if any suicidal or homicidal ideations. Patient stated understanding and is agreeable to treatment and crisis plan.           Provider Signature:  Electronically signed by Karl Lombard, PSYD on 11/24/2020 at 12:39 PM

## 2020-11-24 NOTE — LETTER
[address]  Dept: [phone]  Dept Fax: [phone]      2020      To Whom It May Concern:    I am writing this letter in support of [address]  (:  1980) appeal for the denial of disability. Allison Nava has been in psychotherapy (with intermittent episodes) with me since 2019. During that time we have addressed her diagnoses of Major Depressive Disorder, Recurrent Episode, Moderate; and Post-traumatic Stress Disorder. During that time we have also addressed grief from the deaths of those close to her. Allison Nava has given admirable effort within the context of psychotherapy despite being undercut by the diagnoses themselves. Her stated intentions in therapy are good, but follow through has been a challenge for her. Her diagnoses have made it difficult to keep up with appointments, keep up with her activities of daily living, or maintain meaningful employment. Furthermore, her diagnoses have negatively impacted the following areas as related to the prospect of employment:    -Time management  -Organization  -Stress management  -Focus/Concentration/Attention  -Short-term memory and transitional follow-through    As someone who has been able to get to know Trudy for the past couple of years, I believe her mental health issues preclude her from meaningful employment at this time. Should Trudy be able to re-establish with psychiatric services, continue with psychotherapy for a period of time, and maintain stability over a significant period of time, then a re-evaluation may reveal a different clinical picture. If you have any further questions or need additional information, please feel free to contact me. Sincerely,       ARRON Cunningham

## 2020-12-10 ENCOUNTER — VIRTUAL VISIT (OUTPATIENT)
Dept: PSYCHOLOGY | Age: 40
End: 2020-12-10
Payer: COMMERCIAL

## 2020-12-10 PROCEDURE — 90832 PSYTX W PT 30 MINUTES: CPT | Performed by: PSYCHOLOGIST

## 2020-12-10 NOTE — PROGRESS NOTES
Behavioral Health Consultation/Psychotherapy Note  Mahnaz Villagomez Psy.D. Visit Date:  12/10/2020    Patient:  Lawyer Ballard  YOB: 1980  Chief Complaint:  Follow-up; Depression; and Stress    Duration of session:  30 minutes      S:     This session was conducted as a telepsychology visit due to [facility] restrictions placed on in-person visits d/t the COVID-19 outbreak. Patient Location: Home       Provider Location ([facility]): [facility]    Patient gave verbal consent for teleservices and will sign a consent form when feasible. This virtual visit was conducted via interactive/real-time audio/video, using phone (audio only). Ct said she got her appeal turned in on the last day. She got her meds refilled, and got an appt with Dr. Daria Macdonald for June 2021. She said she still hasn't contacted the lady from the meeting place, saying she's probably upset with ct. Ct was open to trying a new anti-depressant but not Effexor, since she's been on this before and it didn't work. We talked about some things she can do to improve her mood and her perspective. O:    Appetite abnormal: fluctuates, but often low  Sleep disturbance Yes  Loss of pleasure Yes  Speech    normal rate, normal volume, well articulated and clear and understandable  Mood    Dysthymic, pleasant  Thought Process    linear, goal directed, coherent and linear and coherent  Insight    Great  Judgment    Intact  Memory    recent and remote memory intact  Suicide Assessment    no suicidal ideations    A:    1.  Major depressive disorder, recurrent episode, moderate ([facility] [address]        Ct really seems to appreciate the validation of her experience.  She has great insight into her problems and participates and listens well.  Continued psychotherapy needed to assist with depression management, emotional management, and decision making.     She has been struggling with day to day tasks, and ADL's not having the motivation or drive to complete these tasks due to the depression. P:    Consult with PCP about trying Cymbalta. Phone session in 2-4  weeks. All questions about treatment plan answered. Patient instructed to go immediately to the emergency room and/or call 911 if any suicidal or homicidal ideations. Patient stated understanding and is agreeable to treatment and crisis plan.           Provider Signature:  Electronically signed by Shirley Ponce PSYD on 12/10/2020 at 10:53 AM

## 2020-12-14 ENCOUNTER — OFFICE VISIT (OUTPATIENT)
Dept: ENT CLINIC | Age: 40
End: 2020-12-14
Payer: COMMERCIAL

## 2020-12-14 VITALS
TEMPERATURE: 97.3 F | HEART RATE: 68 BPM | BODY MASS INDEX: 52.64 KG/M2 | DIASTOLIC BLOOD PRESSURE: 84 MMHG | RESPIRATION RATE: 16 BRPM | WEIGHT: 293 LBS | SYSTOLIC BLOOD PRESSURE: 122 MMHG

## 2020-12-14 PROBLEM — H92.11 CHRONIC OTORRHEA OF RIGHT EAR: Status: ACTIVE | Noted: 2020-12-14

## 2020-12-14 PROBLEM — E28.2 PCOS (POLYCYSTIC OVARIAN SYNDROME): Status: ACTIVE | Noted: 2020-12-14

## 2020-12-14 PROBLEM — G47.30 SLEEP APNEA: Status: ACTIVE | Noted: 2019-08-08

## 2020-12-14 PROBLEM — Z96.22: Status: ACTIVE | Noted: 2020-12-14

## 2020-12-14 PROBLEM — H66.91: Status: ACTIVE | Noted: 2020-12-14

## 2020-12-14 PROCEDURE — 4004F PT TOBACCO SCREEN RCVD TLK: CPT | Performed by: OTOLARYNGOLOGY

## 2020-12-14 PROCEDURE — G8484 FLU IMMUNIZE NO ADMIN: HCPCS | Performed by: OTOLARYNGOLOGY

## 2020-12-14 PROCEDURE — 99215 OFFICE O/P EST HI 40 MIN: CPT | Performed by: OTOLARYNGOLOGY

## 2020-12-14 PROCEDURE — G8417 CALC BMI ABV UP PARAM F/U: HCPCS | Performed by: OTOLARYNGOLOGY

## 2020-12-14 PROCEDURE — G8427 DOCREV CUR MEDS BY ELIG CLIN: HCPCS | Performed by: OTOLARYNGOLOGY

## 2020-12-14 RX ORDER — ACETIC ACID 20.65 MG/ML
4 SOLUTION AURICULAR (OTIC) 3 TIMES DAILY
Qty: 1 BOTTLE | Refills: 5 | Status: SHIPPED | OUTPATIENT
Start: 2020-12-14 | End: 2020-12-16 | Stop reason: ALTCHOICE

## 2020-12-14 NOTE — PROGRESS NOTES
[facility] PROFESSIONAL SERVS  [facility] [place] EAR, NOSE AND THROAT  [facility]  Dept: [phone]  Dept Fax: [phone]  Loc: Marion Lilly is a 38 y.o. female who was referred by No ref. provider found for:  Chief Complaint   Patient presents with    Hearing Loss     New patient here for evaluation of her hearing loss, right and right ear pain. Patient has previously been seen by Dr Duran Stephen but would like to be seen by a different provider.  Otalgia       HPI:     Clara Mac is a 38 y.o. 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 She underwent a radical parotidectomy and facial nerve sacrifice with nerve grafts intended to enable her buccal and zygomaticotemporal branches of her facial nerve to work as well as possible. This was followed by proton beam therapy to the region. Adenoid cystic carcinoma is notoriously insensitive to radiation therapy. When it is used it is usually under relatively dire circumstances. The patient had a lymph node 2 cm in diameter found 2 years after her operation which was removed and found to be reactive in nature. She is currently complaining of multiple organ system problems including chronic ear drainage from her right middle ear space status post pressure equalizing tube placement last year. She has been on numerous eardrops and had little abatement of the problem. She has decreased hearing on that side possibly as a primary process from inner ear radiation toxicity but also as a conductor process given surgical distortions to her external auditory canal and chronic ear drainage likely with eustachian tube dysfunction. In addition she has postural vertigo from almost any postural change including standing, sitting, rolling over in bed, and others.   Additionally she came planes of regular episodes of choking on liquids and other food matter causing paroxysmal coughing. Her chronic pain is in the region of her cervical plexus extending from her postauricular space down the neck medially and inferiorly all the way almost to her clavicle. Her numbness starts in the preauricular skin and extends down the jaw of the mandible to the similar region that is painful. She is scheduled for a full mouth extraction after pretreatment with hyperbaric oxygen therapy and posttreatment following the extraction. She has had recent surgery to improve her eye closure in the last few weeks. She is still recovering from that procedure.      History:     No Known Allergies  Current Outpatient Medications   Medication Sig Dispense Refill    acetic acid (VOSOL) 2 % otic solution Place 4 drops into the right ear 3 times daily for 7 days 1 Bottle 5    RA VITAMIN D-3 125 MCG (5000 UT) CAPS capsule take 1 capsule by mouth once daily 30 capsule 2    FLUoxetine (PROZAC) 20 MG capsule take 1 capsule by mouth once daily 30 capsule 2    FLUoxetine (PROZAC) 40 MG capsule take 1 capsule by mouth once daily 30 capsule 2    levothyroxine (SYNTHROID) 50 MCG tablet take 1 tablet by mouth once daily 30 tablet 2    Omega-3 Fatty Acids (RA FISH OIL) 1000 MG CAPS take 1 capsule by mouth once daily 30 capsule 2    Handicap Placard MISC by Does not apply route Expires on 8/3/2022 1 each 0    pilocarpine (SALAGEN) 5 MG tablet take 1 tablet by mouth three times a day 90 tablet 3    prazosin (MINIPRESS) 5 MG capsule Take 1 capsule by mouth nightly 30 capsule 3    acetaminophen (TYLENOL) 500 MG tablet Take 1,000 mg by mouth every 6 hours as needed for Pain      ibuprofen (ADVIL;MOTRIN) 200 MG tablet Take 600 mg by mouth every 6 hours as needed for Pain      Multiple Vitamins-Minerals (THERAPEUTIC MULTIVITAMIN-MINERALS) tablet Take 1 tablet by mouth daily Indications: when remembered       clonazePAM (KLONOPIN) 0.5 MG tablet Take 1 tablet by mouth 2 times daily as needed for Anxiety for up to 60 days. 60 tablet 1     No current facility-administered medications for this visit. Past Medical History:   Diagnosis Date    Adenoid cystic carcinoma ([address] 10/2011    Removed tumor, salivary glands, facial nerve graft    Anxiety     Arthritis     knees & right shoulder, hip    Depression     Hypothyroidism     PTSD (post-traumatic stress disorder)     Sleep apnea       Past Surgical History:   Procedure Laterality Date    EUA PELVIC N/A 2/21/2019    REMOVAL AND REINSERTION OF MIRENA, EXAM UNDER ANESTHESIA, PAP performed by Afshan Juan MD at [address] Right     SELECTIVE NECK DISSECTION Right 10/2011    Radiacal Neck for CA    TUMOR EXCISION  2011    excision salivary gland (radical neck disection & nerve graft) - radiation    TYMPANOSTOMY TUBE PLACEMENT      age 14years old   [employer] WISDOM TOOTH EXTRACTION  2018     Family History   Problem Relation Age of Onset    Diabetes Mother    [employer] Arthritis Mother     Obesity Mother     Depression Mother     No Known Problems Father     Depression Brother      Social History     Tobacco Use    Smoking status: Current Every Day Smoker     Packs/day: 0.50     Years: 20.00     Pack years: 10.00     Types: Cigarettes     Start date: 4/16/1999    Smokeless tobacco: Never Used    Tobacco comment: wearing the patch for the first time today   Substance Use Topics    Alcohol use: Yes     Comment: social        Subjective:      Review of Systems  Rest of review of systems are negative, except as noted in HPI. Objective:     /84 (Site: Left Upper Arm, Position: Sitting)   Pulse 68   Temp 97.3 °F (36.3 °C) (Infrared)   Resp 16   Wt (!) 336 lb 1.6 oz (152.5 kg)   BMI 52.64 kg/m²     Physical Exam       On general physical exam the patient is a pleasant alert well oriented adult female in no acute distress. She does have an ill appearance overall. She is morbidly obese with a BMI of 52.6.   Her voice is within normal limits for her age and gender. Her speech pattern is also normal, neither being hyper nor hyponasal.  I heard no throat clearing coughing or inspiratory stridor. On otoscopy, her left middle ear structures, tympanic membrane, ear canal and pinna are all normal.  On the right, her ear canal is collapsed in an AP direction and difficult to examine. Her medial canal is covered with mucopurulent fluid. Her tympanic membrane is not directly visible. I could not see her pressure equalizing tube. However when she attempts to clear her ears, she makes a coarse whistling sound through that ear which proves that the tympanotomy is still patent. Her face is abnormal for right-sided facial nerve palsy. She has a House-Brackman grade III-IV temporal branch with adequate eye closure (postop). She has a grade II-III buccal branch palsy with some sphincteric function. The patient's oral cavity and oropharynx were abnormal for the presence of numerous carious and fractured teeth with extensive periodontal disease consistent with radiation therapy. She had a class I Mallampati aperture with an easy to see oropharynx but a narrow nasopharyngeal inlet. No purulence was seen posteriorly. Her neck was abnormal for extensive postsurgical changes with radiation changes evident posterior laterally and superiorly in the region of her pinna and upper neck. I detected no adenopathy or thyromegaly. Examining her extraocular movements, the patient had a scleral hemorrhage from postop changes but appeared to have conjugate gaze. I saw no obvious nystagmus. Her hearing was poor on her right side, forcing her to put her hand behind her left ear to improve communication. Her face was markedly hirsute with full lip and moderate chin and cheek area growth. Vitals reviewed. No results found.    Lab Results   Component Value Date     06/17/2020     12/06/2018    K 4.4 06/17/2020    K 4.3 12/06/2018     06/17/2020     12/06/2018    CO2 26 06/17/2020    CO2 26 12/06/2018    BUN 10 06/17/2020    BUN 15 12/06/2018    CREATININE 0.8 07/22/2020    CREATININE 0.8 06/17/2020    CREATININE 0.8 12/06/2018    CALCIUM 9.3 06/17/2020    CALCIUM 9.5 12/06/2018    PROT 7.5 12/06/2018    LABALBU 4.1 12/06/2018    BILITOT 0.4 12/06/2018    ALKPHOS 77 12/06/2018    AST 13 12/06/2018    ALT 12 12/06/2018       All of the past medical history, past surgical history, family history,social history, allergies and current medications were reviewed with the patient. Assessment & Plan   Diagnoses and all orders for this visit:     Diagnosis Orders   1. Balance problem due to vestibular dysfunction of right ear  Audiometry with tympanometry    Basic Vestibular Evaluation   2. Oropharyngeal dysphagia  FL MODIFIED BARIUM SWALLOW W VIDEO   3. Adenoid cystic carcinoma ([address]     4. Neck pain     5. Cancer of parotid gland ([address]     6. Morbidly obese (HCC)     7. Partial facial nerve palsy     8. Cervical neuralgia     9. 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 Chronic otitis media after insertion of tympanic ventilation tube, right  Audiometry with tympanometry   11. Chronic otorrhea of right ear  Audiometry with tympanometry   12. Osteoradionecrosis of jaw     13. Other specified disorders of the skin and subcutaneous tissue related to radiation     14. PCOS (polycystic ovarian syndrome)         Based on the patient's history and these physical findings, she has extensive severe manifestations of dysfunction status post the necessary surgical and postsurgical treatment for an advanced adenoid cystic carcinoma 9 years ago. She is so far free from signs of recurrence but will be at risk for recurrence for another 11 years.   Her dysfunction is substantially debilitating and may be characterized by some additional diagnostic studies which I will order. They will include a modified barium swallow study to determine the degree of neurological dysphagia that she may have. A repeat audiogram and vestibular studies will quantitate her hearing dysfunction and vestibulopathy. After this I will get together with the patient by phone to review the results of her findings. This will be compiled in her residual functional assessment, the document she needs for pursuing her disability designation which I support. Regarding her chronic otitis media with drainage, I recommended that she try VoSol drops, which is acetic acid into her right ear canal.  They may migrate into her middle ear space in which case the may sting significantly. If that happens she is to stop using the drops. If she tolerates them, it may help her ear recover her normal tesfaye and decrease the amount of discharge that she has to suffer with. I will see her back in approximately 6 months for an interval evaluation. I answered all her questions and addressed her concerns. She reported being pleased with the outcome of the visit and being willing to proceed as such. I spent over 45 minutes of face-to-face time with the patient more than half of which was spent reviewing her conditions and planning her diagnostic and therapeutic care. I will review with her the results of her studies either through [facility] or phone. Return in about 6 months (around 6/14/2021) for Follow-up care. .           **This report has been created using voice recognition software. It may contain minor errors which are inherent in voice recognition technology. **

## 2020-12-16 ENCOUNTER — OFFICE VISIT (OUTPATIENT)
Dept: FAMILY MEDICINE CLINIC | Age: 40
End: 2020-12-16
Payer: COMMERCIAL

## 2020-12-16 VITALS
HEART RATE: 102 BPM | SYSTOLIC BLOOD PRESSURE: 122 MMHG | WEIGHT: 293 LBS | DIASTOLIC BLOOD PRESSURE: 86 MMHG | RESPIRATION RATE: 20 BRPM | TEMPERATURE: 96.9 F | BODY MASS INDEX: 45.99 KG/M2 | HEIGHT: 67 IN | OXYGEN SATURATION: 97 %

## 2020-12-16 PROCEDURE — G8484 FLU IMMUNIZE NO ADMIN: HCPCS | Performed by: NURSE PRACTITIONER

## 2020-12-16 PROCEDURE — 99396 PREV VISIT EST AGE 40-64: CPT | Performed by: NURSE PRACTITIONER

## 2020-12-16 RX ORDER — OMEGA-3 FATTY ACIDS/FISH OIL 300-500 MG
CAPSULE ORAL
Qty: 30 CAPSULE | Refills: 2 | Status: SHIPPED | OUTPATIENT
Start: 2020-12-16 | End: 2021-02-11 | Stop reason: SDUPTHER

## 2020-12-16 NOTE — PROGRESS NOTES
[address] [facility]  [phone] (phone)  [phone] (fax)    Visit Date: 12/16/2020    Alis Snell is a 38 y.o. female who presents today for:  Chief Complaint   Patient presents with    3 Month Follow-Up     increased dizziness     HPI:     Health Habits: With regard to her health habits, she eats 2 meals and 1 snacks per day. She does not exercise regularly. She does not take over the counter vitamins. She never had a blood transfusion. Does have tattoo. She wears seatbelts while riding a car. She does not text or talk on the phone while driving. She performs all of her ADL's without problem. She is independent, she cooks, drives, bathes, and gets dressed without assistance. She is not . She has 0 children. She does not work. Health Maintenance   Topic Date Due    Cervical cancer screen  05/25/2000    Flu vaccine (1) 09/01/2020    TSH testing  06/17/2021    Lipid screen  06/17/2025    DTaP/Tdap/Td vaccine (2 - Td) 10/06/2027    Pneumococcal 0-64 years Vaccine  Completed    HIV screen  Completed    Hepatitis A vaccine  Aged Out    Hepatitis B vaccine  Aged Out    Hib vaccine  Aged Out    Meningococcal (ACWY) vaccine  Aged Out       She  reports that she has been smoking cigarettes. She started smoking about 21 years ago. She has a 10.00 pack-year smoking history. She has never used smokeless tobacco. She reports current alcohol use. She reports current drug use. Frequency: 5.00 times per week. Drug: Marijuana. .  Her last pap smear was 1-2  years and it was normal.  She is sexually active. She has had the same sexual partner for the last 10 years. She does not perform regular breast self exams. Has not had a mammogram yet    Has been trying to get social security - denied -  said she needs an MD to complete the forms.      Had corrective eye surgery 12/10 - Dr. Mildred Dillon from [address] come to [facility] -  Lower lid was not functioning - wouldn't go up, put a stich in bottom and pulled it up so tears do not go that way - able to get a firm seal now. Goes back for follow up tomorrow.        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    Removed tumor, salivary glands, facial nerve graft    Anxiety     Arthritis     knees & right shoulder, hip    Depression     Hypothyroidism     PTSD (post-traumatic stress disorder)     Sleep apnea       Past Surgical History:   Procedure Laterality Date    EUA PELVIC N/A 02/21/2019    REMOVAL AND REINSERTION OF MIRENA, EXAM UNDER ANESTHESIA, PAP performed by Apolinar Jj MD at [address] Right 12/10/2020    SELECTIVE NECK DISSECTION Right 10/2011    Radiacal Neck for CA    TUMOR EXCISION  2011    excision salivary gland (radical neck disection & nerve graft) - radiation    TYMPANOSTOMY TUBE PLACEMENT      age 14years old   [facility] WISDOM TOOTH EXTRACTION  2018     Family History   Problem Relation Age of Onset    Diabetes Mother     Arthritis Mother     Obesity Mother     Depression Mother     No Known Problems Father     Depression Brother      Social History     Tobacco Use    Smoking status: Current Every Day Smoker     Packs/day: 0.50     Years: 20.00     Pack years: 10.00     Types: Cigarettes     Start date: 4/16/1999    Smokeless tobacco: Never Used    Tobacco comment: wearing the patch for the first time today   Substance Use Topics    Alcohol use: Yes     Comment: social      Current Outpatient Medications   Medication Sig Dispense Refill    Omega-3 Fatty Acids (RA FISH OIL) 1000 MG CAPS take 1 capsule by mouth once daily 30 capsule 2    RA VITAMIN D-3 125 MCG (5000 UT) CAPS capsule take 1 capsule by mouth once daily 30 capsule 2    FLUoxetine (PROZAC) 20 MG capsule take 1 capsule by mouth once daily 30 capsule 2    FLUoxetine (PROZAC) 40 MG capsule take 1 capsule by mouth once daily 30 capsule 2    levothyroxine (SYNTHROID) 50 MCG tablet take 1 tablet by mouth once daily 30 tablet 2    Handicap Placard MISC by Does not apply route Expires on 8/3/2022 1 each 0    pilocarpine (SALAGEN) 5 MG tablet take 1 tablet by mouth three times a day 90 tablet 3    prazosin (MINIPRESS) 5 MG capsule Take 1 capsule by mouth nightly 30 capsule 3    clonazePAM (KLONOPIN) 0.5 MG tablet Take 1 tablet by mouth 2 times daily as needed for Anxiety for up to 60 days. 60 tablet 1    acetaminophen (TYLENOL) 500 MG tablet Take 1,000 mg by mouth every 6 hours as needed for Pain      ibuprofen (ADVIL;MOTRIN) 200 MG tablet Take 600 mg by mouth every 6 hours as needed for Pain      Multiple Vitamins-Minerals (THERAPEUTIC MULTIVITAMIN-MINERALS) tablet Take 1 tablet by mouth daily Indications: when remembered        No current facility-administered medications for this visit. No Known Allergies    Subjective:    Review of Systems   Constitutional: Negative for chills, fatigue and fever. HENT: Positive for congestion, ear pain, postnasal drip and rhinorrhea. Negative for sore throat. Eyes: Negative for discharge and redness. Respiratory: Negative for cough and shortness of breath. Cardiovascular: Negative for chest pain and leg swelling. Gastrointestinal: Negative for abdominal distention, abdominal pain, anal bleeding, blood in stool, constipation, diarrhea and nausea. Musculoskeletal: Positive for arthralgias and myalgias. Skin: Negative for color change and rash.    Neurological: Negative for facial asymmetry, speech difficulty and weakness. Hematological: Does not bruise/bleed easily. Psychiatric/Behavioral: Negative for agitation and confusion. Objective:     Vitals:    12/16/20 1202   BP: 122/86   Site: Left Upper Arm   Position: Sitting   Cuff Size: Large Adult   Pulse: 102   Resp: 20   Temp: 96.9 °F (36.1 °C)   TempSrc: Temporal   SpO2: 97%   Weight: (!) 334 lb (151.5 kg)   Height: 5' 7\" (1.702 m)       Body mass index is 52.31 kg/m². Wt Readings from Last 3 Encounters:   12/16/20 (!) 334 lb (151.5 kg)   12/14/20 (!) 336 lb 1.6 oz (152.5 kg)   09/16/20 (!) 330 lb (149.7 kg)     BP Readings from Last 3 Encounters:   12/16/20 122/86   12/14/20 122/84   09/16/20 131/88     Physical Exam  Constitutional:       General: She is not in acute distress. Appearance: She is well-developed. She is not ill-appearing or diaphoretic. HENT:      Head: Normocephalic and atraumatic. Right Ear: Hearing and external ear normal. No decreased hearing noted. Left Ear: Hearing and external ear normal. No decreased hearing noted. Nose: Nose normal. No nasal deformity. Eyes:      General:         Right eye: No discharge. Left eye: No discharge. Conjunctiva/sclera: Conjunctivae normal.     Neck:      Musculoskeletal: Normal range of motion and neck supple. Pulmonary:      Effort: Pulmonary effort is normal. No respiratory distress. Abdominal:      General: There is no distension. Tenderness: There is no guarding. Musculoskeletal: Normal range of motion. General: No tenderness or deformity. Skin:     Coloration: Skin is not pale. Findings: No erythema or rash (On exposed areas). Neurological:      Mental Status: She is alert. Gait: Gait normal.   Psychiatric:         Speech: Speech normal.         Behavior: Behavior normal.         Thought Content:  Thought content normal.         Judgment: Judgment normal.         Lab Results   Component Value Date    WBC 8.3 06/17/2020    HGB 13.5 06/17/2020    HCT 43.1 06/17/2020     06/17/2020    CHOL 183 06/17/2020    TRIG 133 06/17/2020    HDL 35 06/17/2020    LDLCALC 121 06/17/2020    AST 13 12/06/2018     06/17/2020    K 4.4 06/17/2020     06/17/2020    CREATININE 0.8 07/22/2020    BUN 10 06/17/2020    CO2 26 06/17/2020    TSH 3.740 06/17/2020    LABA1C 5.6 11/07/2018    LABGLOM 79 (A) 06/17/2020    MG 2.1 06/17/2020    CALCIUM 9.3 06/17/2020    VITD25 32 06/17/2020     Assessment:       Diagnosis Orders   1. Encounter for screening mammogram for malignant neoplasm of breast  GENESIS DIGITAL SCREEN W OR WO CAD BILATERAL   2. Fatigue, unspecified type  Omega-3 Fatty Acids (RA FISH OIL) 1000 MG CAPS       Plan:   Ángel Hathaway was seen today for 3 month follow-up. Diagnoses and all orders for this visit:    Encounter for screening mammogram for malignant neoplasm of breast  -     GENESIS DIGITAL SCREEN W OR WO CAD BILATERAL; Future    Fatigue, unspecified type  -     Omega-3 Fatty Acids (RA FISH OIL) 1000 MG CAPS; take 1 capsule by mouth once daily      Will get you scheduled for a follow-up with one of the physicians in office - can refer to media tab 12/11/2019    Return if symptoms worsen or fail to improve.     Orders Placed:  Orders Placed This Encounter   Procedures    GENESIS DIGITAL SCREEN W OR WO CAD BILATERAL     Medications Prescribed:  Orders Placed This Encounter   Medications    Omega-3 Fatty Acids (RA FISH OIL) 1000 MG CAPS     Sig: take 1 capsule by mouth once daily     Dispense:  30 capsule     Refill:  2     Future Appointments   Date Time Provider Aure Torres   12/17/2020  1:15 PM Becky Carr MD [facility] NEUROLO P - [place]   12/22/2020  8:45 AM STR FLUORO ROOM 2 STRZ RAD STR Radiolog   12/29/2020  2:00 PM Justina Keyes PSYD [facility] Psychol [facility] - [place]   1/4/2021  2:40 PM Gagan Ruggiero DO [facility] FM RES [facility] - [place]   1/18/2021  2:30 PM [facility]   6/9/2021  3:00 PM MD Helga Thayer [facility] [place] 6/14/2021  2:00 PM Candance Hailey, MD N ENT [facility] - [address] [facility]   9/15/2021  4:00 PM Melvin Paul MD N Oncology [facility] - [address] [place] [address]      Patient given educational materials - see patient instructions. Discussed use, benefit, and side effects of prescribedmedications. All patient questions answered. Pt voiced understanding. Reviewed health maintenance. Instructed to continue current medications, diet and exercise. Patient agreed with treatment plan. Follow up as directed.     Electronically signed by EDEN Harvey CNP on 12/17/2020 at 12:36 PM

## 2020-12-16 NOTE — PATIENT INSTRUCTIONS
Thank you   1. Thank you for trusting us with your healthcare needs. You may receive a survey regarding today's visit. It would help us out if you would take a few moments to provide your feedback. We value your input. 2. Please bring in ALL medications BOTTLES, including inhalers, herbal supplements, over the counter, prescribed & non-prescribed medicine. The office would like actual medication bottles and a list.   3. Please note our OFFICE POLICIES:   a. Prior to getting your labs drawn, please check with your insurance company for benefits and eligibility of lab services. Often, insurance companies cover certain tests for preventative visits only. It is patient's responsibility to see what is covered. b. We are unable to change a diagnosis after the test has been performed. c. Lab orders will not be re-printed. Please hold onto your original lab orders and take them to your lab to be completed. d. If you no show your scheduled appointment three times, you will be dismissed from this practice. e. Reji Dicker must be completed 24 hours prior to your schedule appointment. 4. If the list below has been completed, PLEASE FAX RECORDS TO OUR OFFICE @ [phone]. Once the records have been received we will update your records at our office:  Health Maintenance Due   Topic Date Due    Cervical cancer screen  05/25/2000    Flu vaccine (1) 09/01/2020             Patient Education        Mammogram: About This Test  What is it? A mammogram is an X-ray of the breast that is used to screen for breast cancer. This test can find tumors that are too small for you or your doctor to feel. Cancer is most easily treated when it is found at an early stage. Why is this test done? A mammogram is done to:  · Look for breast cancer in women who don't have symptoms. · Find breast cancer in women who have symptoms.  Symptoms of breast cancer may include a lump or thickening in the breast, nipple discharge, or dimpling of the skin on one area of the breast.  · Find an area of suspicious breast tissue to remove for an exam under a microscope (biopsy). How do you prepare for the test?  If you've had a mammogram before at another clinic, have the results sent or bring them with you to your appointment. On the day of the mammogram, don't use any deodorant. And don't use perfume, powders, or ointments near or on your breasts. The residue left on your skin by these substances may interfere with the X-rays. How is the test done? · You will need to take off any jewelry that might interfere with the X-ray pictures. · You will need to take off your clothes above the waist.  · You will be given a cloth or paper gown to use during the test.  · You probably will stand during the mammogram.  · One at a time, your breasts will be placed on a flat plate. · Another plate is then pressed firmly against your breast to help flatten out the breast tissue. You may be asked to lift your arm. · For a few seconds while the X-ray picture is being taken, you will need to hold your breath. · At least two pictures are taken of each breast. One is taken from the top and one from the side. How does having a mammogram feel? A mammogram is often uncomfortable but rarely painful. If you have sensitive or fragile skin or a skin condition, let the technician know before you have your exam. If you have menstrual periods, the procedure is more comfortable when done within 2 weeks after your period has ended. Having your breasts flattened is usually uncomfortable, but it helps the technician get the best images. How long does the test take? · The test will take about 10 to 15 minutes. You may be in the clinic for up to an hour. · You may be asked to wait a few minutes while the images are checked to make sure they don't need to be redone. What happens after the test?  · You will probably be able to go home right away.   · You can go back to your usual activities right away. Follow-up care is a key part of your treatment and safety. Be sure to make and go to all appointments, and call your doctor if you are having problems. It's also a good idea to keep a list of the medicines you take. Ask your doctor when you can expect to have your test results. Where can you learn more? Go to https://chpepiceweb.[email or web address] Voicemail. org and sign in to your [employer] account. Enter B878 in the cWyze box to learn more about \"Mammogram: About This Test.\"     If you do not have an account, please click on the \"Sign Up Now\" link. Current as of: April 29, 2020               Content Version: 12.6  © 2006-2020 [employer]. Care instructions adapted under license by [facility] ([facility]). If you have questions about a medical condition or this instruction, always ask your healthcare professional. [place] [address] any warranty or liability for your use of this information. Patient Education        Learning About Breast Cancer Screening  What is breast cancer screening? Breast cancer occurs when cells that are not normal grow in one or both of your breasts. Screening tests can help find breast cancer early. Cancer is easier to treat when it's found early. Having concerns about breast cancer is common. That's why it's important to talk with your doctor about when to start and how often to get screened for breast cancer. How is breast cancer screening done? Several screening tests can be used to check for breast cancer. Mammograms. These tests check for signs of cancer using X-rays. They can show tumors that are too small for you or your doctor to feel. During a mammogram, a machine squeezes your breasts to make them flatter and easier to X-ray. At least two pictures are taken of each breast. One is taken from the top and one from the side. 3-D mammograms. These tests are also called digital breast tomosynthesis.  Your breast is positioned on a flat plate. A top plate is pressed against your breast to keep it in position. The X-ray arm then moves in an arc above the breast and takes many pictures. A computer uses these X-rays to create a three-dimensional image. Clinical breast exam.   In this exam, your doctor carefully feels your breasts and under your arms to check for lumps or other changes. Who should be screened for breast cancer? Experts agree that mammograms are the best screening test for people at average risk of breast cancer. But they don't all agree on the age at which screening should start. And they don't agree on whether it's better to be screened every year or every two years. Here are some of the recommendations from experts:  · Start by age 36 and have a mammogram each year. · Start at age 39 and have a mammogram each year. · Start at age 48 and have a mammogram every 2 years. When to stop having mammograms is another decision. You and your doctor can decide on the right age to start and stop screening based on your personal preferences and overall health. What is your risk for breast cancer? If you don't already know your risk of breast cancer, you can ask your doctor about it. You can also look it up at www.cancer.gov/bcrisktool/. If your doctor says that you have a high or very high risk, ask about ways to reduce your risk. These could include getting extra screening, taking medicine, or having surgery. If you have a strong family history of breast cancer, ask your doctor about genetic testing. What steps can you take to stay healthy? Some things that increase your risk of breast cancer, such as your age and being female, cannot be controlled. But you can do some things to stay as healthy as you can. · Learn what your breasts normally look and feel like. If you notice any changes, tell your doctor. · If you drink alcohol, limit how much you drink.  Any amount of alcohol may increase your risk for some types of cancer. · If you smoke, quit. When you quit smoking, you lower your chances of getting many types of cancer. You can also do your best to eat well, be active, and stay at a healthy weight. Eating healthy foods and being active every day, as well as staying at a healthy weight, may help prevent cancer. Where can you learn more? Go to https://chpekanaewjeffrey.IPTEGO. org and sign in to your [employer] account. Enter R995 in the [employer] box to learn more about \"Learning About Breast Cancer Screening. \"     If you do not have an account, please click on the \"Sign Up Now\" link. Current as of: April 29, 2020               Content Version: 12.6  © 0163-1521 [employer], Incorporated. Care instructions adapted under license by [facility] ([facility]). If you have questions about a medical condition or this instruction, always ask your healthcare professional. [address] any warranty or liability for your use of this information.

## 2020-12-17 ENCOUNTER — VIRTUAL VISIT (OUTPATIENT)
Dept: NEUROLOGY | Age: 40
End: 2020-12-17
Payer: COMMERCIAL

## 2020-12-17 PROCEDURE — 99213 OFFICE O/P EST LOW 20 MIN: CPT | Performed by: PSYCHIATRY & NEUROLOGY

## 2020-12-17 PROCEDURE — G8428 CUR MEDS NOT DOCUMENT: HCPCS | Performed by: PSYCHIATRY & NEUROLOGY

## 2020-12-17 RX ORDER — ZONISAMIDE 50 MG/1
50 CAPSULE ORAL DAILY
Qty: 30 CAPSULE | Refills: 1 | Status: SHIPPED | OUTPATIENT
Start: 2020-12-17 | End: 2021-02-15

## 2020-12-17 ASSESSMENT — ENCOUNTER SYMPTOMS
ANAL BLEEDING: 0
SORE THROAT: 0
NAUSEA: 0
EYE DISCHARGE: 0
CONSTIPATION: 0
COUGH: 0
ABDOMINAL PAIN: 0
COLOR CHANGE: 0
BLOOD IN STOOL: 0
ABDOMINAL DISTENTION: 0
DIARRHEA: 0
EYE REDNESS: 0
RHINORRHEA: 1
SHORTNESS OF BREATH: 0

## 2020-12-17 NOTE — PATIENT INSTRUCTIONS
1. MRI brain with and without contrast IAC protocol follow-up study evaluate for right vestibular neuritis, postradiation changes. 2. Start Zonegran 50 mg daily take with plenty of fluids  3. Follow-up with ophthalmology as scheduled  4. Report any new events.

## 2020-12-17 NOTE — PROGRESS NOTES
2020    TELEHEALTH EVALUATION -- Audio/Visual (During ZGUUX-82 public health emergency)    HPI:    Evelyn Freeman (:  1980) has requested an audio/video evaluation for the following concern(s):  Follow up for headache. The patient describes increased headaches, off-balance feeling. She was evaluated by ophthalmology and assured that there is no intraocular pathology to explain her symptoms, she had testing performed. She reports her headache is the same pattern and location. The patient is evaluated via video link to discuss plan of care going forward. Review of Systems   Musculoskeletal: Negative. Skin: Negative. Hematological: Does not bruise/bleed easily. Prior to Visit Medications    Medication Sig Taking? Authorizing Provider   Omega-3 Fatty Acids (RA FISH OIL) 1000 MG CAPS take 1 capsule by mouth once daily  Margo Priyank, APRN - CNP   RA VITAMIN D-3 125 MCG (5000 UT) CAPS capsule take 1 capsule by mouth once daily  Margo Priyank, APRN - CNP   FLUoxetine (PROZAC) 20 MG capsule take 1 capsule by mouth once daily  Margo Priyank, APRN - CNP   FLUoxetine (PROZAC) 40 MG capsule take 1 capsule by mouth once daily  Margo Priyank, APRN - CNP   levothyroxine (SYNTHROID) 50 MCG tablet take 1 tablet by mouth once daily  Margo Priyank, APRN - CNP   Handicap Placard MISC by Does not apply route Expires on 8/3/2022  Jeovany Portillo MD   pilocarpine (SALAGEN) 5 MG tablet take 1 tablet by mouth three times a day  Shawn Man DO   prazosin (MINIPRESS) 5 MG capsule Take 1 capsule by mouth nightly  Shawn Man DO   clonazePAM (KLONOPIN) 0.5 MG tablet Take 1 tablet by mouth 2 times daily as needed for Anxiety for up to 60 days.   Joshua Canela MD   acetaminophen (TYLENOL) 500 MG tablet Take 1,000 mg by mouth every 6 hours as needed for Pain  Historical Provider, MD ibuprofen (ADVIL;MOTRIN) 200 MG tablet Take 600 mg by mouth every 6 hours as needed for Pain  Historical Provider, MD   Multiple Vitamins-Minerals (THERAPEUTIC MULTIVITAMIN-MINERALS) tablet Take 1 tablet by mouth daily Indications: when remembered   Historical Provider, MD       Social History     Tobacco Use    Smoking status: Current Every Day Smoker     Packs/day: 0.50     Years: 20.00     Pack years: 10.00     Types: Cigarettes     Start date: 4/16/1999    Smokeless tobacco: Never Used    Tobacco comment: wearing the patch for the first time today   Substance Use Topics    Alcohol use: Yes     Comment: social    Drug use: Yes     Frequency: 5.0 times per week     Types: Marijuana     Comment: weekly        Past Surgical History:   Procedure Laterality Date    EUA PELVIC N/A 02/21/2019    REMOVAL AND REINSERTION OF MIRENA, EXAM UNDER ANESTHESIA, PAP performed by Robbin Irizarry MD at [address] Right 12/10/2020    SELECTIVE NECK DISSECTION Right 10/2011    Radiacal Neck for CA    TUMOR EXCISION  2011    excision salivary gland (radical neck disection & nerve graft) - radiation    TYMPANOSTOMY TUBE PLACEMENT      age 14years old    WISDOM TOOTH EXTRACTION  2018   ,   Social History     Tobacco Use    Smoking status: Current Every Day Smoker     Packs/day: 0.50     Years: 20.00     Pack years: 10.00     Types: Cigarettes     Start date: 4/16/1999    Smokeless tobacco: Never Used    Tobacco comment: wearing the patch for the first time today   Substance Use Topics    Alcohol use: Yes     Comment: social    Drug use: Yes     Frequency: 5.0 times per week     Types: Marijuana     Comment: weekly   ,   Family History   Problem Relation Age of Onset    Diabetes Mother     Arthritis Mother     Obesity Mother     Depression Mother     No Known Problems Father     Depression Brother        PHYSICAL EXAMINATION: [ INSTRUCTIONS:  \"[x]\" Indicates a positive item  \"[]\" Indicates a negative item  -- DELETE ALL ITEMS NOT EXAMINED]  Vital Signs: (As obtained by patient/caregiver or practitioner observation)    Blood pressure-  Heart rate-    Respiratory rate-    Temperature-  Pulse oximetry-     Constitutional: [x] Appears well-developed and well-nourished [x] No apparent distress      [] Abnormal-   Mental status  [x] Alert and awake  [x] Oriented to person/place/time [x]Able to follow commands      Eyes:  EOM    []  Normal  [x] Abnormal-there is limited right eye movement with the lid ptosis. Sclera  [x]  Normal  [] Abnormal -         Discharge [x]  None visible  [] Abnormal -    HENT:   [x] Normocephalic, atraumatic. [] Abnormal   [x] Mouth/Throat: Mucous membranes are moist.     External Ears [x] Normal  [] Abnormal-     Neck: [x] No visualized mass     Pulmonary/Chest: [x] Respiratory effort normal.  [x] No visualized signs of difficulty breathing or respiratory distress        [] Abnormal-      Musculoskeletal:   [] Normal gait with no signs of ataxia         [x] Normal range of motion of neck        [] Abnormal-       Neurological:        [] No Facial Asymmetry (Cranial nerve 7 motor function) (limited exam to video visit)          [] No gaze palsy        [x] Abnormal-  There is right facial asymmetry. Skin:        [x] No significant exanthematous lesions or discoloration noted on facial skin         [] Abnormal-            Psychiatric:       [x] Normal Affect [x] No Hallucinations        [] Abnormal-     Other pertinent observable physical exam findings-   There is swelling in the right eyelid, as she had recent surgery. ASSESSMENT/PLAN:  1. Other headache syndrome  2.  Visual disturbance The patient describes increased headaches, off-balance feeling. She was evaluated by ophthalmology and assured that there is no intraocular pathology to explain her symptoms, was told she does not need to have a spinal tap for pseudotumor cerebri. She was referred onto a plastic ophthalmologist, who operated on her right eyelid, she still has a swollen right eyelid. She reports that her balance is off, her headaches are frequent, I reviewed her MRI brain performed 7/2020, she would benefit from a repeat study. We discussed placing her on Zonegran as headache preventative medication. In addition we will have her undergo a repeat MRI brain with and without contrast as follow-up for 7/2020, MRI brain showing a right vestibular neuritis. 1. MRI brain with and without contrast IAC protocol follow-up study evaluate for right vestibular neuritis, postradiation changes. 2. Start Zonegran 50 mg daily take with plenty of fluids  3. Follow-up with ophthalmology as scheduled  4. Report any new events. 5. Follow up in 2 weeks. Return in about 2 weeks (around 12/31/2020). Jason Briones is a 38 y.o. female being evaluated by a Virtual Visit (video visit) encounter to address concerns as mentioned above. A caregiver was present when appropriate. Due to this being a TeleHealth encounter (During AQHTV-10 public health emergency), evaluation of the following organ systems was limited: Vitals/Constitutional/EENT/Resp/CV/GI//MS/Neuro/Skin/Heme-Lymph-Imm. Pursuant to the emergency declaration under the [address] [place] [address] and the [employer] Act, this Virtual Visit was conducted with patient's (and/or legal guardian's) consent, to reduce the patient's risk of exposure to COVID-19 and provide necessary medical care. The patient (and/or legal guardian) has also been advised to contact this office for worsening conditions or problems, and seek emergency medical treatment and/or call 911 if deemed necessary. Patient identification was verified at the start of the visit: Yes    Total time spent on this encounter: 15 min    Services were provided through a video synchronous discussion virtually to substitute for in-person clinic visit. Patient and provider were located at their individual homes. --Morgan Maria MD on 12/17/2020 at 1:18 PM    An electronic signature was used to authenticate this note.

## 2020-12-22 ENCOUNTER — HOSPITAL ENCOUNTER (OUTPATIENT)
Dept: GENERAL RADIOLOGY | Age: 40
Discharge: HOME | End: 2020-12-22
Payer: COMMERCIAL

## 2020-12-22 PROCEDURE — 74230 X-RAY XM SWLNG FUNCJ C+: CPT

## 2020-12-22 PROCEDURE — 2500000003 HC RX 250 WO HCPCS: Performed by: OTOLARYNGOLOGY

## 2020-12-22 PROCEDURE — 92611 MOTION FLUOROSCOPY/SWALLOW: CPT | Performed by: SPEECH-LANGUAGE PATHOLOGIST

## 2020-12-22 RX ADMIN — BARIUM SULFATE 10 ML: 400 PASTE ORAL at 09:33

## 2020-12-22 RX ADMIN — BARIUM SULFATE 20 ML: 0.81 POWDER, FOR SUSPENSION ORAL at 09:33

## 2020-12-22 NOTE — DISCHARGE SUMMARY
Lateral Sulcus - Right - clears spontaneously, Uncontrolled Bolus/Diffuse Fall Over Tongue Base and Piecemeal Deglutition     ORAL PHASE FLORESITA SCORE: (Dysphagia outcome and severity scale)  5 = Mild Dysphagia - May have one diet consistency restricted - Mild oral residue but clears    PHARYNGEAL PHASE:  Impaired: Delayed Swallow      PHARYNGEAL PHASE FLORESITA SCORE: (Dysphagia outcome and severity scale)  5 = Mild Dysphagia - may need one consistency restricted - May have one or more of the following: Aspiration with thin - cough to clear, Airway penetration midway to the vocal cords with one or more consistency or to the vocal folds with one consistency, but clears spontaneously - Residue in the pharynx clears spontaneously    EVIDENCE FOR LARYNGEAL PENETRATION AND/OR ASPIRATION:  No evidence of aspiration  Laryngeal penetration evident with thin liquids inconsistently, but clears completely with completion of the swallow. PENETRATION-ASPIRATION SCALE (PAS): Thin Liquids: 2 = Material enters the airway, remains above vocal folds, and is ejected from the airway  Puree:  1 = Material does not enter the airway  Soft Solid:  1 = Material does not enter the airway  Hard Solid: 1 = Material does not enter the airway    ESOPHAGEAL PHASE:   Patient with trace residuals in the upper esophagus during this study. Patient reported a feeling of residue in the mid to lower esophagus. Patient may benefit from further esophageal testing. NOTE:  Patient coughing x 2 during this study, however, no aspiration noted. Patient reported it was related to a feeling of residue in the esophagus.     ATTEMPTED TECHNIQUES:  Small Bolus Size Effective    Straw Not Attempted    Cup Effective    Chin Tuck Not Attempted    Head Turn Not Attempted    Spoon Presentations Not Attempted with liquids    Volitional Cough Not Attempted    Spontaneous Cough Strong, but nothing to clear           DIAGNOSTIC IMPRESSIONS:  Patient presents with mild oropharyngeal dysphagia characterized by mildly increased mastication time, mild oral residue that clears spontaneously, decreased bolus control and a mildly delayed swallow. Laryngeal penetration noted with thin liquids inconsistently, but cleared completely with completion of the swallow. No aspiration evident throughout this study. Trace residuals noted at times in the upper esophagus and patient reported a feeling of residue in the esophagus (pointing to the mid to lower esophagus. Feel the patient may benefit from further esophageal testing. Diet Recommendations:  Regular with thin liquids  Strategies:  Full Upright Position, Small Bite/Sip and slow rate     EDUCATION:  Learner: Patient  Education:  Reviewed results and recommendations of this evaluation, Reviewed diet and strategies and Reviewed recommendations for follow-up with Dr. Graciela Garcia. Reviewed the study with the patient on the [facility] unit. Evaluation of Education: Verbalizes understanding and Family not present    PLAN:  No further speech therapy services indicated.         Thi Calzada M.S. Teresa [address] [id number]

## 2020-12-23 ENCOUNTER — TELEPHONE (OUTPATIENT)
Dept: OTOLARYNGOLOGY | Age: 40
End: 2020-12-23

## 2020-12-23 NOTE — TELEPHONE ENCOUNTER
Received order for Esophagram.  I called patient and left a message for her to call back so that we can schedule this.

## 2021-01-04 ENCOUNTER — OFFICE VISIT (OUTPATIENT)
Dept: FAMILY MEDICINE CLINIC | Age: 41
End: 2021-01-04
Payer: COMMERCIAL

## 2021-01-04 VITALS
OXYGEN SATURATION: 95 % | TEMPERATURE: 97.2 F | WEIGHT: 293 LBS | RESPIRATION RATE: 18 BRPM | SYSTOLIC BLOOD PRESSURE: 132 MMHG | HEART RATE: 91 BPM | BODY MASS INDEX: 52.25 KG/M2 | DIASTOLIC BLOOD PRESSURE: 70 MMHG

## 2021-01-04 DIAGNOSIS — R42 CHRONIC VERTIGO: Primary | ICD-10-CM

## 2021-01-04 DIAGNOSIS — N39.3 STRESS INCONTINENCE: ICD-10-CM

## 2021-01-04 DIAGNOSIS — R53.1 WEAKNESS: ICD-10-CM

## 2021-01-04 PROCEDURE — 4004F PT TOBACCO SCREEN RCVD TLK: CPT | Performed by: FAMILY MEDICINE

## 2021-01-04 PROCEDURE — G8484 FLU IMMUNIZE NO ADMIN: HCPCS | Performed by: FAMILY MEDICINE

## 2021-01-04 PROCEDURE — 99214 OFFICE O/P EST MOD 30 MIN: CPT | Performed by: FAMILY MEDICINE

## 2021-01-04 PROCEDURE — G8427 DOCREV CUR MEDS BY ELIG CLIN: HCPCS | Performed by: FAMILY MEDICINE

## 2021-01-04 PROCEDURE — G8417 CALC BMI ABV UP PARAM F/U: HCPCS | Performed by: FAMILY MEDICINE

## 2021-01-04 RX ORDER — POLYETHYLENE GLYCOL 400 AND PROPYLENE GLYCOL 4; 3 MG/ML; MG/ML
1 SOLUTION/ DROPS OPHTHALMIC PRN
Qty: 30 ML | Refills: 5 | Status: SHIPPED | OUTPATIENT
Start: 2021-01-04 | End: 2021-04-07 | Stop reason: SDUPTHER

## 2021-01-04 NOTE — PROGRESS NOTES
Health Maintenance Due   Topic Date Due    Hepatitis C screen  1980     Cervical cancer screen  05/25/2000 has mirena - 2019 GYN specialist    Flu vaccine (1) 09/01/2020 declines

## 2021-01-04 NOTE — PROGRESS NOTES
Trudy Freeman (:  1980) is a 38 y.o. female,Established patient, here for evaluation of the following chief complaint(s):  Check-Up and Other (disability paperwork)      ASSESSMENT/PLAN:  1. Chronic vertigo-forms completed for patient. 2. Roverto Bill to physical therapy  -     [facility]  3. Stress incontinence-refer to physical therapy  -     [facility]      Return if symptoms worsen or fail to improve. SUBJECTIVE/OBJECTIVE:  HPI  MRI brain scheduled. PT referral given for aches and pains. Has not scheduled yet due to executive function disorder. Stress incontinence recently with coughing. Has not seen specialist.  First time it is becoming an issue. Patient reports that symptoms of chronic vertigo have been occurring for at least 2 years since she completed radiotherapy for cancer of the adenoids. Is been gradually worsening. It occurs constantly and with every time she changes position. She walks with a cane chronically. Unable to work due to inability to lift or move consistently for several hours a day. It also affects her concentration. An electronic signature was used to authenticate this note.     --Meliza Salazar, DO

## 2021-01-18 ENCOUNTER — HOSPITAL ENCOUNTER (OUTPATIENT)
Dept: AUDIOLOGY | Age: 41
Discharge: HOME | End: 2021-01-18
Payer: COMMERCIAL

## 2021-01-18 PROCEDURE — 92538 CALORIC VSTBLR TEST W/REC: CPT | Performed by: AUDIOLOGIST

## 2021-01-18 PROCEDURE — 92540 BASIC VESTIBULAR EVALUATION: CPT | Performed by: AUDIOLOGIST

## 2021-01-18 PROCEDURE — 92567 TYMPANOMETRY: CPT | Performed by: AUDIOLOGIST

## 2021-01-18 PROCEDURE — 92557 COMPREHENSIVE HEARING TEST: CPT | Performed by: AUDIOLOGIST

## 2021-01-18 NOTE — PROGRESS NOTES
ACCOUNT #: [de-identified]                                    AUDIOLOGICAL EVALUATION WITH VNG      MEDICATIONS REVIEWED:  Patient has held appropriate medications for VNG testing. REASON FOR TESTING: Increased hearing loss in the left ear and continuing vertigo/dizziness. The dizziness is most noticeable when the patient gets up at night. 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  The tumor was removed and she did receive radiation.  She continues to experience balance problems. She had a PE tube placed in the right TM about 1 year ago Gene Reece is interested in considering a hearing aid or cochlear implant for he months ago in her right ear. According to chart notes, the patient had normal hearing in 2013. A VNG also completed in 2013 revealed a right peripheral vestibular loss. In 2018 the patient was experiencing dizziness which was intermittent and triggered with quick head movements and change in position. Rolling over in bed elicited dizziness but true vertigo was denied. She also was noticing new onset of hearing loss in her right ear during that time. 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 Word understanding was good in the left ear and very poor in the left ear. Audiometry revealed a significant increase in the conductive component of the patient's mixed hearing loss in her right ear compared to her 11/08/2019 audigram. Hearing is stable in her left ear. Tympanometry revealed normal middle ear function in the left ear and revealed a large ECV consistent with a patent PE tube in the right ear.         VNG RESULTS:    SACCADES: WNL  TRACKING: WNL  OPTOKINETICS:  WNL  GAZE: WNL  HALLPIKE MANEUVER:  WNL   POSITIONAL: WNL  HEADSHAKE TEST: WNL  CALORIC TESTING: Caloric testing completed with cool stimulus only due to a PE tube being present in the right TM. Caloric responses were present from both ears but are not quatifiable. MINNIE' SOT: Sway was noted with the [place], eyes closed and the patient fell to the side with eyes closed in the [address]. The Willma Sanaz was not completed. RECOMMENDATION(S): Follow up should be completed with Dr. Vibha Orozco as scheduled. Additional Vestibular Rehabilitation/Balance Retraining could be considered. Pending the completion of any additional medical management the patient should have repeat audiometric testing to monitor her mixed hearing loss and a Hearing Aid/Amplification consultation could be completed.

## 2021-01-18 NOTE — TELEPHONE ENCOUNTER
Patient's last appointment was : 1/4/2021  Patient's next appointment is : 4/7/2021  Last refilled:  5/28/2020

## 2021-01-19 ENCOUNTER — VIRTUAL VISIT (OUTPATIENT)
Dept: PSYCHOLOGY | Age: 41
End: 2021-01-19
Payer: COMMERCIAL

## 2021-01-19 DIAGNOSIS — F33.1 MAJOR DEPRESSIVE DISORDER, RECURRENT EPISODE, MODERATE (HCC): Primary | ICD-10-CM

## 2021-01-19 PROCEDURE — 90837 PSYTX W PT 60 MINUTES: CPT | Performed by: PSYCHOLOGIST

## 2021-01-19 NOTE — PROGRESS NOTES
Behavioral Health Consultation/Psychotherapy Note  Candace Ozuna. Maribel Fernandez Psy.D. Visit Date:  1/19/2021    Patient:  Saniya Severino  YOB: 1980  Chief Complaint:  Follow-up, Depression, Anxiety, and Stress    Duration of session:  55 minutes      S:     This session was conducted as a telepsychology visit due to [facility] restrictions placed on in-person visits d/t the COVID-19 outbreak. Patient Location: Home       Provider Location ([facility]): [facility]    Patient gave verbal consent for teleservices and will sign a consent form when feasible. This virtual visit was conducted via interactive/real-time audio/video, using Doxy. me. Ct is trying to get motivated to do things in her home. She said things aren't going well with Chavo Loaiza right now since he's working so much, and not doing much around the house. She said she's been emotionally eating a lot more, and didn't know how to curb this. We explored some reasons for the emotional eating and discussed solutions. O:    Appearance    Patient presents as alert, oriented, and cooperative, with moderate distress, tearful  Appetite abnormal: fluctuates, but often low  Sleep disturbance Yes  Loss of pleasure Yes  Speech    normal rate, normal volume, well articulated and clear and understandable  Mood    Depressed  Low self-esteem  Affect    depressed affect  Thought Process    linear, goal directed, coherent and linear and coherent  Insight    Great  Judgment    Intact  Memory    recent and remote memory intact  Suicide Assessment    no suicidal ideations    A:    1. Major depressive disorder, recurrent episode, moderate ([facility] [address]        Ct really seems to appreciate the validation of her experience.  She has great insight into her problems and participates and listens well.  Continued psychotherapy needed. P:    Doxy visit in 2-4 weeks. All questions about treatment plan answered. Patient instructed to go immediately to the emergency room and/or call 911 if any suicidal or homicidal ideations. Patient stated understanding and is agreeable to treatment and crisis plan.           Provider Signature:  Electronically signed by Sean Nieto PSYD on 1/19/2021 at 2:06 PM

## 2021-01-20 RX ORDER — PILOCARPINE HYDROCHLORIDE 5 MG/1
TABLET, FILM COATED ORAL
Qty: 90 TABLET | Refills: 3 | Status: SHIPPED | OUTPATIENT
Start: 2021-01-20

## 2021-01-21 ENCOUNTER — HOSPITAL ENCOUNTER (OUTPATIENT)
Dept: MRI IMAGING | Age: 41
Discharge: HOME | End: 2021-01-21
Payer: COMMERCIAL

## 2021-01-21 DIAGNOSIS — H53.9 VISUAL DISTURBANCE: ICD-10-CM

## 2021-01-21 DIAGNOSIS — G44.89 OTHER HEADACHE SYNDROME: ICD-10-CM

## 2021-01-21 PROCEDURE — 6360000004 HC RX CONTRAST MEDICATION: Performed by: PSYCHIATRY & NEUROLOGY

## 2021-01-21 PROCEDURE — 70553 MRI BRAIN STEM W/O & W/DYE: CPT

## 2021-01-21 PROCEDURE — A9579 GAD-BASE MR CONTRAST NOS,1ML: HCPCS | Performed by: PSYCHIATRY & NEUROLOGY

## 2021-01-21 RX ADMIN — GADOTERIDOL 20 ML: 279.3 INJECTION, SOLUTION INTRAVENOUS at 12:25

## 2021-01-25 ENCOUNTER — HOSPITAL ENCOUNTER (OUTPATIENT)
Dept: GENERAL RADIOLOGY | Age: 41
Discharge: HOME | End: 2021-01-25
Payer: COMMERCIAL

## 2021-01-25 DIAGNOSIS — K21.9 GASTROESOPHAGEAL REFLUX DISEASE WITHOUT ESOPHAGITIS: ICD-10-CM

## 2021-01-25 DIAGNOSIS — L59.8 RADIATION FIBROSIS OF SOFT TISSUE FROM THERAPEUTIC PROCEDURE: ICD-10-CM
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
## 2021-01-25 DIAGNOSIS — R13.13 PHARYNGEAL DYSPHAGIA: ICD-10-CM

## 2021-01-25 PROCEDURE — 6360000004 HC RX CONTRAST MEDICATION: Performed by: OTOLARYNGOLOGY

## 2021-01-25 PROCEDURE — 6370000000 HC RX 637 (ALT 250 FOR IP): Performed by: OTOLARYNGOLOGY

## 2021-01-25 PROCEDURE — A4641 RADIOPHARM DX AGENT NOC: HCPCS | Performed by: OTOLARYNGOLOGY

## 2021-01-25 PROCEDURE — 2500000003 HC RX 250 WO HCPCS: Performed by: OTOLARYNGOLOGY

## 2021-01-25 PROCEDURE — 74220 X-RAY XM ESOPHAGUS 1CNTRST: CPT

## 2021-01-25 RX ADMIN — BARIUM SULFATE 100 ML: 0.6 SUSPENSION ORAL at 09:34

## 2021-01-25 RX ADMIN — ANTACID/ANTIFLATULENT 1 EACH: 380; 550; 10; 10 GRANULE, EFFERVESCENT ORAL at 09:34

## 2021-01-25 RX ADMIN — BARIUM SULFATE 140 ML: 980 POWDER, FOR SUSPENSION ORAL at 09:34

## 2021-02-02 ENCOUNTER — VIRTUAL VISIT (OUTPATIENT)
Dept: PSYCHOLOGY | Age: 41
End: 2021-02-02
Payer: COMMERCIAL

## 2021-02-02 DIAGNOSIS — F33.1 MAJOR DEPRESSIVE DISORDER, RECURRENT EPISODE, MODERATE (HCC): Primary | ICD-10-CM

## 2021-02-02 PROCEDURE — 90837 PSYTX W PT 60 MINUTES: CPT | Performed by: PSYCHOLOGIST

## 2021-02-02 NOTE — PROGRESS NOTES
Memory    recent and remote memory intact  Suicide Assessment    no suicidal ideations      A:    1. Major depressive disorder, recurrent episode, moderate ([address]        Ct really seems to appreciate the validation of her experience.  She has great insight into her problems and participates and listens well.  Continued psychotherapy needed. P:    Doxy visit in 2 weeks. All questions about treatment plan answered. Patient instructed to go immediately to the emergency room and/or call 911 if any suicidal or homicidal ideations. Patient stated understanding and is agreeable to treatment and crisis plan.           Provider Signature:  Electronically signed by Magnolia Rivera PSYD on 2/2/2021 at 12:39 PM

## 2021-02-11 DIAGNOSIS — R53.83 FATIGUE, UNSPECIFIED TYPE: ICD-10-CM

## 2021-02-15 ENCOUNTER — VIRTUAL VISIT (OUTPATIENT)
Dept: NEUROLOGY | Age: 41
End: 2021-02-15
Payer: COMMERCIAL

## 2021-02-15 DIAGNOSIS — G44.89 OTHER HEADACHE SYNDROME: Primary | ICD-10-CM

## 2021-02-15 DIAGNOSIS — H53.9 VISUAL DISTURBANCE: ICD-10-CM

## 2021-02-15 PROCEDURE — G8428 CUR MEDS NOT DOCUMENT: HCPCS | Performed by: PSYCHIATRY & NEUROLOGY

## 2021-02-15 PROCEDURE — 99213 OFFICE O/P EST LOW 20 MIN: CPT | Performed by: PSYCHIATRY & NEUROLOGY

## 2021-02-15 RX ORDER — OXCARBAZEPINE 150 MG/1
150 TABLET, FILM COATED ORAL DAILY
Qty: 30 TABLET | Refills: 3 | Status: SHIPPED | OUTPATIENT
Start: 2021-02-15 | End: 2021-04-19

## 2021-02-15 NOTE — PATIENT INSTRUCTIONS
1. Follow up with ENT re MRI brain with and without contrast IAC protocol results and any future recommendations,  postradiation changes. 2. Stop Zonegran due to lack of benefit. 3. Start Trileptal 150 mg daily. 4. Follow-up with ophthalmology as scheduled  5. Report any new events. 6. Follow up in 2 months.

## 2021-02-15 NOTE — TELEPHONE ENCOUNTER
Last visit- 1/4/2021  Next visit- 4/7/2021 with Deloris Leyden    Requested Prescriptions     Pending Prescriptions Disp Refills    levothyroxine (SYNTHROID) 50 MCG tablet [Pharmacy Med Name: LEVOTHYROXINE 50 MCG TABLET] 30 tablet 2     Sig: take 1 tablet by mouth once daily    FLUoxetine (PROZAC) 40 MG capsule [Pharmacy Med Name: FLUOXETINE HCL 40 MG CAPSULE] 30 capsule 2     Sig: take 1 capsule by mouth once daily    FLUoxetine (PROZAC) 20 MG capsule [Pharmacy Med Name: FLUOXETINE HCL 20 MG CAPSULE] 30 capsule 2     Sig: take 1 capsule by mouth once daily       Please review, approve or deny

## 2021-02-15 NOTE — PROGRESS NOTES
2/15/2021    TELEHEALTH EVALUATION -- Audio/Visual (During UZOVA-21 public health emergency)    HPI:    Lala Freeman (:  1980) has requested an audio/video evaluation for the following concern(s):  Follow up for headache. The patient describes increased headaches, off-balance feeling. She was evaluated by ophthalmology and assured that there is no intraocular pathology to explain her symptoms, she had testing performed. She reports her headache is the same pattern and location. The patient is evaluated via video link to discuss plan of care going forward. Review of Systems   Musculoskeletal: Negative. Skin: Negative. Hematological: Does not bruise/bleed easily. Prior to Visit Medications    Medication Sig Taking?  Authorizing Provider   pilocarpine (SALAGEN) 5 MG tablet take 1 tablet by mouth three times a day  Barbara Doyle APRN - CNP   polyethyl glycol-propyl glycol 0.4-0.3 % (SYSTANE) 0.4-0.3 % ophthalmic solution Place 1 drop into both eyes as needed for Dry Eyes  Niraj Benson DO   zonisamide (ZONEGRAN) 50 MG capsule Take 1 capsule by mouth daily  Ariadna Marinelli MD   Omega-3 Fatty Acids (RA FISH OIL) 1000 MG CAPS take 1 capsule by mouth once daily  Flynn Head, APRN - CNP   RA VITAMIN D-3 125 MCG (5000 UT) CAPS capsule take 1 capsule by mouth once daily  Flynn Head, APRN - CNP   FLUoxetine (PROZAC) 20 MG capsule take 1 capsule by mouth once daily  Flynn Head, APRN - CNP   FLUoxetine (PROZAC) 40 MG capsule take 1 capsule by mouth once daily  Flynn Head, APRN - CNP   levothyroxine (SYNTHROID) 50 MCG tablet take 1 tablet by mouth once daily  Flynn Head, APRN - CNP   Handicap Placard MISC by Does not apply route Expires on 8/3/2022  Ry Clemons MD   prazosin (MINIPRESS) 5 MG capsule Take 1 capsule by mouth nightly  Rylie Ybarra DO Family History   Problem Relation Age of Onset    Diabetes Mother     Arthritis Mother     Obesity Mother     Depression Mother     No Known Problems Father     Depression Brother        PHYSICAL EXAMINATION:  [ INSTRUCTIONS:  \"[x]\" Indicates a positive item  \"[]\" Indicates a negative item  -- DELETE ALL ITEMS NOT EXAMINED]  Vital Signs: (As obtained by patient/caregiver or practitioner observation)    Blood pressure-  Heart rate-    Respiratory rate-    Temperature-  Pulse oximetry-     Constitutional: [x] Appears well-developed and well-nourished [x] No apparent distress      [] Abnormal-   Mental status  [x] Alert and awake  [x] Oriented to person/place/time [x]Able to follow commands      Eyes:  EOM    []  Normal  [x] Abnormal-there is limited right eye movement with the lid ptosis. Sclera  [x]  Normal  [] Abnormal -         Discharge [x]  None visible  [] Abnormal -    HENT:   [x] Normocephalic, atraumatic. [] Abnormal   [x] Mouth/Throat: Mucous membranes are moist.     External Ears [x] Normal  [] Abnormal-     Neck: [x] No visualized mass     Pulmonary/Chest: [x] Respiratory effort normal.  [x] No visualized signs of difficulty breathing or respiratory distress        [] Abnormal-      Musculoskeletal:   [] Normal gait with no signs of ataxia         [x] Normal range of motion of neck        [] Abnormal-       Neurological:        [] No Facial Asymmetry (Cranial nerve 7 motor function) (limited exam to video visit)          [] No gaze palsy        [x] Abnormal-  There is right facial asymmetry. Skin:        [x] No significant exanthematous lesions or discoloration noted on facial skin         [] Abnormal-            Psychiatric:       [x] Normal Affect [x] No Hallucinations        [] Abnormal-     Other pertinent observable physical exam findings-   There is swelling in the right eyelid, as she had recent surgery.     MRI Brain 1/22/2021 was reviewed:  Impression     1. Decreased enhancement of right labyrinth as evidence for resolving labyrinthitis though with persistent enhancement along the posterior margin of the internal auditory canal suggesting persistent neuritis. 2. Redemonstration of large right mastoid effusion and hyperintense signal in the white matter in the adjacent right temporal lobe likely on the basis of prior radiation. 3. Old lacunar infarct in the right corona radiata. 4. Redemonstration of prior right parotid resection.                   **This report has been created using voice recognition software. It may contain minor errors which are inherent in voice recognition technology. **           Final report electronically signed by Dr. Malou Seay MD on 1/22/2021 3:14 PM       ASSESSMENT/PLAN:  1. Other headache syndrome  2. Visual disturbance  The patient describes increased headaches, off-balance feeling. She was evaluated by ophthalmology and assured that there is no intraocular pathology to explain her symptoms, was told she does not need to have a spinal tap for pseudotumor cerebri. She underwent repeat MRI brain that was shared with the patient. She was evaluated by ENT and had vestibular testing. She feels better since last evaluation. I reviewed the MRI brain, and outside records from ENT evaluation. She is on Zonegran 50 mg daily for headache preventative medication. She reports the Zonegran 50 mg daily, she feels no change in her symptoms since starting the Zonegran. After detailed discussion with patient we agreed on the following plan. .    1. Follow up with ENT re MRI brain with and without contrast IAC protocol results and any future recommendations,  postradiation changes. 2. Stop Zonegran due to lack of benefit. 3. Start Trileptal 150 mg daily. 4. Follow-up with ophthalmology as scheduled  5. Report any new events. 6. Follow up in 2 months. Return in about 3 months (around 5/15/2021). Joshua Alcazar is a 38 y.o. female being evaluated by a Virtual Visit (video visit) encounter to address concerns as mentioned above. A caregiver was present when appropriate. Due to this being a TeleHealth encounter (During SEQDZ-15 public health emergency), evaluation of the following organ systems was limited: Vitals/Constitutional/EENT/Resp/CV/GI//MS/Neuro/Skin/Heme-Lymph-Imm. Pursuant to the emergency declaration under the [address] [place] [address] and the [employer] Act, this Virtual Visit was conducted with patient's (and/or legal guardian's) consent, to reduce the patient's risk of exposure to COVID-19 and provide necessary medical care. The patient (and/or legal guardian) has also been advised to contact this office for worsening conditions or problems, and seek emergency medical treatment and/or call 911 if deemed necessary. Patient identification was verified at the start of the visit: Yes  Total time spent on this encounter: 23 min  Services were provided through a video synchronous discussion virtually to substitute for in-person clinic visit. Patient and provider were located at their individual homes. --Hernando Ambrose MD on 2/15/2021 at 2:56 PM    An electronic signature was used to authenticate this note.

## 2021-02-16 ENCOUNTER — VIRTUAL VISIT (OUTPATIENT)
Dept: PSYCHOLOGY | Age: 41
End: 2021-02-16
Payer: COMMERCIAL

## 2021-02-16 DIAGNOSIS — F33.1 MAJOR DEPRESSIVE DISORDER, RECURRENT EPISODE, MODERATE (HCC): Primary | ICD-10-CM

## 2021-02-16 PROCEDURE — 90832 PSYTX W PT 30 MINUTES: CPT | Performed by: PSYCHOLOGIST

## 2021-02-16 RX ORDER — FLUOXETINE HYDROCHLORIDE 40 MG/1
CAPSULE ORAL
Qty: 30 CAPSULE | Refills: 2 | Status: SHIPPED | OUTPATIENT
Start: 2021-02-16 | End: 2021-06-09 | Stop reason: SDUPTHER

## 2021-02-16 RX ORDER — FLUOXETINE HYDROCHLORIDE 20 MG/1
CAPSULE ORAL
Qty: 30 CAPSULE | Refills: 2 | Status: SHIPPED | OUTPATIENT
Start: 2021-02-16 | End: 2021-06-09 | Stop reason: SDUPTHER

## 2021-02-16 RX ORDER — OMEGA-3 FATTY ACIDS/FISH OIL 300-500 MG
CAPSULE ORAL
Qty: 30 CAPSULE | Refills: 2 | Status: SHIPPED | OUTPATIENT
Start: 2021-02-16 | End: 2021-07-15 | Stop reason: SDUPTHER

## 2021-02-16 RX ORDER — LEVOTHYROXINE SODIUM 0.05 MG/1
TABLET ORAL
Qty: 30 TABLET | Refills: 2 | Status: SHIPPED | OUTPATIENT
Start: 2021-02-16 | End: 2021-07-27 | Stop reason: SDUPTHER

## 2021-02-16 NOTE — PROGRESS NOTES
Behavioral Health Consultation/Psychotherapy Note  Fang Saez. Chen Sharma Psy.D. Visit Date:  2/16/2021    Patient:  Rosario Melgar  YOB: 1980  Chief Complaint:  Follow-up, Depression, and Stress    Duration of session:  35 minutes      S:     This session was conducted as a telepsychology visit due to [facility] restrictions placed on in-person visits d/t the COVID-19 outbreak. Patient Location: Home       Provider Location ([facility]): [facility]    Patient gave verbal consent for teleservices and will sign a consent form when feasible. This virtual visit was conducted via interactive/real-time audio/video, using Doxy. me. Ct talked about how she's been trying to care for her friend Elijah who lives next door. He's had some MH issues, and this is a stressor for her. She said he doesn't reach out to anyone else, and he regresses when she doesn't help him. The SSDI is another major stressor in her life. The situation is now at the appealate court since she's in the appeal process. She knows she can start gathering information, and may need to go to [place] to get things done. Her OOD has done all she can do, so her job is done now. She's been keeping up with reading poems, vacuuming, but not the dishes. She has appreciated being able to share her V Day gifts with others.             O:    Appearance    Patient presents as alert, oriented, and cooperative, with moderate distress, tearful  Appetite abnormal: fluctuates, but often low  Sleep disturbance Yes  Loss of pleasure Yes  Speech    normal rate, normal volume, well articulated and clear and understandable  Mood    Depressed  Low self-esteem  Affect    depressed affect  Thought Process    linear, goal directed, coherent and linear and coherent  Insight    Great  Judgment    Intact  Memory    recent and remote memory intact  Suicide Assessment    no suicidal ideations      A: 1. Major depressive disorder, recurrent episode, moderate ([facility] [address]        Ct really seems to appreciate the validation of her experience.  She has great insight into her problems and participates and listens well.  Continued psychotherapy needed. P:    Doxy visit in 2 weeks. All questions about treatment plan answered. Patient instructed to go immediately to the emergency room and/or call 911 if any suicidal or homicidal ideations. Patient stated understanding and is agreeable to treatment and crisis plan.           Provider Signature:  Electronically signed by Rose Youngblood PSYD on 2/16/2021 at 2:25 PM

## 2021-03-02 ENCOUNTER — VIRTUAL VISIT (OUTPATIENT)
Dept: PSYCHOLOGY | Age: 41
End: 2021-03-02
Payer: COMMERCIAL

## 2021-03-02 DIAGNOSIS — F33.1 MAJOR DEPRESSIVE DISORDER, RECURRENT EPISODE, MODERATE (HCC): Primary | ICD-10-CM

## 2021-03-02 PROCEDURE — 90834 PSYTX W PT 45 MINUTES: CPT | Performed by: PSYCHOLOGIST

## 2021-03-02 NOTE — PROGRESS NOTES
Behavioral Health Consultation/Psychotherapy Note  Lazarus Michel. Candice Pinon Psy.D. Visit Date:  3/2/2021    Patient:  Barrington Almodovar  YOB: 1980  Chief Complaint:  Follow-up, Depression, and Stress    Duration of session:  45 minutes      S:     This session was conducted as a telepsychology visit due to [facility] restrictions placed on in-person visits d/t the COVID-19 outbreak. Patient Location: Home       Provider Location ([facility]): [facility]    Patient gave verbal consent for teleservices and will sign a consent form when feasible. This virtual visit was conducted via interactive/real-time audio/video, using Doxy. me. Ct said her friend ALEXANDRIA GRECO , but she was glad she got to be there before he . He had been in a lot of their shows together. He had been battling pancreatic cancer for years. She felt so much closure being able to share time with him and make him laugh. She's really struggling with how to cope with dealing with Elijah. He's been decompensating for the past 1.5 mos. She said she doesn't have the strength to keep giving, and we talked about being OK setting boundaries, and how it creates opportunities for others. O:    Appearance    Patient presents as alert, oriented, and cooperative, with moderate distress, tearful  Appetite abnormal: fluctuates, but often low  Sleep disturbance Yes  Loss of pleasure Yes  Speech    normal rate, normal volume, well articulated and clear and understandable  Mood    Depressed  Low self-esteem  Affect    depressed affect  Thought Process    linear, goal directed, coherent and linear and coherent  Insight    Great  Judgment    Intact  Memory    recent and remote memory intact  Suicide Assessment    no suicidal ideations    A:    1.  Major depressive disorder, recurrent episode, moderate ([facility] [address] Ct really seems to appreciate the validation of her experience.  She has great insight into her problems and participates and listens well.  Continued psychotherapy needed. P:    Doxy visit in 2 weeks. All questions about treatment plan answered. Patient instructed to go immediately to the emergency room and/or call 911 if any suicidal or homicidal ideations. Patient stated understanding and is agreeable to treatment and crisis plan.           Provider Signature:  Electronically signed by Sudheer Ndiaye PSYD on 3/2/2021 at 11:47 AM

## 2021-03-11 DIAGNOSIS — F43.10 PTSD (POST-TRAUMATIC STRESS DISORDER): ICD-10-CM

## 2021-03-11 DIAGNOSIS — F33.1 MODERATE EPISODE OF RECURRENT MAJOR DEPRESSIVE DISORDER (HCC): ICD-10-CM

## 2021-03-11 RX ORDER — CLONAZEPAM 0.5 MG/1
0.5 TABLET ORAL 2 TIMES DAILY PRN
Qty: 60 TABLET | Refills: 0 | Status: SHIPPED | OUTPATIENT
Start: 2021-03-11 | End: 2021-06-09 | Stop reason: SDUPTHER

## 2021-03-11 RX ORDER — PRAZOSIN HYDROCHLORIDE 5 MG/1
5 CAPSULE ORAL NIGHTLY
Qty: 30 CAPSULE | Refills: 0 | Status: SHIPPED | OUTPATIENT
Start: 2021-03-11 | End: 2021-04-07 | Stop reason: SDUPTHER

## 2021-03-11 NOTE — TELEPHONE ENCOUNTER
Jameson Harman is a former patient of Dr. Lisa Castillo who was last seen 3/11/20 and is scheduled to resume care with you 6/9/21. She has requested refills of Prazosin 5mg/hs and Klonopin 0.5mg BID, prn. The last script for Prazosin 5mg was a bridge script written by Dr. Deborah Ireland 5/28 for a 30 day supply with three refills. The last script for Klonopin was written 12/11/19 for a 30 day supply with one refill. Patient states that she has been trying to make it last and only takes it when absolutely needed.

## 2021-03-16 ENCOUNTER — VIRTUAL VISIT (OUTPATIENT)
Dept: PSYCHOLOGY | Age: 41
End: 2021-03-16
Payer: COMMERCIAL

## 2021-03-16 DIAGNOSIS — F33.1 MAJOR DEPRESSIVE DISORDER, RECURRENT EPISODE, MODERATE (HCC): Primary | ICD-10-CM

## 2021-03-16 PROCEDURE — 90832 PSYTX W PT 30 MINUTES: CPT | Performed by: PSYCHOLOGIST

## 2021-03-16 NOTE — PROGRESS NOTES
Behavioral Health Consultation/Psychotherapy Note  Niki Voss. Mariajose Longoria Psy.D. Visit Date:  3/16/2021    Patient:  Bailye Solorio  YOB: 1980  Chief Complaint:  Follow-up, Depression, and Stress    Duration of session:  30 minutes      S:     This session was conducted as a telepsychology visit due to [facility] restrictions placed on in-person visits d/t the COVID-19 outbreak. Patient Location: Home       Provider Location ([facility]): [facility]    Patient gave verbal consent for teleservices and will sign a consent form when feasible. This virtual visit was conducted via interactive/real-time audio/video, using Doxy. me. Ct has struggled with getting all disability paperwork in, but she was able to get all her medical stuff faxed. She said it's been hard dealing with all the emotions and frustrations, but she needs time for herself, and to not do so many things for everyone else. O:    Appearance    Patient presents as alert, oriented, and cooperative, with moderate distress, tearful  Appetite abnormal: fluctuates, but often low  Sleep disturbance Yes  Loss of pleasure Yes  Speech    normal rate, normal volume, well articulated and clear and understandable  Mood    Depressed  Low self-esteem  Affect    depressed affect  Thought Process    linear, goal directed, coherent and linear and coherent  Insight    Great  Judgment    Intact  Memory    recent and remote memory intact  Suicide Assessment    no suicidal ideations      A:    1. Major depressive disorder, recurrent episode, moderate ([address]        Ct really seems to appreciate the validation of her experience.  She has great insight into her problems and participates and listens well.  Continued psychotherapy needed. P:    Doxy visit in 2-4 weeks. All questions about treatment plan answered. Patient instructed to go immediately to the emergency room and/or call 911 if any suicidal or homicidal ideations.  Patient stated understanding and is agreeable to treatment and crisis plan.           Provider Signature:  Electronically signed by Tomer Morrow PSYD on 3/16/2021 at 2:39 PM

## 2021-04-01 ENCOUNTER — VIRTUAL VISIT (OUTPATIENT)
Dept: PSYCHOLOGY | Age: 41
End: 2021-04-01
Payer: COMMERCIAL

## 2021-04-01 DIAGNOSIS — F33.1 MAJOR DEPRESSIVE DISORDER, RECURRENT EPISODE, MODERATE (HCC): Primary | ICD-10-CM

## 2021-04-01 PROCEDURE — 90837 PSYTX W PT 60 MINUTES: CPT | Performed by: PSYCHOLOGIST

## 2021-04-01 NOTE — PROGRESS NOTES
Behavioral Health Consultation/Psychotherapy Note  Barry Solis. Tasha Negron Psy.D. Visit Date:  4/1/2021    Patient:  Francisco Monsalve  YOB: 1980  Chief Complaint:  Follow-up, Depression, and Stress    Duration of session:  55 minutes      This note will not be viewable in [employer] for the following reason(s). This is a Psychotherapy Note. S:     This session was conducted as a telepsychology visit due to [facility] restrictions placed on in-person visits d/t the COVID-19 outbreak. Patient Location: Home       Provider Location ([facility]): [facility]    Patient gave verbal consent for teleservices and will sign a consent form when feasible. This virtual visit was conducted via interactive/real-time audio/video, using Doxy. me. Ct said she's on the verge of a panic attack. She's been desperately holding onto her own space, and sanity. She's been ignoring literal cries for help from Elijah. He bangs on the wall, says disparaging comments to them, saying \"why can't you help me? \"    Police have been called, well checks have been done. He's been verbalizing people are trying to kill him. They have not been able to get him to agree or involuntarily taken to the hospital.      All this has been happening while she's trying to get her SSDI done, amidst internet not working. She's been having to take Klonopin just to try and get through. She's been shaky, having heart palpitations. She needs her own territory with reliable internet, but she doesn't want to go anywhere. There's places she could go but doesn't want to. We talked about her giving herself permission to set that space for herself.           O:    Appearance    Patient presents as alert, oriented, and cooperative, with moderate distress, tearful  Appetite abnormal: fluctuates, but often low  Sleep disturbance Yes  Loss of pleasure Yes  Speech    normal rate, normal volume, well articulated and clear and understandable  Mood    Depressed  Low self-esteem  Affect    depressed affect  Thought Process    linear, goal directed, coherent and linear and coherent  Insight    Great  Judgment    Intact  Memory    recent and remote memory intact  Suicide Assessment    no suicidal ideations    A:    1. Major depressive disorder, recurrent episode, moderate ([address]        Ct really seems to appreciate the validation of her experience.  She has great insight into her problems and participates and listens well.  Continued psychotherapy needed. P:    Doxy visit in 2 weeks. All questions about treatment plan answered. Patient instructed to go immediately to the emergency room and/or call 911 if any suicidal or homicidal ideations. Patient stated understanding and is agreeable to treatment and crisis plan.           Provider Signature:  Electronically signed by Betty Mancera PSYD on 4/1/2021 at 4:31 PM

## 2021-04-06 ENCOUNTER — VIRTUAL VISIT (OUTPATIENT)
Dept: PSYCHOLOGY | Age: 41
End: 2021-04-06
Payer: COMMERCIAL

## 2021-04-06 DIAGNOSIS — F33.1 MAJOR DEPRESSIVE DISORDER, RECURRENT EPISODE, MODERATE (HCC): Primary | ICD-10-CM

## 2021-04-06 PROCEDURE — 90834 PSYTX W PT 45 MINUTES: CPT | Performed by: PSYCHOLOGIST

## 2021-04-06 NOTE — PROGRESS NOTES
episode, moderate ([address]        Ct really seems to appreciate the validation of her experience.  She has great insight into her problems and participates and listens well.  Continued psychotherapy needed. P:    Doxy visit in 1-2 weeks. All questions about treatment plan answered. Patient instructed to go immediately to the emergency room and/or call 911 if any suicidal or homicidal ideations. Patient stated understanding and is agreeable to treatment and crisis plan.           Provider Signature:  Electronically signed by Deni Del Castillo PSYD on 4/6/2021 at 3:14 PM

## 2021-04-07 ENCOUNTER — TELEPHONE (OUTPATIENT)
Dept: FAMILY MEDICINE CLINIC | Age: 41
End: 2021-04-07

## 2021-04-07 ENCOUNTER — OFFICE VISIT (OUTPATIENT)
Dept: FAMILY MEDICINE CLINIC | Age: 41
End: 2021-04-07
Payer: COMMERCIAL

## 2021-04-07 VITALS
SYSTOLIC BLOOD PRESSURE: 118 MMHG | DIASTOLIC BLOOD PRESSURE: 64 MMHG | OXYGEN SATURATION: 99 % | HEART RATE: 67 BPM | RESPIRATION RATE: 18 BRPM | WEIGHT: 293 LBS | BODY MASS INDEX: 52.69 KG/M2

## 2021-04-07 DIAGNOSIS — R26.89 POOR BALANCE: ICD-10-CM

## 2021-04-07 DIAGNOSIS — R13.12 OROPHARYNGEAL DYSPHAGIA: ICD-10-CM

## 2021-04-07 DIAGNOSIS — Z12.31 BREAST CANCER SCREENING BY MAMMOGRAM: ICD-10-CM

## 2021-04-07 DIAGNOSIS — G51.0 PARTIAL FACIAL NERVE PALSY: ICD-10-CM

## 2021-04-07 DIAGNOSIS — F51.5 NIGHTMARES: Primary | ICD-10-CM

## 2021-04-07 DIAGNOSIS — M25.561 ACUTE PAIN OF RIGHT KNEE: ICD-10-CM

## 2021-04-07 DIAGNOSIS — R29.898 RIGHT LEG WEAKNESS: ICD-10-CM
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
## 2021-04-07 DIAGNOSIS — R32 URINARY INCONTINENCE, UNSPECIFIED TYPE: ICD-10-CM

## 2021-04-07 DIAGNOSIS — M27.2 OSTEORADIONECROSIS OF JAW: ICD-10-CM

## 2021-04-07 DIAGNOSIS — C07 CANCER OF PAROTID GLAND (HCC): ICD-10-CM

## 2021-04-07 DIAGNOSIS — H83.2X1 BALANCE PROBLEM DUE TO VESTIBULAR DYSFUNCTION OF RIGHT EAR: ICD-10-CM

## 2021-04-07 DIAGNOSIS — F32.2 MAJOR DEPRESSIVE DISORDER, SINGLE EPISODE, SEVERE WITH MELANCHOLIC FEATURES (HCC): ICD-10-CM

## 2021-04-07 DIAGNOSIS — H81.21 VESTIBULAR NEURONITIS OF RIGHT EAR: ICD-10-CM

## 2021-04-07 PROCEDURE — 99214 OFFICE O/P EST MOD 30 MIN: CPT | Performed by: NURSE PRACTITIONER

## 2021-04-07 PROCEDURE — G8427 DOCREV CUR MEDS BY ELIG CLIN: HCPCS | Performed by: NURSE PRACTITIONER

## 2021-04-07 PROCEDURE — 4004F PT TOBACCO SCREEN RCVD TLK: CPT | Performed by: NURSE PRACTITIONER

## 2021-04-07 PROCEDURE — G8417 CALC BMI ABV UP PARAM F/U: HCPCS | Performed by: NURSE PRACTITIONER

## 2021-04-07 RX ORDER — PRAZOSIN HYDROCHLORIDE 2 MG/1
2 CAPSULE ORAL NIGHTLY
Qty: 30 CAPSULE | Refills: 2 | Status: SHIPPED | OUTPATIENT
Start: 2021-04-07 | End: 2021-06-09 | Stop reason: SDUPTHER

## 2021-04-07 ASSESSMENT — ENCOUNTER SYMPTOMS
ABDOMINAL DISTENTION: 0
BLOOD IN STOOL: 0
COLOR CHANGE: 0
EYE PAIN: 1
EYE REDNESS: 0
SHORTNESS OF BREATH: 0
NAUSEA: 0
SORE THROAT: 0
COUGH: 1
ANAL BLEEDING: 0
ABDOMINAL PAIN: 0
DIARRHEA: 0
SINUS PRESSURE: 1
SINUS PAIN: 1
CONSTIPATION: 0
PHOTOPHOBIA: 1
RHINORRHEA: 0
EYE DISCHARGE: 1

## 2021-04-07 NOTE — LETTER
Letter of Medical Necessity    4/8/2021        RE: [address] [place] [id number]      Roberto Correa and [place]    This letter is being provided to support the medical necessity of Trudy Freeman's treatment in the [facility] Medicine Department department. Alejandro Encarnacion has the diagnosis of cervical neuralgia, neck pain, pain left hip, shoulder pain, and has failed the following conservative treatment: injections, surgery, medication, physical therapy. Consequently, a Treatment with a medical massage is now necessary and woud improve her quality of life. Should you have any questions or concerns, please feel free to contact my office at [phone].     Sincerely        Ariela Coronel

## 2021-04-07 NOTE — PROGRESS NOTES
[facility]  [phone] (phone)  [phone] (fax)    Visit Date: 4/7/2021    George Randhawa is a 38 y.o. female who presents today for:  Chief Complaint   Patient presents with    3 Month Follow-Up     HPI:     3 month follow-up    In the process of filing for social security - her  \"messed things up\" she is now doing it on her own. 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 Underwent radiation - proton radiation. Had to remove several salivary glands with a radical neck disection. Had to cut the nerve and scrape it and do a nerve graft. She has very limited function on the right side of her face. Her eye does sometimes seal closed but not tight - will get dry eyes. She will drool and her face will cramp. Underwent PT for this, prevents her from working. Having ongoing headaches, dizziness, neck strain/muscle spasms/, right shoulder pain/muscle twitching, right hand shaking, right eye - vision problems - has to tape eye shut at night to prevent the crusting - lots of mucus from the eye in the mornings. Using drops from her eye specialist - helps some but very short term treatment    Her teeth are bad - needs to go to hyperbaric chamber for 20 sessions prior to dental surgery - her mental health has not been in a good place to complete this - requires an hour per day in the chamber. Dentist: [facility] and [facility].     Half of tongue is still numb along with right side of mouth - started after her last dental surgery 3-4 years ago - done at [facility] is off - has vestibular issues - testing done    Had MRI with  -   MRI Brain 1/22/2021 was reviewed:  Impression       1. Decreased enhancement of right labyrinth as evidence for resolving labyrinthitis though with persistent enhancement along the posterior margin of the internal auditory canal suggesting persistent neuritis.    2. Redemonstration of large right mastoid effusion and hyperintense signal in the white matter in the adjacent right temporal lobe likely on the basis of prior radiation. 3. Old lacunar infarct in the right corona radiata.    4. Redemonstration of prior right parotid resection.         He switched her to Trileptal 150 mg daily     Urinary incontinence - would like tot try PT     Needs a bath rail for safety    Due for mammogram    Right knee issues - feels like it pops and she is unstabl lashae the knee - started 1 month - no injury     HPI  Health Maintenance   Topic Date Due    Hepatitis C screen  Never done    COVID-19 Vaccine (1) Never done    Cervical cancer screen  Never done    TSH testing  06/17/2021    Flu vaccine (Season Ended) 09/01/2021    Lipid screen  06/17/2025    DTaP/Tdap/Td vaccine (2 - Td) 10/06/2027    Pneumococcal 0-64 years Vaccine  Completed    HIV screen  Completed    Hepatitis A vaccine  Aged Out    Hepatitis B vaccine  Aged Out    Hib vaccine  Aged Out    Meningococcal (ACWY) vaccine  Aged Out     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    Removed tumor, salivary glands, facial nerve graft    Anxiety     Arthritis     knees & right shoulder, hip    Depression     Hypothyroidism     PTSD (post-traumatic stress disorder)     Sleep apnea       Past Surgical History:   Procedure Laterality Date    EUA PELVIC N/A 02/21/2019    REMOVAL AND REINSERTION OF MIRENA, EXAM UNDER ANESTHESIA, PAP performed by Catherine Garg MD at [address] Right 12/10/2020    EYE SURGERY Right     SELECTIVE NECK DISSECTION Right 10/2011    Radiacal Neck for CA    TUMOR EXCISION  2011    excision salivary gland (radical neck disection & nerve graft) - radiation    TYMPANOSTOMY TUBE PLACEMENT      age 14years old   Armida Brunner WISDOM TOOTH EXTRACTION  2018     Family History   Problem Relation Age of Onset    Diabetes Mother     Arthritis Mother     Obesity Mother    Armida Brunner Depression Mother     No Known Problems Father     Depression Brother      Social History     Tobacco Use    Smoking status: Current Every Day Smoker     Packs/day: 0.25     Years: 20.00     Pack years: 5.00     Types: Cigarettes     Start date: 4/16/1999    Smokeless tobacco: Never Used    Tobacco comment: wearing the patch for the first time today   Substance Use Topics    Alcohol use: Yes     Comment: social      Current Outpatient Medications   Medication Sig Dispense Refill    prazosin (MINIPRESS) 2 MG capsule Take 1 capsule by mouth nightly 30 capsule 2    Misc. Devices (BATHTUB SAFETY RAIL) MISC Use while showering 2 each 0    clonazePAM (KLONOPIN) 0.5 MG tablet Take 1 tablet by mouth 2 times daily as needed for Anxiety for up to 60 days. 60 tablet 0    Omega-3 Fatty Acids (RA FISH OIL) 1000 MG CAPS take 1 capsule by mouth once daily 30 capsule 2    levothyroxine (SYNTHROID) 50 MCG tablet take 1 tablet by mouth once daily 30 tablet 2    FLUoxetine (PROZAC) 40 MG capsule take 1 capsule by mouth once daily 30 capsule 2    FLUoxetine (PROZAC) 20 MG capsule take 1 capsule by mouth once daily 30 capsule 2    OXcarbazepine (TRILEPTAL) 150 MG tablet Take 1 tablet by mouth daily 30 tablet 3    pilocarpine (SALAGEN) 5 MG tablet take 1 tablet by mouth three times a day 90 tablet 3    RA VITAMIN D-3 125 MCG (5000 UT) CAPS capsule take 1 capsule by mouth once daily 30 capsule 2    Handicap Placard MISC by Does not apply route Expires on 8/3/2022 1 each 0    acetaminophen (TYLENOL) 500 MG tablet Take 1,000 mg by mouth every 6 hours as needed for Pain      ibuprofen (ADVIL;MOTRIN) 200 MG tablet Take 600 mg by mouth every 6 hours as needed for Pain       No current facility-administered medications for this visit. No Known Allergies    Subjective:    Review of Systems   Constitutional: Positive for fatigue. Negative for chills and fever. HENT: Positive for ear pain, sinus pressure and sinus pain. Negative for congestion, postnasal drip, rhinorrhea and sore throat. Eyes: Positive for photophobia, pain, discharge and visual disturbance. Negative for redness. Respiratory: Positive for cough. Negative for shortness of breath. Cardiovascular: Negative for chest pain and leg swelling. Gastrointestinal: Negative for abdominal distention, abdominal pain, anal bleeding, blood in stool, constipation, diarrhea and nausea. Musculoskeletal: Positive for gait problem, neck pain and neck stiffness. Skin: Negative for color change and rash. Neurological: Positive for dizziness, weakness and headaches. Negative for facial asymmetry and speech difficulty. Hematological: Does not bruise/bleed easily. Psychiatric/Behavioral: Negative for agitation and confusion. Objective:     Vitals:    04/07/21 1447   BP: 118/64   Site: Left Upper Arm   Pulse: 67   Resp: 18   SpO2: 99%   Weight: (!) 336 lb 6.4 oz (152.6 kg)       Body mass index is 52.69 kg/m². Wt Readings from Last 3 Encounters:   04/07/21 (!) 336 lb 6.4 oz (152.6 kg)   01/04/21 (!) 333 lb 9.6 oz (151.3 kg)   12/16/20 (!) 334 lb (151.5 kg)     BP Readings from Last 3 Encounters:   04/07/21 118/64   01/04/21 132/70   12/16/20 122/86     Physical Exam  Constitutional:       General: She is not in acute distress. Appearance: She is well-developed. She is not ill-appearing or diaphoretic. HENT:      Head: Normocephalic and atraumatic. Right Ear: Hearing and external ear normal. No decreased hearing noted. Left Ear: Hearing and external ear normal. No decreased hearing noted. Nose: Nose normal. No nasal deformity. Eyes:      General:         Right eye: No discharge. Left eye: No discharge. Conjunctiva/sclera: Conjunctivae normal.   Neck:      Musculoskeletal: Normal range of motion and neck supple. Pulmonary:      Effort: Pulmonary effort is normal. No respiratory distress.    Abdominal:      General: There is no

## 2021-04-07 NOTE — PROGRESS NOTES
Health Maintenance Due   Topic Date Due    Hepatitis C screen  Never done    COVID-19 Vaccine (1) Never done- patient is due.      Cervical cancer screen  Never done - completed [facility]BG[facility] Specialist.

## 2021-04-07 NOTE — PATIENT INSTRUCTIONS
Patient Education        Recovering From Depression: Care Instructions  Your Care Instructions     Taking good care of yourself is important as you recover from depression. In time, your symptoms will fade as your treatment takes hold. Do not give up. Instead, focus your energy on getting better. Your mood will improve. It just takes some time. Focus on things that can help you feel better, such as being with friends and family, eating well, and getting enough rest. But take things slowly. Do not do too much too soon. You will begin to feel better gradually. Follow-up care is a key part of your treatment and safety. Be sure to make and go to all appointments, and call your doctor if you are having problems. It's also a good idea to know your test results and keep a list of the medicines you take. How can you care for yourself at home? Be realistic  · If you have a large task to do, break it up into smaller steps you can handle, and just do what you can. · You may want to put off important decisions until your depression has lifted. If you have plans that will have a major impact on your life, such as marriage, divorce, or a job change, try to wait a bit. Talk it over with friends and loved ones who can help you look at the overall picture first.  · Reaching out to people for help is important. Do not isolate yourself. Let your family and friends help you. Find someone you can trust and confide in, and talk to that person. · Be patient, and be kind to yourself. Remember that depression is not your fault and is not something you can overcome with willpower alone. Treatment is important for depression, just like for any other illness. Feeling better takes time, and your mood will improve little by little. Stay active  · Stay busy and get outside. Take a walk, or try some other light exercise. · Talk with your doctor about an exercise program. Exercise can help with mild depression.   · Go to a movie or pills may make you groggy during the day, and they may interact with other medicine you are taking. · If you have any other illnesses, such as diabetes, heart disease, or high blood pressure, make sure to continue with your treatment. Tell your doctor about all of the medicines you take, including those with or without a prescription. · If you or someone you know talks about suicide, self-harm, or feeling hopeless, get help right away. Call the [facility] at 1-800-273-talk (9-308.865.1996) or text HOME to 234743 to access the Crisis Text Line. Consider saving these numbers in your phone. When should you call for help? Call 911 anytime you think you may need emergency care. For example, call if:    · You feel like hurting yourself or someone else.     · Someone you know has depression and is about to attempt or is attempting suicide. Call your doctor now or seek immediate medical care if:    · You hear voices.     · Someone you know has depression and:  ? Starts to give away his or her possessions. ? Uses illegal drugs or drinks alcohol heavily. ? Talks or writes about death, including writing suicide notes or talking about guns, knives, or pills. ? Starts to spend a lot of time alone. ? Acts very aggressively or suddenly appears calm. Watch closely for changes in your health, and be sure to contact your doctor if:    · You do not get better as expected. Where can you learn more? Go to https://TASCETvalerieFlowgram.RevoLaze. org and sign in to your ABS account. Enter U000 in the [facility] box to learn more about \"Recovering From Depression: Care Instructions. \"     If you do not have an account, please click on the \"Sign Up Now\" link. Current as of: September 23, 2020               Content Version: 12.8  © 2148-6406 Healthwise, Incorporated. Care instructions adapted under license by [facility]  If you have questions about a medical condition or this instruction, always ask your healthcare professional. [address] any warranty or liability for your use of this information. Patient Education        Bladder Training: Care Instructions  Your Care Instructions     Bladder training is used to treat urge incontinence and stress incontinence. Urge incontinence means that the need to urinate comes on so fast that you can't get to a toilet in time. Stress incontinence means that you leak urine because of pressure on your bladder. For example, it may happen when you laugh, cough, or lift something heavy. Bladder training can increase how long you can wait before you have to urinate. It can also help your bladder hold more urine. And it can give you better control over the urge to urinate. It is important to remember that bladder training takes a few weeks to a few months to make a difference. You may not see results right away, but don't give up. Follow-up care is a key part of your treatment and safety. Be sure to make and go to all appointments, and call your doctor if you are having problems. It's also a good idea to know your test results and keep a list of the medicines you take. How can you care for yourself at home? Work with your doctor to come up with a bladder training program that is right for you. You may use one or more of the following methods. Delayed urination  · In the beginning, try to keep from urinating for 5 minutes after you first feel the need to go. · While you wait, take deep, slow breaths to relax. Kegel exercises can also help you delay the need to go to the bathroom. · After some practice, when you can easily wait 5 minutes to urinate, try to wait 10 minutes before you urinate. · Slowly increase the waiting period until you are able to control when you have to urinate. Scheduled urination  · Empty your bladder when you first wake up in the morning.   · Schedule times throughout the day when you will urinate. · Start by going to the bathroom every hour, even if you don't need to go. · Slowly increase the time between trips to the bathroom. · When you have found a schedule that works well for you, keep doing it. · If you wake up during the night and have to urinate, do it. Apply your schedule to waking hours only. Kegel exercises  These tighten and strengthen pelvic muscles, which can help you control the flow of urine. To do Kegel exercises:  · Squeeze the same muscles you would use to stop your urine. Your belly and thighs should not move. · Hold the squeeze for 3 seconds, and then relax for 3 seconds. · Start with 3 seconds. Then add 1 second each week until you are able to squeeze for 10 seconds. · Repeat the exercise 10 to 15 times a session. Do three or more sessions a day. When should you call for help? Watch closely for changes in your health, and be sure to contact your doctor if:    · Your incontinence is getting worse.     · You do not get better as expected. Where can you learn more? Go to https://arcplan Information Services AGpeURBANARA.Urban Renewable H2. org and sign in to your [employer] account. Enter S294 in the [facility] box to learn more about \"Bladder Training: Care Instructions. \"     If you do not have an account, please click on the \"Sign Up Now\" link. Current as of: June 29, 2020               Content Version: 12.8  © 0371-8215 [employer]. Care instructions adapted under license by [facility] ([facility]). If you have questions about a medical condition or this instruction, always ask your healthcare professional. [address] any warranty or liability for your use of this information. Patient Education        Learning About the Diet for Swallowing Problems  What are swallowing problems? Difficulty swallowing is also called dysphagia (say \"odj-NVF-kdk-uh\"). It is most often a sign of a problem with your throat or esophagus.  This is the tube that moves food and liquids from the back of your mouth to your stomach. Trouble swallowing can occur when the muscles and nerves that move food through the throat and esophagus are not working right. To help you swallow food, your doctor or speech therapist may advise a special dysphagia diet for you. Why is a special diet important? A dysphagia diet can help you handle some problems that can occur when it's hard to swallow food and liquids easily. These problems can include:  · Malnutrition. This means you aren't getting enough healthy foods to keep your body working well. · Dehydration. This means you aren't getting enough liquids to keep your body healthy. · Aspiration. This means that food, liquid, or saliva goes down your windpipe (trachea) into your lungs, instead of down your esophagus to your stomach. This can lead to aspiration pneumonia, which is an inflammation of the lungs. What is the dysphagia diet? In the dysphagia diet, you change the foods you eat and the liquids you drink to make it easier to swallow them. You may:  · Change the texture of the foods you eat. Your doctor or speech therapist may advise you to eat one of these types of foods:  ? Easy-to-chew foods. These are foods that are soft or tender. ? Soft and bite-sized foods. These are soft foods that have been cut into small pieces. ? Minced and moist foods. These are very soft, small, and moist lumps of food that need very little chewing. ? Pureed foods. These are foods that have been blended smooth. The puree must be thick enough to hold its shape on a spoon. These foods don't need to be chewed. ? Liquidized foods. These are foods that have been blended smooth but are not as thick as pureed foods. You can drink them from a cup. · Thicken the liquids you drink. Your doctor or speech therapist will tell you what kind of thickener to use and how thick to make the liquids. ? Slightly thick liquids.  These are thicker than water but can flow through a unhappy. Insomnia can be caused by conditions such as depression or anxiety. Pain can also affect your ability to sleep. When these problems are solved, the insomnia usually clears up. But sometimes bad sleep habits can cause insomnia. If insomnia is affecting your work or your enjoyment of life, you can take steps to improve your sleep. Follow-up care is a key part of your treatment and safety. Be sure to make and go to all appointments, and call your doctor if you are having problems. It's also a good idea to know your test results and keep a list of the medicines you take. How can you care for yourself at home? What to avoid   · Do not have drinks with caffeine, such as coffee or black tea, for 8 hours before bed. · Do not smoke or use other types of tobacco near bedtime. Nicotine is a stimulant and can keep you awake. · Avoid drinking alcohol late in the evening, because it can cause you to wake in the middle of the night. · Do not eat a big meal close to bedtime. If you are hungry, eat a light snack. · Do not drink a lot of water close to bedtime, because the need to urinate may wake you up during the night. · Do not read or watch TV in bed. Use the bed only for sleeping and sexual activity. What to try   · Go to bed at the same time every night, and wake up at the same time every morning. Do not take naps during the day. · Keep your bedroom quiet, dark, and cool. · Sleep on a comfortable pillow and mattress. · If watching the clock makes you anxious, turn it facing away from you so you cannot see the time. · If you worry when you lie down, start a worry book. Well before bedtime, write down your worries, and then set the book and your concerns aside. · Try meditation or other relaxation techniques before you go to bed. · If you cannot fall asleep, get up and go to another room until you feel sleepy. Do something relaxing. Repeat your bedtime routine before you go to bed again.   · Make your house quiet and calm about an hour before bedtime. Turn down the lights, turn off the TV, log off the computer, and turn down the volume on music. This can help you relax after a busy day. When should you call for help? Watch closely for changes in your health, and be sure to contact your doctor if:    · Your efforts to improve your sleep do not work.     · Your insomnia gets worse.     · You have been feeling down, depressed, or hopeless or have lost interest in things that you usually enjoy. Where can you learn more? Go to https://HoneyBook Inc.pepicewbMenu.Pockethernet. org and sign in to your [employer] account. Enter P513 in the [employer] box to learn more about \"Insomnia: Care Instructions. \"     If you do not have an account, please click on the \"Sign Up Now\" link. Current as of: August 31, 2020               Content Version: 12.8  © 2006-2021 [employer]. Care instructions adapted under license by [facility] ([facility]). If you have questions about a medical condition or this instruction, always ask your healthcare professional. [place] [address] any warranty or liability for your use of this information. Patient Education        Learning About Mood Disorders  What are mood disorders? Mood disorders are medical problems that affect how you feel. They can impact your moods, thoughts, and actions. Mood disorders include:  · Depression. This causes you to feel sad or hopeless for much of the time. · Bipolar disorder. This causes extreme mood changes from manic episodes of very high energy to extreme lows of depression. · Seasonal affective disorder (SAD). This is a type of depression that affects you during the same season each year. Most often people experience SAD during the fall and winter months when days are shorter and there is less light. What are the symptoms? Depression  You may:  · Feel sad or hopeless nearly every day.   · Lose interest in or not get pleasure from most daily activities. You feel this way nearly every day. · Have low energy, changes in your appetite, or changes in how well you sleep. · Have trouble concentrating. · Think about death and suicide. Keep the numbers for these national suicide hotlines: 8-737-265-TALK (5-719-786-867-105-1194) and 4-192-JPXPLSV (6-992-007-561.325.7904). If you or someone you know talks about suicide or feeling hopeless, get help right away. Bipolar disorder  Symptoms depend on your mood swings. You may:  · Feel very happy, energetic, or on edge. · Feel like you need very little sleep. · Feel overly self-confident. · Do impulsive things, such as spending a lot of money. · Feel sad or hopeless. · Have racing thoughts or trouble thinking and making decisions. · Lose interest in things you have enjoyed in the past.  · Think about death and suicide. Keep the numbers for these national suicide hotlines: 8-440-067-TALK (8-838.737.5103) and 0-924-PBKGJUT (9-425.563.6890). If you or someone you know talks about suicide or feeling hopeless, get help right away. Seasonal affective disorder (SAD)  Symptoms come and go at about the same time each year. For most people with SAD, symptoms come during the winter when there is less daylight. You may:  · Feel sad, grumpy, parra, or anxious. · Lose interest in your usual activities. · Eat more and crave carbohydrates, such as bread and pasta. · Gain weight. · Sleep more and feel drowsy during the daytime. How are mood disorders treated? Mood disorders can be treated with medicines or counseling, or a combination of both. Medicines for depression and SAD may include antidepressants. Medicines for bipolar disorder may include:  · Mood stabilizers. · Antipsychotics. · Benzodiazepines. Counseling may involve cognitive-behavioral therapy. It teaches you how to change the ways you think and behave. This can help you stop thinking bad thoughts about yourself and your life.   Light therapy is the main treatment for SAD. This therapy uses a special kind of lamp. You let the lamp shine on you at certain times, usually in the morning. This may help your symptoms during the months when there is less sunlight. Healthy lifestyle  Healthy lifestyle changes may help you feel better. · Be active often. You might try walking or strength training. · Eat a healthy diet. Include fruits, vegetables, lean proteins, and whole grains in your diet each day. · Keep a regular sleep schedule. Try for 8 hours of sleep a night. · Find ways to manage stress, such as relaxation exercises. · Avoid alcohol and illegal drugs. Follow-up care is a key part of your treatment and safety. Be sure to make and go to all appointments, and call your doctor if you are having problems. It's also a good idea to know your test results and keep a list of the medicines you take. Where can you learn more? Go to https://Entrenarmepekanaeweb.TheDressSpot.com. org and sign in to your [employer] account. Enter W265 in the [employer] box to learn more about \"Learning About Mood Disorders. \"     If you do not have an account, please click on the \"Sign Up Now\" link. Current as of: September 23, 2020               Content Version: 12.8  © 2006-2021 Healthwise, Incorporated. Care instructions adapted under license by [facility] ([facility]). If you have questions about a medical condition or this instruction, always ask your healthcare professional. [place] [address] any warranty or liability for your use of this information.

## 2021-04-15 ENCOUNTER — VIRTUAL VISIT (OUTPATIENT)
Dept: PSYCHOLOGY | Age: 41
End: 2021-04-15
Payer: COMMERCIAL

## 2021-04-15 DIAGNOSIS — F33.1 MAJOR DEPRESSIVE DISORDER, RECURRENT EPISODE, MODERATE (HCC): Primary | ICD-10-CM

## 2021-04-15 PROCEDURE — 90832 PSYTX W PT 30 MINUTES: CPT | Performed by: PSYCHOLOGIST
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
## 2021-04-19 ENCOUNTER — OFFICE VISIT (OUTPATIENT)
Dept: NEUROLOGY | Age: 41
End: 2021-04-19
Payer: COMMERCIAL

## 2021-04-19 VITALS
SYSTOLIC BLOOD PRESSURE: 116 MMHG | WEIGHT: 293 LBS | HEIGHT: 70 IN | HEART RATE: 64 BPM | DIASTOLIC BLOOD PRESSURE: 70 MMHG | BODY MASS INDEX: 41.95 KG/M2

## 2021-04-19 DIAGNOSIS — G44.89 OTHER HEADACHE SYNDROME: Primary | ICD-10-CM

## 2021-04-19 DIAGNOSIS — H53.9 VISUAL DISTURBANCE: ICD-10-CM

## 2021-04-19 PROCEDURE — 99213 OFFICE O/P EST LOW 20 MIN: CPT | Performed by: PSYCHIATRY & NEUROLOGY

## 2021-04-19 PROCEDURE — G8427 DOCREV CUR MEDS BY ELIG CLIN: HCPCS | Performed by: PSYCHIATRY & NEUROLOGY

## 2021-04-19 PROCEDURE — G8417 CALC BMI ABV UP PARAM F/U: HCPCS | Performed by: PSYCHIATRY & NEUROLOGY

## 2021-04-19 PROCEDURE — 4004F PT TOBACCO SCREEN RCVD TLK: CPT | Performed by: PSYCHIATRY & NEUROLOGY

## 2021-04-19 RX ORDER — DIVALPROEX SODIUM 250 MG/1
250 TABLET, EXTENDED RELEASE ORAL NIGHTLY
Qty: 30 TABLET | Refills: 3 | Status: SHIPPED | OUTPATIENT
Start: 2021-04-19 | End: 2021-06-18

## 2021-04-19 RX ORDER — OXCARBAZEPINE 150 MG/1
150 TABLET, FILM COATED ORAL 2 TIMES DAILY
Qty: 60 TABLET | Refills: 3 | Status: SHIPPED | OUTPATIENT
Start: 2021-04-19

## 2021-04-19 NOTE — PATIENT INSTRUCTIONS
1. Change Trileptal to  150 mg two times a day. 2. Start Depakote  mg nightly  3. Follow-up with ophthalmology as scheduled  4. Report any new events. 5. Follow up in 2 months.

## 2021-04-21 ENCOUNTER — VIRTUAL VISIT (OUTPATIENT)
Dept: PSYCHOLOGY | Age: 41
End: 2021-04-21
Payer: COMMERCIAL

## 2021-04-21 DIAGNOSIS — F33.1 MAJOR DEPRESSIVE DISORDER, RECURRENT EPISODE, MODERATE (HCC): Primary | ICD-10-CM

## 2021-04-21 PROCEDURE — 90832 PSYTX W PT 30 MINUTES: CPT | Performed by: PSYCHOLOGIST
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
## 2021-04-27 NOTE — PROGRESS NOTES
NEUROLOGY OUT PATIENT FOLLOW UP NOTE:  4/19/20212:11 PM    Lee Orona is here for follow up for headache, visual disturbance. She is doing the same with her headache. She denies any new symptoms. She was evaluated by ophthalmology and assured that there is no intraocular pathology to explain her symptoms, was told she does not need to have a spinal tap for pseudotumor cerebri. She was evaluated by ENT and had vestibular testing. She is on trileptal and feels there is more room for improvement regarding her headache. She denies any side effects to the trileptal. She has been tried on zonegran in the past without benefit. She comes in today by herself to discuss medication options and the plan of care going forward. ROS:  Respiratory : no cough, no shortness of breath  Cardiac: no chest pain. No palpitations. Renal : no flank pain, no hematuria    Skin: no rash      No Known Allergies    Current Outpatient Medications:     prazosin (MINIPRESS) 2 MG capsule, Take 1 capsule by mouth nightly, Disp: 30 capsule, Rfl: 2    Misc. Devices (BATHTUB SAFETY RAIL) MISC, Use while showering, Disp: 2 each, Rfl: 0    clonazePAM (KLONOPIN) 0.5 MG tablet, Take 1 tablet by mouth 2 times daily as needed for Anxiety for up to 60 days. , Disp: 60 tablet, Rfl: 0    Omega-3 Fatty Acids (RA FISH OIL) 1000 MG CAPS, take 1 capsule by mouth once daily, Disp: 30 capsule, Rfl: 2    levothyroxine (SYNTHROID) 50 MCG tablet, take 1 tablet by mouth once daily, Disp: 30 tablet, Rfl: 2    FLUoxetine (PROZAC) 40 MG capsule, take 1 capsule by mouth once daily, Disp: 30 capsule, Rfl: 2    FLUoxetine (PROZAC) 20 MG capsule, take 1 capsule by mouth once daily, Disp: 30 capsule, Rfl: 2    OXcarbazepine (TRILEPTAL) 150 MG tablet, Take 1 tablet by mouth daily, Disp: 30 tablet, Rfl: 3    pilocarpine (SALAGEN) 5 MG tablet, take 1 tablet by mouth three times a day, Disp: 90 tablet, Rfl: 3    Handicap Placard MISC, by Does not apply route Expires on 8/3/2022, Disp: 1 each, Rfl: 0    acetaminophen (TYLENOL) 500 MG tablet, Take 1,000 mg by mouth every 6 hours as needed for Pain, Disp: , Rfl:     ibuprofen (ADVIL;MOTRIN) 200 MG tablet, Take 600 mg by mouth every 6 hours as needed for Pain, Disp: , Rfl:     RA VITAMIN D-3 125 MCG (5000 UT) CAPS capsule, take 1 capsule by mouth once daily (Patient not taking: Reported on 4/19/2021), Disp: 30 capsule, Rfl: 2    I reviewed the past medical history, allergies, medications, social history and family history. PE:   Vitals:    04/19/21 1402   BP: 116/70   Site: Left Lower Arm   Position: Sitting   Cuff Size: Medium Adult   Pulse: 64   Weight: (!) 337 lb (152.9 kg)   Height: 5' 10\" (1.778 m)     General Appearance:  awake, alert, oriented, in no acute distress  Gen: NAD, Language is Intact. Skin: no rash, lesion, dry  to touch. warm  Head: no rash, no icterus, hearing is intact on the left, she is deaf with the right ear  Neck: There is no carotid bruits. The Neck is supple. There is no neck lymphadenopathy. Neuro: CN 2-12: there is right facial asymmetry. Power 5/5 Throughout symmetric, Reflexes are  symmetric. Long tracts are intact. Cerebellar exam is Intact. Sensory exam is intact to light touch. Gait is intact. Musculoskeletal:  Has no hand arthritis, no limitation of ROM in any of the four extremities. Lower extremities no edema  The abdomen is soft,  intact bowel sounds. DATA:      Results for orders placed or performed in visit on 07/22/20   Creatinine   Result Value Ref Range    CREATININE 0.8 0.6 - 1.3 mg/dL    Creatinine Clearance 60          I reviewed the MRI brain and agree with interpretation. Results for orders placed during the hospital encounter of 01/21/21   MRI BRAIN W WO CONTRAST    Addendum ** ADDENDUM #1 **  The small focal area of encephalomalacia in the right corona radiata is  not specific for lacunar infarct although this is the most common  etiology.  Other considerations would include prior  infectious/inflammatory/demyelinating process and prior trauma. Final report electronically signed by Dr. Bogdan Chawla MD on  2/15/2021 3:21 PM  ** ORIGINAL REPORT ** PROCEDURE: MRI BRAIN W WO CONTRAST  INDICATION:Other headache syndrome, Visual disturbance. Dizziness and  headaches. Previous radiation for right-sided vestibular neuritis. COMPARISON: MRI brain/IACs dated 7/31/2020. TECHNIQUE: Multiplanar and multiple spin echo T1 and T2-weighted images  were obtained through the brain before and after the administration of  intravenous contrast. Dedicated images were obtained through the IACs  including high-resolution T2-weighted  images and pre-and postcontrast T1-weighted images with fat saturation. 20  mL ProHance was injected in the right AC. FINDINGS: The ventricles, cisterns and sulci are symmetric and normal in size and  configuration. There is a partially empty sella, stable compared to prior  exam. There is a small focal area of encephalomalacia in the right corona  radiata, stable compared to prior  exam. There is small area of hyperintense T2/FLAIR signal in the white  matter of the right temporal lobe adjacent to large right mastoid  effusion, similar to prior exam likely on the basis of prior radiation. No  other focal areas of abnormal T2/flair  prolongation are identified within the parenchyma. No intra or extra-axial  mass is identified. No focal areas restricted diffusion are present. No  focal areas of abnormal parenchymal or meningeal enhancement are  identified. The 7th and 8th nerve complexes are symmetric without focal nodularity.   There is linear enhancement along the posterior margin of the internal  auditory canal on the right, unchanged compared to prior exam. There is  enhancement of the basal turn of the  cochlea on the right which is mildly less pronounced compared to prior  exam.. There is very subtle enhancement of the components of the vestibule  and semicircular canals, decreased compared to prior exam. The cisternal  segments of the 5th cranial nerves  and Meckel's caves are symmetric and normal in appearance. The major vascular flow voids appear patent. Orbits are unremarkable. Paranasal sinuses are clear. The left mastoid air cells are clear. The  right parotid gland is absent, stable compared to prior exam. The left  parotid gland is prominent, stable  compared to prior exam.        Nancy Sulaiman, DO 2/15/2021  3:23 PM          Narrative PROCEDURE: MRI BRAIN W WO CONTRAST    INDICATION:Other headache syndrome, Visual disturbance. Dizziness and headaches. Previous radiation for right-sided vestibular neuritis. COMPARISON: MRI brain/IACs dated 7/31/2020. TECHNIQUE: Multiplanar and multiple spin echo T1 and T2-weighted images were obtained through the brain before and after the administration of intravenous contrast. Dedicated images were obtained through the IACs including high-resolution T2-weighted   images and pre-and postcontrast T1-weighted images with fat saturation. 20 mL ProHance was injected in the right AC. FINDINGS:  The ventricles, cisterns and sulci are symmetric and normal in size and configuration. There is a partially empty sella, stable compared to prior exam. There is a small focal area of encephalomalacia in the right corona radiata, stable compared to prior   exam. There is small area of hyperintense T2/FLAIR signal in the white matter of the right temporal lobe adjacent to large right mastoid effusion, similar to prior exam likely on the basis of prior radiation. No other focal areas of abnormal T2/flair   prolongation are identified within the parenchyma. No intra or extra-axial mass is identified. No focal areas restricted diffusion are present. No focal areas of abnormal parenchymal or meningeal enhancement are identified. The 7th and 8th nerve complexes are symmetric without focal nodularity.  There is linear enhancement along the posterior margin of the internal auditory canal on the right, unchanged compared to prior exam. There is enhancement of the basal turn of the   cochlea on the right which is mildly less pronounced compared to prior exam.. There is very subtle enhancement of the components of the vestibule and semicircular canals, decreased compared to prior exam. The cisternal segments of the 5th cranial nerves   and Meckel's caves are symmetric and normal in appearance. The major vascular flow voids appear patent. Orbits are unremarkable. Paranasal sinuses are clear. The left mastoid air cells are clear. The right parotid gland is absent, stable compared to prior exam. The left parotid gland is prominent, stable   compared to prior exam.        Impression    1. Decreased enhancement of right labyrinth as evidence for resolving labyrinthitis though with persistent enhancement along the posterior margin of the internal auditory canal suggesting persistent neuritis. 2. Redemonstration of large right mastoid effusion and hyperintense signal in the white matter in the adjacent right temporal lobe likely on the basis of prior radiation. 3. Old lacunar infarct in the right corona radiata. 4. Redemonstration of prior right parotid resection. **This report has been created using voice recognition software. It may contain minor errors which are inherent in voice recognition technology. **      Final report electronically signed by Dr. Mariama Mcqueen MD on 1/22/2021 3:14 PM            Assessment:     Diagnosis Orders   1. Other headache syndrome     2. Visual disturbance          She is doing the same with her headache. She denies any new symptoms. She was evaluated by ophthalmology and assured that there is no intraocular pathology to explain her symptoms, was told she does not need to have a spinal tap for pseudotumor cerebri. She was evaluated by ENT and had vestibular testing.  She is on trileptal and feels there is more room for improvement regarding her headache. She denies any side effects to the trileptal. She has been tried on zonegran in the past without benefit. After detailed discussion with patient we agreed on the following plan. Plan:  1. Change Trileptal to  150 mg two times a day. 2. Start Depakote  mg nightly  3. Follow-up with ophthalmology as scheduled  4. Report any new events. 5. Follow up in 2 months. Total time 23 min.     Nia Martinez MD

## 2021-04-28 ENCOUNTER — TELEPHONE (OUTPATIENT)
Dept: PSYCHIATRY | Age: 41
End: 2021-04-28

## 2021-04-28 RX ORDER — PRAZOSIN HYDROCHLORIDE 5 MG/1
5 CAPSULE ORAL NIGHTLY
Qty: 30 CAPSULE | Refills: 1 | Status: SHIPPED | OUTPATIENT
Start: 2021-04-28 | End: 2021-06-09 | Stop reason: SDUPTHER

## 2021-04-29 ENCOUNTER — VIRTUAL VISIT (OUTPATIENT)
Dept: PSYCHOLOGY | Age: 41
End: 2021-04-29
Payer: COMMERCIAL

## 2021-04-29 DIAGNOSIS — F33.1 MAJOR DEPRESSIVE DISORDER, RECURRENT EPISODE, MODERATE (HCC): Primary | ICD-10-CM

## 2021-04-29 PROCEDURE — 90832 PSYTX W PT 30 MINUTES: CPT | Performed by: PSYCHOLOGIST

## 2021-04-29 NOTE — PROGRESS NOTES
Behavioral Health Consultation/Psychotherapy Note  Rosa Salazar. Elias Merritt Psy.D. Visit Date:  4/29/2021    Patient:  Angela Adame  YOB: 1980  Chief Complaint:  Follow-up, Depression, and Stress    Duration of session:  35 minutes    This note will not be viewable in [facility] for the following reason(s). This is a Psychotherapy Note. S:     This session was conducted as a telepsychology visit due to [facility] restrictions placed on in-person visits d/t the COVID-19 outbreak. Patient Location: Home       Provider Location ([facility]): [facility]    Patient gave verbal consent for teleservices and will sign a consent form when feasible. This virtual visit was conducted via interactive/real-time audio/video, using Doxy. me. She hasn't sent in the disability info, and hasn't done the dishes yet. She was agreeable to doing 10 min to work on the paperwork. She talked about how she's been trying to treat herself better in small ways. She's been able to set boundaries with Elijah and she knows he needs to help himself. We processed thoughts and feelings about the situation. O:    Appearance    Patient presents as alert, oriented, and cooperative, with moderate distress, tearful  Appetite abnormal: fluctuates, but often low  Sleep disturbance Yes  Loss of pleasure Yes  Speech    normal rate, normal volume, well articulated and clear and understandable  Mood    Depressed  Low self-esteem  Affect    depressed affect  Thought Process    linear, goal directed, coherent and linear and coherent  Insight    Great  Judgment    Intact  Memory    recent and remote memory intact  Suicide Assessment    no suicidal ideations    A:    1.  Major depressive disorder, recurrent episode, moderate ([address]        Ct really seems to appreciate the validation of her experience.  She has great insight into her problems and participates and listens well.  Continued psychotherapy needed.       P:    Doxy visit in 1-2 weeks. All questions about treatment plan answered. Patient instructed to go immediately to the emergency room and/or call 911 if any suicidal or homicidal ideations. Patient stated understanding and is agreeable to treatment and crisis plan.           Provider Signature:  Electronically signed by Raquel Baengas PSYD on 4/29/2021 at 2:12 PM

## 2021-06-09 ENCOUNTER — VIRTUAL VISIT (OUTPATIENT)
Dept: PSYCHIATRY | Age: 41
End: 2021-06-09
Payer: COMMERCIAL

## 2021-06-09 DIAGNOSIS — F33.1 MODERATE EPISODE OF RECURRENT MAJOR DEPRESSIVE DISORDER (HCC): ICD-10-CM

## 2021-06-09 DIAGNOSIS — F43.10 PTSD (POST-TRAUMATIC STRESS DISORDER): Primary | ICD-10-CM

## 2021-06-09 DIAGNOSIS — F51.5 NIGHTMARES: ICD-10-CM

## 2021-06-09 PROCEDURE — 99214 OFFICE O/P EST MOD 30 MIN: CPT | Performed by: PSYCHIATRY & NEUROLOGY

## 2021-06-09 PROCEDURE — G8427 DOCREV CUR MEDS BY ELIG CLIN: HCPCS | Performed by: PSYCHIATRY & NEUROLOGY

## 2021-06-09 RX ORDER — PRAZOSIN HYDROCHLORIDE 5 MG/1
5 CAPSULE ORAL NIGHTLY
Qty: 30 CAPSULE | Refills: 1 | Status: SHIPPED | OUTPATIENT
Start: 2021-06-09 | End: 2021-08-05 | Stop reason: SDUPTHER

## 2021-06-09 RX ORDER — FLUOXETINE HYDROCHLORIDE 40 MG/1
CAPSULE ORAL
Qty: 30 CAPSULE | Refills: 1 | Status: SHIPPED | OUTPATIENT
Start: 2021-06-09 | End: 2021-08-05 | Stop reason: SDUPTHER

## 2021-06-09 RX ORDER — PRAZOSIN HYDROCHLORIDE 2 MG/1
2 CAPSULE ORAL NIGHTLY
Qty: 30 CAPSULE | Refills: 1 | Status: SHIPPED | OUTPATIENT
Start: 2021-06-09 | End: 2021-08-05 | Stop reason: SDUPTHER

## 2021-06-09 RX ORDER — CLONAZEPAM 0.5 MG/1
0.5 TABLET ORAL 2 TIMES DAILY PRN
Qty: 60 TABLET | Refills: 1 | Status: SHIPPED | OUTPATIENT
Start: 2021-06-09 | End: 2021-08-05 | Stop reason: SDUPTHER

## 2021-06-09 RX ORDER — FLUOXETINE HYDROCHLORIDE 20 MG/1
CAPSULE ORAL
Qty: 30 CAPSULE | Refills: 1 | Status: SHIPPED | OUTPATIENT
Start: 2021-06-09 | End: 2021-08-05 | Stop reason: SDUPTHER
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
## 2021-06-18 ENCOUNTER — OFFICE VISIT (OUTPATIENT)
Dept: NEUROLOGY | Age: 41
End: 2021-06-18
Payer: COMMERCIAL

## 2021-06-18 VITALS
WEIGHT: 293 LBS | DIASTOLIC BLOOD PRESSURE: 82 MMHG | SYSTOLIC BLOOD PRESSURE: 126 MMHG | OXYGEN SATURATION: 96 % | BODY MASS INDEX: 44.41 KG/M2 | HEIGHT: 68 IN | HEART RATE: 71 BPM

## 2021-06-18 DIAGNOSIS — H53.9 VISUAL DISTURBANCE: ICD-10-CM

## 2021-06-18 DIAGNOSIS — G44.89 OTHER HEADACHE SYNDROME: Primary | ICD-10-CM

## 2021-06-18 PROCEDURE — 4004F PT TOBACCO SCREEN RCVD TLK: CPT | Performed by: NURSE PRACTITIONER

## 2021-06-18 PROCEDURE — G8417 CALC BMI ABV UP PARAM F/U: HCPCS | Performed by: NURSE PRACTITIONER

## 2021-06-18 PROCEDURE — G8427 DOCREV CUR MEDS BY ELIG CLIN: HCPCS | Performed by: NURSE PRACTITIONER

## 2021-06-18 PROCEDURE — 99213 OFFICE O/P EST LOW 20 MIN: CPT | Performed by: NURSE PRACTITIONER

## 2021-06-18 RX ORDER — DIVALPROEX SODIUM 500 MG/1
500 TABLET, EXTENDED RELEASE ORAL DAILY
Qty: 30 TABLET | Refills: 3 | Status: SHIPPED | OUTPATIENT
Start: 2021-06-18 | End: 2021-10-15

## 2021-06-18 NOTE — PROGRESS NOTES
NEUROLOGY OUT PATIENT FOLLOW UP NOTE:  6/18/20212:14 PM    Trudy Freeman is here for follow up for headache, visual disturbance. ROS:  Respiratory : no cough, no shortness of breath  Cardiac: no chest pain. No palpitations. Renal : no flank pain, no hematuria    Skin: no rash      No Known Allergies    Current Outpatient Medications:     prazosin (MINIPRESS) 5 MG capsule, Take 1 capsule by mouth nightly Along with 2 mg for total dose 7 mg at night, Disp: 30 capsule, Rfl: 1    prazosin (MINIPRESS) 2 MG capsule, Take 1 capsule by mouth nightly Along with 5 mg for total dose of 7 mg at night, Disp: 30 capsule, Rfl: 1    FLUoxetine (PROZAC) 40 MG capsule, take 1 capsule by mouth once daily along with 20 mg for total dose 60 mg daily, Disp: 30 capsule, Rfl: 1    FLUoxetine (PROZAC) 20 MG capsule, take 1 capsule by mouth once daily along with 40 mg for total 60 mg once daily, Disp: 30 capsule, Rfl: 1    clonazePAM (KLONOPIN) 0.5 MG tablet, Take 1 tablet by mouth 2 times daily as needed for Anxiety for up to 60 days. , Disp: 60 tablet, Rfl: 1    OXcarbazepine (TRILEPTAL) 150 MG tablet, Take 1 tablet by mouth 2 times daily, Disp: 60 tablet, Rfl: 3    divalproex (DEPAKOTE ER) 250 MG extended release tablet, Take 1 tablet by mouth nightly, Disp: 30 tablet, Rfl: 3    Misc.  Devices (BATHTUB SAFETY RAIL) MISC, Use while showering, Disp: 2 each, Rfl: 0    Omega-3 Fatty Acids (RA FISH OIL) 1000 MG CAPS, take 1 capsule by mouth once daily, Disp: 30 capsule, Rfl: 2    levothyroxine (SYNTHROID) 50 MCG tablet, take 1 tablet by mouth once daily, Disp: 30 tablet, Rfl: 2    pilocarpine (SALAGEN) 5 MG tablet, take 1 tablet by mouth three times a day, Disp: 90 tablet, Rfl: 3    RA VITAMIN D-3 125 MCG (5000 UT) CAPS capsule, take 1 capsule by mouth once daily, Disp: 30 capsule, Rfl: 2    Handicap Placard MISC, by Does not apply route Expires on 8/3/2022, Disp: 1 each, Rfl: 0    acetaminophen (TYLENOL) 500 MG tablet, Take 1,000 mg by mouth every 6 hours as needed for Pain, Disp: , Rfl:     ibuprofen (ADVIL;MOTRIN) 200 MG tablet, Take 600 mg by mouth every 6 hours as needed for Pain, Disp: , Rfl:     I reviewed the past medical history, allergies, medications, social history and family history. PE:   Vitals:    06/18/21 1406   BP: 126/82   Site: Left Upper Arm   Position: Sitting   Cuff Size: Medium Adult   Pulse: 71   SpO2: 96%   Weight: (!) 341 lb (154.7 kg)   Height: 5' 8\" (1.727 m)     General Appearance:  awake, alert, oriented, in no acute distress  Gen: NAD, Language is Intact. Skin: no rash, lesion, dry  to touch. warm  Head: no rash, no icterus, hearing is intact on the left, she is deaf with the right ear  Neck: There is no carotid bruits. The Neck is supple. There is no neck lymphadenopathy. Neuro: CN 2-12: there is right facial asymmetry. Power 5/5 Throughout symmetric, Reflexes are  symmetric. Long tracts are intact. Cerebellar exam is Intact. Sensory exam is intact to light touch. Gait is intact. Musculoskeletal:  Has no hand arthritis, no limitation of ROM in any of the four extremities. Lower extremities no edema  The abdomen is soft,  intact bowel sounds. DATA:      Results for orders placed or performed in visit on 07/22/20   Creatinine   Result Value Ref Range    CREATININE 0.8 0.6 - 1.3 mg/dL    Creatinine Clearance 60               Results for orders placed during the hospital encounter of 01/21/21    MRI BRAIN W WO CONTRAST    Addendum 2/15/2021  3:23 PM  ** ADDENDUM #1 **    The small focal area of encephalomalacia in the right corona radiata is not specific for lacunar infarct although this is the most common etiology. Other considerations would include prior infectious/inflammatory/demyelinating process and prior trauma.     Final report electronically signed by Dr. Jenn Torres MD on 2/15/2021 3:21 PM    * ORIGINAL REPORT **  PROCEDURE: MRI BRAIN W WO CONTRAST    INDICATION:Other headache syndrome, Visual disturbance. Dizziness and headaches. Previous radiation for right-sided vestibular neuritis. COMPARISON: MRI brain/IACs dated 7/31/2020. TECHNIQUE: Multiplanar and multiple spin echo T1 and T2-weighted images were obtained through the brain before and after the administration of intravenous contrast. Dedicated images were obtained through the IACs including high-resolution T2-weighted  images and pre-and postcontrast T1-weighted images with fat saturation. 20 mL ProHance was injected in the right AC. FINDINGS:  The ventricles, cisterns and sulci are symmetric and normal in size and configuration. There is a partially empty sella, stable compared to prior exam. There is a small focal area of encephalomalacia in the right corona radiata, stable compared to prior  exam. There is small area of hyperintense T2/FLAIR signal in the white matter of the right temporal lobe adjacent to large right mastoid effusion, similar to prior exam likely on the basis of prior radiation. No other focal areas of abnormal T2/flair  prolongation are identified within the parenchyma. No intra or extra-axial mass is identified. No focal areas restricted diffusion are present. No focal areas of abnormal parenchymal or meningeal enhancement are identified. The 7th and 8th nerve complexes are symmetric without focal nodularity. There is linear enhancement along the posterior margin of the internal auditory canal on the right, unchanged compared to prior exam. There is enhancement of the basal turn of the  cochlea on the right which is mildly less pronounced compared to prior exam.. There is very subtle enhancement of the components of the vestibule and semicircular canals, decreased compared to prior exam. The cisternal segments of the 5th cranial nerves  and Meckel's caves are symmetric and normal in appearance. The major vascular flow voids appear patent. Orbits are unremarkable.  Paranasal sinuses are clear. The left mastoid air cells are clear. The right parotid gland is absent, stable compared to prior exam. The left parotid gland is prominent, stable  compared to prior exam.    Narrative  PROCEDURE: MRI BRAIN W WO CONTRAST    INDICATION:Other headache syndrome, Visual disturbance. Dizziness and headaches. Previous radiation for right-sided vestibular neuritis. COMPARISON: MRI brain/IACs dated 7/31/2020. TECHNIQUE: Multiplanar and multiple spin echo T1 and T2-weighted images were obtained through the brain before and after the administration of intravenous contrast. Dedicated images were obtained through the IACs including high-resolution T2-weighted  images and pre-and postcontrast T1-weighted images with fat saturation. 20 mL ProHance was injected in the right AC. FINDINGS:  The ventricles, cisterns and sulci are symmetric and normal in size and configuration. There is a partially empty sella, stable compared to prior exam. There is a small focal area of encephalomalacia in the right corona radiata, stable compared to prior  exam. There is small area of hyperintense T2/FLAIR signal in the white matter of the right temporal lobe adjacent to large right mastoid effusion, similar to prior exam likely on the basis of prior radiation. No other focal areas of abnormal T2/flair  prolongation are identified within the parenchyma. No intra or extra-axial mass is identified. No focal areas restricted diffusion are present. No focal areas of abnormal parenchymal or meningeal enhancement are identified. The 7th and 8th nerve complexes are symmetric without focal nodularity.  There is linear enhancement along the posterior margin of the internal auditory canal on the right, unchanged compared to prior exam. There is enhancement of the basal turn of the  cochlea on the right which is mildly less pronounced compared to prior exam.. There is very subtle enhancement of the components of the vestibule and semicircular canals, decreased compared to prior exam. The cisternal segments of the 5th cranial nerves  and Meckel's caves are symmetric and normal in appearance. The major vascular flow voids appear patent. Orbits are unremarkable. Paranasal sinuses are clear. The left mastoid air cells are clear. The right parotid gland is absent, stable compared to prior exam. The left parotid gland is prominent, stable  compared to prior exam.    Impression  1. Decreased enhancement of right labyrinth as evidence for resolving labyrinthitis though with persistent enhancement along the posterior margin of the internal auditory canal suggesting persistent neuritis. 2. Redemonstration of large right mastoid effusion and hyperintense signal in the white matter in the adjacent right temporal lobe likely on the basis of prior radiation. 3. Old lacunar infarct in the right corona radiata. 4. Redemonstration of prior right parotid resection. **This report has been created using voice recognition software. It may contain minor errors which are inherent in voice recognition technology. **      Final report electronically signed by Dr. Edgardo Stevenson MD on 1/22/2021 3:14 PM        Assessment:     Diagnosis Orders   1. Other headache syndrome     2. Visual disturbance          Her headaches are the same. She denies new symptoms. She was evaluated by ophthalmology and assured that there is no intraocular pathology to explain her symptoms, was told she does not need to have a spinal tap for pseudotumor cerebri. She was evaluated by ENT and had vestibular testing. She has not noticed any improvement in her headache with the addition of the Depakote, but does feel that the Depakote has helped with her mood. She is also on trileptal. No side effects noted to the medication. She has tried zonegran without relief. We will increase her Depakote to 500 mg daily in addition to the Trileptal she is already on.   Based on her response and feedback to the medication, we will consider referring her for Botox injections as a next step. After detailed discussion with patient we agreed on the following plan. Plan:  1. Change Depakote 500 mg ER daily  2. Continue with Trileptal 150 mg two times a day   3. Follow-up with ophthalmology as scheduled  4. Consider botox injections as a next step. 5. Report any new events.   6. Follow up in 2-3 months.          Time spent 24 min.     Andres Curtis, EDEN - CNP

## 2021-06-18 NOTE — PATIENT INSTRUCTIONS
1. Change Depakote 500 mg ER   2. Continue with Trileptal 150 mg two times a day   3. Follow-up with ophthalmology as scheduled  4. Consider botox injections as a next step.    5. Report any new events.   6. Follow up in 2-3 months.

## 2021-07-15 ENCOUNTER — OFFICE VISIT (OUTPATIENT)
Dept: FAMILY MEDICINE CLINIC | Age: 41
End: 2021-07-15
Payer: COMMERCIAL

## 2021-07-15 VITALS
HEART RATE: 68 BPM | HEIGHT: 68 IN | DIASTOLIC BLOOD PRESSURE: 76 MMHG | WEIGHT: 293 LBS | SYSTOLIC BLOOD PRESSURE: 124 MMHG | OXYGEN SATURATION: 98 % | BODY MASS INDEX: 44.41 KG/M2 | TEMPERATURE: 98.4 F | RESPIRATION RATE: 16 BRPM

## 2021-07-15 DIAGNOSIS — M54.6 CHRONIC MIDLINE THORACIC BACK PAIN: ICD-10-CM

## 2021-07-15 DIAGNOSIS — M54.2 CERVICAL PAIN (NECK): ICD-10-CM

## 2021-07-15 DIAGNOSIS — M25.511 RIGHT ANTERIOR SHOULDER PAIN: ICD-10-CM

## 2021-07-15 DIAGNOSIS — G89.29 CHRONIC MIDLINE THORACIC BACK PAIN: ICD-10-CM

## 2021-07-15 DIAGNOSIS — H83.2X1 BALANCE PROBLEM DUE TO VESTIBULAR DYSFUNCTION OF RIGHT EAR: ICD-10-CM

## 2021-07-15 DIAGNOSIS — R29.898 RIGHT LEG WEAKNESS: ICD-10-CM

## 2021-07-15 DIAGNOSIS — M54.50 LUMBAR BACK PAIN: ICD-10-CM

## 2021-07-15 DIAGNOSIS — R53.83 FATIGUE, UNSPECIFIED TYPE: Primary | ICD-10-CM

## 2021-07-15 DIAGNOSIS — E55.9 VITAMIN D DEFICIENCY: ICD-10-CM

## 2021-07-15 PROCEDURE — G8427 DOCREV CUR MEDS BY ELIG CLIN: HCPCS | Performed by: NURSE PRACTITIONER

## 2021-07-15 PROCEDURE — 4004F PT TOBACCO SCREEN RCVD TLK: CPT | Performed by: NURSE PRACTITIONER

## 2021-07-15 PROCEDURE — G8417 CALC BMI ABV UP PARAM F/U: HCPCS | Performed by: NURSE PRACTITIONER

## 2021-07-15 PROCEDURE — 99213 OFFICE O/P EST LOW 20 MIN: CPT | Performed by: NURSE PRACTITIONER

## 2021-07-15 RX ORDER — CHOLECALCIFEROL (VITAMIN D3) 125 MCG
CAPSULE ORAL
Qty: 30 CAPSULE | Refills: 11 | Status: SHIPPED | OUTPATIENT
Start: 2021-07-15

## 2021-07-15 RX ORDER — OMEGA-3 FATTY ACIDS/FISH OIL 300-500 MG
CAPSULE ORAL
Qty: 30 CAPSULE | Refills: 11 | Status: SHIPPED | OUTPATIENT
Start: 2021-07-15 | End: 2021-09-27 | Stop reason: SDUPTHER

## 2021-07-15 SDOH — ECONOMIC STABILITY: FOOD INSECURITY: WITHIN THE PAST 12 MONTHS, YOU WORRIED THAT YOUR FOOD WOULD RUN OUT BEFORE YOU GOT MONEY TO BUY MORE.: NEVER TRUE

## 2021-07-15 SDOH — ECONOMIC STABILITY: FOOD INSECURITY: WITHIN THE PAST 12 MONTHS, THE FOOD YOU BOUGHT JUST DIDN'T LAST AND YOU DIDN'T HAVE MONEY TO GET MORE.: NEVER TRUE

## 2021-07-15 ASSESSMENT — ENCOUNTER SYMPTOMS
DIARRHEA: 0
COUGH: 0
EYE REDNESS: 0
ANAL BLEEDING: 0
NAUSEA: 0
COLOR CHANGE: 0
ABDOMINAL PAIN: 0
EYE DISCHARGE: 0
SORE THROAT: 0
BLOOD IN STOOL: 0
RHINORRHEA: 0
SHORTNESS OF BREATH: 0
ABDOMINAL DISTENTION: 0
CONSTIPATION: 0

## 2021-07-15 ASSESSMENT — SOCIAL DETERMINANTS OF HEALTH (SDOH): HOW HARD IS IT FOR YOU TO PAY FOR THE VERY BASICS LIKE FOOD, HOUSING, MEDICAL CARE, AND HEATING?: VERY HARD

## 2021-07-15 NOTE — PATIENT INSTRUCTIONS
adapted under license by [facility] ([facility]). If you have questions about a medical condition or this instruction, always ask your healthcare professional. [place] [address] any warranty or liability for your use of this information. Patient Education        Neck Pain: Care Instructions  Your Care Instructions     You can have neck pain anywhere from the bottom of your head to the top of your shoulders. It can spread to the upper back or arms. Injuries, painting a ceiling, sleeping with your neck twisted, staying in one position for too long, and many other activities can cause neck pain. Most neck pain gets better with home care. Your doctor may recommend medicine to relieve pain or relax your muscles. He or she may suggest exercise and physical therapy to increase flexibility and relieve stress. You may need to wear a special (cervical) collar to support your neck for a day or two. Follow-up care is a key part of your treatment and safety. Be sure to make and go to all appointments, and call your doctor if you are having problems. It's also a good idea to know your test results and keep a list of the medicines you take. How can you care for yourself at home? · Try using a heating pad on a low or medium setting for 15 to 20 minutes every 2 or 3 hours. Try a warm shower in place of one session with the heating pad. · You can also try an ice pack for 10 to 15 minutes every 2 to 3 hours. Put a thin cloth between the ice and your skin. · Take pain medicines exactly as directed. ? If the doctor gave you a prescription medicine for pain, take it as prescribed. ? If you are not taking a prescription pain medicine, ask your doctor if you can take an over-the-counter medicine. · If your doctor recommends a cervical collar, wear it exactly as directed. When should you call for help?    Call your doctor now or seek immediate medical care if:    · You have new or worsening numbness in your arms, buttocks or legs.     · You have new or worsening weakness in your arms or legs. (This could make it hard to stand up.)     · You lose control of your bladder or bowels. Watch closely for changes in your health, and be sure to contact your doctor if:    · Your neck pain is getting worse.     · You are not getting better after 1 week.     · You do not get better as expected. Where can you learn more? Go to https://Skycast Solutionspekanaewjeffrey.IPS Game Farmers. org and sign in to your 3dCart Shopping Cart Software account. Enter [id number] in the PetCoach box to learn more about \"Neck Pain: Care Instructions. \"     If you do not have an account, please click on the \"Sign Up Now\" link. Current as of: November 16, 2020               Content Version: 12.9  © 2006-2021 Healthwise, Mpayy. Care instructions adapted under license by [facility] ([facility]). If you have questions about a medical condition or this instruction, always ask your healthcare professional. [place] [address] any warranty or liability for your use of this information. Patient Education        Neck: Exercises  Introduction  Here are some examples of exercises for you to try. The exercises may be suggested for a condition or for rehabilitation. Start each exercise slowly. Ease off the exercises if you start to have pain. You will be told when to start these exercises and which ones will work best for you. How to do the exercises  Neck stretch   1. This stretch works best if you keep your shoulder down as you lean away from it. To help you remember to do this, start by relaxing your shoulders and lightly holding on to your thighs or your chair. 2. Tilt your head toward your shoulder and hold for 15 to 30 seconds. Let the weight of your head stretch your muscles. 3. If you would like a little added stretch, use your hand to gently and steadily pull your head toward your shoulder.  For example, keeping your right shoulder down, lean your head to the learn more about \"Neck: Exercises. \"     If you do not have an account, please click on the \"Sign Up Now\" link. Current as of: November 16, 2020               Content Version: 12.9  © 2006-2021 Heart Buddy. Care instructions adapted under license by [facility] ([facility]). If you have questions about a medical condition or this instruction, always ask your healthcare professional. [place] [address] any warranty or liability for your use of this information. Patient Education        Fatigue: Care Instructions  Your Care Instructions     Fatigue is a feeling of tiredness, exhaustion, or lack of energy. You may feel fatigue because of too much or not enough activity. It can also come from stress, lack of sleep, boredom, and poor diet. Many medical problems, such as viral infections, can cause fatigue. Emotional problems, especially depression, are often the cause of fatigue. Fatigue is most often a symptom of another problem. Treatment for fatigue depends on the cause. For example, if you have fatigue because you have a certain health problem, treating this problem also treats your fatigue. If depression or anxiety is the cause, treatment may help. Follow-up care is a key part of your treatment and safety. Be sure to make and go to all appointments, and call your doctor if you are having problems. It's also a good idea to know your test results and keep a list of the medicines you take. How can you care for yourself at home? · Get regular exercise. But don't overdo it. Go back and forth between rest and exercise. · Get plenty of rest.  · Eat a healthy diet. Do not skip meals, especially breakfast.  · Reduce your use of caffeine, tobacco, and alcohol. Caffeine is most often found in coffee, tea, cola drinks, and chocolate. · Limit medicines that can cause fatigue. This includes tranquilizers and cold and allergy medicines. When should you call for help?   Watch closely for changes in your health, and be sure to contact your doctor if:    · You have new symptoms such as fever or a rash.     · Your fatigue gets worse.     · You have been feeling down, depressed, or hopeless. Or you may have lost interest in things that you usually enjoy.     · You are not getting better as expected. Where can you learn more? Go to https://chpepiceweb.Fiz. org and sign in to your PetroDE account. Enter S384 in the Adar IT box to learn more about \"Fatigue: Care Instructions. \"     If you do not have an account, please click on the \"Sign Up Now\" link. Current as of: October 19, 2020               Content Version: 12.9  © 2006-2021 Targeted Technologies. Care instructions adapted under license by [facility] ([facility]). If you have questions about a medical condition or this instruction, always ask your healthcare professional. [place] [address] any warranty or liability for your use of this information. Patient Education        Learning About Low Back Pain  What is low back pain? Low back pain is pain that can occur anywhere below the ribs and above the legs. It is very common. Almost everyone has it at one time or another. Low back pain can be:  · Acute. This is new pain that can last a few days to a few weeks--at the most a few months. · Chronic. This pain can last for more than a few months. Sometimes it can last for years. What are some myths about low back pain? Here are some common myths about low back pain--and the facts:  Myth: \"I need to rest my back when I have back pain. \"   Fact: Staying active won't hurt you. It may help you get better faster. Myth: \"I need prescription pain medicine. \"   Fact: It's best to try to let time and being active heal your back. Opioid pain medicines--such as hydrocodone or oxycodone--usually don't work any better than over-the-counter medicines like ibuprofen or naproxen.  And opioids can cause serious problems like opioid use disorder or overdose. Moderate to severe opioid use disorder is sometimes called addiction. Myth: \"I need a test like an X-ray or an MRI to diagnose my low back pain. \"   Fact: Getting a test right away won't help you get better faster. And it could lead you down a treatment path you may not need, since most people get better on their own. What causes low back pain? In most cases, there isn't a clear cause. This can be frustrating, because your back hurts and there's no obvious reason. Your back pain can be caused by:  Overuse or muscle strain. This can happen from playing sports, lifting heavy things, or not being physically fit. A herniated disc. This is a problem with the cushion between the bones in your back. Arthritis. With age, you may have changes in your bones that can narrow the space around your nerves. Other causes. In rare cases, the cause is a serious illness like an infection or cancer. But there are usually other symptoms too. What are the symptoms? Back pain can come on quickly or over time. You may feel:  · Pain in your hips or buttock. · Leg pain, numbness, tingling, or weakness. When a nerve gets squeezed--such as from a disc problem or arthritis--you may have symptoms in your leg or foot. You can even have leg symptoms from a back problem without having any pain in your back. · Pain that's sharp or dull, sometimes with stiffness or muscle spasms. It may be in one small area or over a broad area. But even bad pain doesn't mean that it's caused by something serious. How is low back pain diagnosed? A physical exam is the main way to diagnose low back pain. Your doctor may examine your back, check your nerves by testing your reflexes, and make sure that your muscles are strong. Your doctor also will ask questions about your back and overall health. Most people don't need any tests right away. Tests often don't show the reason for your pain.   If your pain lasts more than 6 weeks or you have symptoms that your doctor is more concerned about, then your doctor may order tests. These may include an X-ray, a CT scan, or an MRI. Sometimes other tests such as a bone scan or nerve conduction test may be done. How is low back pain treated? Most acute low back pain gets better on its own within a few weeks, no matter what the cause. Time and doing usual activities are all that most people need to feel better. Using heat or ice and taking over-the-counter pain medicine also can help while your body heals. If you aren't getting better on your own or your pain is very bad, your doctor may recommend:  · Physical therapy. · Spinal manipulation, such as by a chiropractor. · Acupuncture. · Massage. · Injections of steroid medicine in your back (especially for pain that involves your legs). If you have chronic low back pain, treatment will help you understand and manage your pain. Treatment may include:  · Staying active. This may include walking or doing back exercises. · Physical therapy. · Medicines. Some of these medicines are also used for other problems, like depression. · Pain management. Your doctor may have you see a pain specialist.  · Counseling. Having chronic pain can be hard. It may help to talk to someone who can help you cope with your pain. Surgery isn't needed for most people. But it may help some types of low back pain. Follow-up care is a key part of your treatment and safety. Be sure to make and go to all appointments, and call your doctor if you are having problems. It's also a good idea to know your test results and keep a list of the medicines you take. When should you call for help? Call 911 anytime you think you may need emergency care. For example, call if:  · You can't move a leg at all. Call your doctor now or seek immediate medical care if:  · You have new or worse symptoms in your legs, belly, or buttocks.  Symptoms may include:  ? Numbness or tingling. ? Weakness. ? Pain. · You lose bladder or bowel control. Watch closely for changes in your health, and be sure to contact your doctor if:  · Along with the back pain, you have a fever, lose weight, or don't feel well. · You do not get better as expected. Where can you learn more? Go to https://chpekanaewjeffrey.Woisio. org and sign in to your College Snack Attack account. Enter A007 in the Davra Networks box to learn more about \"Learning About Low Back Pain. \"     If you do not have an account, please click on the \"Sign Up Now\" link. Current as of: November 16, 2020               Content Version: 12.9  © 2006-2021 Giraffe Friend. Care instructions adapted under license by [facility] ([facility]). If you have questions about a medical condition or this instruction, always ask your healthcare professional. [place] [address] any warranty or liability for your use of this information. Patient Education        Musculoskeletal Pain: Care Instructions  Your Care Instructions     Different problems with the bones, muscles, nerves, ligaments, and tendons in the body can cause pain. One or more areas of your body may ache or burn. Or they may feel tired, stiff, or sore. The medical term for this type of pain is musculoskeletal pain. It can have many different causes. Sometimes the pain is caused by an injury such as a strain or sprain. Or you might have pain from using one part of your body in the same way over and over again. This is called overuse. In some cases, the cause of the pain is another health problem such as arthritis or fibromyalgia. The doctor will examine you and ask you questions about your health to help find the cause of your pain. Blood tests or imaging tests like an X-ray may also be helpful. But sometimes doctors can't find a cause of the pain. Treatment depends on your symptoms and the cause of the pain, if known.   The doctor has checked you carefully, but problems can develop later. If you notice any problems or new symptoms, get medical treatment right away. Follow-up care is a key part of your treatment and safety. Be sure to make and go to all appointments, and call your doctor if you are having problems. It's also a good idea to know your test results and keep a list of the medicines you take. How can you care for yourself at home? · If you have new pain:  ? Rest until you feel better. ? Do not do anything that makes the pain worse. Return to exercise gradually if you feel better and your doctor says it's okay. · Be safe with medicines. Read and follow all instructions on the label. ? If the doctor gave you a prescription medicine for pain, take it as prescribed. ? If you are not taking a prescription pain medicine, ask your doctor if you can take an over-the-counter medicine. · Put heat or cold where your pain is, as needed. Use what helps you most. You can also switch between hot and cold packs. ? Apply heat 2 or 3 times a day for 20 to 30 minutes. This can be done using a heating pad, hot shower, or hot pack to relieve pain and stiffness. But don't use heat on a newly swollen joint. ? Put ice or a cold pack on the painful spot for 10 to 20 minutes at a time. Put a thin cloth between the ice and your skin. When should you call for help? Call your doctor now or seek immediate medical care if:    · You have new pain, or your pain gets worse.     · You have new symptoms such as a fever, a rash, or chills. Watch closely for changes in your health, and be sure to contact your doctor if:    · You do not get better as expected. Where can you learn more? Go to https://Fashion RepublicpeDINKlife.Crisp Media. org and sign in to your Travelnuts account. Enter L736 in the Lenovo box to learn more about \"Musculoskeletal Pain: Care Instructions. \"     If you do not have an account, please click on the \"Sign Up Now\" link.   Current as of: August 4, 2020               Content Version: 12.9  © 2006-2021 Healthwise, [email or web address]. [email or web address]. Care instructions adapted under license by [facility] ([facility]). If you have questions about a medical condition or this instruction, always ask your healthcare professional. [address] any warranty or liability for your use of this information. Patient Education        Shoulder Stretches: Exercises  Introduction  Here are some examples of exercises for you to try. The exercises may be suggested for a condition or for rehabilitation. Start each exercise slowly. Ease off the exercises if you start to have pain. You will be told when to start these exercises and which ones will work best for you. How to do the exercises  Shoulder stretch   1.  a doorway and place one arm against the door frame. Your elbow should be a little higher than your shoulder. 2. Relax your shoulders as you lean forward, allowing your chest and shoulder muscles to stretch. You can also turn your body slightly away from your arm to stretch the muscles even more. 3. Hold for 15 to 30 seconds. 4. Repeat 2 to 4 times with each arm. Shoulder and chest stretch   1. Shoulder and chest stretch  2. While sitting, relax your upper body so you slump slightly in your chair. 3. As you breathe in, straighten your back and open your arms out to the sides. 4. Gently pull your shoulder blades back and downward. 5. Hold for 15 to 30 seconds as your breathe normally. 6. Repeat 2 to 4 times. Overhead stretch   1. Reach up over your head with both arms. 2. Hold for 15 to 30 seconds. 3. Repeat 2 to 4 times. Follow-up care is a key part of your treatment and safety. Be sure to make and go to all appointments, and call your doctor if you are having problems. It's also a good idea to know your test results and keep a list of the medicines you take. Where can you learn more? Go to https://chpekennyeb.health-partners. org and sign in to your ScreenTag account. Enter S254 in the [facility] box to learn more about \"Shoulder Stretches: Exercises. \"     If you do not have an account, please click on the \"Sign Up Now\" link. Current as of: November 16, 2020               Content Version: 12.9  © 0385-0522 Healthwise, Incorporated. Care instructions adapted under license by [facility] ([facility]). If you have questions about a medical condition or this instruction, always ask your healthcare professional. [address] any warranty or liability for your use of this information. Patient Education        Healthy Upper Back: Exercises  Introduction  Here are some examples of exercises for your upper back. Start each exercise slowly. Ease off the exercise if you start to have pain. Your doctor or physical therapist will tell you when you can start these exercises and which ones will work best for you. How to do the exercises  Lower neck and upper back stretch   1. Stretch your arms out in front of your body. Clasp one hand on top of your other hand. 2. Gently reach out so that you feel your shoulder blades stretching away from each other. 3. Gently bend your head forward. 4. Hold for 15 to 30 seconds. 5. Repeat 2 to 4 times. Midback stretch   If you have knee pain, do not do this exercise. 1. Kneel on the floor, and sit back on your ankles. 2. Lean forward, place your hands on the floor, and stretch your arms out in front of you. Rest your head between your arms. 3. Gently push your chest toward the floor, reaching as far in front of you as possible. 4. Hold for 15 to 30 seconds. 5. Repeat 2 to 4 times. Shoulder rolls   1. Sit comfortably with your feet shoulder-width apart. You can also do this exercise while standing. 2. Roll your shoulders up, then back, and then down in a smooth, circular motion. 3. Repeat 2 to 4 times. Wall push-up   1.  Stand against a wall with your feet about 12 to 24 inches back from the wall. If you feel any pain when you do this exercise, stand closer to the wall. 2. Place your hands on the wall slightly wider apart than your shoulders, and lean forward. 3. Gently lean your body toward the wall. Then push back to your starting position. Keep the motion smooth and controlled. 4. Repeat 8 to 12 times. Resisted shoulder blade squeeze   For this exercise, you will need elastic exercise material, such as surgical tubing or Thera-Band. 1. Sit or stand, holding the band in both hands in front of you. Keep your elbows close to your sides, bent at a 90-degree angle. Your palms should face up. 2. Squeeze your shoulder blades together, and move your arms to the outside, stretching the band. Be sure to keep your elbows at your sides while you do this. 3. Relax. 4. Repeat 8 to 12 times. Resisted rows   For this exercise, you will need elastic exercise material, such as surgical tubing or Thera-Band. 1. Put the band around a solid object, such as a bedpost, at about waist level. Hold one end of the band in each hand. 2. With your elbows at your sides and bent to 90 degrees, pull the band back to move your shoulder blades toward each other. Return to the starting position. 3. Repeat 8 to 12 times. Follow-up care is a key part of your treatment and safety. Be sure to make and go to all appointments, and call your doctor if you are having problems. It's also a good idea to know your test results and keep a list of the medicines you take. Where can you learn more? Go to https://WadeCo Specialtiesdestinee.Converged Access. org and sign in to your Wyle account. Enter I144 in the OncoHealth box to learn more about \"Healthy Upper Back: Exercises. \"     If you do not have an account, please click on the \"Sign Up Now\" link. Current as of: November 16, 2020               Content Version: 12.9  © 8009-0436 Healthwise, Incorporated. Care instructions adapted under license by [facility] ([facility]).  If you have questions about a medical condition or this instruction, always ask your healthcare professional. [address] any warranty or liability for your use of this information. Patient Education        Learning About Vitamin D  Why is it important to get enough vitamin D? Your body needs vitamin D to absorb calcium. Calcium keeps your bones and muscles, including your heart, healthy and strong. If your muscles don't get enough calcium, they can cramp, hurt, or feel weak. You may have long-term (chronic) muscle aches and pains. If you don't get enough vitamin D throughout life, you have an increased chance of having thin and brittle bones (osteoporosis) in your later years. Children who don't get enough vitamin D may not grow as much as others their age. They also have a chance of getting a rare disease called rickets. It causes weak bones. Vitamin D and calcium are added to many foods. And your body uses sunshine to make its own vitamin D. How much vitamin D do you need? The recommended daily allowance (RDA) for vitamin D is 600 IU (international units) every day for people ages 3 through 79. Adults 71 and older need 800 IU every day. Blood tests for vitamin D can check your vitamin D level. But there is no standard normal range used by all laboratories. You're likely getting enough vitamin D if your levels are in the range of 20 to 50 ng/mL. How can you get more vitamin D? Foods that contain vitamin D include:  · Turner, tuna, and mackerel. These are some of the best foods to eat when you need to get more vitamin D.  · Cheese, egg yolks, and beef liver. These foods have vitamin D in small amounts. · Milk, soy drinks, orange juice, yogurt, margarine, and some kinds of cereal have vitamin D added to them. Some people don't make vitamin D as well as others. They may have to take extra care in getting enough vitamin D.   Things that reduce how much vitamin D your body makes include:  · Dark skin, such as many  Americans have. · Age, especially if you are older than 72. · Digestive problems, such as Crohn's or celiac disease. · Liver and kidney disease. Some people who do not get enough vitamin D may need supplements. Are there any risks from taking vitamin D?  · Too much vitamin D:  ? Can damage your kidneys. ? Can cause nausea and vomiting, constipation, and weakness. ? Raises the amount of calcium in your blood. If this happens, you can get confused or have an irregular heart rhythm. · Vitamin D may interact with other medicines. Tell your doctor about all of the medicines you take, including over-the-counter drugs, herbs, and pills. Tell your doctor about all of your current medical problems. Where can you learn more? Go to [email or web address] Borrow or Steal. org and sign in to your "[employer]" account. Enter [id number] in the Shopdeca box to learn more about \"Learning About Vitamin D. \"     If you do not have an account, please click on the \"Sign Up Now\" link. Current as of: December 17, 2020               Content Version: 12.9  © 5207-7523 Healthwise, Incorporated. Care instructions adapted under license by [facility] ([facility]). If you have questions about a medical condition or this instruction, always ask your healthcare professional. [address] any warranty or liability for your use of this information.

## 2021-07-15 NOTE — PROGRESS NOTES
[address] [facility]  [phone] (phone)  [phone] (fax)    Visit Date: 7/15/2021    Beata Lam is a 42 y.o. female who presents today for:  Chief Complaint   Patient presents with    3 Month Follow-Up     HPI:     3 month follow-up    Never got the knee xrays - still having knee pain - trying to sleep on her back with a wedge pillow - helping her stay in one place at night. Never got the safety rail for tub - needs a new order for safety rail and shower chair.      Was doing medical marijuana - Dr. 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    Removed tumor, salivary glands, facial nerve graft    Anxiety     Arthritis     knees & right shoulder, hip    Depression     Hypothyroidism     PTSD (post-traumatic stress disorder)     Sleep apnea       Past Surgical History:   Procedure Laterality Date    EUA PELVIC N/A 02/21/2019    REMOVAL AND REINSERTION OF MIRENA, EXAM UNDER ANESTHESIA, PAP performed by Sobia Mojica MD at [place] [address] Right 12/10/2020    EYE SURGERY Right     SELECTIVE NECK DISSECTION Right 10/2011    Radiacal Neck for CA    TUMOR EXCISION  2011    excision salivary gland (radical neck disection & nerve graft) - radiation    TYMPANOSTOMY TUBE PLACEMENT      age 14years old    WISDOM TOOTH EXTRACTION  2018     Family History   Problem Relation Age of Onset    Diabetes Mother     Arthritis Mother     Obesity Mother     Depression Mother     No Known Problems Father     Depression Brother      Social History     Tobacco Use    Smoking status: Current Every Day Smoker     Packs/day: 0.25     Years: 20.00     Pack years: 5.00     Types: Cigarettes, Cigars     Start date: 4/16/1999    Smokeless tobacco: Never Used    Tobacco comment: wearing the patch for the first time today   Substance Use Topics    Alcohol use: Not Currently     Comment: social      Current Outpatient Medications   Medication Sig Dispense Refill    vitamin D (RA VITAMIN D-3) 125 MCG (5000 UT) CAPS capsule take 1 capsule by mouth once daily 30 capsule 11    Misc. Devices (BATHTUB SAFETY RAIL) MISC Use daily with showers 1 each 0    Misc. Devices [address] [facility] Shower chair - Use daily when showering 1 Device 0    Omega-3 Fatty Acids (RA FISH OIL) 1000 MG CAPS take 1 capsule by mouth once daily 30 capsule 11    divalproex (DEPAKOTE ER) 500 MG extended release tablet Take 1 tablet by mouth daily 30 tablet 3    prazosin (MINIPRESS) 5 MG capsule Take 1 capsule by mouth nightly Along with 2 mg for total dose 7 mg at night 30 capsule 1    prazosin (MINIPRESS) 2 MG capsule Take 1 capsule by mouth nightly Along with 5 mg for total dose of 7 mg at night 30 capsule 1    FLUoxetine (PROZAC) 40 MG capsule take 1 capsule by mouth once daily along with 20 mg for total dose 60 mg daily 30 capsule 1    FLUoxetine (PROZAC) 20 MG capsule take 1 capsule by mouth once daily along with 40 mg for total 60 mg once daily 30 capsule 1    clonazePAM (KLONOPIN) 0.5 MG tablet Take 1 tablet by mouth 2 times daily as needed for Anxiety for up to 60 days.  60 tablet 1    OXcarbazepine (TRILEPTAL) 150 MG tablet Take 1 tablet by mouth 2 times daily 60 tablet 3    levothyroxine (SYNTHROID) 50 MCG tablet take 1 tablet by mouth once daily 30 tablet 2    pilocarpine (SALAGEN) 5 MG tablet take 1 tablet by mouth three times a day 90 tablet 3  Handicap Placard MISC by Does not apply route Expires on 8/3/2022 1 each 0    acetaminophen (TYLENOL) 500 MG tablet Take 1,000 mg by mouth every 6 hours as needed for Pain      ibuprofen (ADVIL;MOTRIN) 200 MG tablet Take 600 mg by mouth every 6 hours as needed for Pain       No current facility-administered medications for this visit. No Known Allergies    Subjective:    Review of Systems   Constitutional: Negative for chills, fatigue and fever. HENT: Negative for congestion, ear pain, postnasal drip, rhinorrhea and sore throat. Eyes: Negative for discharge and redness. Respiratory: Negative for cough and shortness of breath. Cardiovascular: Negative for chest pain and leg swelling. Gastrointestinal: Negative for abdominal distention, abdominal pain, anal bleeding, blood in stool, constipation, diarrhea and nausea. Musculoskeletal: Positive for arthralgias, joint swelling and myalgias. Skin: Negative for color change and rash. Neurological: Negative for facial asymmetry, speech difficulty and weakness. Hematological: Does not bruise/bleed easily. Psychiatric/Behavioral: Negative for agitation and confusion. Objective:     Vitals:    07/15/21 1313   BP: 124/76   Site: Left Lower Arm   Position: Sitting   Cuff Size: Medium Adult   Pulse: 68   Resp: 16   Temp: 98.4 °F (36.9 °C)   TempSrc: Oral   SpO2: 98%   Weight: (!) 338 lb (153.3 kg)   Height: 5' 8\" (1.727 m)       Body mass index is 51.39 kg/m². Wt Readings from Last 3 Encounters:   07/15/21 (!) 338 lb (153.3 kg)   06/18/21 (!) 341 lb (154.7 kg)   04/19/21 (!) 337 lb (152.9 kg)     BP Readings from Last 3 Encounters:   07/15/21 124/76   06/18/21 126/82   04/19/21 116/70     Physical Exam  Constitutional:       General: She is not in acute distress. Appearance: She is well-developed. She is not ill-appearing or diaphoretic. HENT:      Head: Normocephalic and atraumatic.       Right Ear: Hearing and external ear normal. No decreased hearing noted. Left Ear: Hearing and external ear normal. No decreased hearing noted. Nose: Nose normal. No nasal deformity. Eyes:      General:         Right eye: No discharge. Left eye: No discharge. Conjunctiva/sclera: Conjunctivae normal.   Pulmonary:      Effort: Pulmonary effort is normal. No respiratory distress. Abdominal:      General: There is no distension. Tenderness: There is no guarding. Musculoskeletal:         General: No tenderness or deformity. Normal range of motion. Cervical back: Normal range of motion and neck supple. Skin:     Coloration: Skin is not pale. Findings: No erythema or rash (On exposed areas). Neurological:      Mental Status: She is alert. Gait: Gait normal.   Psychiatric:         Speech: Speech normal.         Behavior: Behavior normal.         Thought Content: Thought content normal.         Judgment: Judgment normal.         Lab Results   Component Value Date    WBC 8.3 06/17/2020    HGB 13.5 06/17/2020    HCT 43.1 06/17/2020     06/17/2020    CHOL 183 06/17/2020    TRIG 133 06/17/2020    HDL 35 06/17/2020    LDLCALC 121 06/17/2020    AST 13 12/06/2018     06/17/2020    K 4.4 06/17/2020     06/17/2020    CREATININE 0.8 07/22/2020    BUN 10 06/17/2020    CO2 26 06/17/2020    TSH 3.740 06/17/2020    LABA1C 5.6 11/07/2018    LABGLOM 79 (A) 06/17/2020    MG 2.1 06/17/2020    CALCIUM 9.3 06/17/2020    VITD25 32 06/17/2020     Assessment:       Diagnosis Orders   1. Fatigue, unspecified type  Omega-3 Fatty Acids (RA FISH OIL) 1000 MG CAPS   2. Vitamin D deficiency  vitamin D (RA VITAMIN D-3) 125 MCG (5000 UT) CAPS capsule   3. Balance problem due to vestibular dysfunction of right ear  Misc. Devices (BATHTUB SAFETY RAIL) MISC    Misc. Devices MISC   4.  Right leg weakness  [facility]   5. Cervical pain (neck)  [facility]    XR SPINE ENTIRE (2-3 VIEWS) 6. Chronic midline thoracic back pain  [facility]    XR SPINE ENTIRE (2-3 VIEWS)   7. Right anterior shoulder pain  XR SHOULDER RIGHT (MIN 2 VIEWS)    [facility]   8. Lumbar back pain  XR SPINE ENTIRE (2-3 VIEWS)       Plan:   Dr. Claire Beavers: Address: [address] [address]  Phone: [phone]    Will order the safety supplies    Will refill the vitamins    Will order an xray of the back    Will refer to physical therapy    Back in 6 months, sooner as needed     Return in about 6 months (around 1/15/2022). Orders Placed:  Orders Placed This Encounter   Procedures    XR SHOULDER RIGHT (MIN 2 VIEWS)    XR SPINE ENTIRE (2-3 VIEWS)    [facility]     Medications Prescribed:  Orders Placed This Encounter   Medications    vitamin D (RA VITAMIN D-3) 125 MCG (5000 UT) CAPS capsule     Sig: take 1 capsule by mouth once daily     Dispense:  30 capsule     Refill:  11    Misc. Devices (BATHTUB SAFETY RAIL) MISC     Sig: Use daily with showers     Dispense:  1 each     Refill:  0     Permanent rail - no suction    Misc. Devices MISC     Sig: Shower chair - Use daily when showering     Dispense:  1 Device     Refill:  0     Shower chair    Omega-3 Fatty Acids (RA FISH OIL) 1000 MG CAPS     Sig: take 1 capsule by mouth once daily     Dispense:  30 capsule     Refill:  11     Future Appointments   Date Time Provider Aure Torres   8/5/2021  4:00 PM MD Deena Garcia [address]   9/15/2021  4:00 PM MD DENISE Espitia [facility] [address]   9/20/2021  1:00 PM EDEN Francis - CNP [facility] [address]   1/19/2022  1:00 PM EDEN Kowalski - CNP [facility]  RES [address]      Patient given educational materials - see patient instructions. Discussed use, benefit, and side effects of prescribedmedications. All patient questions answered. Pt voiced understanding. Reviewed health maintenance.   Instructed to continue current medications, diet and exercise. Patient agreed with treatment plan. Follow up as directed.     Electronically signed by EDEN Dwyer CNP on 7/15/2021 at 4:38 PM

## 2021-07-15 NOTE — PROGRESS NOTES
Health Maintenance Due   Topic Date Due    Hepatitis C screen  Never done    TSH testing  06/17/2021

## 2021-07-27 RX ORDER — LEVOTHYROXINE SODIUM 0.05 MG/1
TABLET ORAL
Qty: 30 TABLET | Refills: 2 | Status: SHIPPED | OUTPATIENT
Start: 2021-07-27 | End: 2022-02-08 | Stop reason: SDUPTHER

## 2021-07-27 NOTE — TELEPHONE ENCOUNTER
Elidia Jolly called requesting a refill on the following medications:  Requested Prescriptions     Pending Prescriptions Disp Refills    levothyroxine (SYNTHROID) 50 MCG tablet 30 tablet 2     Pharmacy verified: [facility]   . pv      Date of last visit: 7/15/2021  Date of next visit (if applicable): 2/01/5965    *Patient unsure of correct dose

## 2021-07-27 NOTE — TELEPHONE ENCOUNTER
Patient's last appointment was : 7/15/2021  Patient's next appointment is : 1/19/2022  Last refilled: 2/16/21

## 2021-08-05 ENCOUNTER — VIRTUAL VISIT (OUTPATIENT)
Dept: PSYCHIATRY | Age: 41
End: 2021-08-05
Payer: COMMERCIAL

## 2021-08-05 DIAGNOSIS — F33.1 MODERATE EPISODE OF RECURRENT MAJOR DEPRESSIVE DISORDER (HCC): ICD-10-CM

## 2021-08-05 DIAGNOSIS — F43.10 PTSD (POST-TRAUMATIC STRESS DISORDER): Primary | ICD-10-CM

## 2021-08-05 DIAGNOSIS — F51.5 NIGHTMARES: ICD-10-CM

## 2021-08-05 PROCEDURE — 99214 OFFICE O/P EST MOD 30 MIN: CPT | Performed by: PSYCHIATRY & NEUROLOGY

## 2021-08-05 PROCEDURE — G8427 DOCREV CUR MEDS BY ELIG CLIN: HCPCS | Performed by: PSYCHIATRY & NEUROLOGY

## 2021-08-05 RX ORDER — FLUOXETINE HYDROCHLORIDE 40 MG/1
CAPSULE ORAL
Qty: 30 CAPSULE | Refills: 2 | Status: SHIPPED | OUTPATIENT
Start: 2021-08-05 | End: 2021-10-21 | Stop reason: SDUPTHER

## 2021-08-05 RX ORDER — PRAZOSIN HYDROCHLORIDE 2 MG/1
2 CAPSULE ORAL NIGHTLY
Qty: 30 CAPSULE | Refills: 2 | Status: SHIPPED | OUTPATIENT
Start: 2021-08-05 | End: 2021-10-21 | Stop reason: SDUPTHER

## 2021-08-05 RX ORDER — PRAZOSIN HYDROCHLORIDE 5 MG/1
5 CAPSULE ORAL NIGHTLY
Qty: 30 CAPSULE | Refills: 2 | Status: SHIPPED | OUTPATIENT
Start: 2021-08-05 | End: 2021-10-21 | Stop reason: SDUPTHER

## 2021-08-05 RX ORDER — FLUOXETINE HYDROCHLORIDE 20 MG/1
CAPSULE ORAL
Qty: 30 CAPSULE | Refills: 2 | Status: SHIPPED | OUTPATIENT
Start: 2021-08-05 | End: 2021-10-21 | Stop reason: SDUPTHER

## 2021-08-05 RX ORDER — CLONAZEPAM 0.5 MG/1
0.5 TABLET ORAL 2 TIMES DAILY PRN
Qty: 60 TABLET | Refills: 2 | Status: SHIPPED | OUTPATIENT
Start: 2021-08-05 | End: 2021-10-21 | Stop reason: SDUPTHER

## 2021-08-05 NOTE — PROGRESS NOTES
[address] [facility]  [place] [id number] [phone]    Progress Note    Patient:  Valorie Luna  YOB: 1980  PCP:  EDEN Zuniga CNP  Visit Date:  8/5/2021    TELEHEALTH EVALUATION -- Audio/Visual (During CIBFY-17 public health emergency)    Patient location: home  Physician location: [place], [facility]  This virtual visit was conducted via interactive, real-time video. Chief Complaint   Patient presents with    Follow-up    Medication Check    Anxiety    Depression       SUBJECTIVE:    Time in: 4:05pm  Time out: 4:36pm  Time spent on documentation: 5 minutes    Valorie Luna, a 42 y.o. female, presents for a follow up visit. Patient reports she is stable. Patient is compliant with medication regimen. She presents alone. Feeling sick the last couple days. Summer is going ok. Mood about the same, overall. Shows me the many plants on her porch. It's therapeutic for her. Maybe less anxiety, using less Klonopin. C/o nausea with Depakote. Taking that from Neuro for HA. Hasn't discussed with them which I suggest she do. Chronic pain is always there. Has PT in [facility] [place] \"scared to do it\". Worries that they won't understand the impact radiation had to her muscles the way the [facility] it. Hasn't seen Dr. Angeline Bartlett in 1.5-2 mos. \"I don't want him to tell me something. \"   Thinks he will tell her to talk to Chio Gonzalez, her neighbor and former friend who caused a lot of issues for her. Has seen Maik in passing. Thinks he's made a better effort to leave her alone. Thinks he's doing better. He has a job at [employer]. No longer communicating with his sister. Feels the medications Leann Keri doing their job\". Declines to make any changes today. Prazosin helps the feeling of panic that happens on waking. Helps her get out of nightmares more easily.  Sleeps \"solidly\" and doesn't always want to wake up. Wants to figure out the dreams. \"like it's a puzzle\". Reads me another poem, \"Unfit Mother\". Wrote it in her writing group yesterday. Med Trials: Klonopin, Prozac, prazosin, Depakote (for HA), Trileptal (for HA), Effexor (severe discontinuation syndrome), Wellbutrin    OBJECTIVE:  Vitals: There were no vitals taken for this visit. MENTAL STATUS EXAM:    GENERAL  Build: Overweight    Hygiene:  Appropriate, well dressed   SENSORIUM Orientation: Place, Person, Time, & Situation     Consciousness: Alert    ATTENTION   Focused, mostly   RELATEDNESS  Cooperative    EYE CONTACT   Good    PSYCHOMOTOR  Normal    SPEECH Volume: Normal     Rate: Normal rate and tone    Amplitude: Within normal limits   MOOD  Euthymic    AFFECT Range: Full , bright at times, laughing   THOUGHT Process:  Goal-Directed, mostly    Content: no evidence of psychosis    COGNITION Insight: Good    Judgement:  Intact    MEMORY  Intact    INTELLIGENCE  Average     Mobility/Gait: Independently     Controlled SubstancesMonitoring: Periodic Controlled Substance Monitoring: No signs of potential drug abuse or diversion identified. , Possible medication side effects, risk of tolerance/dependence & alternative treatments discussed. Juanpablo Jean MD)       ASSESSMENT: A little less anxiety and use of Klonopin lately. Declines med change. Mood stable. No SI or AVH which will monitor. Diagnosis Orders   1. PTSD (post-traumatic stress disorder)  clonazePAM (KLONOPIN) 0.5 MG tablet   2. Nightmares  prazosin (MINIPRESS) 2 MG capsule   3.  Moderate episode of recurrent major depressive disorder (HCC)  clonazePAM (KLONOPIN) 0.5 MG tablet     Medical Hx:     PLAN:     · Medications:   · Prozac 60 mg QAM  · Prazosin 7 mg HS  · Klonopin 0.5 mg BID  · Rx Depakote and Trileptal by Neuro for HA, possible mood benefit from those  · Medical Cannabis   · Therapy: sees Dr. Hina Broussard  · Labs/Tests/Imaging: none   · Records Reviewed: [facility]  · Patient

## 2021-08-13 ENCOUNTER — HOSPITAL ENCOUNTER (OUTPATIENT)
Dept: PHYSICAL THERAPY | Age: 41
Setting detail: THERAPIES SERIES
Discharge: HOME | End: 2021-08-13
Payer: COMMERCIAL

## 2021-08-13 PROCEDURE — 97161 PT EVAL LOW COMPLEX 20 MIN: CPT

## 2021-08-13 NOTE — PROGRESS NOTES
** PLEASE SIGN, DATE AND TIME CERTIFICATION BELOW AND RETURN TO [facility] OUTPATIENT REHABILITATION (FAX #: [phone]). ATTEST/CO-SIGN IF ACCESSING VIA INrankur. THANK YOU.**    I certify that I have examined the patient below and determined that Physical Medicine and Rehabilitation service is necessary and that I approve the established plan of care for up to 90 days or as specifically noted.   Attestation, signature or co-signature of physician indicates approval of certification requirements.    ________________________ ____________ __________  Physician Signature   Date   Time  [address] [facility]  PHYSICAL THERAPY  [x] EVALUATION  [] DAILY NOTE (LAND) [] DAILY NOTE (AQUATIC ) [] PROGRESS NOTE [] DISCHARGE NOTE    [x] [facility]   [] [place] [address]    [] [facility]   [] [facility]    Date: 2021  Patient Name:  Beata Lam  : 1980  MRN: [id number]  CSN: [id number]    Referring Practitioner EDEN Lambert*   Diagnosis Other symptoms and signs involving the musculoskeletal system [R29.898]  Cervicalgia [M54.2]  Pain in thoracic spine [M54.6]    Treatment Diagnosis Pain in neck, pain in R shoulder, pain in thoracic spine, weakness in UEs, decreased R shoulder and cervical ROM, poor posture   Date of Evaluation 21    Additional Pertinent History Incontinence, obesity, anxiety, memory issues, salivary gland cancer      Functional Outcome Measure Used Neck disability index    Functional Outcome Score 38/50 (21)       Insurance: Primary: Payor: Srinath Bridges /  /  / ,   Secondary:    Authorization Information: Aquatics covered, modalities covered, telehealth covered    Visit # 1, 1/10 for progress note   Visits Allowed: Isrrael Huynh required after eval, eval only    Recertification Date: 51/9/10   Physician Follow-Up:    Physician Orders:    History of Present Illness: Patient reports that she has been having neck, R shoulder and mid back pain since 10/28/2011 when she had a cancer removal surgery. Patient reports that she had a radical neck resection and nerve graft. Patient has had PT in the past for her neck pain. Patient reports that she had benefit from PT in the past.       SUBJECTIVE: Patient reports pain with reaching, pushing, pulling and holding her head up. Patient reports that lying down and stretching will help her pain. Patient does report that she will get numbness at times when she is sleeping with her head tilted. Social/Functional History and Current Status:  Medications and Allergies have been reviewed and are listed on Medical History Questionnaire. Nicanor Sanchez lives with spouse in a multiple floor home with ability to complete ADL's on main floor with stairs and a handrail to enter. Task Previous Current   ADLs  Independent Assistance Required   IADL's Independent Assistance Required   Ambulation Independent Modified Independent   Transfers Independent Modified Independent   Recreation Independent Assistance Required   Community Integration Independent Modified Independent   Driving Active  Active    Work Unemployed  Unemployed       Objective:  GENERAL   Pain 7.5/10 pain in neck, R shoulder, thoracic pain   Palpation Tenderness over R upper trap, R pec minor insertion, R lateral neck. Sensation Light touch intact B UEs   Observation Patient has an incision over R lateral neck that is healed, no signs of infection.    Edema n/a   Accessory Motion Acessory motion is normal B up and down glides cervical         POSTURE     Comments   Forward Head x    Rounded Shoulders x    Kyphosis     Lordosis     Lateral Shift     Scoliosis         NECK RANGE OF MOTION   Flexion 35   Extension 25   Rotation Right 62   Rotation Left 55   Sidebending Right 25   Sidebending Left 20   Retraction Restricted, tightness and mild pain noted    Protraction WFL   Neck Range of Motion is [facility]  []     UPPER EXTREMITY RANGE OF MOTION    Left Right Comments   Shoulder Flexion     Shoulder Extension [facility]    Shoulder Abduction WFL 95    Shoulder Adduction [facility] WFL    Shoulder External Rotation [facility] WFL    Shoulder Internal Rotation [facility] WFL    Shoulder Range of Motion is [facility]  []      Elbow Flexion      Elbow Extension      Elbow Range of Motion is [facility]  [x]     UPPER EXTREMITY STRENGTH    Left Right Comments   Shoulder Flexion 5 4-    Shoulder Extension 5 4    Shoulder Abduction 5 3-    Shoulder Adduction 5 5    Shoulder External Rotation 5 4-    Shoulder Internal Rotation 5 4    []  Shoulder Strength is grossly WFL. Elbow Flexion 5 4+    Elbow Extension 5 4+    [] Elbow Strength is grossly WFL.  Strength WFL WFL        SPECIAL TESTS (+/-)    Left Right Comments   Vertebral Artery - -    Spurling's - -        TREATMENT   Precautions: Hx salivary cancer, surgical removal with neck resection    Pain:     X in shaded column indicates activity completed today   Modalities Parameters/  Location  Notes                     Manual Therapy Time/Technique  Notes                     Exercise/Intervention   Notes                                                                                  Specific Interventions Next Treatment: Chin tucks, upper trap/levator/pec stretching, R shoulder AAROM flexion and abduction, R shoulder strength, cervical stability exercises, thoracic extension exercises, may need STM to R neck musculature, postural exercises. Activity/Treatment Tolerance:  [x]  Patient tolerated treatment well  []  Patient limited by fatigue  []  Patient limited by pain   []  Patient limited by medical complications  []  Other:     Assessment: 42year old female presents with neck, R shoulder, and thoracic pain secondary to salivary gland cancer removal with neck resection surgery in 2011.  Patient has pain in neck, pain in shoulder, pain in thoracic spine, decreased R shoulder ROM, decreased cervical ROM, forward head and rounded shoulder posture, decreased R UE strength and tightness throughout R neck and mid back that limits her ability to perform daily tasks and drive. Patient would benefit from skilled PT to improve pain, ROM, tissue extensibility, strength, and posture to allow improved functional mobility. Patient educated on benefit of PT and PT POC with patient in agreement. Body Structures/Functions/Activity Limitations: edema, impaired ROM, impaired strength, pain and abnormal posture  Prognosis: good    GOALS:  Patient Goal: to improve pain and mobility     Short Term Goals: 4 weeks  1. Patient will report decrease in pain to 5/10 at most to allow ease of ADLs and household tasks. 2. Patient will improve B cervical lateral flexion AROM to 45 degrees to allow decreased pain with household tasks. 3. Patient will improve cervical rotation AROM to 70 degrees to allow decreased pain with turning head during driving. 4. Patient will improve R UE strength to 5/5 to allow ease of lifting. 5. Patient will improve shoulder flexion and abduction AROM to 160 degrees to allow decreased pain with reaching overhead. Long Term Goals: 8 weeks  1. Patient will improve Neck Disability Index Score from 38/50 to 25/50 disability to allow decreased disability and improved functional mobility. 2. Patient will be independent with HEP in order to prevent re-injury and improve functional abilities. Patient Education:   [x]  HEP/Education Completed: Plan of Care, Goals, benefit of PT, attendance policy   [facility] Access Code:  []  No new Education completed  []  Reviewed Prior HEP      [x]  Patient verbalized and/or demonstrated understanding of education provided. []  Patient unable to verbalize and/or demonstrate understanding of education provided. Will continue education.   []  Barriers to learning:     PLAN:  Treatment Recommendations: Strengthening, Range of Motion, Manual Therapy - Soft Tissue Mobilization, Manual Therapy - Joint Manipulation, Pain Management, Home Exercise Program, Patient Education, Aquatics and Modalities    [x]  Plan of care initiated. Plan to see patient 2 times per week for 8 weeks to address the treatment planned outlined above.   []  Continue with current plan of care  []  Modify plan of care as follows:    []  Hold pending physician visit  []  Discharge    Time In 1304   Time Out 1340   Timed Code Minutes: 0 min   Total Treatment Time: 36 min       Electronically Signed by: Elva Bland, PT

## 2021-08-17 ENCOUNTER — TELEPHONE (OUTPATIENT)
Dept: FAMILY MEDICINE CLINIC | Age: 41
End: 2021-08-17

## 2021-08-17 ENCOUNTER — HOSPITAL ENCOUNTER (OUTPATIENT)
Dept: GENERAL RADIOLOGY | Age: 41
Discharge: HOME | End: 2021-08-17
Payer: COMMERCIAL

## 2021-08-17 ENCOUNTER — HOSPITAL ENCOUNTER (OUTPATIENT)
Age: 41
Discharge: HOME | End: 2021-08-17
Payer: COMMERCIAL

## 2021-08-17 DIAGNOSIS — R29.898 RIGHT LEG WEAKNESS: ICD-10-CM

## 2021-08-17 DIAGNOSIS — M25.561 ACUTE PAIN OF RIGHT KNEE: ICD-10-CM

## 2021-08-17 DIAGNOSIS — M25.552 LEFT HIP PAIN: Primary | ICD-10-CM

## 2021-08-17 DIAGNOSIS — M54.6 CHRONIC MIDLINE THORACIC BACK PAIN: ICD-10-CM

## 2021-08-17 DIAGNOSIS — M54.2 CERVICAL PAIN (NECK): ICD-10-CM

## 2021-08-17 DIAGNOSIS — M54.50 LUMBAR BACK PAIN: ICD-10-CM

## 2021-08-17 DIAGNOSIS — M25.511 RIGHT ANTERIOR SHOULDER PAIN: ICD-10-CM

## 2021-08-17 DIAGNOSIS — G89.29 CHRONIC MIDLINE THORACIC BACK PAIN: ICD-10-CM

## 2021-08-17 LAB
FOLATE: 8.5 NG/ML (ref 4.8–24.2)
VITAMIN B-12: 541 PG/ML (ref 211–911)

## 2021-08-17 PROCEDURE — 82607 VITAMIN B-12: CPT

## 2021-08-17 PROCEDURE — 84207 ASSAY OF VITAMIN B-6: CPT

## 2021-08-17 PROCEDURE — 73030 X-RAY EXAM OF SHOULDER: CPT

## 2021-08-17 PROCEDURE — 82746 ASSAY OF FOLIC ACID SERUM: CPT

## 2021-08-17 PROCEDURE — 73562 X-RAY EXAM OF KNEE 3: CPT

## 2021-08-17 PROCEDURE — 36415 COLL VENOUS BLD VENIPUNCTURE: CPT

## 2021-08-17 NOTE — TELEPHONE ENCOUNTER
Message  Received: Today  Patricia SEVERINO [facility] Clinical Staff  Subject: Message to Provider     QUESTIONS   Information for Provider? patient currently in the hospital at [facility]   and would like for Dr. Chuy Jarrett to send a order for xrays to be done on her   left hip as soon as possible today   ---------------------------------------------------------------------------   --------------   CALL BACK INFO   What is the best way for the office to contact you? OK to leave message on   voicemail   Preferred Call Back Phone Number? [phone]   ---------------------------------------------------------------------------   --------------   SCRIPT ANSWERS   Relationship to Patient?  Self

## 2021-08-17 NOTE — TELEPHONE ENCOUNTER
Pt called requesting an order for x ray left hip d/t hip pain. Pt states she discussed left hip pain with Pattie Michelle CNP at last visit. Please advise/place order.

## 2021-08-17 NOTE — TELEPHONE ENCOUNTER
Venu Monte from [facility] radiology called requesting clarification on xray orders from 7/15/2021. Venu Monte wants to know if the order should be images of each area of the spine or one image from cervical to tailbone. Venu Monte says that if the xray needs to be individual, to the cervical, thoracic and lumbar spine, new orders need to be placed. Donna's phone number is [phone]. Please advise.

## 2021-08-18 DIAGNOSIS — R93.6 ABNORMAL X-RAY OF KNEE: Primary | ICD-10-CM

## 2021-08-18 DIAGNOSIS — R29.898 RIGHT LEG WEAKNESS: ICD-10-CM

## 2021-08-18 NOTE — TELEPHONE ENCOUNTER
Lena Ballard is informed of her imaging results from xray of knee and would be okay with having an MRI of the knee completed. Lena Ballard is informed that an xray of her hip was ordered. Trudy would like BRO Maciel to clarify the orders for xray of her spine, either one full xray or multiple. Please advise xray spine order.

## 2021-08-19 ENCOUNTER — HOSPITAL ENCOUNTER (OUTPATIENT)
Dept: PHYSICAL THERAPY | Age: 41
Setting detail: THERAPIES SERIES
Discharge: HOME | End: 2021-08-19
Payer: COMMERCIAL

## 2021-08-19 PROCEDURE — 97110 THERAPEUTIC EXERCISES: CPT

## 2021-08-19 PROCEDURE — 97535 SELF CARE MNGMENT TRAINING: CPT

## 2021-08-19 PROCEDURE — 97112 NEUROMUSCULAR REEDUCATION: CPT

## 2021-08-19 NOTE — PROGRESS NOTES
[facility]  ONCOLOGY REHABILITATION  PHYSICAL THERAPY  [x] DAILY NOTE [] PROGRESS NOTE [] DISCHARGE NOTE    [x] [facility]     Date: 2021  Patient Name:  Alis Snell  : 1980  MRN: [id number]                                                                  History of Present Illness: Patient had right radical parotidectomy with facial nerve dissection and sacrifice, gortex sling, partial auriculectomy, skull base and partial temporal bone resection, right neck dissection, facial nerve graft on 10/29/11.  XRT completed 40 rounds of radiation in 3/2012        Referring Practitioner EDEN Walker*   Diagnosis Other symptoms and signs involving the musculoskeletal system [R29.898]  Cervicalgia [M54.2]  Pain in thoracic spine [M54.6]    Treatment Diagnosis Pain in neck, pain in R shoulder, pain in thoracic spine, weakness in UEs, decreased R shoulder and cervical ROM, poor posture   Date of Evaluation 21    Additional Pertinent History Incontinence, obesity, anxiety, memory issues, salivary gland cancer       Functional Outcome Measure Used Neck disability index    Functional Outcome Score 38/50 (21)        Insurance: Primary: Payor: Brissa Puentes /  /  / ,   Secondary:    Authorization Information: Aquatics covered, modalities covered, telehealth covered    Visit # 2, 2/10 for progress note   Visits Allowed: 60 units of CPT codes  thru     Recertification Date: 36   Physician Follow-Up:   9/15/21 Fabiola, 22 PCP   History of Present Illness: Pt states neck, R shoulder and mid back pain since 10/29/2011 (surgery for right radical parotidectomy with facial nerve dissection and sacrifice, gortex sling, partial auriculectomy, skull base and partial temporal bone resection, right neck dissection, facial nerve graft).           SUBJECTIVE: Pt states she would would like to have less pain, improved tolerance of functional mobility and neck control as she feels likes she has severe cramping and weakness of neck. Admits to lymphedema throughout R face. Admits wearing elastic compression brace at trunk and notes mild improvement in back pain but states does not fit appropriately. Curious if she could be prescribed one that would fit better. Admits to hypersensitivity throughout R rudy-face/neck and severe cramping. TREATMENT   Precautions: Hx salivary cancer, surgical removal with neck resection, lymphedema risk   Pain: 0/10    X in shaded column indicates activity completed today   Modalities Parameters/  Location  Notes         Manual Therapy Time/Technique  Notes   Desensitization techniques to R rudy face and neck 10 minutes x    Exercise/Intervention   Notes   Diaphragmatic breathing x5  x    Upper trap stretch for R 15 sec x3  x    Levator stretch for R 15 sec x3  x    Neck extension with hands on clavicle 15 sec x3  x    Chin tuck x10  x                                                Specific Interventions for Next Treatment:  Chin tucks, upper trap/levator/pec stretching, R shoulder AAROM flexion and abduction, R shoulder strength, cervical stability exercises, thoracic extension exercises, may need STM to R neck musculature, postural exercises    Activity Tolerance: Patient tolerated treatment well    ASSESSMENT:  Assessment: Thorough discussion of pt's lymphedema throughout face and importance of stretching, MLD and compression with review of compression options that might work with her CPAP. Also provided with options for desensitization of R side of face and neck to allow start of manual work. Pt with potential for improvement however has significant number of functional limitations and structural impairments to be addressed. Educated pt to determine priorities and begin with that - pt decided head/neck. GOALS:  Patient Goal: to improve pain and mobility      Short Term Goals: 4 weeks  1.  Patient will report decrease in pain to 5/10 at most to allow ease of ADLs and household tasks. 2. Patient will improve B cervical lateral flexion AROM to 45 degrees to allow decreased pain with household tasks. 3. Patient will improve cervical rotation AROM to 70 degrees to allow decreased pain with turning head during driving. 4. Patient will improve R UE strength to 5/5 to allow ease of lifting. 5. Patient will improve shoulder flexion and abduction AROM to 160 degrees to allow decreased pain with reaching overhead.     Long Term Goals: 8 weeks  1. Patient will improve Neck Disability Index Score from 38/50 to 25/50 disability to allow decreased disability and improved functional mobility. 2. Patient will be independent with HEP in order to prevent re-injury and improve functional abilities. Patient Education: Pt to track when heaviness of head occurs during the day, and to track location, frequency and intensity of cramping at head/neck to establish baseline. HEP: Access Code: [email or web address]  URL: [email or web address]  Date: 08/19/2021  Prepared by: Dinh Calderon  Seated Diaphragmatic Breathing - 3 x daily - 7 x weekly - 1 sets - 5 reps  Seated Cervical Sidebending Stretch - 3 x daily - 7 x weekly - 1 sets - 3 reps - 15 hold  Seated Levator Scapulae Stretch - 3 x daily - 7 x weekly - 1 sets - 3 reps - 15 hold  Cervical Extension Stretch - 3 x daily - 7 x weekly - 1 sets - 3 reps - 15 hold  Seated Cervical Retraction - 3 x daily - 7 x weekly - 1 sets - 3 reps - 15 hold    Education Outcome: Verbalized understanding  Education Barriers: None    PLAN: Continue established plan of care      Time In 1430   Time Out 1530   Timed Code Minutes: 60 min   Total Treatment Time: 60 min (4 units)       Electronically Signed by: Osvaldo Burrell, PT PT, DPT, ANU [id number] 8/19/2021

## 2021-08-22 LAB — VITAMIN B6: 59.6 NMOL/L (ref 20–125)

## 2021-08-24 ENCOUNTER — HOSPITAL ENCOUNTER (OUTPATIENT)
Dept: PHYSICAL THERAPY | Age: 41
Setting detail: THERAPIES SERIES
End: 2021-08-24
Payer: COMMERCIAL

## 2021-08-26 ENCOUNTER — HOSPITAL ENCOUNTER (OUTPATIENT)
Dept: PHYSICAL THERAPY | Age: 41
Setting detail: THERAPIES SERIES
Discharge: HOME | End: 2021-08-26
Payer: COMMERCIAL

## 2021-08-26 PROCEDURE — 97140 MANUAL THERAPY 1/> REGIONS: CPT

## 2021-08-26 NOTE — PROGRESS NOTES
[address] [facility]  ONCOLOGY REHABILITATION  PHYSICAL THERAPY  [x] DAILY NOTE [] PROGRESS NOTE [] DISCHARGE NOTE    [x] [facility]     Date: 2021  Patient Name:  Akbar Bernardo  : 1980  MRN: [id number]        Referring Practitioner EDEN Klein*   Diagnosis Other symptoms and signs involving the musculoskeletal system [R29.898]  Cervicalgia [M54.2]  Pain in thoracic spine [M54.6]    Treatment Diagnosis Pain in neck, pain in R shoulder, pain in thoracic spine, weakness in UEs, decreased R shoulder and cervical ROM, poor posture   Date of Evaluation 21    Additional Pertinent History Incontinence, obesity, anxiety, memory issues, salivary gland cancer       Functional Outcome Measure Used Neck disability index    Functional Outcome Score 38/50 (21)        Insurance: Primary: Payor: Mynor Adair /  /  / ,   Secondary:    Authorization Information: Aquatics covered, modalities covered, telehealth covered    Visit # 3, 3/10 for progress note   Visits Allowed: 60 units of CPT codes  thru     Recertification Date:    Physician Follow-Up:   9/15/21 Fabiola, 22 PCP   History of Present Illness: Pt states neck, R shoulder and mid back pain since 10/29/2011 (surgery for right radical parotidectomy with facial nerve dissection and sacrifice, gortex sling, partial auriculectomy, skull base and partial temporal bone resection, right neck dissection, facial nerve graft).           SUBJECTIVE: Admits she has not complied with HEP as she becomes emotional. Has been complying with stretches but notes significant tingling throughout face and shoulder. Has been trying to figure out how to track multiple spasms and will try to start with bullet journal. States heaviness in head begins about 1-2 hours after waking up.       TREATMENT   Precautions: Hx salivary cancer, surgical removal with neck resection, lymphedema risk   Pain: 7/10 R side of neck    X in shaded column indicates activity completed today   Modalities Parameters/  Location  Notes         Manual Therapy Time/Technique  Notes   Desensitization techniques and myofascial release to R side of neck and shoulder 40 minutes x Nearly constant spasms initially however reduced in frequency and abolished by end. Exercise/Intervention   Notes   Diaphragmatic breathing x5      Upper trap stretch for R 15 sec x3      Levator stretch for R 15 sec x3      Neck extension with hands on clavicle 15 sec x3      Chin tuck x10                                                  Specific Interventions for Next Treatment:  Chin tucks, upper trap/levator/pec stretching, R shoulder AAROM flexion and abduction, R shoulder strength, cervical stability exercises, thoracic extension exercises, may need STM to R neck musculature, postural exercises    Activity Tolerance: Patient tolerated treatment well    ASSESSMENT:  Assessment: Pt with improved tolerance of touch and reduced muscle tightness by end of session. Pt described draining-type sensation, likely lymphatic drainage from reduced muscle spasticity. GOALS:  Patient Goal: to improve pain and mobility      Short Term Goals: 4 weeks  1. Patient will report decrease in pain to 5/10 at most to allow ease of ADLs and household tasks. 2. Patient will improve B cervical lateral flexion AROM to 45 degrees to allow decreased pain with household tasks. 3. Patient will improve cervical rotation AROM to 70 degrees to allow decreased pain with turning head during driving. 4. Patient will improve R UE strength to 5/5 to allow ease of lifting. 5. Patient will improve shoulder flexion and abduction AROM to 160 degrees to allow decreased pain with reaching overhead.     Long Term Goals: 8 weeks  1. Patient will improve Neck Disability Index Score from 38/50 to 25/50 disability to allow decreased disability and improved functional mobility.     2. Patient will be independent with HEP in order to prevent re-injury and improve functional abilities. Patient Education: Demo'd how to perform gentle MLD and MFR on her own.   Education Outcome: Verbalized understanding  Education Barriers: None    PLAN: Continue established plan of care      Time In 1540   Time Out 1620   Timed Code Minutes: 40 min   Total Treatment Time: 40 min (3 units)       Electronically Signed by: Gayle Seip, PT PT, DPT, ANU [id number] 8/26/2021

## 2021-08-27 ENCOUNTER — HOSPITAL ENCOUNTER (OUTPATIENT)
Age: 41
Discharge: HOME | End: 2021-08-27
Payer: COMMERCIAL

## 2021-08-27 ENCOUNTER — HOSPITAL ENCOUNTER (OUTPATIENT)
Dept: GENERAL RADIOLOGY | Age: 41
Discharge: HOME | End: 2021-08-27
Payer: COMMERCIAL

## 2021-08-27 DIAGNOSIS — M25.552 LEFT HIP PAIN: ICD-10-CM

## 2021-08-27 PROCEDURE — 72082 X-RAY EXAM ENTIRE SPI 2/3 VW: CPT

## 2021-08-27 PROCEDURE — 73502 X-RAY EXAM HIP UNI 2-3 VIEWS: CPT

## 2021-08-31 ENCOUNTER — HOSPITAL ENCOUNTER (OUTPATIENT)
Dept: PHYSICAL THERAPY | Age: 41
Setting detail: THERAPIES SERIES
Discharge: HOME | End: 2021-08-31
Payer: COMMERCIAL

## 2021-08-31 PROCEDURE — 97110 THERAPEUTIC EXERCISES: CPT

## 2021-08-31 PROCEDURE — 97535 SELF CARE MNGMENT TRAINING: CPT

## 2021-08-31 NOTE — PROGRESS NOTES
[address] [facility]  ONCOLOGY REHABILITATION  PHYSICAL THERAPY  [x] DAILY NOTE [] PROGRESS NOTE [] DISCHARGE NOTE    [x] [facility]     Date: 2021  Patient Name:  Codie Kennedy  : 1980  MRN: [id number]        Referring Practitioner EDEN Will*   Diagnosis Other symptoms and signs involving the musculoskeletal system [R29.898]  Cervicalgia [M54.2]  Pain in thoracic spine [M54.6]    Treatment Diagnosis Pain in neck, pain in R shoulder, pain in thoracic spine, weakness in UEs, decreased R shoulder and cervical ROM, poor posture   Date of Evaluation 21    Additional Pertinent History Incontinence, obesity, anxiety, memory issues, salivary gland cancer       Functional Outcome Measure Used Neck disability index    Functional Outcome Score 38/50 (21)        Insurance: Primary: Payor: Liam Thao /  /  / ,   Secondary:    Authorization Information: Aquatics covered, modalities covered, telehealth covered    Visit # 4, 4/10 for progress note   Visits Allowed: 60 units of CPT codes  thru 10/28/14    Recertification Date:    Physician Follow-Up:   9/15/21 [place]isek, 22 PCP   History of Present Illness: Pt states neck, R shoulder and mid back pain since 10/29/2011 (surgery for right radical parotidectomy with facial nerve dissection and sacrifice, gortex sling, partial auriculectomy, skull base and partial temporal bone resection, right neck dissection, facial nerve graft).         SUBJECTIVE: Notes spasms occur in proximity of other. Notes her head feels like it is heavy all day long and just gets heavier - states it's just hard to hold up her head. Admits locations of spasms include SCM, UT, levator, pec major/minor, and rhomboids. Improving tolerance of tactile pressure to L lateral neck.       TREATMENT   Precautions: Hx salivary cancer, surgical removal with neck resection, lymphedema risk   Pain: 6.5-7/10 R side of neck    X in shaded column indicates activity completed today   Modalities Parameters/  Location  Notes         Manual Therapy Time/Technique  Notes   Desensitization techniques and myofascial release to R side of neck and shoulder 40 minutes  Nearly constant spasms initially however reduced in frequency and abolished by end. Exercise/Intervention   Notes   Diaphragmatic breathing x5      Upper trap stretch for R 15 sec x3  x    Levator stretch for R 15 sec x3  x    Neck extension with hands on clavicle 15 sec x3  x    Chin tuck x10  x    SCM release x10  x    Scapular depression x10  x    Shoulder extension with ball on wall x10  x                           Specific Interventions for Next Treatment:  Chin tucks, upper trap/levator/pec stretching, R shoulder AAROM flexion and abduction, R shoulder strength, cervical stability exercises, thoracic extension exercises, may need STM to R neck musculature, postural exercises    Activity Tolerance: Patient tolerated treatment well    ASSESSMENT:  Assessment: Continued improved ability to tolerate manual pressure to R side of neck. Note palsy-like impairment with R scapular mobility during shoulder flexion and abduction therefore performed more lower scapular stabilization activities. GOALS:  Patient Goal: to improve pain and mobility      Short Term Goals: 4 weeks  1. Patient will report decrease in pain to 5/10 at most to allow ease of ADLs and household tasks. 2. Patient will improve B cervical lateral flexion AROM to 45 degrees to allow decreased pain with household tasks. 3. Patient will improve cervical rotation AROM to 70 degrees to allow decreased pain with turning head during driving. 4. Patient will improve R UE strength to 5/5 to allow ease of lifting. 5. Patient will improve shoulder flexion and abduction AROM to 160 degrees to allow decreased pain with reaching overhead.     Long Term Goals: 8 weeks  1.  Patient will improve Neck Disability Index Score from 38/50 to 25/50 disability to allow decreased disability and improved functional mobility. 2. Patient will be independent with HEP in order to prevent re-injury and improve functional abilities. Patient Education: Education as to while muscles \"vibrate\" and how treatment performed is reducing this issue. Benefits of focus on neck spasms, weakness, and muscle imbalance before moving to other body regions. Access Code: [email or web address]  URL: [email or web address]  Date: 08/31/2021  Prepared by: Remonia Haja    Exercises  Seated Diaphragmatic Breathing - 3 x daily - 7 x weekly - 1 sets - 5 reps  Seated Cervical Sidebending Stretch - 3 x daily - 7 x weekly - 1 sets - 3 reps - 15 hold  Seated Levator Scapulae Stretch - 3 x daily - 7 x weekly - 1 sets - 3 reps - 15 hold  Cervical Extension Stretch - 3 x daily - 7 x weekly - 1 sets - 3 reps - 15 hold  Seated Cervical Retraction - 3 x daily - 7 x weekly - 1 sets - 3 reps - 15 hold  Sternocleidomastoid Release - 3 x daily - 7 x weekly - 1 sets - 10 reps  Standing Scapular Depression - 3 x daily - 7 x weekly - 1 sets - 10 reps  Standing Isometric Cervical Extension and Bilateral Shoulder Flexion with Ball at Wall - 1 x daily - 7 x weekly - 3 sets - 10 reps    Education Outcome: Verbalized understanding  Education Barriers: None    PLAN: Continue established plan of care      Time In 1540   Time Out 1630   Timed Code Minutes: 50 min   Total Treatment Time: 50 min (3 units)       Electronically Signed by: Rory Pike, PT PT, DPT, ANU [id number] 8/31/2021

## 2021-09-02 ENCOUNTER — HOSPITAL ENCOUNTER (OUTPATIENT)
Dept: PHYSICAL THERAPY | Age: 41
Setting detail: THERAPIES SERIES
Discharge: HOME | End: 2021-09-02
Payer: COMMERCIAL

## 2021-09-02 ENCOUNTER — TELEPHONE (OUTPATIENT)
Dept: FAMILY MEDICINE CLINIC | Age: 41
End: 2021-09-02

## 2021-09-02 PROCEDURE — 97110 THERAPEUTIC EXERCISES: CPT

## 2021-09-02 PROCEDURE — 97140 MANUAL THERAPY 1/> REGIONS: CPT

## 2021-09-02 PROCEDURE — 97112 NEUROMUSCULAR REEDUCATION: CPT

## 2021-09-02 NOTE — PROGRESS NOTES
[address] [facility]  ONCOLOGY REHABILITATION  PHYSICAL THERAPY  [x] DAILY NOTE [] PROGRESS NOTE [] DISCHARGE NOTE    [x] [facility]     Date: 2021  Patient Name:  Conard Oppenheim  : 1980  MRN: [id number]        Referring Practitioner Roge Kline, APRN*   Diagnosis Other symptoms and signs involving the musculoskeletal system [R29.898]  Cervicalgia [M54.2]  Pain in thoracic spine [M54.6]    Treatment Diagnosis Pain in neck, pain in R shoulder, pain in thoracic spine, weakness in UEs, decreased R shoulder and cervical ROM, poor posture   Date of Evaluation 21    Additional Pertinent History Incontinence, obesity, anxiety/PTSD/OCD, memory issues, salivary gland cancer, 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, L hip and R knee       Functional Outcome Measure Used Neck disability index    Functional Outcome Score 38/50 (21)        Insurance: Primary: Payor: Anais Macdonald /  /  / ,   Secondary:    Authorization Information: Aquatics covered, modalities covered, telehealth covered    Visit # 5, 5/10 for progress note   Visits Allowed: 60 units of CPT codes  thru 81/59/81    Recertification Date:    Physician Follow-Up:   9/15/21 Mrozek, 22 PCP   History of Present Illness: Pt states neck, R shoulder and mid back pain since 10/29/2011 (surgery for right radical parotidectomy with facial nerve dissection and sacrifice, gortex sling, partial auriculectomy, skull base and partial temporal bone resection, right neck dissection, facial nerve graft).         SUBJECTIVE: States she is compliant with HEP but admits it is difficult due to the emotional issues from dx. Admits the stretches \"feel good\" and she finds she is lowering her R shoulder from its \"hike\" position.       TREATMENT   Precautions: Hx salivary cancer, surgical removal with neck resection, lymphedema risk   Pain: 6.5-7/10 R side of neck    X in shaded column indicates activity completed today   Modalities Parameters/  Location  Notes         Manual Therapy Time/Technique  Notes   Manual lymphatic drainage and myofascial release to R side of neck and shoulder 30 minutes x Nearly constant spasms initially however reduced in frequency and abolished by end. Exercise/Intervention   Notes   Diaphragmatic breathing x5      Upper trap stretch for R 15 sec x3      Levator stretch for R 15 sec x3      Neck extension with hands on clavicle 15 sec x3      Chin tuck x10      SCM release x10      Scapular depression 2x10  x Manual cues for scapular mobility and reduce hiking   Shoulder extension with ball on wall x10      Scapular retraction 2x10  x Manual cues for scapular mobility and reduce hiking   Scap retraction x10  x Peach t-band   Posture re-ed Throughout  x Cues for reducing R neck rotation and lateral flexion, reduce L scapular hiking - benefits of visual feedback     Specific Interventions for Next Treatment:  Chin tucks, upper trap/levator/pec stretching, R shoulder AAROM flexion and abduction, R shoulder strength, cervical stability exercises, thoracic extension exercises, may need STM to R neck musculature, postural exercises    Activity Tolerance: Patient tolerated treatment well    ASSESSMENT:  Assessment: Focused session on reducing neck spasms to then allow improved posture and scapular strengthening for improved ability to maintain posture and reduce sense of head heaviness. Pt reports after MLD and MFR, had reduced numbness of tongue. GOALS:  Patient Goal: to improve pain and mobility      Short Term Goals: 4 weeks  1. Patient will report decrease in pain to 5/10 at most to allow ease of ADLs and household tasks. 2. Patient will improve B cervical lateral flexion AROM to 45 degrees to allow decreased pain with household tasks. 3. Patient will improve cervical rotation AROM to 70 degrees to allow decreased pain with turning head during driving.   4. Patient will improve R UE strength to 5/5 to allow ease of lifting. 5. Patient will improve shoulder flexion and abduction AROM to 160 degrees to allow decreased pain with reaching overhead.     Long Term Goals: 8 weeks  1. Patient will improve Neck Disability Index Score from 38/50 to 25/50 disability to allow decreased disability and improved functional mobility. 2. Patient will be independent with HEP in order to prevent re-injury and improve functional abilities. Patient Education: Importance of neutral posture to allow muscle to stop overworking and contribute to continued spasming. To use mirror for feedback with posture and add scap retractions to HEP.   Education Outcome: Verbalized understanding  Education Barriers: None    PLAN: Continue established plan of care      Time In 1520   Time Out 1600   Timed Code Minutes: 40 min   Total Treatment Time: 40 min (3 units)       Electronically Signed by: Ramiro Pittman, PT PT, DPT, ANU [id number] 9/2/2021

## 2021-09-02 NOTE — TELEPHONE ENCOUNTER
[facility] Clinical Staff  There is a lot of arthritis in the spine - how is PT going? Let us know if you are not improving and we can see you in the next month or so to go over your back pain.           XR SPINE ENTIRE (2-3 VIEWS)  Order: [id number]  Status:  Final result   Visible to patient:  Yes (MyChart) Dx:  Lumbar back pain; Cervical pain (neck. ..   3 Result Notes     1 Patient Communication  Details    Reading Physician Reading Date Result Priority   Slime Castro MD  [phone] 8/27/2021       Narrative & Impression  PROCEDURE: XR SPINE ENTIRE (2-3 VIEWS)     CLINICAL INFORMATION: Cervical pain (neck), Chronic midline thoracic back pain, Chronic midline thoracic back pain, Lumbar back pain .     COMPARISON: No prior study.     TECHNIQUE: Standing PA and supine AP projections of the thoracolumbar spine and lateral projections of the thoracolumbar spine. Lateral projection is severely limited upper thoracic levels are not visible there is fogging of the mid line at the lower   thoracic level also. FINDINGS: Although limited in the lateral projection shows essentially normal AP alignment of the thoracic and lumbar spine. Standing PA projection demonstrates the greatest thoracic scoliosis to be a levoscoliosis of 13 degrees measured T1-T10.     This is reduced to 4 degrees on the supine image. Lumbar spine there is a 9 degree dextroscoliosis measured from L3 to L5 this is reduced to essentially 0 on the supine projection     Degenerative changes are present in the thoracic and lumbar spine. There are bridging osteophytes from T7 to T12. Loss of vertebral body height of T10, 11 and 12 on a chronic basis. Marginal osteophytes and disc space narrowing is seen at these levels as   well as L1 and L2.   The pedicles are intact.        IMPRESSION:  Levoscoliosis of the thoracic spine with dextroscoliosis of the lumbar spine which both reduce substantially on supine projections. Degenerative changes throughout the thoracic and lumbar spine.           **This report has been created using voice recognition software. It may contain minor errors which are inherent in voice recognition technology. **     Final report electronically signed by Dr. Chi Saravia on 8/27/2021 3:46 PM         Last Resulted: 08/27/21 15:46        Order Details      View Encounter      Lab and Collection Details      Routing      Result History           Result Care Coordination      Result Notes     Judith Griffith   9/2/2021  8:36 AM EDT Back to Top      A message was left for the patient to call the office. Letter mailed to patient. Judith Griffith   9/1/2021  2:40 PM EDT       A message was left for the patient to call the office. Lj Gamez DO   8/30/2021  9:07 AM EDT       There is a lot of arthritis in the spine - how is PT going? Let us know if you are not improving and we can see you in the next month or so to go over your back pain.            Patient Communication  Not seen    Back to Top    There is a lot of arthritis in the spine - how is PT going? Let us know if you are not improving and we can see you in the next month or so to go over your back pain. Written by Lj Gamez DO on 8/30/2021  9:07 AM EDT           XR SPINE ENTIRE (2-3 VIEWS): Comments to Patient     There is a lot of arthritis in the spine - how is PT going? Let us know if you are not improving and we can see you in the next month or so to go over your back pain.    Written by Lj Gamez DO on 8/30/2021  9:07 AM EDT  Not seen

## 2021-09-07 ENCOUNTER — HOSPITAL ENCOUNTER (OUTPATIENT)
Dept: PHYSICAL THERAPY | Age: 41
Setting detail: THERAPIES SERIES
Discharge: HOME | End: 2021-09-07
Payer: COMMERCIAL

## 2021-09-07 PROCEDURE — 97140 MANUAL THERAPY 1/> REGIONS: CPT

## 2021-09-07 PROCEDURE — 97110 THERAPEUTIC EXERCISES: CPT

## 2021-09-07 NOTE — PROGRESS NOTES
[address] [facility]  ONCOLOGY REHABILITATION  PHYSICAL THERAPY  [x] DAILY NOTE [] PROGRESS NOTE [] DISCHARGE NOTE    [x] [facility]     Date: 2021  Patient Name:  Rachel Del Castillo  : 1980  MRN: [id number]        Referring Practitioner EDEN Ribeiro*   Diagnosis Other symptoms and signs involving the musculoskeletal system [R29.898]  Cervicalgia [M54.2]  Pain in thoracic spine [M54.6]    Treatment Diagnosis Pain in neck, pain in R shoulder, pain in thoracic spine, weakness in UEs, decreased R shoulder and cervical ROM, poor posture   Date of Evaluation 21    Additional Pertinent History Incontinence, obesity, anxiety/PTSD/OCD, memory issues, salivary gland cancer, 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, L hip and R knee       Functional Outcome Measure Used Neck disability index    Functional Outcome Score 38/50 (21)        Insurance: Primary: Payor: Cynthia Peralta /  /  / ,   Secondary:    Authorization Information: Aquatics covered, modalities covered, telehealth covered    Visit # 6, 10 for progress note   Visits Allowed: 60 units of CPT codes  thru 75/58/19    Recertification Date:    Physician Follow-Up:   21 Mrozek, 22 PCP   History of Present Illness: Pt states neck, R shoulder and mid back pain since 10/29/2011 (surgery for right radical parotidectomy with facial nerve dissection and sacrifice, gortex sling, partial auriculectomy, skull base and partial temporal bone resection, right neck dissection, facial nerve graft).       SUBJECTIVE: States complying with HEP however has increased \"electric fire\" like symptoms at R side of neck to anterior chest. Discussed importance of staging the stretch.       TREATMENT   Precautions: Hx salivary cancer, surgical removal with neck resection, lymphedema risk   Pain: 6.5-7/10 R side of neck    X in shaded column indicates activity completed today   Modalities Parameters/  Location  Notes         Manual Therapy Time/Technique  Notes   Manual lymphatic drainage and myofascial release to R side of neck and shoulder 30 minutes x Nearly constant spasms initially however reduced in frequency and abolished by end. Exercise/Intervention   Notes   Diaphragmatic breathing x5      Upper trap stretch for R 15 sec x3      Levator stretch for R 15 sec x3      Neck extension with L hand on clavicles 15 sec x3  x Altered stretch due to spasming at R chest   Chin tuck x10      SCM release x10      Scapular depression 2x10  x Manual cues for scapular mobility and reduce hiking   Shoulder extension with ball on wall x10      Scapular retraction 2x10  x Manual cues for scapular mobility and reduce hiking   Scap retraction and shoulder extension x10  x Peach t-band   Posture re-ed Throughout  x Cues for reducing R neck rotation and lateral flexion, reduce L scapular hiking     Specific Interventions for Next Treatment:  Chin tucks, upper trap/levator/pec stretching, R shoulder AAROM flexion and abduction, R shoulder strength, cervical stability exercises, thoracic extension exercises, may need STM to R neck musculature, postural exercises    Activity Tolerance: Patient tolerated treatment well    ASSESSMENT:  Assessment: Pt with improving postural alignment with less verbal cues required. Thorough education of activation of proper muscles during activity to reduce over-utilization of spastic muscles. Note less spasms with manual work and during activity. GOALS:  Patient Goal: to improve pain and mobility      Short Term Goals: 4 weeks  1. Patient will report decrease in pain to 5/10 at most to allow ease of ADLs and household tasks. 2. Patient will improve B cervical lateral flexion AROM to 45 degrees to allow decreased pain with household tasks. 3. Patient will improve cervical rotation AROM to 70 degrees to allow decreased pain with turning head during driving.   4. Patient will improve R UE strength to 5/5 to allow ease of

## 2021-09-09 ENCOUNTER — HOSPITAL ENCOUNTER (OUTPATIENT)
Dept: PHYSICAL THERAPY | Age: 41
Setting detail: THERAPIES SERIES
Discharge: HOME | End: 2021-09-09
Payer: COMMERCIAL

## 2021-09-09 PROCEDURE — 97110 THERAPEUTIC EXERCISES: CPT

## 2021-09-09 PROCEDURE — 97140 MANUAL THERAPY 1/> REGIONS: CPT

## 2021-09-09 NOTE — PROGRESS NOTES
[address] [facility]  ONCOLOGY REHABILITATION  PHYSICAL THERAPY  [x] DAILY NOTE [] PROGRESS NOTE [] DISCHARGE NOTE    [x] [facility]     Date: 2021  Patient Name:  Sola Tate  : 1980  MRN: [id number]        Referring Practitioner EDEN Montero*   Diagnosis Other symptoms and signs involving the musculoskeletal system [R29.898]  Cervicalgia [M54.2]  Pain in thoracic spine [M54.6]    Treatment Diagnosis Pain in neck, pain in R shoulder, pain in thoracic spine, weakness in UEs, decreased R shoulder and cervical ROM, poor posture   Date of Evaluation 21    Additional Pertinent History Incontinence, obesity, anxiety/PTSD/OCD, memory issues, salivary gland cancer, 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, L hip and R knee       Functional Outcome Measure Used Neck disability index    Functional Outcome Score 38/50 (21)        Insurance: Primary: Payor: Reji Decker /  /  / ,   Secondary:    Authorization Information: Aquatics covered, modalities covered, telehealth covered    Visit # 7, /10 for progress note   Visits Allowed: 60 units of CPT codes  thru     Recertification Date:    Physician Follow-Up:   21 Mrozek, 22 PCP   History of Present Illness: Pt states neck, R shoulder and mid back pain since 10/29/2011 (surgery for right radical parotidectomy with facial nerve dissection and sacrifice, gortex sling, partial auriculectomy, skull base and partial temporal bone resection, right neck dissection, facial nerve graft).       SUBJECTIVE: Reports her overall activity level has increased and therefore has had more muscle soreness across her upper body. Has had deep itching sensation in R lateral neck. Also admits to choking sensation with celery last night. Admits she had a swallow study without much findings but is curious about speech therapy. Encouraged pt to speak to PCP.  Pt reports her PCP also informed her that imaging found arthritis throughout her spine and was curious if therapy could incorporate more for spine pain. TREATMENT   Precautions: Hx salivary cancer, surgical removal with neck resection, lymphedema risk   Pain: 7/10 R side of neck    X in shaded column indicates activity completed today   Modalities Parameters/  Location  Notes         Manual Therapy Time/Technique  Notes   Manual lymphatic drainage and myofascial release to R side of neck and shoulder 30 minutes x Nearly constant spasms initially however reduced in frequency and abolished by end - also performed gentle neck traction for lymphatic drainage   Exercise/Intervention   Notes   Diaphragmatic breathing x5      Upper trap stretch for R 15 sec x3      Levator stretch for R 15 sec x3      Neck extension with L hand on clavicles 15 sec x3   Altered stretch due to spasming at R chest   Chin tuck x10      SCM release x10      Scapular depression 2x10   Manual cues for scapular mobility and reduce hiking   Shoulder extension with ball on wall x10      Scapular retraction 2x10   Manual cues for scapular mobility and reduce hiking   Scap retraction and shoulder extension x10   Peach t-band   Posture re-ed Throughout   Cues for reducing R neck rotation and lateral flexion, reduce L scapular hiking   Neck isometrics x4 directions 10  x In supine   Posterior pelvic tilts 10  x Hooklying   Bridges 10  x Hooklying - difficulty coordinating     Specific Interventions for Next Treatment:  Chin tucks, upper trap/levator/pec stretching, R shoulder AAROM flexion and abduction, R shoulder strength, cervical stability exercises, thoracic extension exercises, may need STM to R neck musculature, postural exercises    Activity Tolerance: Patient tolerated treatment well    ASSESSMENT:  Assessment: Progressed to gentle core and neck strengthening this date with good tolerance however difficulty coordinating tasks.  Pt with increased neck spasms today however improved with traction technique. GOALS:  Patient Goal: to improve pain and mobility      Short Term Goals: 4 weeks  1. Patient will report decrease in pain to 5/10 at most to allow ease of ADLs and household tasks. 2. Patient will improve B cervical lateral flexion AROM to 45 degrees to allow decreased pain with household tasks. 3. Patient will improve cervical rotation AROM to 70 degrees to allow decreased pain with turning head during driving. 4. Patient will improve R UE strength to 5/5 to allow ease of lifting. 5. Patient will improve shoulder flexion and abduction AROM to 160 degrees to allow decreased pain with reaching overhead.     Long Term Goals: 8 weeks  1. Patient will improve Neck Disability Index Score from 38/50 to 25/50 disability to allow decreased disability and improved functional mobility. 2. Patient will be independent with HEP in order to prevent re-injury and improve functional abilities. Patient Education: Importance of mental health in participation and progression with therapy. Pt to move to 1x per week to allow also meeting with counselor. Encouraged pt to talk with PCP about potential for ST. Updated HEP: Access Code: [email or web address]  URL: [email or web address]  Date: 09/09/2021  Prepared by: Robyn Colbert    Exercises  Supine Isometric Neck Flexion - 1 x daily - 7 x weekly - 1 sets - 10 reps  Supine Isometric Neck Sidebend (Lateral Flexion as an alternative name) - 1 x daily - 7 x weekly - 1 sets - 10 reps  Supine Isometric Neck Extension - 1 x daily - 7 x weekly - 1 sets - 10 reps  Supine Posterior Pelvic Tilt - 1 x daily - 7 x weekly - 1 sets - 10 reps  Supine Bridge - 1 x daily - 7 x weekly - 1 sets - 10 reps    Education Outcome: Verbalized understanding  Education Barriers: None    PLAN: Continue established plan of care      Time In 1525   Time Out 1615   Timed Code Minutes: 45 min   Total Treatment Time: 45 min (3 units)       Electronically Signed by: Netta Mcgee PT PT, DPT, ANU

## 2021-09-14 ENCOUNTER — APPOINTMENT (OUTPATIENT)
Dept: PHYSICAL THERAPY | Age: 41
End: 2021-09-14
Payer: COMMERCIAL

## 2021-09-16 ENCOUNTER — HOSPITAL ENCOUNTER (OUTPATIENT)
Dept: PHYSICAL THERAPY | Age: 41
Setting detail: THERAPIES SERIES
End: 2021-09-16
Payer: COMMERCIAL

## 2021-09-20 ENCOUNTER — HOSPITAL ENCOUNTER (OUTPATIENT)
Dept: PHYSICAL THERAPY | Age: 41
Setting detail: THERAPIES SERIES
Discharge: HOME | End: 2021-09-20
Payer: COMMERCIAL

## 2021-09-20 PROCEDURE — 97110 THERAPEUTIC EXERCISES: CPT

## 2021-09-20 PROCEDURE — 97535 SELF CARE MNGMENT TRAINING: CPT

## 2021-09-20 NOTE — PROGRESS NOTES
[address] [facility]  ONCOLOGY REHABILITATION  PHYSICAL THERAPY  [] DAILY NOTE [x] PROGRESS NOTE [] DISCHARGE NOTE    [x] [facility]     Date: 2021  Patient Name:  Davie Hopkins  : 1980  MRN: [id number]        Referring Practitioner EDEN Gacria*   Diagnosis Other symptoms and signs involving the musculoskeletal system [R29.898]  Cervicalgia [M54.2]  Pain in thoracic spine [M54.6]    Treatment Diagnosis Pain in neck, pain in R shoulder, pain in thoracic spine, weakness in UEs, decreased R shoulder and cervical ROM, poor posture   Date of Evaluation 21    Additional Pertinent History Incontinence, obesity, anxiety/PTSD/OCD, memory issues, salivary gland cancer, 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/R knee/spine       Functional Outcome Measure Used Neck disability index    Functional Outcome Score 38/50 (21)        Insurance: Primary: Payor: Ginette England /  /  / ,   Secondary:    Authorization Information: Aquatics covered, modalities covered, telehealth covered    Visit # 7, 0/10 for progress note   Visits Allowed: 60 units of CPT codes  thru     Recertification Date:    Physician Follow-Up:   21 Fabiola, 22 PCP   History of Present Illness: Pt states neck, R shoulder and mid back pain since 10/29/2011 (surgery for right radical parotidectomy with facial nerve dissection and sacrifice, gortex sling, partial auriculectomy, skull base and partial temporal bone resection, right neck dissection, facial nerve graft).       SUBJECTIVE: Pt admits increased L hip pain this date and has had more difficulty with donning socks. Admits doing well with updated HEP for core strengthening but admits she is not yet performing daily since she has been focusing more on stretches of her neck. Pt admits she has now with been using a mirror for visual feedback and notes her posture is starting to improve.        TREATMENT   Precautions: Hx salivary cancer, surgical removal with neck resection, lymphedema risk   Pain: 7/10 R side of neck, L hip 4/10    X in shaded column indicates activity completed today   Modalities Parameters/  Location  Notes         Manual Therapy Time/Technique  Notes   Manual lymphatic drainage and myofascial release to R side of neck and shoulder 30 minutes x Nearly constant spasms initially however reduced in frequency and abolished by end - also performed gentle neck traction for lymphatic drainage   Exercise/Intervention   Notes   Diaphragmatic breathing x5      Upper trap stretch for R 15 sec x3      Levator stretch for R 15 sec x3      Neck extension with L hand on clavicles 15 sec x3   Altered stretch due to spasming at R chest   Chin tuck x10      SCM release x10      Scapular depression 2x10   Manual cues for scapular mobility and reduce hiking   Shoulder extension with ball on wall x10      Scapular retraction 2x10   Manual cues for scapular mobility and reduce hiking   Scap retraction and shoulder extension x10   Peach t-band   Posture re-ed Throughout   Cues for reducing R neck rotation and lateral flexion, reduce L scapular hiking   Neck isometrics x4 directions 10   In supine   Posterior pelvic tilts 10   Hooklying   Pulleys 2 minutes  x Flexion and abduction   Shoulder isometrics R 10  x Flexion, extension, abduction, adduction   Bridges 10   Hooklying - difficulty coordinating     Specific Interventions for Next Treatment:  Chin tucks, upper trap/levator/pec stretching, R shoulder AAROM flexion and abduction, R shoulder strength, cervical stability exercises, thoracic extension exercises, may need STM to R neck musculature, postural exercises    Activity Tolerance: Patient tolerated treatment well    ASSESSMENT:  Assessment: Pt continues with severe and frequent R sided neck spasms due to radical nature of pt's surgery as well as presence of lymphedema.  Pt also continues to struggle with L hip pain and stiffness, R knee pain and back pain from arthritic changes and uses straight cane to assist with functional mobility. As pt's posture and neck/shoulder mobility is limited from cancer surgery, this has impacted her overall mobility and ability to perform daily activities. She would benefit from continued skilled PT/Oncology Rehab to address these issues. Note improvement in R neck sidebending and UE strength with mild improvement in Neck Disability Index indicating pt has potential for further improvement. GOALS:  Patient Goal: to improve pain and mobility      Short Term Goals: 4 weeks  1. Patient will report decrease in pain to 5/10 at most to allow ease of ADLs and household tasks. GOAL NOT MET: 7/10 neck pain today. Continue Goal    2. Patient will improve B cervical lateral flexion AROM to 45 degrees to allow decreased pain with household tasks. GOAL NOT MET: R sidebending 32 deg, L 20 deg. Continue Goal     3. Patient will improve cervical rotation AROM to 70 degrees to allow decreased pain with turning head during driving. GOAL NOT MET: 60 deg R rotation, 49 deg L. Continue Goal     4. Patient will improve R UE strength to 5/5 to allow ease of lifting. GOAL MET:  5/5. Revise Goal: Pt to report spasms at R lateral neck, UT and anterior chest reduced from 20 episodes per day to 10 episodes/day for improved ability to complete household and ADL tasks. 5. Patient will improve shoulder flexion and abduction AROM to 160 degrees to allow decreased pain with reaching overhead. GOAL NOT MET: R shoulder flexion 140 deg, abduction 102 deg; L shoulder flexion 165 deg, abduction 172 deg. .  Continue Goal       Long Term Goals: 8 weeks  1. Patient will improve Neck Disability Index Score from 38/50 to 25/50 disability to allow decreased disability and improved functional mobility. GOAL NOT MET: Scored 32/50 this date. Continue Goal     2. Patient will be independent with HEP in order to prevent re-injury and improve functional abilities. GOAL NOT MET: Continuing to progress HEP as pt tolerates and improve. Continue Goal      Patient Education: Progress towards goals, plan to pursue gentle shoulder mobility and strengthening, potential benefits of aquaticsUpdated HEP: Access Code: [email or web address]  URL: [email or web address]  Date: 09/20/2021  Prepared by: Wen Antonio    Exercises  Isometric Shoulder Flexion at Wall - 1 x daily - 7 x weekly - 1 sets - 10 reps  Isometric Shoulder Abduction at Wall - 1 x daily - 7 x weekly - 1 sets - 10 reps  Isometric Shoulder Extension at Wall - 1 x daily - 7 x weekly - 1 sets - 10 reps  Isometric Shoulder Adduction - 1 x daily - 7 x weekly - 1 sets - 10 reps    Education Outcome: Verbalized understanding  Education Barriers: None    PLAN: Continue established plan of care up to 2x per week for 8 weeks, may trial aquatics if needed for core and back strengthening      Time In 0815   Time Out 0900   Timed Code Minutes: 45 min   Total Treatment Time: 45 min (3 units)       Electronically Signed by: Elisabeth Hernandez PT PT, DPT, ANU [id number] 9/20/2021

## 2021-09-27 ENCOUNTER — OFFICE VISIT (OUTPATIENT)
Dept: ONCOLOGY | Age: 41
End: 2021-09-27
Payer: COMMERCIAL

## 2021-09-27 ENCOUNTER — HOSPITAL ENCOUNTER (OUTPATIENT)
Dept: WOMENS IMAGING | Age: 41
Discharge: HOME | End: 2021-09-27
Payer: COMMERCIAL

## 2021-09-27 ENCOUNTER — HOSPITAL ENCOUNTER (OUTPATIENT)
Dept: INFUSION THERAPY | Age: 41
Discharge: HOME | End: 2021-09-27
Payer: COMMERCIAL

## 2021-09-27 VITALS
BODY MASS INDEX: 44.41 KG/M2 | HEART RATE: 72 BPM | DIASTOLIC BLOOD PRESSURE: 65 MMHG | OXYGEN SATURATION: 98 % | HEIGHT: 68 IN | SYSTOLIC BLOOD PRESSURE: 128 MMHG | WEIGHT: 293 LBS | RESPIRATION RATE: 18 BRPM

## 2021-09-27 DIAGNOSIS — Z85.89 ENCOUNTER FOR FOLLOW-UP SURVEILLANCE OF HEAD AND NECK CANCER: ICD-10-CM

## 2021-09-27 DIAGNOSIS — Z12.31 BREAST CANCER SCREENING BY MAMMOGRAM: ICD-10-CM

## 2021-09-27 DIAGNOSIS — Z08 ENCOUNTER FOR FOLLOW-UP SURVEILLANCE OF HEAD AND NECK CANCER: ICD-10-CM

## 2021-09-27 DIAGNOSIS — R53.83 FATIGUE, UNSPECIFIED TYPE: ICD-10-CM

## 2021-09-27 DIAGNOSIS — G52.9 CRANIAL NERVE PALSY: ICD-10-CM

## 2021-09-27 DIAGNOSIS — C80.1 ADENOID CYSTIC CARCINOMA (HCC): Primary | ICD-10-CM

## 2021-09-27 DIAGNOSIS — Z90.49 HISTORY OF PAROTIDECTOMY: ICD-10-CM

## 2021-09-27 DIAGNOSIS — C07 CANCER OF PAROTID GLAND (HCC): ICD-10-CM

## 2021-09-27 PROCEDURE — G8417 CALC BMI ABV UP PARAM F/U: HCPCS | Performed by: INTERNAL MEDICINE

## 2021-09-27 PROCEDURE — G8427 DOCREV CUR MEDS BY ELIG CLIN: HCPCS | Performed by: INTERNAL MEDICINE

## 2021-09-27 PROCEDURE — 4004F PT TOBACCO SCREEN RCVD TLK: CPT | Performed by: INTERNAL MEDICINE

## 2021-09-27 PROCEDURE — 77063 BREAST TOMOSYNTHESIS BI: CPT

## 2021-09-27 PROCEDURE — 99213 OFFICE O/P EST LOW 20 MIN: CPT | Performed by: INTERNAL MEDICINE

## 2021-09-27 PROCEDURE — 99211 OFF/OP EST MAY X REQ PHY/QHP: CPT

## 2021-09-27 RX ORDER — OMEGA-3 FATTY ACIDS/FISH OIL 300-500 MG
CAPSULE ORAL
Qty: 30 CAPSULE | Refills: 11 | Status: SHIPPED | OUTPATIENT
Start: 2021-09-27

## 2021-09-27 ASSESSMENT — ENCOUNTER SYMPTOMS
COUGH: 0
CONSTIPATION: 0
BACK PAIN: 0
VOMITING: 0
ABDOMINAL PAIN: 0
TROUBLE SWALLOWING: 0
SHORTNESS OF BREATH: 0
VOICE CHANGE: 1
FACIAL SWELLING: 0
ABDOMINAL DISTENTION: 0
COLOR CHANGE: 0
CHEST TIGHTNESS: 0
EYE DISCHARGE: 0
BLOOD IN STOOL: 0
WHEEZING: 0
DIARRHEA: 0
SINUS PRESSURE: 1
RECTAL PAIN: 0
SORE THROAT: 0
NAUSEA: 0

## 2021-09-27 NOTE — PROGRESS NOTES
[facility] [address] [facility] [address], [address] [facility] [address]  [facility]  Dept: [phone]  Dept Fax: [phone]: [phone]    Visit Date:9/27/2021     Kyle Jaramillo is a 42 y.o. 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 She underwent a right radical parotidectomy with facial nerve dissection and sacrifice, gortex sling, partial auriculectomy, skull base and partial temporal bone resection, right neck dissection and facial nerve graft on October 29, 2011 by Dr. Sharda Frye at [facility]. She was staged as T4, N2b. Subsequently she underwent proton beam radiation treatment in [place] which she completed in March 2012. In January 2013 she had FNA of the 2 cm left cervical lymph node that showed reactive lymphoid hyperplasia. She has numbness of her right face and neck due to Bell's palsy. She has multiple postsurgical complaints including numbness of the right half of the tongue, mouth dryness, neck spasms, mouth drooping and drooling difficulty with speaking attributed to cold. She also has difficulty hearing and occasional right ear pain. At [facility] she was receiving physical therapy till 2018 to help her with those symptoms. She had PET scan in August 2017 that showed persistent asymmetric FDG activity overlying the left tonsillar soft tissues. Similarly, there were some persistent mildly hypermetabolic left cervical lymph nodes  No new FDG avid lesion or lymphadenopathy  to suggest recurrent malignancy/metastatic  disease. The patient moved to [facility] in 2018 and she established care with ENT Dr. Gurpreet Amato.   MRI of the orbit in July 2020 showed postsurgical changes from right parotidectomy and postsurgical fibrosis at the operative site without well defined enhancing nodule to suggest recurrent neoplasm. 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 Due to empty sella possible intracranial hypertension. Interval history on September 27, 2021: This is as 1 year follow-up visit. Since last visit with me the patient did not have any hospitalizations. She follows up with neurologist Dr. Felicity Ferreira for headache. The patient describes increased headaches, off-balance feeling. She was evaluated by ophthalmology and assured that there is no intraocular pathology to explain her symptoms, she had testing performed. She reports her headache is the same pattern and location. She is currently complaining of multiple organ system problems including chronic ear drainage from her right middle ear space status post pressure equalizing tube placement last year. She has been on numerous eardrops and had little abatement of the problem. She has decreased hearing on that side possibly as a primary process from inner ear radiation toxicity but also as a conductor process given surgical distortions to her external auditory canal and chronic ear drainage likely with eustachian tube dysfunction. In addition she has postural vertigo from almost any postural change. Additionally she complains of regular episodes of choking on liquids and other food matter causing paroxysmal coughing. Her chronic pain is in the region of her cervical plexus extending from her postauricular space down the neck medially and inferiorly all the way almost to her clavicle. Her numbness starts in the preauricular skin and extends down the jaw of the mandible to the similar region that is painful. Neurologist changed her treatment to Trileptal 150 mg two times a day and started her on Depakote  mg nightly.   HPI   Past Medical History:   Diagnosis Date    Adenoid cystic carcinoma ([facility] [address] 10/2011    Removed tumor, salivary glands, facial nerve graft    Anxiety     Arthritis     knees & right shoulder, hip    Depression     Hypothyroidism     PTSD (post-traumatic stress disorder)     Sleep apnea       Past Surgical History:   Procedure Laterality Date    EUA PELVIC N/A 02/21/2019    REMOVAL AND REINSERTION OF MIRENA, EXAM UNDER ANESTHESIA, PAP performed by Jeff Steven MD at [address] Right 12/10/2020    EYE SURGERY Right     SELECTIVE NECK DISSECTION Right 10/2011    Radiacal Neck for CA    TUMOR EXCISION  2011    excision salivary gland (radical neck disection & nerve graft) - radiation    TYMPANOSTOMY TUBE PLACEMENT      age 14years old    WISDOM TOOTH EXTRACTION  2018      Family History   Problem Relation Age of Onset    Diabetes Mother    Garcia Arthritis Mother     Obesity Mother     Depression Mother     No Known Problems Father     Depression Brother     Uterine Cancer Maternal Grandmother     Breast Cancer Paternal Aunt     Breast Cancer Paternal Aunt       Social History     Tobacco Use    Smoking status: Current Every Day Smoker     Packs/day: 0.50     Years: 20.00     Pack years: 10.00     Types: Cigarettes, Cigars     Start date: 4/16/1999    Smokeless tobacco: Never Used    Tobacco comment: wearing the patch for the first time today   Substance Use Topics    Alcohol use: Not Currently     Comment: social      Current Outpatient Medications   Medication Sig Dispense Refill    Omega-3 Fatty Acids (RA FISH OIL) 1000 MG CAPS take 1 capsule by mouth once daily 30 capsule 11    levothyroxine (SYNTHROID) 50 MCG tablet Take 1 tablet by mouth once daily 30 tablet 2    vitamin D (RA VITAMIN D-3) 125 MCG (5000 UT) CAPS capsule take 1 capsule by mouth once daily 30 capsule 11    Misc. Devices (BATHTUB SAFETY RAIL) MISC Use daily with showers 1 each 0    Misc.  Devices [address] [facility] Shower chair - Use daily when showering 1 Device 0    OXcarbazepine (TRILEPTAL) 150 MG tablet Take 1 tablet by mouth 2 times daily 60 tablet 3    pilocarpine (SALAGEN) 5 MG tablet take 1 tablet by mouth three times a day 90 tablet 3    Handicap Placard MISC by Does not apply route Expires on 8/3/2022 1 each 0    acetaminophen (TYLENOL) 500 MG tablet Take 1,000 mg by mouth every 6 hours as needed for Pain      ibuprofen (ADVIL;MOTRIN) 200 MG tablet Take 600 mg by mouth every 6 hours as needed for Pain      MAGNESIUM PO Take by mouth daily      Cyanocobalamin (B-12 PO) Take by mouth daily      CPAP Machine MISC by Does not apply route nightly      prazosin (MINIPRESS) 5 MG capsule Take 1 capsule by mouth nightly Along with 2 mg for total dose 7 mg at night 30 capsule 2    prazosin (MINIPRESS) 2 MG capsule Take 1 capsule by mouth nightly Along with 5 mg for total dose of 7 mg at night 30 capsule 2    FLUoxetine (PROZAC) 40 MG capsule take 1 capsule by mouth once daily along with 20 mg for total dose 60 mg daily 30 capsule 2    FLUoxetine (PROZAC) 20 MG capsule take 1 capsule by mouth once daily along with 40 mg for total 60 mg once daily 30 capsule 2    clonazePAM (KLONOPIN) 0.5 MG tablet Take 1 tablet by mouth 2 times daily as needed for Anxiety for up to 90 days. 60 tablet 2    divalproex (DEPAKOTE ER) 500 MG extended release tablet take 1 tablet by mouth once daily 30 tablet 3     No current facility-administered medications for this visit.       No Known Allergies   Health Maintenance   Topic Date Due    Hepatitis C screen  Never done    TSH testing  06/17/2021    Flu vaccine (1) 09/01/2021    Cervical cancer screen  02/21/2022    Lipid screen  06/17/2025    DTaP/Tdap/Td vaccine (2 - Td or Tdap) 10/06/2027    Pneumococcal 0-64 years Vaccine (2 of 2 - PPSV23) 05/25/2044    COVID-19 Vaccine  Completed    HIV screen  Completed    Hepatitis A vaccine  Aged Out    Hepatitis B vaccine  Aged Out    Hib vaccine  Aged Out    Meningococcal (ACWY) vaccine  Aged Out        Subjective:   Review of Systems   Constitutional: Negative for activity change, appetite change, fatigue and fever. HENT: Positive for drooling, ear pain, hearing loss, sinus pressure and voice change. Negative for congestion, dental problem, facial swelling, mouth sores, nosebleeds, sore throat, tinnitus and trouble swallowing. Eyes: Negative for discharge and visual disturbance. Respiratory: Negative for cough, chest tightness, shortness of breath and wheezing. Cardiovascular: Negative for chest pain, palpitations and leg swelling. Gastrointestinal: Negative for abdominal distention, abdominal pain, blood in stool, constipation, diarrhea, nausea, rectal pain and vomiting. Endocrine: Negative for cold intolerance, polydipsia and polyuria. Genitourinary: Negative for decreased urine volume, difficulty urinating, dysuria, flank pain, hematuria and urgency. Musculoskeletal: Positive for neck pain and neck stiffness. Negative for arthralgias, back pain, gait problem, joint swelling and myalgias. Skin: Negative for color change, rash and wound. Neurological: Positive for dizziness and headaches. Negative for tremors, seizures, speech difficulty, weakness, light-headedness and numbness. Hematological: Negative for adenopathy. Does not bruise/bleed easily. Psychiatric/Behavioral: Negative for confusion and sleep disturbance. The patient is not nervous/anxious. Objective:   Physical Exam  Vitals reviewed. Constitutional:       General: She is not in acute distress. Appearance: She is well-developed. HENT:      Head: Normocephalic. Mouth/Throat:      Pharynx: No oropharyngeal exudate. Eyes:      General: No scleral icterus. Right eye: No discharge. Left eye: No discharge. Pupils: Pupils are equal, round, and reactive to light. Neck:      Thyroid: No thyromegaly. Vascular: No JVD. Trachea: No tracheal deviation. Cardiovascular:      Rate and Rhythm: Normal rate. Heart sounds: Normal heart sounds.  No murmur heard.   No friction rub. No gallop. Pulmonary:      Effort: Pulmonary effort is normal. No respiratory distress. Breath sounds: Normal breath sounds. No stridor. No wheezing or rales. Chest:      Chest wall: No tenderness. Abdominal:      General: Bowel sounds are normal. There is no distension. Palpations: Abdomen is soft. There is no mass. Tenderness: There is no abdominal tenderness. There is no rebound. Musculoskeletal:         General: Normal range of motion. Cervical back: Normal range of motion and neck supple. Comments: Good range of motion in all four extremities. Lymphadenopathy:      Cervical: No cervical adenopathy. Skin:     General: Skin is warm. Findings: No erythema or rash. Neurological:      Mental Status: She is alert and oriented to person, place, and time. Cranial Nerves: Dysarthria and facial asymmetry present. No cranial nerve deficit. Sensory: Sensory deficit (Right facial numbness and weakness) present. Motor: No abnormal muscle tone. Deep Tendon Reflexes: Reflexes are normal and symmetric. Psychiatric:         Behavior: Behavior normal.         Thought Content: Thought content normal.         Judgment: Judgment normal.         /65 (Site: Left Upper Arm, Position: Sitting, Cuff Size: Medium Adult)   Pulse 72   Resp 18   Ht 5' 8\" (1.727 m)   Wt (!) 335 lb 3.2 oz (152 kg)   SpO2 98%   BMI 50.97 kg/m²      ECOG status is 1    Imaging studies and labs:   MRI of the brain on July 30, 2020 showed:  IMPRESSION:      1.  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             2.  Right mastoid effusion.            3.  Right parotid resection.            4.  Possible intracranial hypertension.  Neurology and ophthalmology      referrals are advised.           Lab Results   Component Value Date    WBC 8.3 06/17/2020    HGB 13.5 06/17/2020    HCT 43.1 06/17/2020    MCV 89.2 06/17/2020     06/17/2020       Chemistry        Component Value Date/Time     06/17/2020 1231    K 4.4 06/17/2020 1231     06/17/2020 1231    CO2 26 06/17/2020 1231    BUN 10 06/17/2020 1231    CREATININE 0.8 07/22/2020 1401        Component Value Date/Time    CALCIUM 9.3 06/17/2020 1231    ALKPHOS 77 12/06/2018 1158    AST 13 12/06/2018 1158    ALT 12 12/06/2018 1158    BILITOT 0.4 12/06/2018 1158        TSH on June 17, 2020: 3.74    PET scan in  October 2020 showed:  1. Indistinct, mildly FDG avid soft tissue fullness at the left tonsil extending into the left oropharyngeal space. This finding may be secondary to an infectious or inflammatory process. Malignancy is considered less likely but not excluded. 2. Focus of activity corresponding to poorly visualized osseous irregularity in the right mandibular ramus, also possibly infectious or inflammatory. Malignancy is considered less likely but not excluded. Brain MRI in January 2021 showed:  1. Decreased enhancement of right labyrinth as evidence for resolving labyrinthitis though with persistent enhancement along the posterior margin of the internal auditory canal suggesting persistent neuritis. 2. Redemonstration of large right mastoid effusion and hyperintense signal in the white matter in the adjacent right temporal lobe likely on the basis of prior radiation. 3. Old lacunar infarct in the right corona radiata. 4. Redemonstration of prior right parotid resection. Assessment/Plan:   1. 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 Patient is s/p right radical parotidectomy with facial nerve dissection and sacrifice, gortex sling, partial auriculectomy, skull base and partial temporal bone resection, right neck dissection and facial nerve graft performed on October 29, 2011 by Dr. Ana Li at [facility].   Subsequently she underwent proton beam radiation treatment in [facility] which she completed in March 2012. The patient has multiple sequela of her surgery and radiation treatment. She has numbness of her right face and neck due to Bell's palsy. She has multiple postsurgical complaints including numbness of the right half of the tongue, mouth dryness, neck spasms, mouth drooping and drooling difficulty with speaking attributed to cold. She also has difficulty hearing and occasional right ear pain. At [facility] she was receiving physical therapy till 2018 to help her with those symptoms. She is working with physical therapy at [facility]. Patient established care with new ENT specialist, Dr. Macie Abraham. 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 It also showed empty sella through cycle raising differential diagnosis of cranial hypertension. The patient was referred to the ophthalmologist, examination showed no papilledema, therefore no cranial hypertension . Her last PET scan in 2020 showed indistinct, mildly FDG avid soft tissue fullness in the left tonsil, extending into the left oropharyngeal space. The patient was seen by ENT. No suspicious finding on direct endoscopy. Her PET scan also showed poorly visualized osseous irregularity in the right mandibular ramus, possibly infectious. The patient did follow-up with oral surgeon. The patient has extensive severe manifestations of dysfunctional status after surgical and radiation treatment for an advanced adenoid cystic carcinoma 10 years ago. Her dysfunction is substantially debilitating, including numbness of the right half of the tongue, mouth dryness, neck spasms, mouth drooping and drooling, difficulty with speaking, dysphagia, difficulty hearing and headaches. She is still pursuing disability, which I support.       is     Diagnosis Orders   1. Adenoid cystic carcinoma ([facility] [address]     2.  Fatigue, unspecified type  Omega-3 Fatty Acids (RA FISH OIL) 1000 MG CAPS   3. Cancer of parotid gland ([facility] [address]     4. History of parotidectomy     5. Cranial nerve palsy     6. Encounter for follow-up surveillance of head and neck cancer            Return in about 1 year (around 9/27/2022). Orders Placed:   No orders of the defined types were placed in this encounter. Medications Prescribed:   Orders Placed This Encounter   Medications    Omega-3 Fatty Acids (RA FISH OIL) 1000 MG CAPS     Sig: take 1 capsule by mouth once daily     Dispense:  30 capsule     Refill:  11            Discussed use, benefit, and side effectsof prescribed medications. All patient questions answered. Pt voiced understanding. Instructed to continue current medications, diet and exercise. Patient agreed with treatment plan. Follow up as directed.     Electronically signed by Kiana Breen MD on 9/16/20 at 3:52 PM EDT

## 2021-09-30 ENCOUNTER — HOSPITAL ENCOUNTER (OUTPATIENT)
Dept: PHYSICAL THERAPY | Age: 41
Setting detail: THERAPIES SERIES
End: 2021-09-30
Payer: COMMERCIAL

## 2021-10-07 ENCOUNTER — HOSPITAL ENCOUNTER (OUTPATIENT)
Dept: PHYSICAL THERAPY | Age: 41
Setting detail: THERAPIES SERIES
Discharge: HOME | End: 2021-10-07
Payer: COMMERCIAL

## 2021-10-07 PROCEDURE — 97140 MANUAL THERAPY 1/> REGIONS: CPT

## 2021-10-07 PROCEDURE — 97110 THERAPEUTIC EXERCISES: CPT

## 2021-10-07 NOTE — PROGRESS NOTES
[address] [facility]  ONCOLOGY REHABILITATION  PHYSICAL THERAPY  [x] DAILY NOTE [] PROGRESS NOTE [] DISCHARGE NOTE    [x] [facility]     Date: 10/7/2021  Patient Name:  Daria Hart  : 1980  MRN: [id number]        Referring Practitioner EDEN Goncalves*   Diagnosis Other symptoms and signs involving the musculoskeletal system [R29.898]  Cervicalgia [M54.2]  Pain in thoracic spine [M54.6]    Treatment Diagnosis Pain in neck, pain in R shoulder, pain in thoracic spine, weakness in UEs, decreased R shoulder and cervical ROM, poor posture   Date of Evaluation 21    Additional Pertinent History Incontinence, obesity, anxiety/PTSD/OCD, memory issues, salivary gland cancer, 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/R knee/spine       Functional Outcome Measure Used Neck disability index    Functional Outcome Score 38/50 (21)        Insurance: Primary: Payor: Kailyn Christianson /  /  / ,   Secondary:    Authorization Information: Aqu[employer] covered, modalities covered, telehealth covered    Visit # 8, 1/10 for progress note   Visits Allowed: 60 units of CPT codes  thru     Recertification Date:    Physician Follow-Up: 22 PCP   History of Present Illness: Pt states neck, R shoulder and mid back pain since 10/29/2011 (surgery for right radical parotidectomy with facial nerve dissection and sacrifice, gortex sling, partial auriculectomy, skull base and partial temporal bone resection, right neck dissection, facial nerve graft).       SUBJECTIVE: Pt reports \"sharp, stabbing pain\" at B anterior [address] joint, twitches at L anterior shoulder/chest, and difficulty swallowing vegetables. Admits her neck continues to be tight but is having trouble tolerating palpating herself. Reports her head has been feeling more heavy.       TREATMENT   Precautions: Hx salivary cancer, surgical removal with neck resection, lymphedema risk   Pain: 6.5/10 R side of neck, L hip 4/10    X in shaded column indicates activity completed today   Modalities Parameters/  Location  Notes         Manual Therapy Time/Technique  Notes   Manual lymphatic drainage and myofascial release to R side of neck and shoulder 30 minutes x Nearly constant spasms initially however reduced in frequency and abolished by end - also performed gentle neck traction for lymphatic drainage   Exercise/Intervention   Notes   Diaphragmatic breathing x5      Upper trap stretch for R 15 sec x3      Levator stretch for R 15 sec x3      Neck extension with L hand on clavicles 15 sec x3   Altered stretch due to spasming at R chest   Chin tuck x10      SCM release x10      Scapular depression 2x10  x Manual cues for scapular mobility and reduce hiking   Shoulder extension with ball on wall x10      Scapular retraction 2x10  x Manual cues for scapular mobility and reduce hiking   Scap retraction and shoulder extension x15  x Peach t-band   Posture re-ed Throughout  x Cues for reducing R neck rotation and lateral flexion, reduce L scapular hiking   Neck isometrics x4 directions 10  x In seated - corrected tech   Posterior pelvic tilts 10   Hooklying   Pulleys 2 minutes  x Flexion and abduction   Shoulder isometrics R 15  x Flexion, extension, abduction, adduction - manual cues needed to stop scapular elevation and focus on scapular abduction with shoulder abduction   Bridges 10   Hooklying - difficulty coordinating     Specific Interventions for Next Treatment:  Chin tucks, upper trap/levator/pec stretching, R shoulder AAROM flexion and abduction, R shoulder strength, cervical stability exercises, thoracic extension exercises, may need STM to R neck musculature, postural exercises    Activity Tolerance: Patient tolerated treatment well    ASSESSMENT:  Assessment: With mod-max verbal and tactile cues, pt able to nearly abolish scapular elevation compensation during tasks by end of session.  Recommend further strengthening of lats and serratus anterior. GOALS:  Patient Goal: to improve pain and mobility      Short Term Goals: 4 weeks  1. Patient will report decrease in pain to 5/10 at most to allow ease of ADLs and household tasks. 2. Patient will improve B cervical lateral flexion AROM to 45 degrees to allow decreased pain with household tasks. 3. Patient will improve cervical rotation AROM to 70 degrees to allow decreased pain with turning head during driving. 4. Pt to report spasms at R lateral neck, UT and anterior chest reduced from 20 episodes per day to 10 episodes/day for improved ability to complete household and ADL tasks. 5. Patient will improve shoulder flexion and abduction AROM to 160 degrees to allow decreased pain with reaching overhead.      Long Term Goals: 8 weeks  1. Patient will improve Neck Disability Index Score from 38/50 to 25/50 disability to allow decreased disability and improved functional mobility. 2. Patient will be independent with HEP in order to prevent re-injury and improve functional abilities. Patient Education: Focus on scapular depression with activities to reduce compensation at upper trap. Updated HEP: Access Code: [email or web address]  URL: [email or web address]  Date: 10/07/2021  Prepared by: Sohan Capps    Exercises  Seated Isometric Cervical Extension - 1 x daily - 7 x weekly - 1 sets - 10 reps  Seated Isometric Cervical Sidebending - 1 x daily - 7 x weekly - 1 sets - 10 reps  Seated Isometric Cervical Flexion - 1 x daily - 7 x weekly - 1 sets - 10 reps  Isometric Shoulder Flexion at Wall - 1 x daily - 7 x weekly - 1 sets - 10 reps  Isometric Shoulder External Rotation at Wall - 1 x daily - 7 x weekly - 1 sets - 10 reps  Isometric Shoulder Extension at Wall - 1 x daily - 7 x weekly - 1 sets - 10 reps  Isometric Shoulder Adduction - 1 x daily - 7 x weekly - 1 sets - 10 reps  Standing Scapular Depression - 3 x daily - 7 x weekly - 1 sets - 10 reps    Education Outcome: Verbalized understanding  Education Barriers: None    PLAN: Continue established plan of care      Time In 1440   Time Out 1530   Timed Code Minutes: 50 min   Total Treatment Time: 50 min (3 units)       Electronically Signed by: Soo Fontaine, PT PT, DPT, [facility] [id number] 10/7/2021

## 2021-10-13 ENCOUNTER — HOSPITAL ENCOUNTER (OUTPATIENT)
Dept: PHYSICAL THERAPY | Age: 41
Setting detail: THERAPIES SERIES
End: 2021-10-13
Payer: COMMERCIAL

## 2021-10-14 ENCOUNTER — APPOINTMENT (OUTPATIENT)
Dept: PHYSICAL THERAPY | Age: 41
End: 2021-10-14
Payer: COMMERCIAL

## 2021-10-15 DIAGNOSIS — G44.89 OTHER HEADACHE SYNDROME: Primary | ICD-10-CM

## 2021-10-15 RX ORDER — DIVALPROEX SODIUM 500 MG/1
TABLET, EXTENDED RELEASE ORAL
Qty: 30 TABLET | Refills: 3 | Status: SHIPPED | OUTPATIENT
Start: 2021-10-15 | End: 2022-02-03 | Stop reason: SDUPTHER

## 2021-10-18 ENCOUNTER — HOSPITAL ENCOUNTER (OUTPATIENT)
Dept: PHYSICAL THERAPY | Age: 41
Setting detail: THERAPIES SERIES
Discharge: HOME | End: 2021-10-18
Payer: COMMERCIAL

## 2021-10-18 PROCEDURE — 97140 MANUAL THERAPY 1/> REGIONS: CPT

## 2021-10-18 PROCEDURE — 97110 THERAPEUTIC EXERCISES: CPT

## 2021-10-18 NOTE — PROGRESS NOTES
[address] [facility]  ONCOLOGY REHABILITATION  PHYSICAL THERAPY  [x] DAILY NOTE [] PROGRESS NOTE [] DISCHARGE NOTE    [x] [facility]     Date: 10/18/2021  Patient Name:  Apurva Brush  : 1980  MRN: [id number]        Referring Practitioner EDEN Barney*   Diagnosis Other symptoms and signs involving the musculoskeletal system [R29.898]  Cervicalgia [M54.2]  Pain in thoracic spine [M54.6]    Treatment Diagnosis Pain in neck, pain in R shoulder, pain in thoracic spine, weakness in UEs, decreased R shoulder and cervical ROM, poor posture   Date of Evaluation 21    Additional Pertinent History Incontinence, obesity, anxiety/PTSD/OCD, memory issues, salivary gland cancer, 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/R knee/spine       Functional Outcome Measure Used Neck disability index    Functional Outcome Score 38/50 (21)        Insurance: Primary: Payor: Leann Copeland /  /  / ,   Secondary:    Authorization Information: Aquatics covered, modalities covered, telehealth covered    Visit # 9, 10 for progress note   Visits Allowed: 60 units of CPT codes  thru     Recertification Date:    Physician Follow-Up: 22 PCP   History of Present Illness: Pt states neck, R shoulder and mid back pain since 10/29/2011 (surgery for right radical parotidectomy with facial nerve dissection and sacrifice, gortex sling, partial auriculectomy, skull base and partial temporal bone resection, right neck dissection, facial nerve graft).       SUBJECTIVE: Reports has \"fire\" type pain during stretches at her R side of neck with upper trap stretches. Questions how she can adapt this to cause less pain. Admits she is only able to tolerate sitting on the couch for 20 minutes when watching a movie due to neck pain. Pt requested manual work due to spasms at her neck.       TREATMENT   Precautions: Hx salivary cancer, surgical removal with neck resection, lymphedema risk   Pain: 6/10 R side of neck X in shaded column indicates activity completed today   Modalities Parameters/  Location  Notes         Manual Therapy Time/Technique  Notes   Manual lymphatic drainage and myofascial release to R side of neck and shoulder 30 minutes x Spasms initially but reduced by end   Exercise/Intervention   Notes   Diaphragmatic breathing x5      Upper trap stretch for R 15 sec x3      Levator stretch for R 15 sec x3      Neck extension with L hand on clavicles 15 sec x3   Altered stretch due to spasming at R chest   Chin tuck x10      SCM release x10      Scapular depression 2x10  x Manual cues for scapular mobility and reduce hiking   Shoulder extension with ball on wall x10      Scapular retraction 2x10  x Manual cues for scapular mobility and reduce hiking, 1 set performed in seated and 1 set in prone   Prone shoulder extension x10  x    Scap retraction and shoulder extension x15   Peach t-band   Posture re-ed Throughout  x Cues for reducing R neck rotation and lateral flexion, reduce L scapular hiking, how to position herself on couch for improved lumbar lordosis and head support, to use pillows and R UE support for L UE to reach R side of neck   Neck isometrics x4 directions 10   In seated - corrected tech   Posterior pelvic tilts 10   Hooklying   Pulleys 2 minutes   Flexion and abduction   Shoulder isometrics R 15   Flexion, extension, abduction, adduction - manual cues needed to stop scapular elevation and focus on scapular abduction with shoulder abduction   Bridges 10   Hooklying - difficulty coordinating     Specific Interventions for Next Treatment:  Chin tucks, upper trap/levator/pec stretching, R shoulder AAROM flexion and abduction, R shoulder strength, cervical stability exercises, thoracic extension exercises, may need STM to R neck musculature, postural exercises - Lats and serratus anterior strengthening    Activity Tolerance: Patient tolerated treatment well    ASSESSMENT:  Assessment: Time taken to 10/18/2021

## 2021-10-19 ENCOUNTER — APPOINTMENT (OUTPATIENT)
Dept: PHYSICAL THERAPY | Age: 41
End: 2021-10-19
Payer: COMMERCIAL

## 2021-10-21 ENCOUNTER — VIRTUAL VISIT (OUTPATIENT)
Dept: PSYCHIATRY | Age: 41
End: 2021-10-21
Payer: COMMERCIAL

## 2021-10-21 DIAGNOSIS — F51.5 NIGHTMARES: ICD-10-CM

## 2021-10-21 DIAGNOSIS — F43.10 PTSD (POST-TRAUMATIC STRESS DISORDER): ICD-10-CM

## 2021-10-21 DIAGNOSIS — F33.1 MODERATE EPISODE OF RECURRENT MAJOR DEPRESSIVE DISORDER (HCC): ICD-10-CM

## 2021-10-21 PROCEDURE — 99213 OFFICE O/P EST LOW 20 MIN: CPT | Performed by: PSYCHIATRY & NEUROLOGY

## 2021-10-21 PROCEDURE — G8427 DOCREV CUR MEDS BY ELIG CLIN: HCPCS | Performed by: PSYCHIATRY & NEUROLOGY

## 2021-10-21 PROCEDURE — 90833 PSYTX W PT W E/M 30 MIN: CPT | Performed by: PSYCHIATRY & NEUROLOGY

## 2021-10-21 RX ORDER — FLUOXETINE HYDROCHLORIDE 20 MG/1
CAPSULE ORAL
Qty: 30 CAPSULE | Refills: 2 | Status: SHIPPED | OUTPATIENT
Start: 2021-10-21 | End: 2021-12-23 | Stop reason: SDUPTHER

## 2021-10-21 RX ORDER — PRAZOSIN HYDROCHLORIDE 2 MG/1
2 CAPSULE ORAL NIGHTLY
Qty: 30 CAPSULE | Refills: 2 | Status: SHIPPED | OUTPATIENT
Start: 2021-10-21 | End: 2021-12-23 | Stop reason: SDUPTHER

## 2021-10-21 RX ORDER — PRAZOSIN HYDROCHLORIDE 5 MG/1
5 CAPSULE ORAL NIGHTLY
Qty: 30 CAPSULE | Refills: 2 | Status: SHIPPED | OUTPATIENT
Start: 2021-10-21 | End: 2021-12-23 | Stop reason: SDUPTHER

## 2021-10-21 RX ORDER — CLONAZEPAM 0.5 MG/1
0.5 TABLET ORAL 2 TIMES DAILY PRN
Qty: 60 TABLET | Refills: 2 | Status: SHIPPED | OUTPATIENT
Start: 2021-10-21 | End: 2021-12-23 | Stop reason: SDUPTHER

## 2021-10-21 RX ORDER — FLUOXETINE HYDROCHLORIDE 40 MG/1
CAPSULE ORAL
Qty: 30 CAPSULE | Refills: 2 | Status: SHIPPED | OUTPATIENT
Start: 2021-10-21 | End: 2021-12-23 | Stop reason: SDUPTHER

## 2021-10-21 NOTE — PROGRESS NOTES
[address] [facility]  [facility] [id number] [phone]    Progress Note    Patient:  Cliff Isidro  YOB: 1980  PCP:  EDEN Cavanaugh CNP  Visit Date:  10/21/2021    TELEHEALTH EVALUATION -- Audio/Visual (During OUUFJ-05 public health emergency)    Patient location: home  Physician location: home, [place], [place]  This virtual visit was conducted via interactive, real-time video. Chief Complaint   Patient presents with    Follow-up    Medication Check    Depression    Anxiety    Trauma       SUBJECTIVE:    Time in: 4:10pm  Time out: 4:40pm  Time spent on documentation: 5 minutes    Cliff Isidro, a 42 y.o. female, presents for a follow up visit. Patient reports she is doing well. Patient is compliant with medication regimen. She presents alone. Doing well overall. Got free tickets to a dinner and violin show and giving it to her brother. Not feeling so well today. Her throat is sore. She coughs and smokes during the encounter. Skipped meds 2 weeks. Didn't organize her pill planner. Describes resistance to meds. She restarted them all at prior dose 4 days ago. Voices tolerating that fine. Provided education on safety of going off and on higher doses of medications without titrating up and down on dose. Particularly prazosin (risk of BP spike if stopping abruptly or hypotension if restarting back at 7 mg dose) and Klonopin (risk of withdrawal seizure). Had trouble with doing PT twice per week. \"I can't focus on more than one big thing. \"  \"Tired of hurting all the time. \" Hopeful PT will help. Considering resuming therapy with Dr. Maria Eugenia Sheldon. Joined 2 new writers groups. Their car hasn't been working so gets help with transport. Getting more content. Considering writing a book. 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 Discusses her disability hearing. They had left out hearing loss.  Has a hearing on Jan 4 and that was added to her case. Plans to handle it by herself. Mood feels \"okay, stable\". Not feeling too dark or depressed. Declines to make any changes today. Using Klonopin as prn which I advised is the best plan to avoid building a tolerance and dependence. She reads a poem she wrote called The Cesar". She notes choking on vegetable and being worked up for that. Notes esophageal dilation/pouch causing it to get stuck. Spent much of session in psychotherapy discussing stressors, coping, strategies for change, supportive therapy, psychoeducation. Med Trials: Klonopin, Prozac, prazosin, Depakote (for HA), Trileptal (for HA), Effexor (severe discontinuation syndrome), Wellbutrin    OBJECTIVE:  Vitals: There were no vitals taken for this visit. MENTAL STATUS EXAM:    GENERAL  Build: Overweight    Hygiene:  Appropriate, smokes a cigarette   SENSORIUM Orientation: Place, Person, Time, & Situation     Consciousness: Alert    ATTENTION   Focused   RELATEDNESS  Cooperative    EYE CONTACT   Good    PSYCHOMOTOR  Normal    SPEECH Volume: Normal     Rate: Normal rate and upbeat tone    Amplitude: Within normal limits   MOOD  \"okay, stable\"    AFFECT Range: Full , mood congruent   THOUGHT Process:  Goal-Directed    Content: no evidence of psychosis    COGNITION Insight: Good    Judgement:  Intact    MEMORY  Intact    INTELLIGENCE  Average     Mobility/Gait: Independently     Controlled SubstancesMonitoring: Periodic Controlled Substance Monitoring: No signs of potential drug abuse or diversion identified. , Possible medication side effects, risk of tolerance/dependence & alternative treatments discussed. Baylee Mario MD)       ASSESSMENT: No med change today per her request. Will monitor for AVH. Diagnosis Orders   1. Nightmares  prazosin (MINIPRESS) 2 MG capsule   2. Moderate episode of recurrent major depressive disorder (HCC)  clonazePAM (KLONOPIN) 0.5 MG tablet   3.  PTSD (post-traumatic stress disorder)  clonazePAM (KLONOPIN) 0.5 MG tablet     Medical Hx:     PLAN:     · Medications:   · Prozac 60 mg QAM  · Prazosin 7 mg HS  · Klonopin 0.5 mg BID  · Rx Depakote and Trileptal by Neuro for HA, possible mood benefit from those  · Medical Cannabis   · Therapy: sees Dr. Aneesh Reynoso  · Labs/Tests/Imaging: none   · Records Reviewed: [facility]  · Patient advised to call if patient has any difficulties with treatment  Return in about 8 weeks (around 12/16/2021) for med check, follow up. I spent 30 minutes face to face with this patient. I spent 20 minutes or longer in psychotherapy discussing stressors, coping, strategies for change, supportive therapy, psychoeducation. Remainder of time spent on symptom evaluation and medication management. Electronically signed by Amrik Junior MD on 10/21/2021 at 6:05 PM    Noman Cornejo is a 42 y.o. female being evaluated by a Virtual Visit (video visit) to address concerns as mentioned above. A caregiver was present when appropriate. Due to this being a TeleHealth encounter ([facility]), evaluation of the following organ systems was limited: Vitals/Constitutional/EENT/Resp/CV/GI//MS/Neuro/Skin/Heme-Lymph-Imm. Pursuant to the emergency declaration under the [facility], [address] [place] [address] authority and the [employer] Act, this Virtual Visit was conducted with patient's (and/or legal guardian's) consent, to reduce the patient's risk of exposure to COVID-19 and provide necessary medical care. The patient (and/or legal guardian) has also been advised to contact this office for worsening conditions or problems, and seek emergency medical treatment and/or call 911 if deemed necessary.      Patient identification was verified at the start of the visit: Yes     Total time spent for this encounter: 35 minutes     Services were provided through a video synchronous discussion virtually to substitute for in-person clinic visit. Patient and provider were located at their individual homes.     Electronically signed by Azucena Dumont MD on 10/21/2021 at 6:05 PM

## 2021-10-25 ENCOUNTER — HOSPITAL ENCOUNTER (OUTPATIENT)
Dept: PHYSICAL THERAPY | Age: 41
Setting detail: THERAPIES SERIES
End: 2021-10-25
Payer: COMMERCIAL

## 2021-10-28 ENCOUNTER — APPOINTMENT (OUTPATIENT)
Dept: PHYSICAL THERAPY | Age: 41
End: 2021-10-28
Payer: COMMERCIAL

## 2021-11-01 ENCOUNTER — OFFICE VISIT (OUTPATIENT)
Dept: NEUROLOGY | Age: 41
End: 2021-11-01
Payer: COMMERCIAL

## 2021-11-01 ENCOUNTER — HOSPITAL ENCOUNTER (OUTPATIENT)
Dept: PHYSICAL THERAPY | Age: 41
Setting detail: THERAPIES SERIES
Discharge: HOME | End: 2021-11-01
Payer: COMMERCIAL

## 2021-11-01 VITALS
BODY MASS INDEX: 44.41 KG/M2 | SYSTOLIC BLOOD PRESSURE: 128 MMHG | OXYGEN SATURATION: 98 % | WEIGHT: 293 LBS | HEIGHT: 68 IN | HEART RATE: 80 BPM | DIASTOLIC BLOOD PRESSURE: 80 MMHG

## 2021-11-01 DIAGNOSIS — G44.89 OTHER HEADACHE SYNDROME: Primary | ICD-10-CM

## 2021-11-01 DIAGNOSIS — H53.9 VISUAL DISTURBANCE: ICD-10-CM

## 2021-11-01 PROCEDURE — G8417 CALC BMI ABV UP PARAM F/U: HCPCS | Performed by: NURSE PRACTITIONER

## 2021-11-01 PROCEDURE — 97110 THERAPEUTIC EXERCISES: CPT

## 2021-11-01 PROCEDURE — 97140 MANUAL THERAPY 1/> REGIONS: CPT

## 2021-11-01 PROCEDURE — 4004F PT TOBACCO SCREEN RCVD TLK: CPT | Performed by: NURSE PRACTITIONER

## 2021-11-01 PROCEDURE — 99213 OFFICE O/P EST LOW 20 MIN: CPT | Performed by: NURSE PRACTITIONER

## 2021-11-01 PROCEDURE — G8484 FLU IMMUNIZE NO ADMIN: HCPCS | Performed by: NURSE PRACTITIONER

## 2021-11-01 PROCEDURE — G8427 DOCREV CUR MEDS BY ELIG CLIN: HCPCS | Performed by: NURSE PRACTITIONER

## 2021-11-01 NOTE — PROGRESS NOTES
[address] [facility]  ONCOLOGY REHABILITATION  PHYSICAL THERAPY  [x] DAILY NOTE [] PROGRESS NOTE [] DISCHARGE NOTE    [x] [facility]     Date: 2021  Patient Name:  Rosalie Veliz  : 1980  MRN: [id number]        Referring Practitioner EDEN So*   Diagnosis Other symptoms and signs involving the musculoskeletal system [R29.898]  Cervicalgia [M54.2]  Pain in thoracic spine [M54.6]    Treatment Diagnosis Pain in neck, pain in R shoulder, pain in thoracic spine, weakness in UEs, decreased R shoulder and cervical ROM, poor posture   Date of Evaluation 21    Additional Pertinent History Incontinence, obesity, anxiety/PTSD/OCD, memory issues, salivary gland cancer, 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/R knee/spine       Functional Outcome Measure Used Neck disability index    Functional Outcome Score 38/50 (21)        Insurance: Primary: Payor: Mayra Fuentes /  /  / ,   Secondary:    Authorization Information: Aquatics covered, modalities covered, telehealth covered    Visit # 8, 3/10 for progress note   Visits Allowed: 60 units of CPT codes  thru 56/15/79    Recertification Date:    Physician Follow-Up: 22 PCP   History of Present Illness: Pt states neck, R shoulder and mid back pain since 10/29/2011 (surgery for right radical parotidectomy with facial nerve dissection and sacrifice, gortex sling, partial auriculectomy, skull base and partial temporal bone resection, right neck dissection, facial nerve graft).       SUBJECTIVE: Pt admits increased pain this date due to lifting her vacuum for repairs. Notes her sitting tolerance is limited to 20 minutes but states she thinks she is getting close the 30 minute cesilia. Admits her over activity has improved. Pt has started process of joining [facility].       TREATMENT   Precautions: Hx salivary cancer, surgical removal with neck resection, lymphedema risk   Pain: 7/10 R side of neck    X in shaded column indicates activity completed today   Modalities Parameters/  Location  Notes         Manual Therapy Time/Technique  Notes   Manual lymphatic drainage and myofascial release to R side of neck and shoulder 30 minutes x Spasms initially but reduced by end   Exercise/Intervention   Notes   Diaphragmatic breathing x5      Upper trap stretch for R 15 sec x3      Levator stretch for R 15 sec x3      Neck extension with L hand on clavicles 15 sec x3   Altered stretch due to spasming at R chest   Chin tuck x10      SCM release x10      Scapular depression 2x10  x Manual cues for scapular mobility and reduce hiking   Shoulder extension 2x10  x Peach t-band   Scapular retraction  - peach t-band 2x10  x Manual cues for scapular mobility and reduce hiking, 1 set performed in seated and 1 set in prone   Prone shoulder extension x10      Scap retraction and shoulder extension x15   Peach t-band   Posture re-ed Throughout  x Cues for reducing R neck rotation and lateral flexion, reduce L scapular hiking, how to position herself on couch for improved lumbar lordosis and head support, to use pillows and R UE support for L UE to reach R side of neck   Serratus anterior x10  x Performed in standing with hands closed chain and again with hands open chain   Neck isometrics x4 directions 10   In seated - corrected tech   Posterior pelvic tilts 10   Hooklying   Pulleys 2 minutes   Flexion and abduction   Shoulder isometrics R 15   Flexion, extension, abduction, adduction - manual cues needed to stop scapular elevation and focus on scapular abduction with shoulder abduction   Bridges 10   Hooklying - difficulty coordinating     Specific Interventions for Next Treatment:  Chin tucks, upper trap/levator/pec stretching, R shoulder AAROM flexion and abduction, R shoulder strength, cervical stability exercises, thoracic extension exercises, may need STM to R neck musculature, postural exercises - Lats and serratus anterior strengthening    Activity Tolerance: Patient tolerated treatment well    ASSESSMENT:  Assessment: Progressed lat, rhomboids, and serratus anterior strengthening this date. Pt required visual and manual cues for correct technique and to assure pt. GOALS:  Patient Goal: to improve pain and mobility      Short Term Goals: 4 weeks  1. Patient will report decrease in pain to 5/10 at most to allow ease of ADLs and household tasks. 2. Patient will improve B cervical lateral flexion AROM to 45 degrees to allow decreased pain with household tasks. 3. Patient will improve cervical rotation AROM to 70 degrees to allow decreased pain with turning head during driving. 4. Pt to report spasms at R lateral neck, UT and anterior chest reduced from 20 episodes per day to 10 episodes/day for improved ability to complete household and ADL tasks. 5. Patient will improve shoulder flexion and abduction AROM to 160 degrees to allow decreased pain with reaching overhead.      Long Term Goals: 8 weeks  1. Patient will improve Neck Disability Index Score from 38/50 to 25/50 disability to allow decreased disability and improved functional mobility. 2. Patient will be independent with HEP in order to prevent re-injury and improve functional abilities. Patient Education: Continue with strengthening, to follow up with compression garments for facial lymphedema.   Education Outcome: Verbalized understanding  Education Barriers: None    PLAN: Continue established plan of care      Time In 1415   Time Out 1445   Timed Code Minutes: 30 min   Total Treatment Time: 30 min (2 units)       Electronically Signed by: Jose Wilson, PT PT, DPT, ANU [id number] 11/1/2021

## 2021-11-01 NOTE — PATIENT INSTRUCTIONS
1. Continue with  Depakote 500 mg ER daily. 2. Continue with Trileptal 150 mg two times a day. 3. CBC, HFP  4. Follow-up with ophthalmology as scheduled  5. Referral to  botox injections for difficult to control headache   6. Follow up in 3 months or sooner if needed. 7. Call if any questions or concerns.

## 2021-11-01 NOTE — PROGRESS NOTES
NEUROLOGY OUT PATIENT FOLLOW UP NOTE:  11/1/20213:24 PM    Miller Kothari is here for follow up for headache, visual disturbance. ROS:  Respiratory : no cough, no shortness of breath  Cardiac: no chest pain. No palpitations. Renal : no flank pain, no hematuria    Skin: no rash      No Known Allergies    Current Outpatient Medications:     MAGNESIUM PO, Take by mouth daily, Disp: , Rfl:     Cyanocobalamin (B-12 PO), Take by mouth daily, Disp: , Rfl:     CPAP Machine MISC, by Does not apply route nightly, Disp: , Rfl:     prazosin (MINIPRESS) 5 MG capsule, Take 1 capsule by mouth nightly Along with 2 mg for total dose 7 mg at night, Disp: 30 capsule, Rfl: 2    prazosin (MINIPRESS) 2 MG capsule, Take 1 capsule by mouth nightly Along with 5 mg for total dose of 7 mg at night, Disp: 30 capsule, Rfl: 2    FLUoxetine (PROZAC) 40 MG capsule, take 1 capsule by mouth once daily along with 20 mg for total dose 60 mg daily, Disp: 30 capsule, Rfl: 2    FLUoxetine (PROZAC) 20 MG capsule, take 1 capsule by mouth once daily along with 40 mg for total 60 mg once daily, Disp: 30 capsule, Rfl: 2    clonazePAM (KLONOPIN) 0.5 MG tablet, Take 1 tablet by mouth 2 times daily as needed for Anxiety for up to 90 days. , Disp: 60 tablet, Rfl: 2    divalproex (DEPAKOTE ER) 500 MG extended release tablet, take 1 tablet by mouth once daily, Disp: 30 tablet, Rfl: 3    Omega-3 Fatty Acids (RA FISH OIL) 1000 MG CAPS, take 1 capsule by mouth once daily, Disp: 30 capsule, Rfl: 11    levothyroxine (SYNTHROID) 50 MCG tablet, Take 1 tablet by mouth once daily, Disp: 30 tablet, Rfl: 2    vitamin D (RA VITAMIN D-3) 125 MCG (5000 UT) CAPS capsule, take 1 capsule by mouth once daily, Disp: 30 capsule, Rfl: 11    Misc. Devices (BATHTUB SAFETY RAIL) MISC, Use daily with showers, Disp: 1 each, Rfl: 0    Misc.  Devices MISC, Shower chair - Use daily when showering, Disp: 1 Device, Rfl: 0    OXcarbazepine (TRILEPTAL) 150 MG tablet, Take 1 tablet by mouth 2 times daily, Disp: 60 tablet, Rfl: 3    pilocarpine (SALAGEN) 5 MG tablet, take 1 tablet by mouth three times a day, Disp: 90 tablet, Rfl: 3    Handicap Placard MISC, by Does not apply route Expires on 8/3/2022, Disp: 1 each, Rfl: 0    acetaminophen (TYLENOL) 500 MG tablet, Take 1,000 mg by mouth every 6 hours as needed for Pain, Disp: , Rfl:     ibuprofen (ADVIL;MOTRIN) 200 MG tablet, Take 600 mg by mouth every 6 hours as needed for Pain, Disp: , Rfl:     I reviewed the past medical history, allergies, medications, social history and family history. PE:   Vitals:    11/01/21 1514   BP: 128/80   Site: Left Upper Arm   Position: Sitting   Cuff Size: Medium Adult   Pulse: 80   SpO2: 98%   Weight: (!) 328 lb (148.8 kg)   Height: 5' 8\" (1.727 m)     General Appearance:  awake, alert, oriented, in no acute distress  Gen: NAD, Language is Intact. Skin: no rash, lesion, dry  to touch. warm  Head: no rash, no icterus, hearing is intact on the left, she is deaf with the right ear  Neck: There is no carotid bruits. The Neck is supple. There is no neck lymphadenopathy. Neuro: CN 2-12: there is right facial asymmetry.  Power 5/5 Throughout symmetric, Reflexes are  symmetric. Long tracts are intact. Cerebellar exam is Intact. Sensory exam is intact to light touch.  Gait is intact. Musculoskeletal:  Has no hand arthritis, no limitation of ROM in any of the four extremities. Lower extremities no edema  The abdomen is soft,  intact bowel sounds.            DATA:      Results for orders placed or performed during the hospital encounter of 08/17/21   Vitamin B12 & Folate   Result Value Ref Range    Vitamin B-12 541 211 - 911 pg/mL    Folate 8.5 4.8 - 24.2 ng/mL   Vitamin B6   Result Value Ref Range    Vitamin B6, Plasma 59.6 20.0 - 125.0 nmol/L          Results for orders placed during the hospital encounter of 01/21/21    MRI BRAIN W WO CONTRAST    Addendum 2/15/2021  3:23 PM  ** ADDENDUM #1 **    The small focal area of encephalomalacia in the right corona radiata is not specific for lacunar infarct although this is the most common etiology. Other considerations would include prior infectious/inflammatory/demyelinating process and prior trauma. Final report electronically signed by Dr. Nicole Weiner MD on 2/15/2021 3:21 PM    * ORIGINAL REPORT *  PROCEDURE: MRI BRAIN W WO CONTRAST    INDICATION:Other headache syndrome, Visual disturbance. Dizziness and headaches. Previous radiation for right-sided vestibular neuritis. COMPARISON: MRI brain/IACs dated 7/31/2020. TECHNIQUE: Multiplanar and multiple spin echo T1 and T2-weighted images were obtained through the brain before and after the administration of intravenous contrast. Dedicated images were obtained through the IACs including high-resolution T2-weighted  images and pre-and postcontrast T1-weighted images with fat saturation. 20 mL ProHance was injected in the right AC. FINDINGS:  The ventricles, cisterns and sulci are symmetric and normal in size and configuration. There is a partially empty sella, stable compared to prior exam. There is a small focal area of encephalomalacia in the right corona radiata, stable compared to prior  exam. There is small area of hyperintense T2/FLAIR signal in the white matter of the right temporal lobe adjacent to large right mastoid effusion, similar to prior exam likely on the basis of prior radiation. No other focal areas of abnormal T2/flair  prolongation are identified within the parenchyma. No intra or extra-axial mass is identified. No focal areas restricted diffusion are present. No focal areas of abnormal parenchymal or meningeal enhancement are identified. The 7th and 8th nerve complexes are symmetric without focal nodularity.  There is linear enhancement along the posterior margin of the internal auditory canal on the right, unchanged compared to prior exam. There is enhancement of the basal turn of identified. The 7th and 8th nerve complexes are symmetric without focal nodularity. There is linear enhancement along the posterior margin of the internal auditory canal on the right, unchanged compared to prior exam. There is enhancement of the basal turn of the  cochlea on the right which is mildly less pronounced compared to prior exam.. There is very subtle enhancement of the components of the vestibule and semicircular canals, decreased compared to prior exam. The cisternal segments of the 5th cranial nerves  and Meckel's caves are symmetric and normal in appearance. The major vascular flow voids appear patent. Orbits are unremarkable. Paranasal sinuses are clear. The left mastoid air cells are clear. The right parotid gland is absent, stable compared to prior exam. The left parotid gland is prominent, stable  compared to prior exam.    Impression  1. Decreased enhancement of right labyrinth as evidence for resolving labyrinthitis though with persistent enhancement along the posterior margin of the internal auditory canal suggesting persistent neuritis. 2. Redemonstration of large right mastoid effusion and hyperintense signal in the white matter in the adjacent right temporal lobe likely on the basis of prior radiation. 3. Old lacunar infarct in the right corona radiata. 4. Redemonstration of prior right parotid resection. **This report has been created using voice recognition software. It may contain minor errors which are inherent in voice recognition technology. **      Final report electronically signed by Dr. Fernand Favre, MD on 1/22/2021 3:14 PM             Assessment:     Diagnosis Orders   1. Other headache syndrome     2. Visual disturbance          She feels her headache has improved some in frequency. She is on Depakote and trileptal and is tolerating the medication well. She does feel there is some more room for improvement.  She has tried zonegran and topamax, lamictal without relief. She is agreeable to undergo formal evaluation with PM&r for botox injections for her difficult to control headaches. After detailed discussion with patient we agreed on the following plan. Plan:  1. Continue with  Depakote 500 mg ER daily. 2. Continue with Trileptal 150 mg two times a day. 3. CBC, HFP  4. Follow-up with ophthalmology as scheduled  5. Referral to PM&R re: botox injections for difficult to control headache   6. Follow up in 3 months or sooner if needed. 7. Call if any questions or concerns.     Total time 24 min    Jorje Morfin, APRN - CNP

## 2021-11-04 ENCOUNTER — APPOINTMENT (OUTPATIENT)
Dept: PHYSICAL THERAPY | Age: 41
End: 2021-11-04
Payer: COMMERCIAL

## 2021-11-08 ENCOUNTER — APPOINTMENT (OUTPATIENT)
Dept: PHYSICAL THERAPY | Age: 41
End: 2021-11-08
Payer: COMMERCIAL

## 2021-11-10 ENCOUNTER — HOSPITAL ENCOUNTER (OUTPATIENT)
Dept: PHYSICAL THERAPY | Age: 41
Setting detail: THERAPIES SERIES
End: 2021-11-10
Payer: COMMERCIAL

## 2021-11-11 ENCOUNTER — APPOINTMENT (OUTPATIENT)
Dept: PHYSICAL THERAPY | Age: 41
End: 2021-11-11
Payer: COMMERCIAL

## 2021-11-24 ENCOUNTER — HOSPITAL ENCOUNTER (OUTPATIENT)
Dept: PHYSICAL THERAPY | Age: 41
Setting detail: THERAPIES SERIES
Discharge: HOME | End: 2021-11-24
Payer: COMMERCIAL

## 2021-11-24 PROCEDURE — 97110 THERAPEUTIC EXERCISES: CPT

## 2021-11-24 NOTE — PROGRESS NOTES
** PLEASE SIGN, DATE AND TIME CERTIFICATION BELOW AND RETURN TO [facility] OUTPATIENT REHABILITATION (FAX #: [phone]). ATTEST/CO-SIGN IF ACCESSING VIA [employer]. THANK YOU.**    I certify that I have examined the patient below and determined that Physical Medicine and Rehabilitation service is necessary and that I approve the established plan of care for up to 90 days or as specifically noted.   Attestation, signature or co-signature of physician indicates approval of certification requirements.    ________________________ ____________ __________  Physician Signature   Date   Time      [facility]  PHYSICAL THERAPY  [] DAILY NOTE [x] PROGRESS NOTE [] DISCHARGE NOTE    [x] [facility]     Date: 2021  Patient Name:  Charles Bingham  : 1980  MRN: [id number]        Referring Practitioner EDEN Clayton*   Diagnosis Other symptoms and signs involving the musculoskeletal system [R29.898]  Cervicalgia [M54.2]  Pain in thoracic spine [M54.6]    Treatment Diagnosis Pain in neck, pain in R shoulder, pain in thoracic spine, weakness in UEs, decreased R shoulder and cervical ROM, poor posture   Date of Evaluation 21    Additional Pertinent History Incontinence, obesity, anxiety/PTSD/OCD, memory issues, salivary gland cancer, 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/R knee/spine       Functional Outcome Measure Used Neck disability index    Functional Outcome Score 38/50 (21)        Insurance: Primary: Payor: [employer] /  /  / ,   Secondary:    Authorization Information: Aquatics covered, modalities covered, telehealth covered    Visit # 6, 4/10 for progress note   Visits Allowed: 30 visits  thru    Recertification Date:    Physician Follow-Up: 22 PCP   History of Present Illness: Pt states neck, R shoulder and mid back pain since 10/29/2011 (surgery for right radical parotidectomy with facial nerve dissection and sacrifice, gortex sling, partial auriculectomy, skull base and partial temporal bone resection, right neck dissection, facial nerve graft).       SUBJECTIVE: Reports the frequency and intensity of her neck spasms has significantly improved. Notes her first spasm of the day was in the afternoon instead of in the morning. Admits she is stretching frequently throughout the day, has been working on her posture but continues with pain constantly. Admits she has better proprioception and strength. Has not yet completed the [facility] sponsorship. TREATMENT   Precautions: Hx salivary cancer, surgical removal with neck resection, lymphedema risk   Pain: 7/10 R side of neck, 6/10 L hip and 5/10 R knee    X in shaded column indicates activity completed today   Modalities Parameters/  Location  Notes         Manual Therapy Time/Technique  Notes   Manual lymphatic drainage and myofascial release to R side of neck and shoulder 30 minutes  Spasms initially but reduced by end   Exercise/Intervention   Notes   Nustep       Pulleys 2 min  x Flexion and abduction   Pelvic tilts 20  x Much education as to how to perform correctly and check herself   Bridges 10  x    Straight leg raises 10  x Cues to engage core during activity   Sidelying hip abduction 10  x    Supine hip ER and IR stretch 15 sec x3  x    Seated hip ER and IR 10  x Bilaterally                                                                           Specific Interventions for Next Treatment:  Chin tucks, upper trap/levator/pec stretching, R shoulder AAROM flexion and abduction, R shoulder strength, cervical stability exercises, thoracic extension exercises, may need STM to R neck musculature, postural exercises - Lats and serratus anterior strengthening    Activity Tolerance: Patient tolerated treatment well    ASSESSMENT:  Assessment: Pt has made significant progress towards improved posture and reduced neck spasms throughout the day.  Note continues with constant pain at neck but also complains of pain at L hip and R knee. Recommend for pt to continue with HEP for neck but to transition to more core and LE work for improved ease with functional mobility. Pt admits L hip pain is interfering with sitting and donning shoes and socks and has R knee pain with weight bearing tasks. Discussed potential of aquatic program but would like to first set up home program and have pt obtain membership for [employer] so that she may continue in the pool after discharge. Pt also asked about TENS unit for neck pain and encouraged pt to follow up with PCP regarding this. Educated to never use TENS unit to cover up new or undiagnosed pain and educated to how TENS works to manage symptoms but does not \"fix the problem. \" Also noted significant decrease in R facial lymphedema today. Pt admits she trialed a different sleeping posture with improved results. Discussed importance of sleeping on her back with her head elevated as well as potential for compression but would need to make adjustments to CPAP set up/face piece. R hip ER 66 deg, IR 21 deg. L hip ER 79 deg, IR 16 deg. GOALS:  Patient Goal: to improve pain and mobility - GOAL ONGOING.     Short Term Goals: 4 weeks  1. Patient will report decrease in pain to 5/10 at most to allow ease of ADLs and household tasks. GOAL NOT MET: Report neck pain constant 7/10, L hip pain average 6/10, R knee pain on average 5/10. Continue Goal    2. Patient will improve B cervical lateral flexion AROM to 45 degrees to allow decreased pain with household tasks. GOAL NOT MET: 27 deg bilaterally. Continue Goal     3. Patient will improve cervical rotation AROM to 70 degrees to allow decreased pain with turning head during driving. GOAL NOT MET: R 54 deg, L 59 deg. Continue Goal    4. Pt to report spasms at R lateral neck, UT and anterior chest reduced from 20 episodes per day to 10 episodes/day for improved ability to complete household and ADL tasks.  GOAL MET:  States on average now has about 10 episodes per day with increasing frequency at nighttime. .  Discontinue Goal New Goal: Pt to report R knee pain reduced from 5/10 on average to 3/10 for improved tolerance of functional mobility. 5. Patient will improve shoulder flexion and abduction AROM to 160 degrees to allow decreased pain with reaching overhead. GOAL MET:  R shoulder PROM shoulder flexion 175 deg, abduction 180 deg. L shoulder PROM flexion 180 deg, abduction 180 deg. .  Discontinue Goal New Goal: Pt to report L hip pain reduced from 6/10 on average to 4/10 for improved ease with donning shoes and socks.       Long Term Goals: 8 weeks  1. Patient will improve Neck Disability Index Score from 38/50 to 25/50 disability to allow decreased disability and [id number] functional mobility. GOAL NOT MET: 67/28. Continue Goal     2. Patient will be independent with HEP in order to prevent re-injury and improve functional abilities. GOAL NOT MET: Pt's HEP continues to be updated as she has progressed thru treatment and has transitioned from more head/neck care to LE and core. .  Continue Goal      Patient Education: Continue with focus on posture and neck stretches and neck isometrics for HEP. Add additional activities performed today with handout provided. Recommendation to continue with skilled care. Access Code: [email or web address]  URL: [email or web address]  Date: 11/24/2021  Prepared by: Sheyla Miller    Exercises  Supine Posterior Pelvic Tilt - 1 x daily - 7 x weekly - 1 sets - 10 reps  Supine Bridge - 1 x daily - 7 x weekly - 1 sets - 10 reps  Supine Active Straight Leg Raise - 1 x daily - 7 x weekly - 1 sets - 10 reps  Sidelying Hip Abduction - 1 x daily - 7 x weekly - 1 sets - 10 reps  Supine Hip External Rotation Stretch - 1 x daily - 7 x weekly - 1 sets - 3 reps - 15 hold  Seated Hip Internal Rotation AROM - 1 x daily - 7 x weekly - 1 sets - 10 reps  Seated Hip External Rotation AROM - 1 x daily - 7 x weekly - 1 sets - 10 reps    Education Outcome: Verbalized understanding  Education Barriers: None    PLAN: Continue established plan of care 2x per week for 8 weeks      Time In 1000   Time Out 1045   Timed Code Minutes: 45 min   Total Treatment Time: 45 min - 3 units       Electronically Signed by: Stanford Landrum, PT PT, DPT, ANU, CLT [id number] 11/24/2021

## 2021-12-01 ENCOUNTER — APPOINTMENT (OUTPATIENT)
Dept: PHYSICAL THERAPY | Age: 41
End: 2021-12-01
Payer: COMMERCIAL

## 2021-12-06 ENCOUNTER — TELEPHONE (OUTPATIENT)
Dept: ENT CLINIC | Age: 41
End: 2021-12-06

## 2021-12-06 NOTE — TELEPHONE ENCOUNTER
Patient called and left a message she stated that she had a few things she would like to be seen for with Dr. Woody Smith. She stated that she was informed that his next appointment was January and she really would like seen before he is out of the office in December. She stated that she was concerned that she had a random nosebleed. She stated that she was laughing pretty hard and she just had a nosebleed. Patient didn't state she was calling for active nosebleed. She also was needing her right ear cleaned. She mentioned that she did have cancer in the past and it was surgical side. 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 Patient also stated that she has hearing loss and it from the surgery in the past. She stated that she has a hearing with Social security January 4th and they need a letter. Please advise.

## 2021-12-06 NOTE — TELEPHONE ENCOUNTER
Unfortunately, Dr Akash Kingston' office schedule for the rest of the year is absolutely full. She will have to be seen at an available appointment.

## 2021-12-07 ENCOUNTER — HOSPITAL ENCOUNTER (OUTPATIENT)
Dept: PHYSICAL THERAPY | Age: 41
Setting detail: THERAPIES SERIES
Discharge: HOME | End: 2021-12-07
Payer: COMMERCIAL

## 2021-12-07 PROCEDURE — 97110 THERAPEUTIC EXERCISES: CPT

## 2021-12-07 NOTE — TELEPHONE ENCOUNTER
Patient also stated that she has hearing loss and it from the surgery in the past. She stated that she has a hearing with [employer] security January 4th and they need a letter.      Please advise.

## 2021-12-07 NOTE — PROGRESS NOTES
[facility]  ONCOLOGY REHABILITATION  PHYSICAL THERAPY  [x] DAILY NOTE [] PROGRESS NOTE [] DISCHARGE NOTE    [x] [facility]     Date: 2021  Patient Name:  Socorro Hartley  : 1980  MRN: [id number]        Referring Practitioner Dewane Saint, APRN*   Diagnosis Other symptoms and signs involving the musculoskeletal system [R29.898]  Cervicalgia [M54.2]  Pain in thoracic spine [M54.6]    Treatment Diagnosis Pain in neck, pain in R shoulder, pain in thoracic spine, weakness in UEs, decreased R shoulder and cervical ROM, poor posture   Date of Evaluation 21    Additional Pertinent History Incontinence, obesity, anxiety/PTSD/OCD, memory issues, salivary gland cancer, 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/R knee/spine       Functional Outcome Measure Used Neck disability index    Functional Outcome Score 38/50 (21)        Insurance: Primary: Payor: [employer] /  /  / ,   Secondary:    Authorization Information: Aquatics covered, modalities covered, telehealth covered    Visit # 15, 5/10 for progress note   Visits Allowed: 30 visits  thru 19   Recertification Date:    Physician Follow-Up: 22 PCP   History of Present Illness: Pt states neck, R shoulder and mid back pain since 10/29/2011 (surgery for right radical parotidectomy with facial nerve dissection and sacrifice, gortex sling, partial auriculectomy, skull base and partial temporal bone resection, right neck dissection, facial nerve graft).       SUBJECTIVE: Admits intermittent R anterior shoulder and anterior chest/sternoclavicular region sharp pain with shoulder movement. Has not yet signed up for the [facility]. States has not completed updated HEP from last session as she has memory issues and organizational difficulties.       TREATMENT   Precautions: Hx salivary cancer, surgical removal with neck resection, lymphedema risk   Pain: 7.5/10 R side of neck, mid to low back, L hip, R knee    X in shaded column indicates activity completed today   Modalities Parameters/  Location  Notes         Manual Therapy Time/Technique  Notes   Manual lymphatic drainage and myofascial release to R side of neck and shoulder 30 minutes  Spasms initially but reduced by end   Exercise/Intervention   Notes   Nustep 5 minutes L3 x Seat 10, arms 11;    Pulleys 2 min  x Flexion and abduction   Marches, heel/toe raises, minisquats 10x  x At bar - cues for posture and tech   3-way hip 10x  x At bar - cues for posture and tech                        Pelvic tilts 20  x Much education as to how to perform correctly and check herself   Bridges 15  x    Straight leg raises 10   Cues to engage core during activity   Sidelying hip abduction 10      Supine hip ER and IR stretch 15 sec x3  x    Seated hip ER and IR 10  x Bilaterally                                                                           Specific Interventions for Next Treatment:  Core strengthening, cardio, hip girdle strengthening, posture, hip stretches    Activity Tolerance: Patient tolerated treatment well    ASSESSMENT:  Assessment: Pt with increased difficulty with completing tasks correctly today and required max cues for proper tech. GOALS:  Patient Goal: to improve pain and mobility      Short Term Goals: 4 weeks  1. Patient will report decrease in pain to 5/10 at most to allow ease of ADLs and household tasks. 2. Patient will improve B cervical lateral flexion AROM to 45 degrees to allow decreased pain with household tasks. 3. Patient will improve cervical rotation AROM to 70 degrees to allow decreased pain with turning head during driving. 4. Pt to report R knee pain reduced from 5/10 on average to 3/10 for improved tolerance of functional mobility. 5. Pt to report L hip pain reduced from 6/10 on average to 4/10 for improved ease with donning shoes and socks.       Long Term Goals: 8 weeks  1.  Patient will improve Neck Disability Index Score from 38/50 to 25/50 disability to allow decreased disability and [id number] functional mobility. 2. Patient will be independent with HEP in order to prevent re-injury and improve functional abilities. Patient Education: Encouraged pt to complete HEP as prescribed.   Education Outcome: Verbalized understanding  Education Barriers: None    PLAN: Continue established plan of care       Time In 1545   Time Out 1630   Timed Code Minutes: 45 min   Total Treatment Time: 45 min - 3 units       Electronically Signed by: Elayne Oliver, PT PT, DPT, ANU, CLT [id number] 12/7/2021

## 2021-12-08 NOTE — TELEPHONE ENCOUNTER
Please find out from the patient what the letter is supposed to say specifically and I will craft letter so she has it before her meeting with Social Security on January 4.   Thank you  PFC

## 2021-12-14 ENCOUNTER — HOSPITAL ENCOUNTER (OUTPATIENT)
Dept: PHYSICAL THERAPY | Age: 41
Setting detail: THERAPIES SERIES
End: 2021-12-14
Payer: COMMERCIAL

## 2021-12-15 NOTE — TELEPHONE ENCOUNTER
Please craft a letter to the following effect: To whom it may concern:    I have taken care of this patient for (fill in the dates). Based on my examination and 25 years of clinical otolaryngology practice, it is my opinion that substantially more than likely the cause of her hearing loss is directly and irrevocably related to her cancer care and she cannot expect this function to return. It should be considered as a directly related factor and considered in her evaluation for Social Security based disability. Please let it be noted that I have made fewer than 10 referrals and support of disability through Social Security in my career. I make these recommendations only on rare and compelling circumstances. Sincerely yours,    Dimitrios Kingston MD, [facility]  Chief  Division of Otolaryngology  [facility]    Sign my name on this letter once its produced with a /and your initials in case I cannot sign it before I leave town.

## 2021-12-21 ENCOUNTER — HOSPITAL ENCOUNTER (OUTPATIENT)
Dept: PHYSICAL THERAPY | Age: 41
Setting detail: THERAPIES SERIES
Discharge: HOME | End: 2021-12-21
Payer: COMMERCIAL

## 2021-12-21 PROCEDURE — 97140 MANUAL THERAPY 1/> REGIONS: CPT

## 2021-12-21 PROCEDURE — 97110 THERAPEUTIC EXERCISES: CPT

## 2021-12-21 NOTE — PROGRESS NOTES
[facility] [facility]  ONCOLOGY REHABILITATION  PHYSICAL THERAPY  [x] DAILY NOTE [] PROGRESS NOTE [] DISCHARGE NOTE    [x] [facility]     Date: 2021  Patient Name:  Ashlie Bhardwaj  : 1980  MRN: [id number]        Referring Practitioner EDEN Marks*   Diagnosis Other symptoms and signs involving the musculoskeletal system [R29.898]  Cervicalgia [M54.2]  Pain in thoracic spine [M54.6]    Treatment Diagnosis Pain in neck, pain in R shoulder, pain in thoracic spine, weakness in UEs, decreased R shoulder and cervical ROM, poor posture   Date of Evaluation 21    Additional Pertinent History Incontinence, obesity, anxiety/PTSD/OCD, memory issues, salivary gland cancer, 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/R knee/spine       Functional Outcome Measure Used Neck disability index    Functional Outcome Score 38/50 (21)        Insurance: Primary: Payor: [facility] /  /  / ,   Secondary:    Authorization Information: Aquatics covered, modalities covered, telehealth covered    Visit # 15, 6/10 for progress note   Visits Allowed: 30 visits  thru 3/78/09   Recertification Date: 76   Physician Follow-Up: 22 PCP   History of Present Illness: Pt states neck, R shoulder and mid back pain since 10/29/2011 (surgery for right radical parotidectomy with facial nerve dissection and sacrifice, gortex sling, partial auriculectomy, skull base and partial temporal bone resection, right neck dissection, facial nerve graft).       SUBJECTIVE: Reports she almost fell the other day and is worried that it may have \"wrenched\" something in her R shoulder and neck. Notes she is trying to focus on her posture and finds that she is standing tall more. Continues with reduced frequency of strong cramps to <10/day.       TREATMENT   Precautions: Hx salivary cancer, surgical removal with neck resection, lymphedema risk   Pain: 8.5/10 R side of neck, mid to low back 1/10, L hip 4/10, R knee 0/10    X in shaded column indicates activity completed today   Modalities Parameters/  Location  Notes         Manual Therapy Time/Technique  Notes   Myofascial release to R side of neck 15 minutes x Improved neck mobility by end   Manual lymphatic drainage and myofascial release to R side of neck and shoulder 30 minutes  Spasms initially but reduced by end   Exercise/Intervention   Notes   Nustep 5 minutes L3  Seat 10, arms 11;    Pulleys 2 min  x Flexion and abduction   Marches, heel/toe raises, minisquats 10x   At bar - cues for posture and tech   3-way hip 10x   At bar - cues for posture and tech                        Pelvic tilts 20   Much education as to how to perform correctly and check herself   Bridges 15      Straight leg raises 10   Cues to engage core during activity   Sidelying hip abduction 10      Supine hip ER and IR stretch 15 sec x3      Seated hip ER and IR 10   Bilaterally          Thorough review of HEP, stretches, posture and how to start a walking program   x             Specific Interventions for Next Treatment:  Core strengthening, cardio, hip girdle strengthening, posture, hip stretches    Activity Tolerance: Patient tolerated treatment well    ASSESSMENT:  Assessment: Pt making slow progress but is motivated to continue and to do more independently. GOALS:  Patient Goal: to improve pain and mobility      Short Term Goals: 4 weeks  1. Patient will report decrease in pain to 5/10 at most to allow ease of ADLs and household tasks. 2. Patient will improve B cervical lateral flexion AROM to 45 degrees to allow decreased pain with household tasks. 3. Patient will improve cervical rotation AROM to 70 degrees to allow decreased pain with turning head during driving. 4. Pt to report R knee pain reduced from 5/10 on average to 3/10 for improved tolerance of functional mobility.     5. Pt to report L hip pain reduced from 6/10 on average to 4/10 for improved ease with donning shoes and socks.       Long Term Goals: 8 weeks  1. Patient will improve Neck Disability Index Score from 38/50 to 25/50 disability to allow decreased disability and improved functional mobility. 2. Patient will be independent with HEP in order to prevent re-injury and improve functional abilities.        Patient Education: Follow up with [facility], start walking program.  Education Outcome: Verbalized understanding  Education Barriers: None    PLAN: Continue established plan of care       Time In 1445   Time Out 1515   Timed Code Minutes: 30 min   Total Treatment Time: 30 min - 2 units       Electronically Signed by: Maday Philippe, PT PT, DPT, ANU, CLT [id number] 12/21/2021

## 2021-12-23 ENCOUNTER — VIRTUAL VISIT (OUTPATIENT)
Dept: PSYCHIATRY | Age: 41
End: 2021-12-23
Payer: COMMERCIAL

## 2021-12-23 DIAGNOSIS — F43.10 PTSD (POST-TRAUMATIC STRESS DISORDER): ICD-10-CM

## 2021-12-23 DIAGNOSIS — F33.1 MODERATE EPISODE OF RECURRENT MAJOR DEPRESSIVE DISORDER (HCC): ICD-10-CM

## 2021-12-23 DIAGNOSIS — F51.5 NIGHTMARES: ICD-10-CM

## 2021-12-23 PROCEDURE — 99213 OFFICE O/P EST LOW 20 MIN: CPT | Performed by: PSYCHIATRY & NEUROLOGY

## 2021-12-23 PROCEDURE — G8427 DOCREV CUR MEDS BY ELIG CLIN: HCPCS | Performed by: PSYCHIATRY & NEUROLOGY

## 2021-12-23 PROCEDURE — 90833 PSYTX W PT W E/M 30 MIN: CPT | Performed by: PSYCHIATRY & NEUROLOGY

## 2021-12-23 RX ORDER — CLONAZEPAM 0.5 MG/1
0.5 TABLET ORAL 2 TIMES DAILY PRN
Qty: 60 TABLET | Refills: 2 | Status: SHIPPED | OUTPATIENT
Start: 2021-12-23 | End: 2022-03-03 | Stop reason: SDUPTHER

## 2021-12-23 RX ORDER — FLUOXETINE HYDROCHLORIDE 40 MG/1
CAPSULE ORAL
Qty: 30 CAPSULE | Refills: 2 | Status: SHIPPED | OUTPATIENT
Start: 2021-12-23 | End: 2022-03-03 | Stop reason: SDUPTHER

## 2021-12-23 RX ORDER — PRAZOSIN HYDROCHLORIDE 5 MG/1
5 CAPSULE ORAL NIGHTLY
Qty: 30 CAPSULE | Refills: 2 | Status: SHIPPED | OUTPATIENT
Start: 2021-12-23 | End: 2022-03-03 | Stop reason: SDUPTHER

## 2021-12-23 RX ORDER — PRAZOSIN HYDROCHLORIDE 2 MG/1
2 CAPSULE ORAL NIGHTLY
Qty: 30 CAPSULE | Refills: 2 | Status: SHIPPED | OUTPATIENT
Start: 2021-12-23 | End: 2022-03-03 | Stop reason: SDUPTHER

## 2021-12-23 RX ORDER — FLUOXETINE HYDROCHLORIDE 20 MG/1
CAPSULE ORAL
Qty: 30 CAPSULE | Refills: 2 | Status: SHIPPED | OUTPATIENT
Start: 2021-12-23 | End: 2022-03-03 | Stop reason: SDUPTHER

## 2021-12-23 NOTE — PROGRESS NOTES
[address] [facility]  [facility] [address] [phone]    Progress Note    Patient:  Alexander Carr  YOB: 1980  PCP:  EDEN Huntley CNP  Visit Date:  12/23/2021    TELEHEALTH EVALUATION -- Audio/Visual (During DARXJ-67 public health emergency)    Patient location: home  Physician location: home, [place], [place]  This virtual visit was conducted via interactive, real-time video. Chief Complaint   Patient presents with    Follow-up    Medication Check    Anxiety    Depression    Trauma       SUBJECTIVE:    Time in: 4:13pm  Time out: 4:41pm  Time spent on documentation: 5 minutes    Alexander Carr, a 42 y.o. female, presents for a follow up visit. Patient reports she is doing well. Patient is compliant with medication regimen. She presents alone. Doing \"not bad\". Busy with physical therapy. Trying to be more consistent with it. It's helping. Joined a neurodivergent group online. Getting helpful tips for how to manage symptoms. Seeing her family less after having regular meetups with them. She would like to be better about getting them together. She's been without her Synthroid the last week. Trying to get in touch with PCP. Doing ok mentally. Was off psych meds for about a week. Didn't have the motivation to get the Rxs and fill the planner. Mood worsened. \"I got donte sad\". Notes consistent issues with HA. Gets Depakote from Neuro. Generally takes it in AM.   Advised I typically Rx it at night because causes sedation and may help sleep at night and energy level in daytime. She may try and discuss with Neuro. H/o migraines all her life. Even as a child. Doesn't think it's related to Prozac as I note it can cause HA. Doing meditation, deep breathing, physical movement. Tries to be compliant with meds. Works at establishing routine.   Feels hypervigilant, very aware of her neighbor and what he's is doing whether he's yelling or playing an instrument. Might avoid using the bathroom or showering noting the wall is the thinnest between their houses. Waits for his car to be gone. Scared about her disability hearing on January 4. Notes the need to take care of certain things for that which is challenging. Spent much of session in psychotherapy discussing current stressors, coping skills, avoidance behaviors, strategies for change, supportive therapy, psycheducation. Med Trials: Klonopin, Prozac, prazosin, Depakote (for HA), Trileptal (for HA), Effexor (severe discontinuation syndrome), Wellbutrin    OBJECTIVE:  Vitals: There were no vitals taken for this visit. MENTAL STATUS EXAM:    GENERAL  Build: Overweight    Hygiene:  Appropriate, wearing cat ears   SENSORIUM Orientation: Place, Person, Time, & Situation     Consciousness: Alert    ATTENTION   Focused   RELATEDNESS  Cooperative    EYE CONTACT   Good    PSYCHOMOTOR  Normal    SPEECH Volume: Normal     Rate: Normal rate and tone    Amplitude: Within normal limits   MOOD  Euthymic    AFFECT Range: Full , mood congruent   THOUGHT Process:  Goal-Directed    Content: no evidence of psychosis    COGNITION Insight: Good    Judgement:  Intact    MEMORY  no gross deficits, did not test    INTELLIGENCE  Average     Mobility/Gait: Independently     Controlled SubstancesMonitoring: Periodic Controlled Substance Monitoring: No signs of potential drug abuse or diversion identified. ,Possible medication side effects, risk of tolerance/dependence & alternative treatments discussed. Kenya Ni MD)       ASSESSMENT: No med changes today. Mood is stable when compliant with meds. Consider return to therapy. Will monitor for AVH. Diagnosis Orders   1. Moderate episode of recurrent major depressive disorder (HCC)  clonazePAM (KLONOPIN) 0.5 MG tablet   2. PTSD (post-traumatic stress disorder)  clonazePAM (KLONOPIN) 0.5 MG tablet   3.  Nightmares prazosin (MINIPRESS) 2 MG capsule       PLAN:     · Medications:   · Prozac 60 mg QAM  · Prazosin 7 mg HS  · Klonopin 0.5 mg BID  · Rx Depakote and Trileptal by Neuro for HA, possible mood benefit from those  · Medical Cannabis   · Therapy: previously saw Dr. Vanessa Rhoades  · Labs/Tests/Imaging: none   · Records Reviewed: [facility]  · Patient advised to call if patient has any difficulties with treatment  Return in about 8 weeks (around 2/17/2022) for med check, follow up. I spent 28 minutes face to face with this patient. I spent 16 minutes or longer in psychotherapy discussing current stressors, coping skills, avoidance behaviors, strategies for change, supportive therapy, psycheducation. Remainder of time spent on symptom evaluation and medication management. Electronically signed by Rachel Gandhi MD on 12/23/2021 at 5:38 PM    Gypsy Ferro is a 42 y.o. female being evaluated by a Virtual Visit (video visit) to address concerns as mentioned above. A caregiver was present when appropriate. Due to this being a TeleHealth encounter (XXTRM-65 public health emergency), evaluation of the following organ systems was limited: Vitals/Constitutional/EENT/Resp/CV/GI//MS/Neuro/Skin/Heme-Lymph-Imm. Pursuant to the emergency declaration under the [address] [place] [address], [address] [place] [address] authority and the [employer] and [employer] Act, this Virtual Visit was conducted with patient's (and/or legal guardian's) consent, to reduce the patient's risk of exposure to COVID-19 and provide necessary medical care. The patient (and/or legal guardian) has also been advised to contact this office for worsening conditions or problems, and seek emergency medical treatment and/or call 911 if deemed necessary.      Patient identification was verified at the start of the visit: Yes     Total time spent for this encounter: 33 minutes     Services were provided through a video

## 2021-12-28 ENCOUNTER — APPOINTMENT (OUTPATIENT)
Dept: PHYSICAL THERAPY | Age: 41
End: 2021-12-28
Payer: COMMERCIAL

## 2022-01-04 ENCOUNTER — HOSPITAL ENCOUNTER (OUTPATIENT)
Dept: PHYSICAL THERAPY | Age: 42
Setting detail: THERAPIES SERIES
Discharge: HOME | End: 2022-01-04
Payer: COMMERCIAL

## 2022-01-04 PROCEDURE — 97110 THERAPEUTIC EXERCISES: CPT

## 2022-01-04 NOTE — PROGRESS NOTES
[address] [facility]  ONCOLOGY REHABILITATION  PHYSICAL THERAPY  [x] DAILY NOTE [] PROGRESS NOTE [] DISCHARGE NOTE    [x] [facility]     Date: 2022  Patient Name:  Kahlil Chapin  : 1980  MRN: [id number]        Referring Practitioner EDEN Mishra*   Diagnosis Other symptoms and signs involving the musculoskeletal system [R29.898]  Cervicalgia [M54.2]  Pain in thoracic spine [M54.6]    Treatment Diagnosis Pain in neck, pain in R shoulder, pain in thoracic spine, weakness in UEs, decreased R shoulder and cervical ROM, poor posture   Date of Evaluation 21    Additional Pertinent History Incontinence, obesity, anxiety/PTSD/OCD, memory issues, salivary gland cancer, 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/R knee/spine       Functional Outcome Measure Used Neck disability index    Functional Outcome Score 38/50 (21)        Insurance: Primary: Payor: [employer] /  /  / ,   Secondary:    Authorization Information: Aquatics covered, modalities covered, telehealth covered    Visit # 15, 6/10 for progress note   Visits Allowed: 30 visits  thru    Recertification Date: 31   Physician Follow-Up: 22 PCP   History of Present Illness: Pt states neck, R shoulder and mid back pain since 10/29/2011 (surgery for right radical parotidectomy with facial nerve dissection and sacrifice, gortex sling, partial auriculectomy, skull base and partial temporal bone resection, right neck dissection, facial nerve graft).       SUBJECTIVE: Pt reports has been more active and notes her pain is reducing.       TREATMENT   Precautions: Hx salivary cancer, surgical removal with neck resection, lymphedema risk   Pain: 0/10 R side of neck, mid to low back 0/10, L hip 0/10, R knee 0/10    X in shaded column indicates activity completed today   Modalities Parameters/  Location  Notes         Manual Therapy Time/Technique  Notes   Myofascial release to R side of neck 15 minutes  Improved neck mobility by end   Manual lymphatic drainage and myofascial release to R side of neck and shoulder 30 minutes  Spasms initially but reduced by end   Exercise/Intervention   Notes   Nustep 6 minutes L3 x Seat 10, arms 11; 526 steps   Pulleys 2 min  x Flexion and abduction   Marches, heel/toe raises, sit<>stand from chair 10x  x At bar - cues for posture and tech   3-way hip 10x  x At bar - cues for posture and tech   Shoulder extension, retractions, punches 10x  x Peach t-band   6 inch step ups 5x  x Forward and lateral with B HR          Pelvic tilts 20   Much education as to how to perform correctly and check herself   Bridges 15      Straight leg raises 10   Cues to engage core during activity   Sidelying hip abduction 10      Supine hip ER and IR stretch 15 sec x3      Seated hip ER and IR 10  x Bilaterally, peach t-band          Thorough review of HEP, stretches, posture and how to start a walking program                Specific Interventions for Next Treatment:  Core strengthening, cardio, hip girdle strengthening, posture, hip stretches    Activity Tolerance: Patient tolerated treatment well    ASSESSMENT:  Assessment: Good tolerance of progression this date. Mild crepitus and pain at L knee with squats so changed to sit <> stand for improved tech. Significantly improved posture and reduced R shoulder hiking. GOALS:  Patient Goal: to improve pain and mobility      Short Term Goals: 4 weeks  1. Patient will report decrease in pain to 5/10 at most to allow ease of ADLs and household tasks. 2. Patient will improve B cervical lateral flexion AROM to 45 degrees to allow decreased pain with household tasks. 3. Patient will improve cervical rotation AROM to 70 degrees to allow decreased pain with turning head during driving. 4. Pt to report R knee pain reduced from 5/10 on average to 3/10 for improved tolerance of functional mobility.     5. Pt to report L hip pain reduced from 6/10 on average to 4/10 for improved ease with donning shoes and socks.       Long Term Goals: 8 weeks  1. Patient will improve Neck Disability Index Score from 38/50 to 25/50 disability to allow decreased disability and improved functional mobility. 2. Patient will be independent with HEP in order to prevent re-injury and improve functional abilities. Patient Education: Continue with HEP.   Education Outcome: Verbalized understanding  Education Barriers: None    PLAN: Continue established plan of care       Time In 1500   Time Out 1545   Timed Code Minutes: 45 min   Total Treatment Time: 45 min - 3 units       Electronically Signed by: Brayan Srinivasan PT PT, DPT, ANU, CLT [id number] 1/4/2022

## 2022-01-11 ENCOUNTER — HOSPITAL ENCOUNTER (OUTPATIENT)
Dept: PHYSICAL THERAPY | Age: 42
Setting detail: THERAPIES SERIES
End: 2022-01-11
Payer: COMMERCIAL

## 2022-01-17 ENCOUNTER — APPOINTMENT (OUTPATIENT)
Dept: PHYSICAL THERAPY | Age: 42
End: 2022-01-17
Payer: COMMERCIAL

## 2022-01-24 ENCOUNTER — HOSPITAL ENCOUNTER (OUTPATIENT)
Dept: PHYSICAL THERAPY | Age: 42
Setting detail: THERAPIES SERIES
End: 2022-01-24
Payer: COMMERCIAL

## 2022-01-31 ENCOUNTER — HOSPITAL ENCOUNTER (OUTPATIENT)
Dept: PHYSICAL THERAPY | Age: 42
Setting detail: THERAPIES SERIES
Discharge: HOME | End: 2022-01-31
Payer: COMMERCIAL

## 2022-01-31 PROCEDURE — 97110 THERAPEUTIC EXERCISES: CPT

## 2022-01-31 PROCEDURE — 97535 SELF CARE MNGMENT TRAINING: CPT

## 2022-01-31 NOTE — PROGRESS NOTES
** PLEASE SIGN, DATE AND TIME CERTIFICATION BELOW AND RETURN TO [facility] OUTPATIENT REHABILITATION (FAX #: [phone]). ATTEST/CO-SIGN IF ACCESSING VIA [employer]. THANK YOU.**    I certify that I have examined the patient below and determined that Physical Medicine and Rehabilitation service is necessary and that I approve the established plan of care for up to 90 days or as specifically noted.   Attestation, signature or co-signature of physician indicates approval of certification requirements.    ________________________ ____________ __________  Physician Signature   Date   Time      [address] [facility]  PHYSICAL THERAPY  [x] DAILY NOTE [x] PROGRESS NOTE [] DISCHARGE NOTE    [x] [facility]     Date: 2022  Patient Name:  Micky Torres  : 1980  MRN: [id number]        Referring Practitioner EDEN Hernandez*   Diagnosis Other symptoms and signs involving the musculoskeletal system [R29.898]  Cervicalgia [M54.2]  Pain in thoracic spine [M54.6]    Treatment Diagnosis Pain in neck, pain in R shoulder, pain in thoracic spine, weakness in UEs, decreased R shoulder and cervical ROM, poor posture   Date of Evaluation 21    Additional Pertinent History Incontinence, obesity, anxiety/PTSD/OCD, memory issues, salivary gland cancer, 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/R knee/spine       Functional Outcome Measure Used Neck disability index    Functional Outcome Score 38/50 (21)        Insurance: Primary: Payor: Nayla Gonzales /  /  / ,   Secondary:    Authorization Information: Aquatics covered, modalities covered, telehealth covered    Visit # 15, 0/10 for progress note   Visits Allowed: 30 visits  thru    Recertification Date: 35   Physician Follow-Up: 22 Neuro - [place]   History of Present Illness: Pt states neck, R shoulder and mid back pain since 10/29/2011 (surgery for right radical parotidectomy with facial nerve dissection and sacrifice, gortex sling, partial auriculectomy, skull base and partial temporal bone resection, right neck dissection, facial nerve graft).       SUBJECTIVE: Pt has had to cancel her last few appts due to illness and inclement weather. Notes her overall activity levels have improved but is still aggravated by certain tasks, weather and day-to-day factors. Admits increased frequency of her R knee \"giving way\" and L hip pain has been worse with the colder temperatures. Also complains that balance continues to be an issue.       TREATMENT   Precautions: Hx salivary cancer, surgical removal with neck resection, lymphedema risk   Pain: 5/10 HA, 7.5/10 R side of neck, R shoulder 6/10, mid to low back 1/10, L hip 2/10, R knee 2/10    X in shaded column indicates activity completed today   Modalities Parameters/  Location  Notes         Manual Therapy Time/Technique  Notes   Myofascial release to R side of neck 15 minutes  Improved neck mobility by end   Manual lymphatic drainage and myofascial release to R side of neck and shoulder 30 minutes  Spasms initially but reduced by end   Exercise/Intervention   Notes   Nustep 6 minutes L3  Seat 10, arms 11; 526 steps   Pulleys 2 min   Flexion and abduction   Marches, heel/toe raises, sit<>stand from chair 10x   At bar - cues for posture and tech   3-way hip 10x   At bar - cues for posture and tech   Shoulder extension, retractions, punches 10x   Peach t-band   6 inch step ups 5x   Forward and lateral with B HR          Pelvic tilts 20   Much education as to how to perform correctly and check herself   Bridges 15      Straight leg raises 10   Cues to engage core during activity   Sidelying hip abduction 10      Supine hip ER and IR stretch 15 sec x3      Seated hip ER and IR 10   Bilaterally, peach t-band          Thorough review of HEP, stretches, posture and how to start a walking program                Specific Interventions for Next Treatment:  Aquatics for core strengthening, cardio, hip girdle/LE strengthening, posture, hip stretches, balance    Activity Tolerance: Patient tolerated treatment well      ASSESSMENT:  Assessment: Pt has made progress towards goals with slowly improved Neck Disability Index score, neck rotation, hip and knee pain and compliance with HEP. Pt with improved overall participation in daily activities however note balance continues to be a problem with a near fall reported last week. Today's Rosado Balance score of 40/56 (moderate risk of fall). Pt would benefit from continued skilled PT with transition from land to aquatics for further knee and hip strengthening while offloading joint to reduce risk of pain flare-up while also having greater incorporation of balance activities for reduced risk of falls. She plans to investigate joining local independent facility where she can then continue her program after discharge from therapy. GOALS:  Patient Goal: to improve pain and mobility      Short Term Goals: 4 weeks  1. Patient will report decrease in pain to 5/10 at most to allow ease of ADLs and household tasks. GOAL NOT MET: neck pain is 7.5/10 currently. Continue Goal    2. Patient will improve B cervical lateral flexion AROM to 45 degrees to allow decreased pain with household tasks. GOAL NOT MET: R 24 deg, L 26 deg. Continue Goal    3. Patient will improve cervical rotation AROM to 70 degrees to allow decreased pain with turning head during driving. GOAL NOT MET: R rotation 70 deg, L 68 deg - making excellent progress. Continue Goal    4. Pt to report R knee pain reduced from 5/10 on average to 3/10 for improved tolerance of functional mobility. GOAL MET:  Reports 3/10 with weightbearing and 2/10 at rest.  Revise Goal: Pt to demo improved Rosado Balance score of 40/56 to 47/56 for reduced risk of falling in home and community. 5. Pt to report L hip pain reduced from 6/10 on average to 4/10 for improved ease with donning shoes and socks.

## 2022-02-03 DIAGNOSIS — G44.89 OTHER HEADACHE SYNDROME: ICD-10-CM

## 2022-02-03 RX ORDER — DIVALPROEX SODIUM 500 MG/1
TABLET, EXTENDED RELEASE ORAL
Qty: 30 TABLET | Refills: 3 | Status: SHIPPED | OUTPATIENT
Start: 2022-02-03

## 2022-02-08 NOTE — TELEPHONE ENCOUNTER
Last visit- 7/15/2021  Next visit- Visit date not found    Requested Prescriptions     Pending Prescriptions Disp Refills    levothyroxine (SYNTHROID) 50 MCG tablet 30 tablet 2     Sig: Take 1 tablet by mouth once daily

## 2022-02-09 RX ORDER — LEVOTHYROXINE SODIUM 0.05 MG/1
TABLET ORAL
Qty: 30 TABLET | Refills: 2 | Status: SHIPPED | OUTPATIENT
Start: 2022-02-09 | End: 2022-10-25 | Stop reason: SDUPTHER

## 2022-02-11 ENCOUNTER — OFFICE VISIT (OUTPATIENT)
Dept: NEUROLOGY | Age: 42
End: 2022-02-11
Payer: COMMERCIAL

## 2022-02-11 VITALS
DIASTOLIC BLOOD PRESSURE: 76 MMHG | OXYGEN SATURATION: 97 % | HEART RATE: 77 BPM | HEIGHT: 67 IN | SYSTOLIC BLOOD PRESSURE: 92 MMHG | WEIGHT: 293 LBS | BODY MASS INDEX: 45.99 KG/M2

## 2022-02-11 DIAGNOSIS — G44.89 OTHER HEADACHE SYNDROME: Primary | ICD-10-CM

## 2022-02-11 DIAGNOSIS — H53.9 VISUAL DISTURBANCE: ICD-10-CM

## 2022-02-11 PROCEDURE — G8427 DOCREV CUR MEDS BY ELIG CLIN: HCPCS | Performed by: PSYCHIATRY & NEUROLOGY

## 2022-02-11 PROCEDURE — 4004F PT TOBACCO SCREEN RCVD TLK: CPT | Performed by: PSYCHIATRY & NEUROLOGY

## 2022-02-11 PROCEDURE — G8417 CALC BMI ABV UP PARAM F/U: HCPCS | Performed by: PSYCHIATRY & NEUROLOGY

## 2022-02-11 PROCEDURE — 99213 OFFICE O/P EST LOW 20 MIN: CPT | Performed by: PSYCHIATRY & NEUROLOGY

## 2022-02-11 PROCEDURE — G8484 FLU IMMUNIZE NO ADMIN: HCPCS | Performed by: PSYCHIATRY & NEUROLOGY

## 2022-02-11 NOTE — PROGRESS NOTES
NEUROLOGY OUT PATIENT FOLLOW UP NOTE:  2/11/20222:46 PM    Raul Acevedo is here for follow up for headache, visual disturbance. No Known Allergies    Current Outpatient Medications:     divalproex (DEPAKOTE ER) 500 MG extended release tablet, take 1 tablet by mouth once daily (Patient not taking: Reported on 2/11/2022), Disp: 30 tablet, Rfl: 3    clonazePAM (KLONOPIN) 0.5 MG tablet, Take 1 tablet by mouth 2 times daily as needed for Anxiety for up to 90 days. , Disp: 60 tablet, Rfl: 2    FLUoxetine (PROZAC) 40 MG capsule, take 1 capsule by mouth once daily along with 20 mg for total dose 60 mg daily, Disp: 30 capsule, Rfl: 2    FLUoxetine (PROZAC) 20 MG capsule, take 1 capsule by mouth once daily along with 40 mg for total 60 mg once daily, Disp: 30 capsule, Rfl: 2    prazosin (MINIPRESS) 5 MG capsule, Take 1 capsule by mouth nightly Along with 2 mg for total dose 7 mg at night, Disp: 30 capsule, Rfl: 2    prazosin (MINIPRESS) 2 MG capsule, Take 1 capsule by mouth nightly Along with 5 mg for total dose of 7 mg at night, Disp: 30 capsule, Rfl: 2    MAGNESIUM PO, Take by mouth daily, Disp: , Rfl:     CPAP Machine MISC, by Does not apply route nightly, Disp: , Rfl:     Omega-3 Fatty Acids (RA FISH OIL) 1000 MG CAPS, take 1 capsule by mouth once daily, Disp: 30 capsule, Rfl: 11    vitamin D (RA VITAMIN D-3) 125 MCG (5000 UT) CAPS capsule, take 1 capsule by mouth once daily, Disp: 30 capsule, Rfl: 11    Misc. Devices (BATHTUB SAFETY RAIL) MISC, Use daily with showers, Disp: 1 each, Rfl: 0    Misc.  Devices MISC, Shower chair - Use daily when showering, Disp: 1 Device, Rfl: 0    OXcarbazepine (TRILEPTAL) 150 MG tablet, Take 1 tablet by mouth 2 times daily, Disp: 60 tablet, Rfl: 3    pilocarpine (SALAGEN) 5 MG tablet, take 1 tablet by mouth three times a day, Disp: 90 tablet, Rfl: 3    Handicap Placard MISC, by Does not apply route Expires on 8/3/2022, Disp: 1 each, Rfl: 0    acetaminophen (TYLENOL) 500 MG tablet, Take 1,000 mg by mouth every 6 hours as needed for Pain, Disp: , Rfl:     ibuprofen (ADVIL;MOTRIN) 200 MG tablet, Take 600 mg by mouth every 6 hours as needed for Pain, Disp: , Rfl:     levothyroxine (SYNTHROID) 50 MCG tablet, Take 1 tablet by mouth once daily (Patient not taking: Reported on 2/11/2022), Disp: 30 tablet, Rfl: 2    Cyanocobalamin (B-12 PO), Take by mouth daily (Patient not taking: Reported on 2/11/2022), Disp: , Rfl:     I reviewed the past medical history, allergies, medications, social history and family history. PE:   Vitals:    02/11/22 1432   BP: 92/76   Site: Left Upper Arm   Position: Sitting   Cuff Size: Large Adult   Pulse: 77   SpO2: 97%   Weight: (!) 324 lb (147 kg)   Height: 5' 7\" (1.702 m)     General Appearance: Jennyfer Gibbs, alert, oriented, in no acute distress, she is wearing mask. Gen: NAD, Language is Intact. Skin: no rash, lesion, dry  to touch. warm  Head: no rash, no icterus, hearing is intact on the left, she is deaf with the right ear  Neck: There is no carotid bruits. The Neck is supple. There is no neck lymphadenopathy. Neuro: CN 2-12: there is right facial asymmetry.  Power 5/5 Throughout symmetric, Reflexes are  symmetric. Long tracts are intact. Cerebellar exam is Intact. Sensory exam is intact to light touch.  Gait is intact uses cane. Musculoskeletal:  Has no hand arthritis, no limitation of ROM in any of the four extremities.   Lower extremities no edema            DATA:      Results for orders placed or performed during the hospital encounter of 08/17/21   Vitamin B12 & Folate   Result Value Ref Range    Vitamin B-12 541 211 - 911 pg/mL    Folate 8.5 4.8 - 24.2 ng/mL   Vitamin B6   Result Value Ref Range    Vitamin B6, Plasma 59.6 20.0 - 125.0 nmol/L          Results for orders placed during the hospital encounter of 01/21/21    MRI BRAIN W WO CONTRAST    Addendum 2/15/2021  3:23 PM  ** ADDENDUM #1 **    The small focal area of encephalomalacia in the right corona radiata is not specific for lacunar infarct although this is the most common etiology. Other considerations would include prior infectious/inflammatory/demyelinating process and prior trauma. Final report electronically signed by Dr. Arianna Campbell MD on 2/15/2021 3:21 PM    * ORIGINAL REPORT *  PROCEDURE: MRI BRAIN W WO CONTRAST    INDICATION:Other headache syndrome, Visual disturbance. Dizziness and headaches. Previous radiation for right-sided vestibular neuritis. COMPARISON: MRI brain/IACs dated 7/31/2020. TECHNIQUE: Multiplanar and multiple spin echo T1 and T2-weighted images were obtained through the brain before and after the administration of intravenous contrast. Dedicated images were obtained through the IACs including high-resolution T2-weighted  images and pre-and postcontrast T1-weighted images with fat saturation. 20 mL ProHance was injected in the right AC. FINDINGS:  The ventricles, cisterns and sulci are symmetric and normal in size and configuration. There is a partially empty sella, stable compared to prior exam. There is a small focal area of encephalomalacia in the right corona radiata, stable compared to prior  exam. There is small area of hyperintense T2/FLAIR signal in the white matter of the right temporal lobe adjacent to large right mastoid effusion, similar to prior exam likely on the basis of prior radiation. No other focal areas of abnormal T2/flair  prolongation are identified within the parenchyma. No intra or extra-axial mass is identified. No focal areas restricted diffusion are present. No focal areas of abnormal parenchymal or meningeal enhancement are identified. The 7th and 8th nerve complexes are symmetric without focal nodularity.  There is linear enhancement along the posterior margin of the internal auditory canal on the right, unchanged compared to prior exam. There is enhancement of the basal turn of the  cochlea on the right which is mildly less pronounced compared to prior exam.. There is very subtle enhancement of the components of the vestibule and semicircular canals, decreased compared to prior exam. The cisternal segments of the 5th cranial nerves  and Meckel's caves are symmetric and normal in appearance. The major vascular flow voids appear patent. Orbits are unremarkable. Paranasal sinuses are clear. The left mastoid air cells are clear. The right parotid gland is absent, stable compared to prior exam. The left parotid gland is prominent, stable  compared to prior exam.    Narrative  PROCEDURE: MRI BRAIN W WO CONTRAST    INDICATION:Other headache syndrome, Visual disturbance. Dizziness and headaches. Previous radiation for right-sided vestibular neuritis. COMPARISON: MRI brain/IACs dated 7/31/2020. TECHNIQUE: Multiplanar and multiple spin echo T1 and T2-weighted images were obtained through the brain before and after the administration of intravenous contrast. Dedicated images were obtained through the IACs including high-resolution T2-weighted  images and pre-and postcontrast T1-weighted images with fat saturation. 20 mL ProHance was injected in the right AC. FINDINGS:  The ventricles, cisterns and sulci are symmetric and normal in size and configuration. There is a partially empty sella, stable compared to prior exam. There is a small focal area of encephalomalacia in the right corona radiata, stable compared to prior  exam. There is small area of hyperintense T2/FLAIR signal in the white matter of the right temporal lobe adjacent to large right mastoid effusion, similar to prior exam likely on the basis of prior radiation. No other focal areas of abnormal T2/flair  prolongation are identified within the parenchyma. No intra or extra-axial mass is identified. No focal areas restricted diffusion are present. No focal areas of abnormal parenchymal or meningeal enhancement are identified.     The 7th and 8th nerve complexes are symmetric without focal nodularity. There is linear enhancement along the posterior margin of the internal auditory canal on the right, unchanged compared to prior exam. There is enhancement of the basal turn of the  cochlea on the right which is mildly less pronounced compared to prior exam.. There is very subtle enhancement of the components of the vestibule and semicircular canals, decreased compared to prior exam. The cisternal segments of the 5th cranial nerves  and Meckel's caves are symmetric and normal in appearance. The major vascular flow voids appear patent. Orbits are unremarkable. Paranasal sinuses are clear. The left mastoid air cells are clear. The right parotid gland is absent, stable compared to prior exam. The left parotid gland is prominent, stable  compared to prior exam.    Impression  1. Decreased enhancement of right labyrinth as evidence for resolving labyrinthitis though with persistent enhancement along the posterior margin of the internal auditory canal suggesting persistent neuritis. 2. Redemonstration of large right mastoid effusion and hyperintense signal in the white matter in the adjacent right temporal lobe likely on the basis of prior radiation. 3. Old lacunar infarct in the right corona radiata. 4. Redemonstration of prior right parotid resection. **This report has been created using voice recognition software. It may contain minor errors which are inherent in voice recognition technology. **      Final report electronically signed by Dr. Anatoly Chandler MD on 1/22/2021 3:14 PM             Assessment:     Diagnosis Orders   1. Other headache syndrome     2. Visual disturbance            Follow up for headache. she stopped the Depakote due to lack of benefit. She is now on Trileptal 150 mg twice a day, she is compliant, no side effects reported. She has tried zonegran and topamax, lamictal without relief.  She is agreeable to undergo formal evaluation with PM&r for botox injections for her difficult to control headaches. She is awaiting evaluation with them re Botox injection re headache. She is not seen the eye Dr yet. She is not interested in any new medications at this time. After detailed discussion with patient we agreed on the following plan. Plan:  1. Continue with Trileptal 150 mg two times a day. 2. CBC, HFP  3. Follow-up with ophthalmology as scheduled  4. Follow up with PM&R re: botox injections for difficult to control headache   5. Follow up in 6 months or sooner if needed. 6. Call if any questions or concerns.     Total time 25 min    Kylah Samayoa MD

## 2022-02-22 ENCOUNTER — OFFICE VISIT (OUTPATIENT)
Dept: PHYSICAL MEDICINE AND REHAB | Age: 42
End: 2022-02-22
Payer: COMMERCIAL

## 2022-02-22 VITALS
BODY MASS INDEX: 45.99 KG/M2 | SYSTOLIC BLOOD PRESSURE: 134 MMHG | WEIGHT: 293 LBS | DIASTOLIC BLOOD PRESSURE: 88 MMHG | HEIGHT: 67 IN

## 2022-02-22 DIAGNOSIS — M62.838 SPASM OF MUSCLE: ICD-10-CM

## 2022-02-22 DIAGNOSIS — G89.4 CHRONIC PAIN SYNDROME: ICD-10-CM

## 2022-02-22 DIAGNOSIS — G43.009 MIGRAINE WITHOUT AURA AND WITHOUT STATUS MIGRAINOSUS, NOT INTRACTABLE: Primary | ICD-10-CM

## 2022-02-22 PROCEDURE — G8484 FLU IMMUNIZE NO ADMIN: HCPCS | Performed by: NURSE PRACTITIONER

## 2022-02-22 PROCEDURE — 4004F PT TOBACCO SCREEN RCVD TLK: CPT | Performed by: NURSE PRACTITIONER

## 2022-02-22 PROCEDURE — G8427 DOCREV CUR MEDS BY ELIG CLIN: HCPCS | Performed by: NURSE PRACTITIONER

## 2022-02-22 PROCEDURE — 99214 OFFICE O/P EST MOD 30 MIN: CPT | Performed by: NURSE PRACTITIONER

## 2022-02-22 PROCEDURE — G8417 CALC BMI ABV UP PARAM F/U: HCPCS | Performed by: NURSE PRACTITIONER

## 2022-02-22 RX ORDER — BACLOFEN 10 MG/1
10 TABLET ORAL 3 TIMES DAILY
Qty: 90 TABLET | Refills: 0 | Status: SHIPPED | OUTPATIENT
Start: 2022-02-22 | End: 2022-10-25 | Stop reason: SDUPTHER

## 2022-02-22 NOTE — PROGRESS NOTES
Chronic Pain/PM&R Clinic Note     Encounter Date: 2/22/22    Subjective:   Chief Complaint:   Chief Complaint   Patient presents with    New Patient     headache       History of Present Illness:   Cira Amin is a 42 y.o. female seen in the clinic initially on 02/22/22 upon request from EDEN Burnham *  for her history of headache. 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 Patient underwent radiation x2, including proton at that time. She is now in remission. Patient states her headaches are aggravated by cold weather, wind, looking to either her left or right. Patient states a lot of her migraines start the base of the right side of her skull or below her right ear. Majority of her migraines are right-sided only, while she does get some bilaterally. Patient states pain is improved with rest, closing her eyes, and listening to soothing music. Patient states she does get light and sound sensitivity with her migraine headaches. She has also found stretching her neck to help ease her headaches. Patient is currently in physical therapy and has found that to be beneficial, she states she has noticed her self adjusting her posture. Patient states she has headaches daily but migraine headaches greater than 15 headache days per month lasting longer than 4 hours. Patient states she also has a lot of muscle spasms in her neck. Patient states she has tried ibuprofen, aspirin, propanolol, Topamax, but gabapentin, amitriptyline, Depakote, and Trileptal.  All of which have been relatively ineffective. Patient states she is not currently working she is trying to get disability. 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 She currently lives at home with her partner, Alessia Arcos.     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    Removed tumor, salivary glands, facial nerve graft    Anxiety     Arthritis     knees & right shoulder, hip    Depression     Hypothyroidism     PTSD (post-traumatic stress disorder)     Sleep apnea        Past Surgical History:   Procedure Laterality Date    EUA PELVIC N/A 02/21/2019    REMOVAL AND REINSERTION OF MIRENA, EXAM UNDER ANESTHESIA, PAP performed by Edgardo Mclain MD at [place] [address] Right 12/10/2020    EYE SURGERY Right     SELECTIVE NECK DISSECTION Right 10/2011    Radiacal Neck for CA    TUMOR EXCISION  2011    excision salivary gland (radical neck disection & nerve graft) - radiation    TYMPANOSTOMY TUBE PLACEMENT      age 14years old   [facility] WISDOM TOOTH EXTRACTION  2018       Family History   Problem Relation Age of Onset    Diabetes Mother    [facility] Arthritis Mother     Obesity Mother     Depression Mother     No Known Problems Father     Depression Brother     Uterine Cancer Maternal Grandmother     Breast Cancer Paternal Aunt     Breast Cancer Paternal Aunt          Medications & Allergies:   Current Outpatient Medications   Medication Instructions    acetaminophen (TYLENOL) 1,000 mg, Oral, EVERY 6 HOURS PRN    baclofen (LIORESAL) 10 mg, Oral, 3 TIMES DAILY    clonazePAM (KLONOPIN) 0.5 mg, Oral, 2 TIMES DAILY PRN    CPAP Machine MISC Does not apply, NIGHTLY    Cyanocobalamin (B-12 PO) Oral, DAILY    divalproex (DEPAKOTE ER) 500 MG extended release tablet take 1 tablet by mouth once daily    FLUoxetine (PROZAC) 20 MG capsule take 1 capsule by mouth once daily along with 40 mg for total 60 mg once daily    FLUoxetine (PROZAC) 40 MG capsule take 1 capsule by mouth once daily along with 20 mg for total dose 60 mg daily    Handicap Placard MISC Does not apply, Expires on 8/3/2022    ibuprofen (ADVIL;MOTRIN) 600 mg, Oral, EVERY 6 HOURS PRN    levothyroxine (SYNTHROID) 50 MCG tablet Take 1 tablet by mouth once daily    MAGNESIUM PO Oral, DAILY    Misc. Devices (BATHTUB SAFETY RAIL) MISC Use daily with showers    Misc. Devices [address] [facility] Shower chair - Use daily when showering    Omega-3 Fatty Acids (RA FISH OIL) 1000 MG CAPS take 1 capsule by mouth once daily    OXcarbazepine (TRILEPTAL) 150 mg, Oral, 2 TIMES DAILY    pilocarpine (SALAGEN) 5 MG tablet take 1 tablet by mouth three times a day    prazosin (MINIPRESS) 5 mg, Oral, NIGHTLY, Along with 2 mg for total dose 7 mg at night    prazosin (MINIPRESS) 2 mg, Oral, NIGHTLY, Along with 5 mg for total dose of 7 mg at night    vitamin D (RA VITAMIN D-3) 125 MCG (5000 UT) CAPS capsule take 1 capsule by mouth once daily       No Known Allergies    Review of Systems:   Constitutional: negative for weight changes or fevers  Genitourinary: negative for bowel/bladder incontinence   Musculoskeletal: positive for migraine headaches  Neurological: positive for right facial numbness  Behavioral/Psych: negative for anxiety/depression   All other systems reviewed and are negative    Objective:     Vitals:    02/22/22 1510   BP: 134/88       Constitutional: Pleasant, no acute distress   Head: Normocephalic, atraumatic   Eyes: Conjunctivae normal   Neck: Supple, symmetrical   Respiration: Non-labored breathing   Cardiovascular: Limbs warm and well perfused   Musculoskeletal: Full cervical ROM in all planes. Negative Spurling maneuver bilaterally. Mild increased pain with facet loading. No tenderness to palpation in cervical paraspinals or other posterior neck musculature. Neuro: Alert, oriented.  CN deficits in V, VII, VIII due to prior parotid gland surgery in 2011. All other CN appear grossly intact. Motor strength 5/5 SAb, EF, EE, WE, Viviana. LT sensation intact in upper limbs. Biceps/triceps reflexes 2+ and symmetrical. Negative Pace bilaterally. Skin: no skin rashes or lesions noted   Psychological: Cooperative, no exaggerated pain behaviors     Assessment:    Diagnosis Orders   1. Migraine without aura and without status migrainosus, not intractable     2. Chronic pain syndrome     3. Spasm of muscle           Trudy Freeman is a 42 y. o.female presenting to the pain clinic for evaluation of migraine headaches. Patient's history and physical consistent with migraine headaches. I set the patient up for Botox injections with Dr. Arline Crooks. I have also started her on baclofen due to her history with muscle spasms in her neck. We discussed that she may require 2-3 rounds of Botox before receiving the full benefit from this medication. Plan: The following treatment recommendations and plan were discussed in detail with Trudy Freeman. Imaging:   None    Analgesics:   None    Adjuvants: In light of the presence of a neuropathic component of pain, the patient is advised to continue Trileptal.   For the musculoskeletal component contributing to her pain, I have started her on baclofen 10mg up to TID PRN. Advised to start nightly x3 nights then can increase up to 3 times daily as needed if tolerated. Interventions:   Patient has a previous history of chronic migraine which was diagnosed more than 6 months ago. Patient has daily headaches and greater than 15 days/month of these are migraine lasting greater than 4 hours/day. Patient has tried the following oral migraine preventives: Propanolol, Ibuprofen, aspirin, Depakote, trileptal, gabapentin, amitriptyline, and topamax  Patient reports that the medications as stated in medication list provide minimal benefit.    In the light of patient's clinical history and suboptimal response to multiple treatment modalities as detailed above, we discussed the option of using onabotulinumtoxinA (Botox) injection for better management of her chronic migraine headaches. The risks and benefits were discussed in detail with the patient. Patient wants to proceed with this injection during the next visit. A second injection is planned 12 weeks after the first injection. Anticoagulation/NPO Recommendations:   None    Multidisciplinary Pain Management:   In the presence of complex, chronic, and multi-factorial pain, the importance of a multidisciplinary approach to pain management in the patients management regimen was emphasized and discussed in great detail. PHYSICAL THERAPY: Patient is advised to see a physical therapist for gentle stretching exercises and conditioning exercises for management of pain.      Referrals:  None    Prescriptions Written This Visit:   Baclofen 10mg    Follow-up: Botox with Dr. Yomi Boo, APRN - CNP

## 2022-02-24 NOTE — DISCHARGE SUMMARY
[address] [facility]  ONCOLOGY REHABILITATION  PHYSICAL THERAPY  [] DAILY NOTE [] PROGRESS NOTE [x] DISCHARGE NOTE    [x] [facility]     Date: 2022  Patient Name:  Barbara Iqbal  : 1980  MRN: [id number]        Referring Practitioner EDEN Chen*   Diagnosis Other symptoms and signs involving the musculoskeletal system [R29.898]  Cervicalgia [M54.2]  Pain in thoracic spine [M54.6]    Treatment Diagnosis Pain in neck, pain in R shoulder, pain in thoracic spine, weakness in UEs, decreased R shoulder and cervical ROM, poor posture   Date of Evaluation 21    Additional Pertinent History Incontinence, obesity, anxiety/PTSD/OCD, memory issues, salivary gland cancer, 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/R knee/spine       Functional Outcome Measure Used Neck disability index    Functional Outcome Score 38/50 (21)        Insurance: Primary: Payor: Kim Fonseca /  /  / ,   Secondary:    Authorization Information: Aquatics covered, modalities covered, telehealth covered    Visit # 15, 0/10 for progress note   Visits Allowed: 30 visits  thru    Recertification Date:    Physician Follow-Up: 22 Neuro - [place]   History of Present Illness: Pt states neck, R shoulder and mid back pain since 10/29/2011 (surgery for right radical parotidectomy with facial nerve dissection and sacrifice, gortex sling, partial auriculectomy, skull base and partial temporal bone resection, right neck dissection, facial nerve graft). Patient discharged due to: Pt was last seen on 22 with instructions to contact clinic to set up aquatic program. Pt did not call. This therapist left 2 voicemails encouraging pt to call and finally left message stating that if pt did not call by 22, she would be discharged due to length of time since last seen. No call by patient, therefore discharge at this time. Current status unknown.   See last note for details related to goal status.         Electronically Signed by: Aurelia Purcell, PT PT, DPT, ANU, CLT [id number] 2/24/2022

## 2022-03-03 ENCOUNTER — TELEMEDICINE (OUTPATIENT)
Dept: PSYCHIATRY | Age: 42
End: 2022-03-03
Payer: COMMERCIAL

## 2022-03-03 DIAGNOSIS — F33.1 MODERATE EPISODE OF RECURRENT MAJOR DEPRESSIVE DISORDER (HCC): Primary | ICD-10-CM

## 2022-03-03 DIAGNOSIS — R41.840 ATTENTION DEFICIT: ICD-10-CM

## 2022-03-03 DIAGNOSIS — F51.5 NIGHTMARES: ICD-10-CM

## 2022-03-03 DIAGNOSIS — F43.10 PTSD (POST-TRAUMATIC STRESS DISORDER): ICD-10-CM

## 2022-03-03 PROCEDURE — G8427 DOCREV CUR MEDS BY ELIG CLIN: HCPCS | Performed by: PSYCHIATRY & NEUROLOGY

## 2022-03-03 PROCEDURE — 99213 OFFICE O/P EST LOW 20 MIN: CPT | Performed by: PSYCHIATRY & NEUROLOGY

## 2022-03-03 PROCEDURE — 90833 PSYTX W PT W E/M 30 MIN: CPT | Performed by: PSYCHIATRY & NEUROLOGY

## 2022-03-03 RX ORDER — FLUOXETINE HYDROCHLORIDE 40 MG/1
CAPSULE ORAL
Qty: 30 CAPSULE | Refills: 2 | Status: SHIPPED | OUTPATIENT
Start: 2022-03-03 | End: 2022-06-02

## 2022-03-03 RX ORDER — CLONAZEPAM 0.5 MG/1
0.5 TABLET ORAL 2 TIMES DAILY PRN
Qty: 60 TABLET | Refills: 2 | Status: SHIPPED | OUTPATIENT
Start: 2022-03-03 | End: 2022-05-31 | Stop reason: SDUPTHER

## 2022-03-03 RX ORDER — PRAZOSIN HYDROCHLORIDE 5 MG/1
5 CAPSULE ORAL NIGHTLY
Qty: 30 CAPSULE | Refills: 2 | Status: SHIPPED | OUTPATIENT
Start: 2022-03-03 | End: 2022-06-02

## 2022-03-03 RX ORDER — FLUOXETINE HYDROCHLORIDE 20 MG/1
CAPSULE ORAL
Qty: 30 CAPSULE | Refills: 2 | Status: SHIPPED | OUTPATIENT
Start: 2022-03-03 | End: 2022-06-02

## 2022-03-03 RX ORDER — PRAZOSIN HYDROCHLORIDE 2 MG/1
2 CAPSULE ORAL NIGHTLY
Qty: 30 CAPSULE | Refills: 2 | Status: SHIPPED | OUTPATIENT
Start: 2022-03-03 | End: 2022-06-02

## 2022-03-03 NOTE — PROGRESS NOTES
[address] [facility], [place] PSYCHIATRY  [facility] [id number] [phone]    Progress Note    Patient:  Kahlil Chapin  YOB: 1980  PCP:  EDEN Correia CNP  Visit Date:  3/3/2022    TELEHEALTH EVALUATION -- Audio/Visual (During - public health emergency)    Patient location: home  Physician location: [place], [address]  This virtual visit was conducted via interactive, real-time video. Chief Complaint   Patient presents with    Follow-up    Medication Check    Depression    Anxiety       SUBJECTIVE:    Time in: 4:30pm  Time out: 5:10pm  Time spent on documentation: 5 minutes    Kahlil Chapin, a 42 y.o. female, presents for a follow up visit. Patient reports she is stable. Patient is compliant with medication regimen. She presents alone. Doing \"okay\" considering the date. Her partner Jennie Mclaughlin  on 3/6, 13 yrs ago. Around 3/3 starts to feel worse \"my body feels the trauma again\". Despite that doing \"pretty good\". Mood is ok. 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 Now on muscle relaxer \"makes everything slow\". \"I think I might have ADD. \" Notes she watched Nenita and Emerita Sanches. Notes the scene of Rand Lean acting like he's on cocaine. She describes that as being how her brain is \"all the time\". \"Impossible to finish tasks. \" Made a Erven Renetta vow of silence. Wants to work on listening more. Shares a poem with me. \"So What's Your Story? \" It was excellent. Discussed whether attention deficit is related to trauma hx. Never finishes what she starts. Will move onto the next thing \"my entire life\". Never sought treatment. Moves around a lot. \"adjust a lot\". Taps at things. Fidgets with sticky tack \"helps to calm me down\". Helps her focus. \"time passes so fast\" and gets distracted from what she wanted to do. Doesn't work.  As a kid pages of poems that were started and never finished. Sketches left undone. Her room was always messy \"I always wanted to surround myself with things\", attributes that to OCD. Trauma from a very early age, 1 yo. Her nephew and maybe her brother have ADHD. Thinks her mom has OCD. Tried Wellbutrin which didn't have any benefit. Denies any discrete AVH. Hearing issues. Only hears well from one ear. Sometimes thinks she hears noise, screaming (her neighbor has mental health issues), telephone beeping, truck on the corner. [place] in a [place] spot. In a lot of pain. Pain distracts her. Can only focus \"if I have my head in a poem\". Can't sit through movies. Has to take breaks. \"I can't process that information all at once. \" We discussed how cannabis use can interfere with focus. SSDI hearing scheduled for 4/21. Was cancelled 2 days prior to 1/4 date of initial hearing. She would like referral for NPT to r/o ADHD. Declines to make any med chagnes today. Spent much of session in psychotherapy discussing current stressors, coping skills, strategies for change, supportive therapy, psycheducation. Med Trials: Klonopin, Prozac, prazosin, Depakote (for HA), Trileptal (for HA), Effexor (severe discontinuation syndrome), Wellbutrin    OBJECTIVE:  Vitals: There were no vitals taken for this visit. MENTAL STATUS EXAM:    GENERAL  Build: Overweight    Hygiene:  Appropriate   SENSORIUM Orientation: Place, Person, Time, & Situation     Consciousness: Alert    ATTENTION   Focused   RELATEDNESS  Cooperative    EYE CONTACT   Good    PSYCHOMOTOR  Retardation    SPEECH Volume: Normal     Rate: Normal rate and tone    Amplitude:  Within normal limits   MOOD  Euthymic    AFFECT Range: Full   THOUGHT Process:  Goal-Directed    Content: no evidence of psychosis    COGNITION Insight: Good    Judgement:  Intact    MEMORY  no gross deficits, did not test    INTELLIGENCE  Average     Mobility/Gait: Independently     Controlled SubstancesMonitoring: Periodic Controlled Substance Monitoring: No signs of potential drug abuse or diversion identified. ,Possible medication side effects, risk of tolerance/dependence & alternative treatments discussed. Rupal Wesley MD)       ASSESSMENT: Will place referral for NPT to r/o ADHD. Attention deficit could be d/t trauma hx. Family h/o ADHD. No med changes today. Diagnosis Orders   1. Moderate episode of recurrent major depressive disorder (HCC)  clonazePAM (KLONOPIN) 0.5 MG tablet   2. PTSD (post-traumatic stress disorder)  clonazePAM (KLONOPIN) 0.5 MG tablet   3. Nightmares  prazosin (MINIPRESS) 2 MG capsule   4. Attention deficit         PLAN:     · Medications:   · Prozac 60 mg QAM  · Prazosin 7 mg HS  · Klonopin 0.5 mg BID  · Rx Depakote and Trileptal by Neuro for HA, possible mood benefit from those  · Medical Cannabis   · Therapy: previously saw Dr. Brenden Cates  · Labs/Tests/Imaging: NPT referral made to r/o ADHD  · Records Reviewed: [facility]  · Patient advised to call if patient has any difficulties with treatment  Return in about 8 weeks (around 4/28/2022) for med check, follow up. I spent 40 minutes face to face with this patient. I spent 22 minutes or longer in psychotherapy discussing current stressors, coping skills, strategies for change, supportive therapy, psycheducation. Remainder of time spent on symptom evaluation and medication management. Electronically signed by Rupal Wesley MD on 3/3/2022 at 5:21 PM    Trudy Freeman, was evaluated through a synchronous (real-time) audio-video encounter. The patient (or guardian if applicable) is aware that this is a billable service, which includes applicable copays. This virtual visit was conducted with patient's (and/or legal guardian's) consent.  The visit was conducted pursuant to the emergency declaration under the [facility]5 waiver authority and the [employer] Supplemental Appropriations Act. Patient identification was verified, and a caregiver was present when appropriate. The patient was located in a state where the provider was licensed to provide care.       Total time spent for this encounter: 45 minutes    Electronically signed by Therese Taylor MD on 3/3/2022 at 5:21 PM

## 2022-03-23 ENCOUNTER — OFFICE VISIT (OUTPATIENT)
Dept: PHYSICAL MEDICINE AND REHAB | Age: 42
End: 2022-03-23
Payer: COMMERCIAL

## 2022-03-23 VITALS
BODY MASS INDEX: 45.99 KG/M2 | DIASTOLIC BLOOD PRESSURE: 74 MMHG | HEIGHT: 67 IN | WEIGHT: 293 LBS | SYSTOLIC BLOOD PRESSURE: 96 MMHG

## 2022-03-23 DIAGNOSIS — G43.719 INTRACTABLE CHRONIC MIGRAINE WITHOUT AURA AND WITHOUT STATUS MIGRAINOSUS: Primary | ICD-10-CM

## 2022-03-23 DIAGNOSIS — G51.0 FACIAL PALSY: ICD-10-CM

## 2022-03-23 PROCEDURE — 4004F PT TOBACCO SCREEN RCVD TLK: CPT | Performed by: ANESTHESIOLOGY

## 2022-03-23 PROCEDURE — 64615 CHEMODENERV MUSC MIGRAINE: CPT | Performed by: ANESTHESIOLOGY

## 2022-03-23 PROCEDURE — 99215 OFFICE O/P EST HI 40 MIN: CPT | Performed by: ANESTHESIOLOGY

## 2022-03-23 PROCEDURE — G8484 FLU IMMUNIZE NO ADMIN: HCPCS | Performed by: ANESTHESIOLOGY

## 2022-03-23 PROCEDURE — G8427 DOCREV CUR MEDS BY ELIG CLIN: HCPCS | Performed by: ANESTHESIOLOGY

## 2022-03-23 PROCEDURE — G8417 CALC BMI ABV UP PARAM F/U: HCPCS | Performed by: ANESTHESIOLOGY

## 2022-03-23 NOTE — PROGRESS NOTES
Chronic Pain/PM&R Clinic Note     Encounter Date: 3/23/22    Subjective:   Chief Complaint:   Chief Complaint   Patient presents with    Botox Injection     200 units migraine       History of Present Illness:   Zeus Villaseñor is a 42 y.o. female seen in the office on 03/23/22 for her management of migraines. 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 She describes the majority of her migraines to be on the right side of her head but states that sometimes they can be severe enough to be on both sides. Patient reports migraines that last greater than 4 hours in duration interfering with everyday activities including work and sleep. Patient reports >15 migraine attacks in the last month. Patient reports failing multiple medications for migraine headaches including ibuprofen, aspirin, propranolol, Topamax, gabapentin, amitriptyline, Depakote, and Trileptal.  She states that all these have been ineffective and she would like to stick with injection therapy to help out with her migraines.       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    Removed tumor, salivary glands, facial nerve graft    Anxiety     Arthritis     knees & right shoulder, hip    Depression     Hypothyroidism     PTSD (post-traumatic stress disorder)     Sleep apnea        Past Surgical History:   Procedure Laterality Date    EUA PELVIC N/A 02/21/2019    REMOVAL AND REINSERTION OF MIRENA, EXAM UNDER ANESTHESIA, PAP performed by Emmanuel Weaver MD at [address] Right 12/10/2020    EYE SURGERY Right     SELECTIVE NECK DISSECTION Right 10/2011    Radiacal Neck for CA    TUMOR EXCISION  2011    excision salivary gland (radical neck disection & nerve graft) - radiation  TYMPANOSTOMY TUBE PLACEMENT      age 14years old   [facility] WISDOM TOOTH EXTRACTION  2018       Family History   Problem Relation Age of Onset    Diabetes Mother     Arthritis Mother     Obesity Mother     Depression Mother     No Known Problems Father     Depression Brother     Uterine Cancer Maternal Grandmother     Breast Cancer Paternal Aunt     Breast Cancer Paternal Aunt        Social History     Socioeconomic History    Marital status: Single     Spouse name: Not on file    Number of children: Not on file    Years of education: Not on file    Highest education level: Not on file   Occupational History    Not on file   Tobacco Use    Smoking status: Current Every Day Smoker     Packs/day: 0.50     Years: 20.00     Pack years: 10.00     Types: Cigarettes, Cigars     Start date: 4/16/1999    Smokeless tobacco: Never Used   Vaping Use    Vaping Use: Never used   Substance and Sexual Activity    Alcohol use: Not Currently     Comment: social    Drug use: Not Currently     Frequency: 5.0 times per week     Types: Marijuana Keke Bachelor)     Comment: weekly    Sexual activity: Never     Partners: Male   Other Topics Concern    Not on file   Social History Narrative    Not on file     Social Determinants of Health     Financial Resource Strain: High Risk    Difficulty of Paying Living Expenses: Very hard   Food Insecurity: No Food Insecurity    Worried About Running Out of Food in the Last Year: Never true    Jeremie of Food in the Last Year: Never true   Transportation Needs:     Lack of Transportation (Medical): Not on file    Lack of Transportation (Non-Medical):  Not on file   Physical Activity:     Days of Exercise per Week: Not on file    Minutes of Exercise per Session: Not on file   Stress:     Feeling of Stress : Not on file   Social Connections:     Frequency of Communication with Friends and Family: Not on file    Frequency of Social Gatherings with Friends and Family: Not on file   Anuel Beck Attends Baptism Services: Not on file    Active Member of Clubs or Organizations: Not on file    Attends Club or Organization Meetings: Not on file    Marital Status: Not on file   Intimate Partner Violence:     Fear of Current or Ex-Partner: Not on file    Emotionally Abused: Not on file    Physically Abused: Not on file    Sexually Abused: Not on file   Housing Stability:     Unable to Pay for Housing in the Last Year: Not on file    Number of [place] in the Last Year: Not on file    Unstable Housing in the Last Year: Not on file       Medications & Allergies:   Current Outpatient Medications   Medication Instructions    acetaminophen (TYLENOL) 1,000 mg, Oral, EVERY 6 HOURS PRN    baclofen (LIORESAL) 10 mg, Oral, 3 TIMES DAILY    clonazePAM (KLONOPIN) 0.5 mg, Oral, 2 TIMES DAILY PRN    CPAP Machine MISC Does not apply, NIGHTLY    Cyanocobalamin (B-12 PO) Oral, DAILY    divalproex (DEPAKOTE ER) 500 MG extended release tablet take 1 tablet by mouth once daily    FLUoxetine (PROZAC) 20 MG capsule take 1 capsule by mouth once daily along with 40 mg for total 60 mg once daily    FLUoxetine (PROZAC) 40 MG capsule take 1 capsule by mouth once daily along with 20 mg for total dose 60 mg daily    Handicap Placard MISC Does not apply, Expires on 8/3/2022    ibuprofen (ADVIL;MOTRIN) 600 mg, Oral, EVERY 6 HOURS PRN    levothyroxine (SYNTHROID) 50 MCG tablet Take 1 tablet by mouth once daily    MAGNESIUM PO Oral, DAILY    Misc. Devices (BATHTUB SAFETY RAIL) MISC Use daily with showers    Misc.  Devices [address] [facility] Shower chair - Use daily when showering    Omega-3 Fatty Acids (RA FISH OIL) 1000 MG CAPS take 1 capsule by mouth once daily    OXcarbazepine (TRILEPTAL) 150 mg, Oral, 2 TIMES DAILY    pilocarpine (SALAGEN) 5 MG tablet take 1 tablet by mouth three times a day    prazosin (MINIPRESS) 2 mg, Oral, NIGHTLY, Along with 5 mg for total dose of 7 mg at night    prazosin (MINIPRESS) 5 mg, Oral, NIGHTLY, Along with 2 mg for total dose 7 mg at night    vitamin D (RA VITAMIN D-3) 125 MCG (5000 UT) CAPS capsule take 1 capsule by mouth once daily       No Known Allergies    Review of Systems:   Constitutional: negative for fevers  Neurological: positive for migraines  Behavioral/Psych: negative for anxiety/depression   All other systems reviewed and are negative    Objective:     Vitals:    03/23/22 1341   BP: 96/74       Constitutional: Pleasant, no acute distress   Head: Normocephalic, atraumatic   Eyes: Conjunctivae normal   Neck: Supple, symmetrical   Respiration: Non-labored breathing   Cardiovascular: Limbs warm and well perfused   Musculoskeletal: Tenderness to palpation in cervical paraspinals. Positive tinel's sign over 1 inch lateral to occipital protuberance bilaterally. Neuro: Alert, oriented. Right-sided facial palsy appreciated. Skin: Healed right neck surgical incision scar  Psychological: Cooperative, no exaggerated pain behaviors       Assessment:    Diagnosis Orders   1. Intractable chronic migraine without aura and without status migrainosus     2. Facial palsy           Sukhdeep Freeman is a 42 y. o.female presenting to the pain clinic for evaluation of migraines after right parotid gland removal secondary to parotid cancer in October 2011. She underwent Botox injections today for treatment of migraine headaches. Follow-up in 6 weeks for reevaluation. Plan: The following treatment recommendations and plan were discussed in detail with Trudy SANGEETHA Pete. In the light of patient's clinical history and suboptimal response to multiple treatment modalities as detailed above, we discussed the option of using onabotulinumtoxinA (Botox) injection for better management of chronic migraine headaches. The risks and benefits were discussed in detail with the patient. Patient underwent Botox injections today. Please see attached procedure note.       Follow-up: 6 weeks      I spent 40 minutes with

## 2022-04-13 ENCOUNTER — TELEPHONE (OUTPATIENT)
Dept: PHYSICAL MEDICINE AND REHAB | Age: 42
End: 2022-04-13

## 2022-04-13 ENCOUNTER — TELEPHONE (OUTPATIENT)
Dept: ENT CLINIC | Age: 42
End: 2022-04-13

## 2022-04-13 NOTE — TELEPHONE ENCOUNTER
Pt. Contacted. Informed her that  I spoke with Dr. Edna Vicente and he advised her to go to the ED for an evaluation. States that she spoke with Eye surgeon staff and they are going to see pt. emergently. She will call me back with time/date.

## 2022-04-13 NOTE — TELEPHONE ENCOUNTER
Would not expect any correlation between the injections and the swallowing issues. May be scheduled at avail with physician.

## 2022-04-13 NOTE — TELEPHONE ENCOUNTER
Pt. Called the office. States that since her  Botox injection her Rt. Eye will not close completely, no seal, red and irritated due to not completely closing. She said that the previous nerve damage may be contributing. She has notified her eye surgeon, Dr. Razia Monique. LVM but she hasn't heard back from them.    Please advise any recommendations,Thanks

## 2022-04-13 NOTE — TELEPHONE ENCOUNTER
Patient is wanting to schedule an appointment for issues with swallowing. She has been getting Botox for her headaches and is concerned it may be causing this issues. Her ears are also full of wax. Please advise .

## 2022-04-18 NOTE — TELEPHONE ENCOUNTER
Pt. Contacted. States that she has a small infection on her corneae. She is started on antibiotic eye drops and gel. She is following up frequently with their office.

## 2022-04-25 ENCOUNTER — OFFICE VISIT (OUTPATIENT)
Dept: ENT CLINIC | Age: 42
End: 2022-04-25
Payer: COMMERCIAL

## 2022-04-25 VITALS
HEIGHT: 67 IN | DIASTOLIC BLOOD PRESSURE: 80 MMHG | BODY MASS INDEX: 45.99 KG/M2 | HEART RATE: 69 BPM | RESPIRATION RATE: 14 BRPM | OXYGEN SATURATION: 99 % | SYSTOLIC BLOOD PRESSURE: 132 MMHG | TEMPERATURE: 96.6 F | WEIGHT: 293 LBS

## 2022-04-25 DIAGNOSIS — H61.301 ACQUIRED STENOSIS OF RIGHT EXTERNAL EAR CANAL: Primary | ICD-10-CM

## 2022-04-25 DIAGNOSIS — L59.8 RADIATION FIBROSIS OF SOFT TISSUE FROM THERAPEUTIC PROCEDURE: ICD-10-CM

## 2022-04-25 DIAGNOSIS — C07 CANCER OF PAROTID GLAND (HCC): ICD-10-CM

## 2022-04-25 DIAGNOSIS — G51.0 PARTIAL FACIAL NERVE PALSY: ICD-10-CM
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
## 2022-04-25 DIAGNOSIS — H61.21 IMPACTED CERUMEN OF RIGHT EAR: ICD-10-CM

## 2022-04-25 DIAGNOSIS — C80.1 ADENOID CYSTIC CARCINOMA (HCC): ICD-10-CM

## 2022-04-25 PROCEDURE — 69210 REMOVE IMPACTED EAR WAX UNI: CPT | Performed by: OTOLARYNGOLOGY

## 2022-04-25 PROCEDURE — G8427 DOCREV CUR MEDS BY ELIG CLIN: HCPCS | Performed by: OTOLARYNGOLOGY

## 2022-04-25 PROCEDURE — G8417 CALC BMI ABV UP PARAM F/U: HCPCS | Performed by: OTOLARYNGOLOGY

## 2022-04-25 PROCEDURE — 4004F PT TOBACCO SCREEN RCVD TLK: CPT | Performed by: OTOLARYNGOLOGY

## 2022-04-25 PROCEDURE — 99214 OFFICE O/P EST MOD 30 MIN: CPT | Performed by: OTOLARYNGOLOGY

## 2022-04-25 RX ORDER — MOXIFLOXACIN 5 MG/ML
SOLUTION/ DROPS OPHTHALMIC
COMMUNITY
Start: 2022-04-14

## 2022-04-25 RX ORDER — NEOMYCIN SULFATE, POLYMYXIN B SULFATE, AND DEXAMETHASONE 3.5; 10000; 1 MG/G; [USP'U]/G; MG/G
OINTMENT OPHTHALMIC
COMMUNITY
Start: 2022-04-14

## 2022-04-25 RX ORDER — VALACYCLOVIR HYDROCHLORIDE 1 G/1
TABLET, FILM COATED ORAL
COMMUNITY
Start: 2022-04-14

## 2022-04-25 NOTE — PROGRESS NOTES
[facility] PHYSICIANS [place] SPECIALTY  [facility] EAR, NOSE AND THROAT  [facility] [place]  Dept: [phone]  Dept Fax: [phone]  Loc: Nickie Ling is a 42 y.o. female who was referred by No ref. provider found for:  Chief Complaint   Patient presents with    Dysphagia     Patient is here for difficulty swallowing, wax impaction. seen Dr. Sherri Ruiz in the past. Patient stated that she notices that she has trouble swollowing soild food. patient stated that there is a pocket in her throat that was from surgery in the past        HPI:     Tim Calhoun is a 42 y.o. 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 She is here today to have wax removed from the right ear which is particularly difficult given that it is a radiated stenotic ear canal.  She is having no new problems related to her cancer surgery. She is in the process of getting disability. She believes she may have succeeded. I assisted her in that process with a note of strong support.      History:     No Known Allergies  Current Outpatient Medications   Medication Sig Dispense Refill    valACYclovir (VALTREX) 1 g tablet take 1 tablet by mouth three times a day for 7 days      neomycin-polymyxin-dexameth [id number] OINT apply A THIN 1/4 INCH RIBBON into right eye once daily at bedtime      moxifloxacin (VIGAMOX) 0.5 % ophthalmic solution instill 1 drop into right eye EVERY HOUR WHILE AWAKE FOR 7 DAYS      prazosin (MINIPRESS) 2 MG capsule Take 1 capsule by mouth nightly Along with 5 mg for total dose of 7 mg at night 30 capsule 2    prazosin (MINIPRESS) 5 MG capsule Take 1 capsule by mouth nightly Along with 2 mg for total dose 7 mg at night 30 capsule 2    FLUoxetine (PROZAC) 40 MG capsule take 1 capsule by mouth once daily along with 20 mg for total dose 60 mg daily 30 capsule 2    FLUoxetine (PROZAC) 20 MG capsule take 1 capsule by mouth once daily along with 40 mg for total 60 mg once daily 30 capsule 2    clonazePAM (KLONOPIN) 0.5 MG tablet Take 1 tablet by mouth 2 times daily as needed for Anxiety for up to 90 days. 60 tablet 2    levothyroxine (SYNTHROID) 50 MCG tablet Take 1 tablet by mouth once daily 30 tablet 2    divalproex (DEPAKOTE ER) 500 MG extended release tablet take 1 tablet by mouth once daily 30 tablet 3    MAGNESIUM PO Take by mouth daily      Cyanocobalamin (B-12 PO) Take by mouth daily       CPAP Machine MISC by Does not apply route nightly      Omega-3 Fatty Acids (RA FISH OIL) 1000 MG CAPS take 1 capsule by mouth once daily 30 capsule 11    vitamin D (RA VITAMIN D-3) 125 MCG (5000 UT) CAPS capsule take 1 capsule by mouth once daily 30 capsule 11    Misc. Devices (BATHTUB SAFETY RAIL) MISC Use daily with showers 1 each 0    Misc. Devices [address] [facility] Shower chair - Use daily when showering 1 Device 0    OXcarbazepine (TRILEPTAL) 150 MG tablet Take 1 tablet by mouth 2 times daily 60 tablet 3    pilocarpine (SALAGEN) 5 MG tablet take 1 tablet by mouth three times a day 90 tablet 3    Handicap Placard MISC by Does not apply route Expires on 8/3/2022 1 each 0    acetaminophen (TYLENOL) 500 MG tablet Take 1,000 mg by mouth every 6 hours as needed for Pain      ibuprofen (ADVIL;MOTRIN) 200 MG tablet Take 600 mg by mouth every 6 hours as needed for Pain       No current facility-administered medications for this visit.      Past Medical History:   Diagnosis Date    Adenoid cystic carcinoma ([facility] 10/2011    Removed tumor, salivary glands, facial nerve graft    Anxiety     Arthritis     knees & right shoulder, hip    Depression     Hypothyroidism     PTSD (post-traumatic stress disorder)     Sleep apnea       Past Surgical History:   Procedure Laterality Date    EUA PELVIC N/A 02/21/2019    REMOVAL AND REINSERTION OF MIRENA, EXAM UNDER ANESTHESIA, PAP performed by Mary Erickson MD at [address] [place] [address] Right 12/10/2020    EYE SURGERY Right     SELECTIVE NECK DISSECTION Right 10/2011    Radiacal Neck for CA    TUMOR EXCISION  2011    excision salivary gland (radical neck disection & nerve graft) - radiation    TYMPANOSTOMY TUBE PLACEMENT      age 14years old   [address] WISDOM TOOTH EXTRACTION  2018     Family History   Problem Relation Age of Onset    Diabetes Mother     Arthritis Mother     Obesity Mother     Depression Mother     No Known Problems Father     Depression Brother     Uterine Cancer Maternal Grandmother     Breast Cancer Paternal Aunt     Breast Cancer Paternal Aunt      Social History     Tobacco Use    Smoking status: Current Every Day Smoker     Packs/day: 0.50     Years: 20.00     Pack years: 10.00     Types: Cigarettes, Cigars     Start date: 4/16/1999    Smokeless tobacco: Never Used   Substance Use Topics    Alcohol use: Not Currently     Comment: social        Subjective:      Review of Systems  Rest of review of systems are negative, except as noted in HPI. Objective:     /80 (Site: Left Upper Arm, Position: Sitting)   Pulse 69   Temp 96.6 °F (35.9 °C) (Infrared)   Resp 14   Ht 5' 7\" (1.702 m)   Wt (!) 323 lb 11.2 oz (146.8 kg)   SpO2 99%   BMI 50.70 kg/m²     Physical Exam       On general physical exam the patient is a pleasant alert and cooperative adult female in no acute distress. Her voice is within normal limits for age and gender. Her speech is abnormal for significant labial dysarthria. I heard no throat clearing coughing or inspiratory stridor. 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 Her other branches are paralyzed. She has a deep scaphoid post radical parotidectomy posterior facial and retromandibular space.   She has a external auditory canal ptosis with collapse and extensive accumulation of impacted cerumen in the deep canal next the tympanic membrane. The left side of both of the structures was entirely normal.    Procedure: Cerumen disimpaction; right side  Findings: Under microscope and with a combination of a cerumen curette and alligator forceps, I was able to deliver a large amount of impacted cerumen that was pressing against the patient's tympanic membrane. It was very uncomfortable for the patient but she was very cooperative and I was successful in delivering nearly all of it. Vitals reviewed. No results found. Lab Results   Component Value Date     06/17/2020     12/06/2018    K 4.4 06/17/2020    K 4.3 12/06/2018     06/17/2020     12/06/2018    CO2 26 06/17/2020    CO2 26 12/06/2018    BUN 10 06/17/2020    BUN 15 12/06/2018    CREATININE 0.8 07/22/2020    CREATININE 0.8 06/17/2020    CREATININE 0.8 12/06/2018    CALCIUM 9.3 06/17/2020    CALCIUM 9.5 12/06/2018    PROT 7.5 12/06/2018    LABALBU 4.1 12/06/2018    BILITOT 0.4 12/06/2018    ALKPHOS 77 12/06/2018    AST 13 12/06/2018    ALT 12 12/06/2018       All of the past medical history, past surgical history, family history,social history, allergies and current medications were reviewed with the patient. Assessment & Plan   Diagnoses and all orders for this visit:     Diagnosis Orders   1. 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 Impacted cerumen of right ear     3. 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 Adenoid cystic carcinoma ([address]     5. Cancer of parotid gland ([address]     6. Partial facial nerve palsy         Based on the patient's history and these physical findings, the patient needed a cerumen disimpaction of her affected operated right side to remove the densely adherent ear canal matter. This was successfully done.   I recommended she use Debrox thereafter approximately once per week and irrigated out and dry it well after each effort to reduce the chances that she will have secondary fungal problems. I will see her back in approximately 6 months; sooner if she has new problems. She reported pleased with the outcome of her visit and being willing to proceed as such. I spent over 30 minutes of face-to-face time with the patient more than half of which was dedicated to reviewing her complex condition and planning her follow-up care. Return in about 6 months (around 10/25/2022) for Follow-up care and cerumen disimpaction if indicated. **This report has been created using voice recognition software. It may contain minor errors which are inherent in voice recognition technology. **

## 2022-05-05 ENCOUNTER — OFFICE VISIT (OUTPATIENT)
Dept: PHYSICAL MEDICINE AND REHAB | Age: 42
End: 2022-05-05
Payer: COMMERCIAL

## 2022-05-05 VITALS
DIASTOLIC BLOOD PRESSURE: 70 MMHG | SYSTOLIC BLOOD PRESSURE: 108 MMHG | BODY MASS INDEX: 45.99 KG/M2 | WEIGHT: 293 LBS | HEIGHT: 67 IN

## 2022-05-05 DIAGNOSIS — G51.0 FACIAL PALSY: ICD-10-CM

## 2022-05-05 DIAGNOSIS — G43.719 INTRACTABLE CHRONIC MIGRAINE WITHOUT AURA AND WITHOUT STATUS MIGRAINOSUS: Primary | ICD-10-CM

## 2022-05-05 DIAGNOSIS — G89.4 CHRONIC PAIN SYNDROME: ICD-10-CM

## 2022-05-05 PROCEDURE — G8417 CALC BMI ABV UP PARAM F/U: HCPCS | Performed by: ANESTHESIOLOGY

## 2022-05-05 PROCEDURE — 4004F PT TOBACCO SCREEN RCVD TLK: CPT | Performed by: ANESTHESIOLOGY

## 2022-05-05 PROCEDURE — G8427 DOCREV CUR MEDS BY ELIG CLIN: HCPCS | Performed by: ANESTHESIOLOGY

## 2022-05-05 PROCEDURE — 99213 OFFICE O/P EST LOW 20 MIN: CPT | Performed by: ANESTHESIOLOGY

## 2022-05-05 NOTE — PROGRESS NOTES
Chronic Pain/PM&R Clinic Note     Encounter Date: 5/5/22    Subjective:   Chief Complaint:   Chief Complaint   Patient presents with    Follow-up       History of Present Illness:   Sola Tate is a 42 y.o. female seen in the office on 03/23/22 for her management of migraines. 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 She describes the majority of her migraines to be on the right side of her head but states that sometimes they can be severe enough to be on both sides. Patient reports migraines that last greater than 4 hours in duration interfering with everyday activities including work and sleep. Patient reports >15 migraine attacks in the last month. Patient reports failing multiple medications for migraine headaches including ibuprofen, aspirin, propranolol, Topamax, gabapentin, amitriptyline, Depakote, and Trileptal.  She states that all these have been ineffective and she would like to stick with injection therapy to help out with her migraines. Today, 5/5/2022, patient presents for planned follow-up for management of migraines. She underwent her first initial round of Botox on 3/23/22. She does report some relief from this injection but states that she did have side effects from the injection. She states that she was unable to close her right eye for several weeks after the injection and actually developed a corneal ulcer. Overall, she states she would like to try a second round of Botox and try to avoid the complications. She denies any changes in the medications used to treat her migraines. She denies any other presentations of her migraine headaches.       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    Removed tumor, salivary glands, facial nerve graft    Anxiety     Arthritis     knees & right shoulder, hip    Depression     Hypothyroidism     PTSD (post-traumatic stress disorder)     Sleep apnea        Past Surgical History:   Procedure Laterality Date    EUA PELVIC N/A 02/21/2019    REMOVAL AND REINSERTION OF MIRENA, EXAM UNDER ANESTHESIA, PAP performed by Aliya oMrillo MD at [address] Right 12/10/2020    EYE SURGERY Right     SELECTIVE NECK DISSECTION Right 10/2011    Radiacal Neck for CA    TUMOR EXCISION  2011    excision salivary gland (radical neck disection & nerve graft) - radiation    TYMPANOSTOMY TUBE PLACEMENT      age 14years old   [facility] TO[facility]TRA[facility]  2018       Family History   Problem Relation Age of Onset    Diabetes Mother    [facility] Arthritis Mother     Obesity Mother     Depression Mother     No Known Problems Father     Depression Brother     Uterine Cancer Maternal Grandmother     Breast Cancer Paternal Aunt     Breast Cancer Paternal Aunt        Social History     Socioeconomic History    Marital status: Single     Spouse name: Not on file    Number of children: Not on file    Years of education: Not on file    Highest education level: Not on file   Occupational History    Not on file   Tobacco Use    Smoking status: Current Every Day Smoker     Packs/day: 0.50     Years: 20.00     Pack years: 10.00     Types: Cigarettes, Cigars     Start date: 4/16/1999    Smokeless tobacco: Never Used   Vaping Use    Vaping Use: Never used   Substance and Sexual Activity    Alcohol use: Not Currently     Comment: social    Drug use: Not Currently     Frequency: 5.0 times per week     Types: Marijuana Damaso Cardona     Comment: weekly    Sexual activity: Never     Partners: Male   Other Topics Concern    Not on file   Social History Narrative    Not on file     Social Determinants of Health     Financial Resource Strain: High Risk    Difficulty of Paying Living Expenses: Very hard   Food Insecurity: No Food Insecurity    Worried About Running Out of Food in the Last Year: Never true    Ran Out of Food in the Last Year: Never true   Transportation Needs:     Lack of Transportation (Medical): Not on file    Lack of Transportation (Non-Medical):  Not on file   Physical Activity:     Days of Exercise per Week: Not on file    Minutes of Exercise per Session: Not on file   Stress:     Feeling of Stress : Not on file   Social Connections:     Frequency of Communication with Friends and Family: Not on file    Frequency of Social Gatherings with Friends and Family: Not on file    Attends Uatsdin Services: Not on file    Active Member of [address] [place] [address] [email or web address] or Organizations: Not on file    Attends Club or Organization Meetings: Not on file    Marital Status: Not on file   Intimate Partner Violence:     Fear of Current or Ex-Partner: Not on file    Emotionally Abused: Not on file    Physically Abused: Not on file    Sexually Abused: Not on file   Housing Stability:     Unable to Pay for Housing in the Last Year: Not on file    Number of [place] in the Last Year: Not on file    Unstable Housing in the Last Year: Not on file       Medications & Allergies:   Current Outpatient Medications   Medication Instructions    acetaminophen (TYLENOL) 1,000 mg, Oral, EVERY 6 HOURS PRN    clonazePAM (KLONOPIN) 0.5 mg, Oral, 2 TIMES DAILY PRN    CPAP Machine MISC Does not apply, NIGHTLY    Cyanocobalamin (B-12 PO) Oral, DAILY    divalproex (DEPAKOTE ER) 500 MG extended release tablet take 1 tablet by mouth once daily    FLUoxetine (PROZAC) 20 MG capsule take 1 capsule by mouth once daily along with 40 mg for total 60 mg once daily    FLUoxetine (PROZAC) 40 MG capsule take 1 capsule by mouth once daily along with 20 mg for total dose 60 mg daily    Handicap Placard MISC Does not apply, Expires on 8/3/2022    ibuprofen (ADVIL;MOTRIN) 600 mg, Oral, EVERY 6 HOURS PRN    levothyroxine (SYNTHROID) 50 MCG tablet Take 1 tablet by mouth once daily    MAGNESIUM PO Oral, DAILY    Misc. Devices (BATHTUB SAFETY RAIL) MISC Use daily with showers    Misc. Devices [address] [facility] Shower chair - Use daily when showering    moxifloxacin (VIGAMOX) 0.5 % ophthalmic solution instill 1 drop into right eye EVERY HOUR WHILE AWAKE FOR 7 DAYS    neomycin-polymyxin-dexameth 3.5-20819-6.1 OINT apply A THIN 1/4 INCH RIBBON into right eye once daily at bedtime    Omega-3 Fatty Acids (RA FISH OIL) 1000 MG CAPS take 1 capsule by mouth once daily    OXcarbazepine (TRILEPTAL) 150 mg, Oral, 2 TIMES DAILY    pilocarpine (SALAGEN) 5 MG tablet take 1 tablet by mouth three times a day    prazosin (MINIPRESS) 2 mg, Oral, NIGHTLY, Along with 5 mg for total dose of 7 mg at night    prazosin (MINIPRESS) 5 mg, Oral, NIGHTLY, Along with 2 mg for total dose 7 mg at night    valACYclovir (VALTREX) 1 g tablet take 1 tablet by mouth three times a day for 7 days    vitamin D ( VITAMIN D-3) 125 MCG (5000 UT) CAPS capsule take 1 capsule by mouth once daily       No Known Allergies    Review of Systems:   Constitutional: negative for fevers  Neurological: positive for migraines  Behavioral/Psych: negative for anxiety/depression   All other systems reviewed and are negative    Objective:     Vitals:    05/05/22 1434   BP: 108/70       Constitutional: Pleasant, no acute distress   Head: Normocephalic, atraumatic   Eyes: Conjunctivae normal   Neck: Supple, symmetrical   Respiration: Non-labored breathing   Cardiovascular: Limbs warm and well perfused   Musculoskeletal: Tenderness to palpation in cervical paraspinals. Positive tinel's sign over 1 inch lateral to occipital protuberance bilaterally. Neuro: Alert, oriented. Right-sided facial palsy appreciated. Skin: Healed right neck surgical incision scar  Psychological: Cooperative, no exaggerated pain behaviors       Assessment:    Diagnosis Orders   1.  Intractable chronic migraine without aura and without status migrainosus     2. Facial palsy     3. Chronic pain syndrome           Beata Lam is a 42 y. o.female presenting to the pain clinic for evaluation of migraines after right parotid gland removal secondary to parotid cancer in October 2011. She did have a partial response to her initial Botox injections back in March but unfortunately had side effects which involved inability to close the right eyelid. We will repeat Botox injections in 6 weeks to avoid the right  muscle to avoid these complications. Plan: The following treatment recommendations and plan were discussed in detail with Trudy Freeman. In the light of patient's clinical history and suboptimal response to multiple treatment modalities as detailed above, we discussed the option of using onabotulinumtoxinA (Botox) injection for better management of chronic migraine headaches. The risks and benefits were discussed in detail with the patient. Patient will undergo repeat Botox injections in 6 weeks.       Follow-up: 6 weeks for repeat Botox      Wendy Marie DO  Interventional Pain Management/PM&R   [place]

## 2022-05-27 DIAGNOSIS — F43.10 PTSD (POST-TRAUMATIC STRESS DISORDER): ICD-10-CM

## 2022-05-27 DIAGNOSIS — F33.1 MODERATE EPISODE OF RECURRENT MAJOR DEPRESSIVE DISORDER (HCC): ICD-10-CM

## 2022-05-27 NOTE — TELEPHONE ENCOUNTER
Trudy called in requesting a refill on klonopin 0.5 mg . Chelsie Estrin is scheduled for a future appt on 7/7.  Loaded pending your approval.

## 2022-05-31 RX ORDER — CLONAZEPAM 0.5 MG/1
0.5 TABLET ORAL 2 TIMES DAILY PRN
Qty: 60 TABLET | Refills: 2 | Status: SHIPPED | OUTPATIENT
Start: 2022-05-31 | End: 2022-07-07 | Stop reason: SDUPTHER

## 2022-06-02 DIAGNOSIS — F51.5 NIGHTMARES: ICD-10-CM

## 2022-06-02 DIAGNOSIS — G44.89 OTHER HEADACHE SYNDROME: ICD-10-CM

## 2022-06-02 RX ORDER — PRAZOSIN HYDROCHLORIDE 2 MG/1
CAPSULE ORAL
Qty: 30 CAPSULE | Refills: 1 | Status: SHIPPED | OUTPATIENT
Start: 2022-06-02 | End: 2022-07-07 | Stop reason: SDUPTHER

## 2022-06-02 RX ORDER — DIVALPROEX SODIUM 500 MG/1
TABLET, EXTENDED RELEASE ORAL
Qty: 30 TABLET | Refills: 3 | OUTPATIENT
Start: 2022-06-02

## 2022-06-02 RX ORDER — FLUOXETINE HYDROCHLORIDE 40 MG/1
CAPSULE ORAL
Qty: 30 CAPSULE | Refills: 1 | Status: SHIPPED | OUTPATIENT
Start: 2022-06-02 | End: 2022-07-07 | Stop reason: SDUPTHER

## 2022-06-02 RX ORDER — FLUOXETINE HYDROCHLORIDE 20 MG/1
CAPSULE ORAL
Qty: 30 CAPSULE | Refills: 1 | Status: SHIPPED | OUTPATIENT
Start: 2022-06-02 | End: 2022-07-07 | Stop reason: SDUPTHER

## 2022-06-02 RX ORDER — PRAZOSIN HYDROCHLORIDE 5 MG/1
CAPSULE ORAL
Qty: 30 CAPSULE | Refills: 1 | Status: SHIPPED | OUTPATIENT
Start: 2022-06-02 | End: 2022-07-07 | Stop reason: SDUPTHER

## 2022-06-02 NOTE — TELEPHONE ENCOUNTER
[facility] is requesting a refill on the following medications:  Requested Prescriptions     Pending Prescriptions Disp Refills    prazosin (MINIPRESS) 2 MG capsule [Pharmacy Med Name: PRAZOSIN 2 MG CAPSULE] 30 capsule 1     Sig: take 1 capsule by mouth nightly ALONG WITH 5 MG FOR 7 MG DOSE    FLUoxetine (PROZAC) 40 MG capsule [Pharmacy Med Name: FLUOXETINE HCL 40 MG CAPSULE] 30 capsule 1     Sig: take 1 capsule by mouth once daily    FLUoxetine (PROZAC) 20 MG capsule [Pharmacy Med Name: FLUOXETINE HCL 20 MG CAPSULE] 30 capsule 1     Sig: take 1 capsule by mouth once daily    prazosin (MINIPRESS) 5 mg capsule [Pharmacy Med Name: PRAZOSIN 5 MG CAPSULE] 30 capsule 1     Sig: take 1 capsule by mouth nightly       Date of last visit: 3/3/2022  Date of next visit (if applicable):7/7/2022  Pharmacy Name: [facility], [place]

## 2022-06-02 NOTE — TELEPHONE ENCOUNTER
Last note stated patient stopped the Depakote due to lack of benefit. Left voice message for patient to return call to verify if still taking.

## 2022-06-16 ENCOUNTER — OFFICE VISIT (OUTPATIENT)
Dept: PHYSICAL MEDICINE AND REHAB | Age: 42
End: 2022-06-16
Payer: COMMERCIAL

## 2022-06-16 VITALS
BODY MASS INDEX: 45.99 KG/M2 | DIASTOLIC BLOOD PRESSURE: 80 MMHG | HEIGHT: 67 IN | SYSTOLIC BLOOD PRESSURE: 106 MMHG | WEIGHT: 293 LBS

## 2022-06-16 DIAGNOSIS — G51.0 FACIAL PALSY: ICD-10-CM

## 2022-06-16 DIAGNOSIS — G89.4 CHRONIC PAIN SYNDROME: ICD-10-CM

## 2022-06-16 DIAGNOSIS — G43.719 INTRACTABLE CHRONIC MIGRAINE WITHOUT AURA AND WITHOUT STATUS MIGRAINOSUS: Primary | ICD-10-CM

## 2022-06-16 PROCEDURE — G8417 CALC BMI ABV UP PARAM F/U: HCPCS | Performed by: ANESTHESIOLOGY

## 2022-06-16 PROCEDURE — G8427 DOCREV CUR MEDS BY ELIG CLIN: HCPCS | Performed by: ANESTHESIOLOGY

## 2022-06-16 PROCEDURE — 64615 CHEMODENERV MUSC MIGRAINE: CPT | Performed by: ANESTHESIOLOGY

## 2022-06-16 PROCEDURE — 4004F PT TOBACCO SCREEN RCVD TLK: CPT | Performed by: ANESTHESIOLOGY

## 2022-06-16 NOTE — PROGRESS NOTES
Pre-operative Diagnosis: Chronic Migraine Headaches     Post-operative Diagnosis: Chronic Migraine Headaches     Procedure: Botox for migraine headaches     Procedure Description:  Patient was reclined on the examination table. Botox was drawn up into a tuberculin syringes, to a concentration of 5 units per 0.1 mL with a 30-gauge needle. Skin was prepped with alcohol wipes. The following muscles were injected after negative aspiration; The frontalis muscle bilaterally a total of 4 sites (20 units), The  muscle bilaterally a total of 1 sites (5 units), the procerus one site (5 units), the occipitalis bilaterally a total of 6 sites (30 units), The temporalis muscle bilaterally a total of 8 sites (40 units), the trapezius bilaterally a total of 6 sites (30 units), and cervical paraspinal muscle groups a total of 4 sites (20 units). This was a total of 150 units at 30 sites. The patient tolerated the procedure well.     Medications: 4 mL in 200 U Botox drawn up in 4 separate 1 mL TB syringes = 5 U per 0.1 mL syringe    Amount medication wasted: 50 units        Procedural Complications: None        Mp Avila DO  Interventional Pain Management/PM&R   [place]

## 2022-06-16 NOTE — PROGRESS NOTES
Chronic Pain/PM&R Clinic Note     Encounter Date: 6/16/22    Subjective:   Chief Complaint:   Chief Complaint   Patient presents with    Botox Injection     200 units        History of Present Illness:   Ashley Lantigua is a 36 y.o. female seen in the office on 03/23/22 for her management of migraines. 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 She describes the majority of her migraines to be on the right side of her head but states that sometimes they can be severe enough to be on both sides. Patient reports migraines that last greater than 4 hours in duration interfering with everyday activities including work and sleep. Patient reports >15 migraine attacks in the last month. Patient reports failing multiple medications for migraine headaches including ibuprofen, aspirin, propranolol, Topamax, gabapentin, amitriptyline, Depakote, and Trileptal.  She states that all these have been ineffective and she would like to stick with injection therapy to help out with her migraines. Today, 6/16/2022, patient presents for planned follow-up for management of migraines. She states that she had significant relief from her initial Botox back in March 2022. She states that her right eye was able to close after a few weeks after the injection. She was happy overall with the results from her first round of Botox and would like to proceed with her second round of Botox. She denies any new medications used to treat her migraines. She denies any changes in the presentation of her migraines.     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    Removed tumor, salivary glands, facial nerve graft    Anxiety     Arthritis     knees & right shoulder, hip    Depression     Hypothyroidism     PTSD (post-traumatic stress disorder)     Sleep apnea        Past Surgical History:   Procedure Laterality Date    EUA PELVIC N/A 02/21/2019    REMOVAL AND REINSERTION OF MIRENA, EXAM UNDER ANESTHESIA, PAP performed by Edilia Soulier, MD at [address] Right 12/10/2020    EYE SURGERY Right     SELECTIVE NECK DISSECTION Right 10/2011    Radiacal Neck for CA    TUMOR EXCISION  2011    excision salivary gland (radical neck disection & nerve graft) - radiation    TYMPANOSTOMY TUBE PLACEMENT      age 14years old   [facility] WISDOM TOOTH EXTRACTION  2018       Family History   Problem Relation Age of Onset    Diabetes Mother    [facility] Arthritis Mother     Obesity Mother     Depression Mother     No Known Problems Father     Depression Brother     Uterine Cancer Maternal Grandmother     Breast Cancer Paternal Aunt     Breast Cancer Paternal Aunt        Social History     Socioeconomic History    Marital status: Single     Spouse name: Not on file    Number of children: Not on file    Years of education: Not on file    Highest education level: Not on file   Occupational History    Not on file   Tobacco Use    Smoking status: Current Every Day Smoker     Packs/day: 0.50     Years: 20.00     Pack years: 10.00     Types: Cigarettes, Cigars     Start date: 4/16/1999    Smokeless tobacco: Never Used   Vaping Use    Vaping Use: Never used   Substance and Sexual Activity    Alcohol use: Not Currently     Comment: social    Drug use: Not Currently     Frequency: 5.0 times per week     Types: Marijuana Lum Olden)     Comment: weekly    Sexual activity: Never     Partners: Male   Other Topics Concern    Not on file   Social History Narrative    Not on file     Social Determinants of Health     Financial Resource Strain: High Risk    Difficulty of Paying Living Expenses: Very hard   Food Insecurity: No Food Insecurity    Worried About Running Out of Food in the Last Year: Never true    Ran Out of Food in the Last Year: Never true   Transportation Needs:     Lack of Transportation (Medical): Not on file    Lack of Transportation (Non-Medical):  Not on file   Physical Activity:     Days of Exercise per Week: Not on file    Minutes of Exercise per Session: Not on file   Stress:     Feeling of Stress : Not on file   Social Connections:     Frequency of Communication with Friends and Family: Not on file    Frequency of Social Gatherings with Friends and Family: Not on file    Attends Gnosticism Services: Not on file    Active Member of [address] [place] [address] [employer] or Organizations: Not on file    Attends Club or Organization Meetings: Not on file    Marital Status: Not on file   Intimate Partner Violence:     Fear of Current or Ex-Partner: Not on file    Emotionally Abused: Not on file    Physically Abused: Not on file    Sexually Abused: Not on file   Housing Stability:     Unable to Pay for Housing in the Last Year: Not on file    Number of [place] in the Last Year: Not on file    Unstable Housing in the Last Year: Not on file       Medications & Allergies:   Current Outpatient Medications   Medication Instructions    acetaminophen (TYLENOL) 1,000 mg, Oral, EVERY 6 HOURS PRN    clonazePAM (KLONOPIN) 0.5 mg, Oral, 2 TIMES DAILY PRN    CPAP Machine MISC Does not apply, NIGHTLY    Cyanocobalamin (B-12 PO) Oral, DAILY    divalproex (DEPAKOTE ER) 500 MG extended release tablet take 1 tablet by mouth once daily    FLUoxetine (PROZAC) 20 MG capsule take 1 capsule by mouth once daily along with 40 mg cap for total dose 60 mg once daily    FLUoxetine (PROZAC) 40 MG capsule take 1 capsule by mouth once daily along with 20 mg cap for total dose 60 mg once daily    Handicap Placard MISC Does not apply, Expires on 8/3/2022    ibuprofen (ADVIL;MOTRIN) 600 mg, Oral, EVERY 6 HOURS PRN    levothyroxine (SYNTHROID) 50 MCG tablet Take 1 tablet by mouth once daily    MAGNESIUM PO Oral, DAILY    Misc. Devices (BATHTUB SAFETY RAIL) MISC Use daily with showers    Misc. Devices [address] [facility] Shower chair - Use daily when showering    moxifloxacin (VIGAMOX) 0.5 % ophthalmic solution instill 1 drop into right eye EVERY HOUR WHILE AWAKE FOR 7 DAYS    neomycin-polymyxin-dexameth 3.5-75991-1.1 OINT apply A THIN 1/4 INCH RIBBON into right eye once daily at bedtime    Omega-3 Fatty Acids (RA FISH OIL) 1000 MG CAPS take 1 capsule by mouth once daily    OXcarbazepine (TRILEPTAL) 150 mg, Oral, 2 TIMES DAILY    pilocarpine (SALAGEN) 5 MG tablet take 1 tablet by mouth three times a day    prazosin (MINIPRESS) 2 MG capsule take 1 capsule by mouth nightly ALONG WITH 5 MG FOR 7 MG DOSE    prazosin (MINIPRESS) 5 mg capsule take 1 capsule by mouth nightly    valACYclovir (VALTREX) 1 g tablet take 1 tablet by mouth three times a day for 7 days    vitamin D ( VITAMIN D-3) 125 MCG (5000 UT) CAPS capsule take 1 capsule by mouth once daily       No Known Allergies    Review of Systems:   Constitutional: negative for fevers  Neurological: positive for migraines  Behavioral/Psych: negative for anxiety/depression   All other systems reviewed and are negative    Objective:     Vitals:    06/16/22 1503   BP: 106/80       Constitutional: Pleasant, no acute distress   Head: Normocephalic, atraumatic   Eyes: Conjunctivae normal   Neck: Supple, symmetrical   Respiration: Non-labored breathing   Cardiovascular: Limbs warm and well perfused   Musculoskeletal: Tenderness to palpation in cervical paraspinals. Positive tinel's sign over 1 inch lateral to occipital protuberance bilaterally. Neuro: Alert, oriented. Right-sided facial palsy appreciated. Skin: Healed right neck surgical incision scar  Psychological: Cooperative, no exaggerated pain behaviors       Assessment:    Diagnosis Orders   1. Intractable chronic migraine without aura and without status migrainosus     2. Facial palsy     3.  Chronic pain syndrome           Trudy Freeman is a 36 y. o.female presenting to the pain clinic for evaluation of migraines after right parotid gland removal secondary to parotid cancer in October 2011. She did have a partial response to her initial Botox injections back in March but unfortunately had side effects which involved inability to close the right eyelid. She underwent repeat Botox injections today. We will follow-up in 12 weeks for repeat Botox. Plan: The following treatment recommendations and plan were discussed in detail with Trudy Freeman. In the light of patient's clinical history and suboptimal response to multiple treatment modalities as detailed above, we discussed the option of using onabotulinumtoxinA (Botox) injection for better management of chronic migraine headaches. The risks and benefits were discussed in detail with the patient. Patient underwent repeat Botox injection today. Please see attached procedure note.       Follow-up: 12 weeks for repeat Botox      Yobany Mendoza DO  Interventional Pain Management/PM&R   [place]

## 2022-07-07 ENCOUNTER — TELEMEDICINE (OUTPATIENT)
Dept: PSYCHIATRY | Age: 42
End: 2022-07-07
Payer: COMMERCIAL

## 2022-07-07 DIAGNOSIS — F43.10 PTSD (POST-TRAUMATIC STRESS DISORDER): ICD-10-CM

## 2022-07-07 DIAGNOSIS — R41.840 ATTENTION DEFICIT: ICD-10-CM

## 2022-07-07 DIAGNOSIS — F51.5 NIGHTMARES: ICD-10-CM

## 2022-07-07 DIAGNOSIS — F33.1 MODERATE EPISODE OF RECURRENT MAJOR DEPRESSIVE DISORDER (HCC): Primary | ICD-10-CM

## 2022-07-07 PROCEDURE — G8427 DOCREV CUR MEDS BY ELIG CLIN: HCPCS | Performed by: PSYCHIATRY & NEUROLOGY

## 2022-07-07 PROCEDURE — 99213 OFFICE O/P EST LOW 20 MIN: CPT | Performed by: PSYCHIATRY & NEUROLOGY

## 2022-07-07 PROCEDURE — 90833 PSYTX W PT W E/M 30 MIN: CPT | Performed by: PSYCHIATRY & NEUROLOGY

## 2022-07-07 RX ORDER — CLONAZEPAM 0.5 MG/1
0.5 TABLET ORAL 2 TIMES DAILY PRN
Qty: 60 TABLET | Refills: 2 | Status: SHIPPED | OUTPATIENT
Start: 2022-07-07 | End: 2022-10-05

## 2022-07-07 RX ORDER — PRAZOSIN HYDROCHLORIDE 5 MG/1
CAPSULE ORAL
Qty: 30 CAPSULE | Refills: 2 | Status: SHIPPED | OUTPATIENT
Start: 2022-07-07

## 2022-07-07 RX ORDER — FLUOXETINE HYDROCHLORIDE 40 MG/1
CAPSULE ORAL
Qty: 30 CAPSULE | Refills: 2 | Status: SHIPPED | OUTPATIENT
Start: 2022-07-07

## 2022-07-07 RX ORDER — PRAZOSIN HYDROCHLORIDE 2 MG/1
CAPSULE ORAL
Qty: 30 CAPSULE | Refills: 2 | Status: SHIPPED | OUTPATIENT
Start: 2022-07-07

## 2022-07-07 RX ORDER — FLUOXETINE HYDROCHLORIDE 20 MG/1
CAPSULE ORAL
Qty: 30 CAPSULE | Refills: 2 | Status: SHIPPED | OUTPATIENT
Start: 2022-07-07

## 2022-07-07 NOTE — PROGRESS NOTES
[address] [facility] PSYCHIATRY  [facility] [id number] [phone]    Progress Note    Patient:  Cliff Isidro  YOB: 1980  PCP:  EDEN Cavanaugh CNP  Visit Date:  7/7/2022    TELEHEALTH EVALUATION -- Audio/Visual (During [id number] public health emergency)    Patient location: home  Physician location: [place], [facility]  This virtual visit was conducted via interactive, real-time video. Chief Complaint   Patient presents with    Follow-up    Medication Check    Depression    Anxiety    Trauma       SUBJECTIVE:    Time in: 3;09pm  Time out: 3:38pm  Time spent on documentation: 5 minutes    Cliff Isidro, a 36 y.o. female, presents for a follow up visit. Patient reports she is doing well. Patient is compliant with medication regimen. She presents alone. Notes doing \"good\". Was proud she was able to get a speed limit sign put on her busy road. She was approved for disability which was a big relief. Was called 2 days prior to the hearing and told there wasn't going to be a hearing. Had struggled to get it x 5 years. Getting $126,000 back pay. Dental pain, broke a tooth yesterday. [facility] until [facility]. Will now have the money to pay for it. H/o necrosis of the jaw. The pain is always there. Planning to look into a bigger city for care. Dental issues make her not want to talk or go to writing group which she's missed the last 5 weeks. Caring for her mom who just had knee surgery. Had same surgery on other side 3 mos ago. Her brother is helping and lives next door. \"It's a lot. \"  Hasn't been taking Depakote Rx by Neuro for HA. Doesn't want to be on it. Mentally feels \"scattered\" but accomplishing things. \"Everything is overwhelming. The world is a terrible place. \" Upset about the overturning of [place]. Notes her mentally ill neighbor.  Her mom told her he will need to leave by Oct 1. Feels bad he was her friend. When cleaning finding old memories. Was in a nude photo shoot which was a mix of feelings. Empowering. Working on her self image. Helping others. Hasn't done the art she wanted to do for the show. Declines to make any med changes today. Mood is stable. Anxiety under fair control. She would like to start seeing me for longer sessions to focus on therapy as well. Spent much of session in psychotherapy discussing current stressors, coping skills, strategies for change, supportive therapy, psycheducation. Med Trials: Klonopin, Prozac, prazosin, Depakote (for HA), Trileptal (for HA), Effexor (severe discontinuation syndrome), Wellbutrin    OBJECTIVE:  Vitals: There were no vitals taken for this visit. MENTAL STATUS EXAM:    GENERAL  Build: Overweight    Hygiene:  Appropriate in casual dress   SENSORIUM Orientation: Place, Person, Time, & Situation     Consciousness: Alert    ATTENTION   Focused, mostly   RELATEDNESS  Cooperative    EYE CONTACT   Good    PSYCHOMOTOR  Normal    SPEECH Volume: Normal     Rate: Normal rate and tone    Amplitude: Within normal limits, talkative   MOOD  \"good\"    AFFECT Range: Full, bright   THOUGHT Process:  Goal-Directed    Content: no evidence of psychosis    COGNITION Insight: Good    Judgement:  Intact    MEMORY  no gross deficits, did not test    INTELLIGENCE  Average     Mobility/Gait: Independently     Controlled SubstancesMonitoring: Periodic Controlled Substance Monitoring: Possible medication side effects, risk of tolerance/dependence & alternative treatments discussed. ,No signs of potential drug abuse or diversion identified. Holly Luciano MD)       ASSESSMENT: No med changes per her request. Will schedule longer sessions to focus on therapy as well. Placed NPT referral to r/o ADHD previously and unclear if she has scheduled it. Attention deficit could be d/t trauma hx. Family h/o ADHD.     Diagnosis Orders 1. Moderate episode of recurrent major depressive disorder (HCC)  clonazePAM (KLONOPIN) 0.5 MG tablet   2. Nightmares  prazosin (MINIPRESS) 2 MG capsule   3. PTSD (post-traumatic stress disorder)  clonazePAM (KLONOPIN) 0.5 MG tablet   4. Attention deficit         PLAN:     · Medications:   · Prozac 60 mg QAM  · Prazosin 7 mg HS  · Klonopin 0.5 mg BID  · Rx Depakote (noncompliant) and Trileptal by Neuro for HA, possible mood benefit from those  · Medical Cannabis   · Therapy: previously saw Dr. Robi Paul  · Labs/Tests/Imaging: NPT referral previously made to r/o ADHD  · Records Reviewed: [facility]  · Patient advised to call if patient has any difficulties with treatment  Return in about 10 weeks (around 9/15/2022) for med check, follow up. I spent 29 minutes face to face with this patient. I spent 16 minutes or longer in psychotherapy discussing current stressors, coping skills, strategies for change, supportive therapy, psycheducation. Remainder of time spent on symptom evaluation and medication management. Celena Clark, was evaluated through a synchronous (real-time) audio-video encounter. The patient (or guardian if applicable) is aware that this is a billable service, which includes applicable copays. This virtual visit was conducted with patient's (and/or legal guardian's) consent. The visit was conducted pursuant to the emergency declaration under the [address] [place] [address], [address] [place] [address] authority and the [employer] Act. Patient identification was verified, and a caregiver was present when appropriate. The patient was located in a state where the provider was licensed to provide care.       Total time spent for this encounter: 34 minutes    Electronically signed by Christ Ganser, MD on 7/11/2022 at 8:32 AM

## 2022-10-24 ENCOUNTER — TELEPHONE (OUTPATIENT)
Dept: FAMILY MEDICINE CLINIC | Age: 42
End: 2022-10-24

## 2022-10-24 DIAGNOSIS — K08.89 ODONTALGIA: Primary | ICD-10-CM

## 2022-10-24 RX ORDER — AMOXICILLIN AND CLAVULANATE POTASSIUM 875; 125 MG/1; MG/1
1 TABLET, FILM COATED ORAL 2 TIMES DAILY
Qty: 14 TABLET | Refills: 0 | Status: SHIPPED | OUTPATIENT
Start: 2022-10-24 | End: 2022-10-31

## 2022-10-24 NOTE — TELEPHONE ENCOUNTER
Patient with a h/o head/neck cancer. Due to this, has many issues with her teeth. Currently looking for a dentist but has not been able to find anyone due to her medical history. Over the past 2 weeks, patient has started developing swelling and discomfort to the jaw. Over the past few days-pain, swelling, and warmth to the jaw have worsened--she is sure that this is from a dental infection. Patient feels that she needs an ATB at this time. I have her scheduled tomorrow afternoon in office but pt would like to see if you would be willing to send in medication tonight so she can get started. If pain/swelling/redness worsens significantly, she will go to ED tonight. Pharmacy info entered, [facility]. Call pt back either way.

## 2022-10-25 ENCOUNTER — OFFICE VISIT (OUTPATIENT)
Dept: FAMILY MEDICINE CLINIC | Age: 42
End: 2022-10-25
Payer: MEDICARE

## 2022-10-25 VITALS
RESPIRATION RATE: 16 BRPM | SYSTOLIC BLOOD PRESSURE: 124 MMHG | HEART RATE: 88 BPM | WEIGHT: 293 LBS | BODY MASS INDEX: 52.63 KG/M2 | DIASTOLIC BLOOD PRESSURE: 82 MMHG

## 2022-10-25 DIAGNOSIS — G89.29 CHRONIC MIDLINE THORACIC BACK PAIN: ICD-10-CM

## 2022-10-25 DIAGNOSIS — K08.89 ODONTALGIA: ICD-10-CM

## 2022-10-25 DIAGNOSIS — Z83.3 FAMILY HISTORY OF DIABETES MELLITUS: Primary | ICD-10-CM

## 2022-10-25 DIAGNOSIS — M54.6 CHRONIC MIDLINE THORACIC BACK PAIN: ICD-10-CM

## 2022-10-25 DIAGNOSIS — M25.552 LEFT HIP PAIN: ICD-10-CM

## 2022-10-25 DIAGNOSIS — E03.9 ACQUIRED HYPOTHYROIDISM: ICD-10-CM

## 2022-10-25 LAB — HBA1C MFR BLD: 5.4 % (ref 4.3–5.7)

## 2022-10-25 PROCEDURE — 99214 OFFICE O/P EST MOD 30 MIN: CPT | Performed by: NURSE PRACTITIONER

## 2022-10-25 PROCEDURE — 83036 HEMOGLOBIN GLYCOSYLATED A1C: CPT | Performed by: NURSE PRACTITIONER

## 2022-10-25 RX ORDER — BACLOFEN 10 MG/1
10 TABLET ORAL 3 TIMES DAILY
Qty: 90 TABLET | Refills: 0 | Status: SHIPPED | OUTPATIENT
Start: 2022-10-25 | End: 2022-11-24

## 2022-10-25 RX ORDER — ACETAMINOPHEN AND CODEINE PHOSPHATE 300; 30 MG/1; MG/1
1 TABLET ORAL EVERY 8 HOURS PRN
Qty: 9 TABLET | Refills: 0 | Status: SHIPPED | OUTPATIENT
Start: 2022-10-25 | End: 2022-10-28

## 2022-10-25 RX ORDER — LEVOTHYROXINE SODIUM 0.05 MG/1
TABLET ORAL
Qty: 30 TABLET | Refills: 2 | Status: SHIPPED | OUTPATIENT
Start: 2022-10-25

## 2022-10-25 ASSESSMENT — COLUMBIA-SUICIDE SEVERITY RATING SCALE - C-SSRS
6. HAVE YOU EVER DONE ANYTHING, STARTED TO DO ANYTHING, OR PREPARED TO DO ANYTHING TO END YOUR LIFE?: NO
1. WITHIN THE PAST MONTH, HAVE YOU WISHED YOU WERE DEAD OR WISHED YOU COULD GO TO SLEEP AND NOT WAKE UP?: NO
2. HAVE YOU ACTUALLY HAD ANY THOUGHTS OF KILLING YOURSELF?: NO

## 2022-10-25 ASSESSMENT — PATIENT HEALTH QUESTIONNAIRE - PHQ9
10. IF YOU CHECKED OFF ANY PROBLEMS, HOW DIFFICULT HAVE THESE PROBLEMS MADE IT FOR YOU TO DO YOUR WORK, TAKE CARE OF THINGS AT HOME, OR GET ALONG WITH OTHER PEOPLE: 1
SUM OF ALL RESPONSES TO PHQ QUESTIONS 1-9: 11
2. FEELING DOWN, DEPRESSED OR HOPELESS: 2
9. THOUGHTS THAT YOU WOULD BE BETTER OFF DEAD, OR OF HURTING YOURSELF: 1
7. TROUBLE CONCENTRATING ON THINGS, SUCH AS READING THE NEWSPAPER OR WATCHING TELEVISION: 1
SUM OF ALL RESPONSES TO PHQ QUESTIONS 1-9: 12
SUM OF ALL RESPONSES TO PHQ QUESTIONS 1-9: 12
4. FEELING TIRED OR HAVING LITTLE ENERGY: 1
8. MOVING OR SPEAKING SO SLOWLY THAT OTHER PEOPLE COULD HAVE NOTICED. OR THE OPPOSITE, BEING SO FIGETY OR RESTLESS THAT YOU HAVE BEEN MOVING AROUND A LOT MORE THAN USUAL: 1
SUM OF ALL RESPONSES TO PHQ QUESTIONS 1-9: 12
SUM OF ALL RESPONSES TO PHQ9 QUESTIONS 1 & 2: 4
1. LITTLE INTEREST OR PLEASURE IN DOING THINGS: 2
3. TROUBLE FALLING OR STAYING ASLEEP: 2
5. POOR APPETITE OR OVEREATING: 1
6. FEELING BAD ABOUT YOURSELF - OR THAT YOU ARE A FAILURE OR HAVE LET YOURSELF OR YOUR FAMILY DOWN: 1

## 2022-10-25 ASSESSMENT — ENCOUNTER SYMPTOMS
ANAL BLEEDING: 0
SORE THROAT: 0
NAUSEA: 0
SHORTNESS OF BREATH: 0
COLOR CHANGE: 0
CONSTIPATION: 0
COUGH: 0
ABDOMINAL PAIN: 0
BLOOD IN STOOL: 0
RHINORRHEA: 0
DIARRHEA: 0
EYE REDNESS: 0
EYE DISCHARGE: 0
ABDOMINAL DISTENTION: 0

## 2022-10-25 NOTE — PATIENT INSTRUCTIONS
Let us know right away if symptoms worsen of if they have not improved significantly by Thursday.      Warm compresses    Tylenol#3 - every 8 hours as needed for 3 days NO REFILLS

## 2022-10-25 NOTE — PROGRESS NOTES
[facility] FAMILY MEDICINE  [address] [facility] [address]  Dept: [phone]  Loc: [phone]      Visit Date: 10/25/2022    Naresh Torres is a 36 y.o. female who presents today for:  Chief Complaint   Patient presents with    Facial Pain     C/O facial pain on right side x couple weeks. No known injury. Believes her teeth are infected. Cannot see dentist until 01/2023. No chest pain/pressure, heart rate, or SOB. HPI:     Teeth are broken and infected - when seh brushes she will get blood. Has dentist appt in Jan.     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 Her medications got all messed up - insurance changed - off most of her medications for 1-2 months.      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    Removed tumor, salivary glands, facial nerve graft    Anxiety     Arthritis     knees & right shoulder, hip    Depression     Hypothyroidism     PTSD (post-traumatic stress disorder)     Sleep apnea       Past Surgical History:   Procedure Laterality Date    EUA PELVIC N/A 02/21/2019    REMOVAL AND REINSERTION OF MIRENA, EXAM UNDER ANESTHESIA, PAP performed by Mauri Chisholm MD at [facility] Right 12/10/2020    EYE SURGERY Right     SELECTIVE NECK DISSECTION Right 10/2011 Radiacal Neck for CA    TUMOR EXCISION  2011    excision salivary gland (radical neck disection & nerve graft) - radiation    TYMPANOSTOMY TUBE PLACEMENT      age 14years old    [de-identified] TOOTH EXTRACTION  2018     Family History   Problem Relation Age of Onset    Diabetes Mother     Arthritis Mother     Obesity Mother     Depression Mother     No Known Problems Father     Depression Brother     Uterine Cancer Maternal Grandmother     Breast Cancer Paternal Aunt     Breast Cancer Paternal Aunt      Social History     Tobacco Use    Smoking status: Every Day     Packs/day: 0.50     Years: 20.00     Pack years: 10.00     Types: Cigarettes, Cigars     Start date: 4/16/1999    Smokeless tobacco: Never   Substance Use Topics    Alcohol use: Not Currently     Comment: social      Current Outpatient Medications   Medication Sig Dispense Refill    levothyroxine (SYNTHROID) 50 MCG tablet Take 1 tablet by mouth once daily 30 tablet 2    baclofen (LIORESAL) 10 MG tablet Take 1 tablet by mouth 3 times daily 90 tablet 0    acetaminophen-codeine (TYLENOL #3) 300-30 MG per tablet Take 1 tablet by mouth every 8 hours as needed for Pain for up to 3 days. 9 tablet 0    amoxicillin-clavulanate (AUGMENTIN) 875-125 MG per tablet Take 1 tablet by mouth 2 times daily for 7 days 14 tablet 0    CPAP Machine MISC by Does not apply route nightly      Misc. Devices (BATHTUB SAFETY RAIL) MISC Use daily with showers 1 each 0    Misc.  Devices [address] Shower chair - Use daily when showering 1 Device 0    Handicap Placard MISC by Does not apply route Expires on 8/3/2022 1 each 0    acetaminophen (TYLENOL) 500 MG tablet Take 1,000 mg by mouth every 6 hours as needed for Pain      ibuprofen (ADVIL;MOTRIN) 200 MG tablet Take 600 mg by mouth every 6 hours as needed for Pain      prazosin (MINIPRESS) 5 MG capsule take 1 capsule by mouth nightly ALONG WITH 2 MG FOR 7 MG DOSE (Patient not taking: Reported on 10/25/2022) 30 capsule 2    prazosin (MINIPRESS) 2 MG capsule take 1 capsule by mouth nightly ALONG WITH 5 MG FOR 7 MG DOSE (Patient not taking: Reported on 10/25/2022) 30 capsule 2    FLUoxetine (PROZAC) 40 MG capsule take 1 capsule by mouth once daily along with 20 mg cap for total dose 60 mg once daily (Patient not taking: Reported on 10/25/2022) 30 capsule 2    FLUoxetine (PROZAC) 20 MG capsule take 1 capsule by mouth once daily along with 40 mg cap for total dose 60 mg once daily (Patient not taking: Reported on 10/25/2022) 30 capsule 2    clonazePAM (KLONOPIN) 0.5 MG tablet Take 1 tablet by mouth 2 times daily as needed for Anxiety for up to 90 days.  (Patient not taking: Reported on 10/25/2022) 60 tablet 2    valACYclovir (VALTREX) 1 g tablet take 1 tablet by mouth three times a day for 7 days (Patient not taking: Reported on 10/25/2022)      neomycin-polymyxin-dexameth 3.5-25881-9.1 OINT apply A THIN 1/4 [facility] RIBBON into right eye once daily at bedtime (Patient not taking: Reported on 10/25/2022)      moxifloxacin (VIGAMOX) 0.5 % ophthalmic solution instill 1 drop into right eye EVERY HOUR WHILE AWAKE FOR 7 DAYS (Patient not taking: Reported on 10/25/2022)      divalproex (DEPAKOTE ER) 500 MG extended release tablet take 1 tablet by mouth once daily (Patient not taking: Reported on 10/25/2022) 30 tablet 3    MAGNESIUM PO Take by mouth daily (Patient not taking: Reported on 10/25/2022)      Cyanocobalamin (B-12 PO) Take by mouth daily  (Patient not taking: Reported on 10/25/2022)      Omega-3 Fatty Acids (RA FISH OIL) 1000 MG CAPS take 1 capsule by mouth once daily (Patient not taking: Reported on 10/25/2022) 30 capsule 11    vitamin D (RA VITAMIN D-3) 125 MCG (5000 UT) CAPS capsule take 1 capsule by mouth once daily (Patient not taking: Reported on 10/25/2022) 30 capsule 11    OXcarbazepine (TRILEPTAL) 150 MG tablet Take 1 tablet by mouth 2 times daily (Patient not taking: Reported on 10/25/2022) 60 tablet 3    pilocarpine (SALAGEN) 5 MG tablet take 1 tablet by mouth three times a day (Patient not taking: Reported on 10/25/2022) 90 tablet 3     No current facility-administered medications for this visit. No Known Allergies    Subjective:    Review of Systems   Constitutional:  Positive for fatigue. Negative for chills and fever. HENT:  Positive for dental problem (Right sided dental pain). Negative for congestion, ear pain, postnasal drip, rhinorrhea and sore throat. Eyes:  Negative for discharge and redness. Respiratory:  Negative for cough and shortness of breath. Cardiovascular:  Negative for chest pain and leg swelling. Gastrointestinal:  Negative for abdominal distention, abdominal pain, anal bleeding, blood in stool, constipation, diarrhea and nausea. Skin:  Negative for color change and rash. Neurological:  Negative for facial asymmetry, speech difficulty and weakness. Hematological:  Does not bruise/bleed easily. Psychiatric/Behavioral:  Positive for dysphoric mood. Negative for agitation and confusion. Objective:     Vitals:    10/25/22 1427   BP: 124/82   Pulse: 88   Resp: 16   Weight: (!) 336 lb (152.4 kg)       Body mass index is 52.63 kg/m². @IMLNFPF(2)@  BP Readings from Last 3 Encounters:   10/25/22 124/82   06/16/22 106/80   05/05/22 108/70     Physical Exam  Constitutional:       General: She is not in acute distress. Appearance: Normal appearance. She is well-developed. She is not ill-appearing or diaphoretic. HENT:      Head: Normocephalic and atraumatic. Right Ear: Hearing and external ear normal. No decreased hearing noted. Left Ear: Hearing and external ear normal. No decreased hearing noted. Nose: Nose normal. No nasal deformity. Mouth/Throat:      Dentition: Abnormal dentition. Dental tenderness and dental caries present. Eyes:      General:         Right eye: No discharge. Left eye: No discharge.       Conjunctiva/sclera: Conjunctivae normal.   Pulmonary:      Effort: Pulmonary effort is normal. No respiratory distress. Abdominal:      General: There is no distension. Tenderness: There is no guarding. Musculoskeletal:         General: No tenderness or deformity. Normal range of motion. Cervical back: Normal range of motion and neck supple. Skin:     Coloration: Skin is not pale. Findings: No erythema or rash (On exposed areas). Neurological:      General: No focal deficit present. Mental Status: She is alert and oriented to person, place, and time. Gait: Gait normal.   Psychiatric:         Mood and Affect: Mood normal.         Speech: Speech normal.         Behavior: Behavior normal.         Thought Content: Thought content normal.         Judgment: Judgment normal.       Lab Results   Component Value Date    WBC 8.3 06/17/2020    HGB 13.5 06/17/2020    HCT 43.1 06/17/2020     06/17/2020    CHOL 183 06/17/2020    TRIG 133 06/17/2020    HDL 35 06/17/2020    LDLCALC 121 06/17/2020    AST 13 12/06/2018     06/17/2020    K 4.4 06/17/2020     06/17/2020    CREATININE 0.8 07/22/2020    BUN 10 06/17/2020    CO2 26 06/17/2020    TSH 3.740 06/17/2020    LABA1C 5.4 10/25/2022    LABGLOM 79 (A) 06/17/2020    MG 2.1 06/17/2020    CALCIUM 9.3 06/17/2020    VITD25 32 06/17/2020     Assessment:       Diagnosis Orders   1. Family history of diabetes mellitus  POCT glycosylated hemoglobin (Hb A1C)      2. Acquired hypothyroidism  levothyroxine (SYNTHROID) 50 MCG tablet    TSH with Reflex    T4      3. Odontalgia  acetaminophen-codeine (TYLENOL #3) 300-30 MG per tablet      4. Left hip pain  baclofen (LIORESAL) 10 MG tablet      5. Chronic midline thoracic back pain  baclofen (LIORESAL) 10 MG tablet          Plan:   Let us know right away if symptoms worsen of if they have not improved significantly by Thursday. Warm compresses    Tylenol#3 - every 8 hours as needed for 3 days NO REFILLS      Return in about 4 weeks (around 11/22/2022).     Orders Placed:  Orders Placed This Encounter   Procedures    TSH with Reflex    T4    POCT glycosylated hemoglobin (Hb A1C)     Medications Prescribed:  Orders Placed This Encounter   Medications    levothyroxine (SYNTHROID) 50 MCG tablet     Sig: Take 1 tablet by mouth once daily     Dispense:  30 tablet     Refill:  2    baclofen (LIORESAL) 10 MG tablet     Sig: Take 1 tablet by mouth 3 times daily     Dispense:  90 tablet     Refill:  0    acetaminophen-codeine (TYLENOL #3) 300-30 MG per tablet     Sig: Take 1 tablet by mouth every 8 hours as needed for Pain for up to 3 days. Dispense:  9 tablet     Refill:  0     Reduce doses taken as pain becomes manageable     Future Appointments   Date Time Provider Aure Torres   11/16/2022  2:00 PM EDEN Chávez CNP [address]   12/13/2022  8:45 AM Mahi Aceves MD [facility] [address]      Patient given educational materials - see patient instructions. Discussed use, benefit, and side effects of prescribedmedications. All patient questions answered. Pt voiced understanding. Reviewed health maintenance. Instructed to continue current medications, diet and exercise. Patient agreed with treatment plan. Follow up as directed.     Electronically signed by EDEN Chávez CNP on 10/25/2022 at 3:46 PM

## 2022-10-28 ENCOUNTER — TELEPHONE (OUTPATIENT)
Dept: FAMILY MEDICINE CLINIC | Age: 42
End: 2022-10-28

## 2022-10-28 NOTE — TELEPHONE ENCOUNTER
FYII  Pt was told to call in with an health update. She says the swelling in her jaw has decrease a little bit. Pt says medication is working ok for her. Pt will call with another update on Monday.

## 2022-11-03 DIAGNOSIS — B37.9 ANTIBIOTIC-INDUCED YEAST INFECTION: Primary | ICD-10-CM

## 2022-11-03 DIAGNOSIS — E03.9 ACQUIRED HYPOTHYROIDISM: ICD-10-CM

## 2022-11-03 DIAGNOSIS — T36.95XA ANTIBIOTIC-INDUCED YEAST INFECTION: Primary | ICD-10-CM

## 2022-11-03 RX ORDER — FLUCONAZOLE 150 MG/1
150 TABLET ORAL DAILY
Qty: 3 TABLET | Refills: 0 | Status: SHIPPED | OUTPATIENT
Start: 2022-11-03 | End: 2022-11-06

## 2022-12-14 ENCOUNTER — HOSPITAL ENCOUNTER (EMERGENCY)
Age: 42
Discharge: HOME | End: 2022-12-14
Payer: MEDICARE

## 2022-12-14 VITALS
HEIGHT: 67 IN | BODY MASS INDEX: 45.99 KG/M2 | TEMPERATURE: 97.2 F | SYSTOLIC BLOOD PRESSURE: 162 MMHG | DIASTOLIC BLOOD PRESSURE: 82 MMHG | HEART RATE: 82 BPM | RESPIRATION RATE: 16 BRPM | OXYGEN SATURATION: 96 % | WEIGHT: 293 LBS

## 2022-12-14 DIAGNOSIS — J06.9 VIRAL URI WITH COUGH: Primary | ICD-10-CM

## 2022-12-14 PROCEDURE — 99213 OFFICE O/P EST LOW 20 MIN: CPT | Performed by: NURSE PRACTITIONER

## 2022-12-14 PROCEDURE — 99213 OFFICE O/P EST LOW 20 MIN: CPT

## 2022-12-14 RX ORDER — BENZONATATE 100 MG/1
200 CAPSULE ORAL 3 TIMES DAILY PRN
Qty: 60 CAPSULE | Refills: 0 | Status: SHIPPED | OUTPATIENT
Start: 2022-12-14

## 2022-12-14 RX ORDER — GUAIFENESIN 600 MG/1
600 TABLET, EXTENDED RELEASE ORAL 2 TIMES DAILY
Qty: 30 TABLET | Refills: 0 | Status: SHIPPED | OUTPATIENT
Start: 2022-12-14 | End: 2022-12-29

## 2022-12-14 RX ORDER — DEXTROMETHORPHAN HYDROBROMIDE AND PROMETHAZINE HYDROCHLORIDE 15; 6.25 MG/5ML; MG/5ML
5 SYRUP ORAL 4 TIMES DAILY PRN
Qty: 200 ML | Refills: 0 | Status: SHIPPED | OUTPATIENT
Start: 2022-12-14

## 2022-12-14 ASSESSMENT — ENCOUNTER SYMPTOMS
ABDOMINAL PAIN: 0
COUGH: 1
WHEEZING: 0
EYE PAIN: 0
VOMITING: 0
SHORTNESS OF BREATH: 0
DIARRHEA: 0
RHINORRHEA: 0
CONSTIPATION: 0
BLOOD IN STOOL: 0
SORE THROAT: 0
NAUSEA: 0

## 2022-12-14 ASSESSMENT — PAIN - FUNCTIONAL ASSESSMENT: PAIN_FUNCTIONAL_ASSESSMENT: NONE - DENIES PAIN

## 2023-02-04 DIAGNOSIS — E03.9 ACQUIRED HYPOTHYROIDISM: ICD-10-CM

## 2023-02-06 RX ORDER — LEVOTHYROXINE SODIUM 0.05 MG/1
TABLET ORAL
Qty: 14 TABLET | Refills: 0 | Status: SHIPPED | OUTPATIENT
Start: 2023-02-06

## 2023-02-06 NOTE — TELEPHONE ENCOUNTER
This medication refill is regarding a electronic request. Refill requested by  [facility] . Requested Prescriptions     Pending Prescriptions Disp Refills    levothyroxine (SYNTHROID) 50 MCG tablet [Pharmacy Med Name: LEVOTHYROXINE 50 MCG TABLET] 30 tablet 0     Sig: take 1 tablet by mouth once daily     Date of last visit: 10/25/2022   Date of next visit: None  Date of last refill: 10/25/22 #30/2    Last Thyroid:    Lab Results   Component Value Date    TSH 3.740 06/17/2020    T4FREE 0.81 (L) 12/06/2018     Rx verified, ordered and set to EP.

## 2023-02-06 NOTE — TELEPHONE ENCOUNTER
Spoke with pt and let her know prescription was sent in #14/0 and that she will need to complete her labs from 10/25/22 within the next 14 days. Pt instructed to go to [facility] for labs. I let her know that we will keep our eyes out for the results and give her a call.

## 2023-02-25 DIAGNOSIS — E03.9 ACQUIRED HYPOTHYROIDISM: ICD-10-CM

## 2023-02-27 RX ORDER — LEVOTHYROXINE SODIUM 0.05 MG/1
TABLET ORAL
Qty: 14 TABLET | Refills: 0 | Status: SHIPPED | OUTPATIENT
Start: 2023-02-27

## 2023-02-27 NOTE — TELEPHONE ENCOUNTER
This medication refill is regarding a electronic request. Refill requested by  [facility]  . Requested Prescriptions     Pending Prescriptions Disp Refills    levothyroxine (SYNTHROID) 50 MCG tablet [Pharmacy Med Name: LEVOTHYROXINE 50 MCG TABLET] 14 tablet 0     Sig: take 1 tablet by mouth once daily     Date of last visit: 10/25/2022   Date of next visit: None  Date of last refill: 2/6/23 #14/0    Last Thyroid:    Lab Results   Component Value Date    TSH 3.740 06/17/2020    T4FREE 0.81 (L) 12/06/2018     Rx verified, ordered and set to EP.

## 2023-02-27 NOTE — TELEPHONE ENCOUNTER
Spoke with pt and let her know the prescription was sent in. Pt apologized for not getting her lab work done.  Pt let me know that she will make it a point to get the labs done within the next 14 days, and she also had me set up an annual appt with Kamilah Valerio on 4/3/23 # 3:20PM.

## 2023-03-13 RX ORDER — FLUOXETINE HYDROCHLORIDE 40 MG/1
CAPSULE ORAL
Qty: 30 CAPSULE | Refills: 2 | OUTPATIENT
Start: 2023-03-13

## 2023-03-13 RX ORDER — FLUOXETINE HYDROCHLORIDE 20 MG/1
CAPSULE ORAL
Qty: 30 CAPSULE | Refills: 2 | OUTPATIENT
Start: 2023-03-13

## 2023-03-23 DIAGNOSIS — E03.9 ACQUIRED HYPOTHYROIDISM: ICD-10-CM

## 2023-03-23 RX ORDER — LEVOTHYROXINE SODIUM 0.05 MG/1
TABLET ORAL
Qty: 7 TABLET | Refills: 0 | Status: SHIPPED | OUTPATIENT
Start: 2023-03-23

## 2023-03-23 NOTE — TELEPHONE ENCOUNTER
Spoke with pt and let her know a 7 day supply of her Levothyroxine was sent into the pharmacy. I let her know that she will absolutely need to complete the labs that she was given back in October. Pt let me know that she will go to [address] to complete the orders. I let pt know that we would call her with the results or we would plan to discuss everything at her upcoming appt with Kamilah Valerio on 4/3/23.

## 2023-03-23 NOTE — TELEPHONE ENCOUNTER
This medication refill is regarding a electronic request. Refill requested by  [facility] . Requested Prescriptions     Pending Prescriptions Disp Refills    levothyroxine (SYNTHROID) 50 MCG tablet [Pharmacy Med Name: LEVOTHYROXINE 50 MCG TABLET] 14 tablet 0     Sig: take 1 tablet by mouth once daily     Date of last visit: 10/25/2022   Date of next visit: 4/3/2023  Date of last refill: 2/27/23 #14/0    Last Thyroid:    Lab Results   Component Value Date    TSH 3.740 06/17/2020    T4FREE 0.81 (L) 12/06/2018     Rx verified, ordered and set to EP.

## 2023-03-23 NOTE — TELEPHONE ENCOUNTER
She was asked to get the tsh levels rechecked the end of fed (orders from October) still has not been done.  Will only give 7 days with NO refills

## 2023-03-28 ENCOUNTER — NURSE ONLY (OUTPATIENT)
Dept: LAB | Age: 43
End: 2023-03-28

## 2023-03-28 DIAGNOSIS — E03.9 ACQUIRED HYPOTHYROIDISM: ICD-10-CM

## 2023-03-28 LAB — TSH SERPL DL<=0.005 MIU/L-ACNC: 4.14 UIU/ML (ref 0.4–4.2)

## 2023-03-29 DIAGNOSIS — E03.9 ACQUIRED HYPOTHYROIDISM: ICD-10-CM

## 2023-03-29 RX ORDER — LEVOTHYROXINE SODIUM 0.05 MG/1
50 TABLET ORAL DAILY
Qty: 30 TABLET | Refills: 5 | Status: SHIPPED | OUTPATIENT
Start: 2023-03-29

## 2023-03-30 LAB — T4 SERPL-MCNC: 5.5 UG/DL (ref 4.5–10.9)

## 2023-04-06 ENCOUNTER — OFFICE VISIT (OUTPATIENT)
Dept: FAMILY MEDICINE CLINIC | Age: 43
End: 2023-04-06

## 2023-04-06 VITALS
RESPIRATION RATE: 16 BRPM | TEMPERATURE: 98.3 F | DIASTOLIC BLOOD PRESSURE: 70 MMHG | BODY MASS INDEX: 55.91 KG/M2 | WEIGHT: 293 LBS | HEART RATE: 80 BPM | SYSTOLIC BLOOD PRESSURE: 114 MMHG

## 2023-04-06 DIAGNOSIS — F33.1 MODERATE EPISODE OF RECURRENT MAJOR DEPRESSIVE DISORDER (HCC): ICD-10-CM

## 2023-04-06 DIAGNOSIS — Z12.31 BREAST CANCER SCREENING BY MAMMOGRAM: ICD-10-CM

## 2023-04-06 DIAGNOSIS — Z00.00 ENCOUNTER FOR WELL ADULT EXAM WITHOUT ABNORMAL FINDINGS: ICD-10-CM

## 2023-04-06 DIAGNOSIS — M25.561 ACUTE PAIN OF RIGHT KNEE: ICD-10-CM

## 2023-04-06 DIAGNOSIS — G89.29 CHRONIC RIGHT SHOULDER PAIN: ICD-10-CM

## 2023-04-06 DIAGNOSIS — C07 CANCER OF PAROTID GLAND (HCC): ICD-10-CM

## 2023-04-06 DIAGNOSIS — M19.90 ARTHRITIS: ICD-10-CM

## 2023-04-06 DIAGNOSIS — R56.00 SIMPLE FEBRILE CONVULSIONS (HCC): ICD-10-CM

## 2023-04-06 DIAGNOSIS — E55.9 VITAMIN D DEFICIENCY: ICD-10-CM

## 2023-04-06 DIAGNOSIS — M25.511 CHRONIC RIGHT SHOULDER PAIN: ICD-10-CM

## 2023-04-06 DIAGNOSIS — G40.89 OTHER SEIZURES (HCC): ICD-10-CM

## 2023-04-06 PROBLEM — R56.9 UNSPECIFIED CONVULSIONS (HCC): Status: ACTIVE | Noted: 2023-04-06

## 2023-04-06 RX ORDER — 1.1% SODIUM FLUORIDE PRESCRIPTION DENTAL CREAM 5 MG/G
CREAM DENTAL
COMMUNITY
Start: 2023-01-10

## 2023-04-06 ASSESSMENT — PATIENT HEALTH QUESTIONNAIRE - PHQ9
7. TROUBLE CONCENTRATING ON THINGS, SUCH AS READING THE NEWSPAPER OR WATCHING TELEVISION: 0
3. TROUBLE FALLING OR STAYING ASLEEP: 0
8. MOVING OR SPEAKING SO SLOWLY THAT OTHER PEOPLE COULD HAVE NOTICED. OR THE OPPOSITE, BEING SO FIGETY OR RESTLESS THAT YOU HAVE BEEN MOVING AROUND A LOT MORE THAN USUAL: 0
SUM OF ALL RESPONSES TO PHQ QUESTIONS 1-9: 0
10. IF YOU CHECKED OFF ANY PROBLEMS, HOW DIFFICULT HAVE THESE PROBLEMS MADE IT FOR YOU TO DO YOUR WORK, TAKE CARE OF THINGS AT HOME, OR GET ALONG WITH OTHER PEOPLE: 0
SUM OF ALL RESPONSES TO PHQ QUESTIONS 1-9: 0
6. FEELING BAD ABOUT YOURSELF - OR THAT YOU ARE A FAILURE OR HAVE LET YOURSELF OR YOUR FAMILY DOWN: 0
SUM OF ALL RESPONSES TO PHQ QUESTIONS 1-9: 0
SUM OF ALL RESPONSES TO PHQ QUESTIONS 1-9: 0
5. POOR APPETITE OR OVEREATING: 0
2. FEELING DOWN, DEPRESSED OR HOPELESS: 0
1. LITTLE INTEREST OR PLEASURE IN DOING THINGS: 0
9. THOUGHTS THAT YOU WOULD BE BETTER OFF DEAD, OR OF HURTING YOURSELF: 0
4. FEELING TIRED OR HAVING LITTLE ENERGY: 0
SUM OF ALL RESPONSES TO PHQ9 QUESTIONS 1 & 2: 0

## 2023-04-06 NOTE — PROGRESS NOTES
formulation 10/06/2017    Influenza, AFLURIA (age 1 yrs+), FLUZONE, (age 10 mo+), MDV, 0.5mL 11/07/2018    Influenza, FLUARIX, FLULAVAL, FLUZONE (age 10 mo+) AND AFLURIA, (age 1 y+), PF, 0.5mL 12/11/2019    Pneumococcal, PPSV23, PNEUMOVAX 21, (age 2y+), SC/IM, 0.5mL 04/30/2012    TDaP, ADACEL (age 10y-63y), Veena Donate (age 10y+), IM, 0.5mL 10/06/2017        Health Maintenance   Topic Date Due    Hepatitis C screen  Never done    COVID-19 Vaccine (3 - Booster for Moderna series) 08/13/2021    Cervical cancer screen  02/21/2022    Flu vaccine (Season Ended) 08/01/2023    Depression Monitoring  10/25/2023    Lipids  06/17/2025    DTaP/Tdap/Td vaccine (2 - Td or Tdap) 10/06/2027    Pneumococcal 0-64 years Vaccine  Completed    HIV screen  Completed    Hepatitis A vaccine  Aged Out    Hib vaccine  Aged Out    Meningococcal (ACWY) vaccine  Aged Out     Recommendations for Constant Therapy Due: see orders and patient instructions/AVS.    Return in 6 months (on 10/6/2023).

## 2023-06-14 ENCOUNTER — HOSPITAL ENCOUNTER (OUTPATIENT)
Age: 43
Discharge: HOME | End: 2023-06-14
Payer: MEDICARE

## 2023-06-16 ENCOUNTER — TELEPHONE (OUTPATIENT)
Dept: FAMILY MEDICINE CLINIC | Age: 43
End: 2023-06-16

## 2023-06-16 DIAGNOSIS — M25.561 ACUTE PAIN OF RIGHT KNEE: Primary | ICD-10-CM

## 2023-06-20 RX ORDER — IBUPROFEN 800 MG/1
800 TABLET ORAL EVERY 6 HOURS PRN
Qty: 120 TABLET | Refills: 1 | Status: SHIPPED | OUTPATIENT
Start: 2023-06-20

## 2023-06-20 RX ORDER — ACETAMINOPHEN AND CODEINE PHOSPHATE 300; 30 MG/1; MG/1
1 TABLET ORAL EVERY 8 HOURS PRN
Qty: 9 TABLET | Refills: 0 | Status: CANCELLED | OUTPATIENT
Start: 2023-06-20 | End: 2023-06-23

## 2023-06-20 NOTE — TELEPHONE ENCOUNTER
Pt notified and would like to go to [facility]. Referral placed.  She is asking for a refill of her Tylenol 3

## 2023-06-29 ENCOUNTER — TELEPHONE (OUTPATIENT)
Dept: WOUND CARE | Age: 43
End: 2023-06-29

## 2023-06-29 ENCOUNTER — TELEPHONE (OUTPATIENT)
Dept: FAMILY MEDICINE CLINIC | Age: 43
End: 2023-06-29

## 2023-06-29 DIAGNOSIS — Z85.818: Primary | ICD-10-CM

## 2023-06-29 DIAGNOSIS — M54.2 NECK PAIN: ICD-10-CM

## 2023-06-29 DIAGNOSIS — G51.0 PARTIAL FACIAL NERVE PALSY: ICD-10-CM

## 2023-06-29 DIAGNOSIS — Z92.3 HISTORY OF HEAD AND NECK RADIATION: ICD-10-CM

## 2023-06-29 DIAGNOSIS — M54.12 CERVICAL NEURALGIA: Chronic | ICD-10-CM

## 2023-06-29 DIAGNOSIS — K08.89 ODONTALGIA: ICD-10-CM

## 2023-07-03 DIAGNOSIS — M54.12 CERVICAL NEURALGIA: Chronic | ICD-10-CM

## 2023-07-03 DIAGNOSIS — Z92.3 HX OF HEAD AND NECK RADIATION: ICD-10-CM

## 2023-07-03 DIAGNOSIS — C07 ADENOID CYSTIC CARCINOMA OF PAROTID GLAND (HCC): ICD-10-CM

## 2023-07-03 DIAGNOSIS — C07 CANCER OF PAROTID GLAND (HCC): Primary | ICD-10-CM

## 2023-07-03 DIAGNOSIS — H61.301 ACQUIRED STENOSIS OF RIGHT EXTERNAL EAR CANAL: ICD-10-CM

## 2023-07-03 DIAGNOSIS — G51.0 PARTIAL FACIAL NERVE PALSY: ICD-10-CM

## 2023-07-03 NOTE — TELEPHONE ENCOUNTER
We can place a referral to [address] [place] [address] radiation specialist regarding consultation related to previous radiation treatment and upcoming oral surgery. She needs to keep the appointment with wound care as well.  Referral placed

## 2023-07-03 NOTE — TELEPHONE ENCOUNTER
Spoke with [employer]. She was last seen by Dr Corey Whelan 5 years ago and was seen once by him. I expressed to her that she would probably be considered a new patient at his office.  She is requesting a referral to a local specialist for this matter

## 2023-07-14 ENCOUNTER — HOSPITAL ENCOUNTER (OUTPATIENT)
Dept: WOUND CARE | Age: 43
Discharge: HOME | End: 2023-07-14
Payer: MEDICARE

## 2023-07-14 VITALS
SYSTOLIC BLOOD PRESSURE: 131 MMHG | RESPIRATION RATE: 18 BRPM | OXYGEN SATURATION: 97 % | HEART RATE: 86 BPM | TEMPERATURE: 97.8 F | DIASTOLIC BLOOD PRESSURE: 76 MMHG
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
## 2023-07-14 DIAGNOSIS — M27.2 OSTEORADIONECROSIS OF JAW: Primary | ICD-10-CM

## 2023-07-14 PROBLEM — G40.89 OTHER SEIZURES (HCC): Status: RESOLVED | Noted: 2023-04-06 | Resolved: 2023-07-14

## 2023-07-14 PROCEDURE — 99213 OFFICE O/P EST LOW 20 MIN: CPT

## 2023-07-14 RX ORDER — FLUOXETINE HYDROCHLORIDE 40 MG/1
40 CAPSULE ORAL DAILY
COMMUNITY

## 2023-07-14 ASSESSMENT — PAIN DESCRIPTION - LOCATION: LOCATION: NECK;FACE

## 2023-07-14 ASSESSMENT — PAIN SCALES - GENERAL: PAINLEVEL_OUTOF10: 3

## 2023-07-14 ASSESSMENT — PAIN DESCRIPTION - ORIENTATION: ORIENTATION: RIGHT

## 2023-07-14 ASSESSMENT — PAIN DESCRIPTION - DESCRIPTORS: DESCRIPTORS: ACHING

## 2023-07-14 NOTE — PROGRESS NOTES
RN HYPERBARIC OXYGEN THERAPY RISK ASSESSMENT TOOL   [facility]     Salvador Freeman  MEDICAL RECORD NUMBER:  [id number]  AGE: 39 y.o.    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    Removed tumor, salivary glands, facial nerve graft    Anxiety     Arthritis     knees & right shoulder, hip    Depression     Hypothyroidism     PTSD (post-traumatic stress disorder)     Sleep apnea        PAST SURGICAL HISTORY  Past Surgical History:   Procedure Laterality Date    EUA PELVIC N/A 2019    REMOVAL AND REINSERTION OF MIRENA, EXAM UNDER ANESTHESIA, PAP performed by Kyle Goodson MD at [address] Right 12/10/2020    EYE SURGERY Right     SELECTIVE NECK DISSECTION Right 10/2011    Radiacal Neck for CA    TUMOR EXCISION      excision salivary gland (radical neck disection & nerve graft) - radiation    TYMPANOSTOMY TUBE PLACEMENT      age 14years old    WISDOM TOOTH EXTRACTION  2018       FAMILY HISTORY  Family History   Problem Relation Age of Onset    Diabetes Mother     Arthritis Mother     Obesity Mother     Depression Mother     No Known Problems Father     Depression Brother     Uterine Cancer Maternal Grandmother     Breast Cancer Paternal Aunt     Breast Cancer Paternal Aunt        SOCIAL HISTORY  Social History     Tobacco Use    Smoking status: Every Day     Packs/day: 0.50     Years: 20.00     Pack years: 10.00     Types: Cigarettes, Cigars     Start date: 1999    Smokeless tobacco: Never   Vaping Use    Vaping Use: Never used   Substance Use Topics    Alcohol use: Not Currently     Comment: social    Drug use: Not Currently     Frequency: 5.0 times per week     Types: Marijuana Myah Lombardo)     Comment: weekly       ALLERGIES  No Known Allergies    MEDICATIONS  Current Outpatient Medications on File Prior to Encounter   Medication Sig Dispense

## 2023-07-14 NOTE — PLAN OF CARE
Problem: Cognitive:  Goal: Understand HBO therapy goals, procedures, and potential hazards. Description: Understand HBO therapy goals, procedures, and potential hazards. 7/14/2023 1603 by Juan M Ayers RN  Outcome: Adequate for Discharge  7/14/2023 1603 by Juan M Ayers RN  Outcome: Adequate for Discharge   Pt. Seen for HBO Evaluation see AVS for orders. Follow up after clearance from psychiatry. Care plan reviewed with patient and . Patient and  verbalize understanding of the plan of care and contribute to goal setting.

## 2023-07-14 NOTE — PROGRESS NOTES
Hyperbaric Oxygen Therapy Consultation  History and Physical    NAME: Rich Mithcell  MEDICAL RECORD NUMBER:  [id number]  AGE: 39 y.o. 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 Since that time, patient reports ongoing issues with broken, infected teeth. 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 Per oncology documentation \"Her dysfunction is substantially debilitating, including numbness of the right half of the tongue, mouth dryness, neck spasms, mouth drooping and drooling, difficulty with speaking, dysphagia, difficulty hearing and headaches. \"     Today, patient presents for HBOT due to plans for tooth extraction due to patient reported infection and broken teeth. She notes ongoing pain and difficultly eating due to symptoms. She denies history of pneumothorax. Denies presence of implanted devices. Denies history of seizures. There is a history of seizures on her problem list.  Patient states that she does not know why this is there as she has never had seizure activity to her knowledge. She does note recent thoughts of self harm. Denies plan or method for harm. States that she has previously followed with psychiatry, however she stopped her medications abruptly some time ago due to \"feeling numb and the inability to have an orgasm. \"  She states that she restarted taking these medications recently,

## 2023-07-14 NOTE — DISCHARGE INSTRUCTIONS
INFORMATION REVIEWED: Yes    Smoking has a negative effect on treatment and may diminish the benefits you may receive. Smoking within 2 hours prior to your treatment poses additional risk and should be avoided completely. Drinking alcohol or using illicit drugs may also have a negative effect on your treatment and should be avoided. Drinking carbonated beverages such as soda pop within 1 hour of your treatment could cause pains in the stomach during the treatment. Caffeinated beverages or foods containing caffeine should be avoided before your treatment. Please let us know if we can assist you with seeking help to stop smoking, drinking or using recreational drugs. PROHIBITED ITEMS INFORMATION REVIEWED: Yes   No wigs, hair spray, hair oils, hair ornaments, creams, lotions, make-up, after shave, perfumes, colognes, chap stick, lip balms, mustache waxes, petroleum products (e.g. Vaseline), baby oil, deodorant or ointments  No nail polish    No synthetic clothing  No nylon hose, underwear, panty hose or bra  No food, gum or candy  No jewelry  No smoking materials such as lighter, cigarettes or matches  No reading material  No hearing aids, non-fixed dentures  No Hard (non-gas permeable) contact lenses  Most dressings are approved to wear into the hyperbaric chamber. Please advise the hyperbaric staff of any new dressings applied to your wound to ensure the new dressings are compatible with hyperbaric oxygen treatments. No alcohol preps  No heat packs  No street clothes or shoes  No cell phones or other electronics  If it was not a part of you when you were born into this world, if it was not provided by the chamber , then it cannot go into the chamber with you.       Call once you hear from psychology for clearance and we will order a chest x-ray and EKG and coordinate with dental for surgery

## 2023-07-17 ENCOUNTER — TELEPHONE (OUTPATIENT)
Dept: PSYCHIATRY | Age: 43
End: 2023-07-17

## 2023-07-17 NOTE — TELEPHONE ENCOUNTER
Letter has been signed. Staff left message on voicemail for patient to call. Staff also sent message via [employer] to verify where clearance needs to be faxed/sent to? Waiting for a response.

## 2023-07-17 NOTE — TELEPHONE ENCOUNTER
Letter has been composed and accidentally \"pended\" the the provider-will need signed. Letter placed in mailbox for signature. Please advise otherwise.

## 2023-07-17 NOTE — TELEPHONE ENCOUNTER
We can provide the requested letter for clearance.    Electronically signed by Asha Mahajan MD on 7/17/2023 at 8:19 AM

## 2023-07-17 NOTE — TELEPHONE ENCOUNTER
Natali Catherine wrote into the office via [employer]:    Tom Sanchez! I am needing Hyperbaric oxygen treatment for my broken infected teeth and they need your clearance ASAP to let them know I am not going to try and kill myself. --Please advise; she is not scheduled to return until 08/15/23. She was seen last on 07/07/22 (with a cancellation and no show in between).

## 2023-07-17 NOTE — TELEPHONE ENCOUNTER
Trudy called into the office stating that the [address] is needing the letter. Spoke with Huseyin Fuentes at the [address] (F#: [phone]) Letter faxed.

## 2023-07-24 ENCOUNTER — TELEPHONE (OUTPATIENT)
Dept: INFECTIOUS DISEASES | Age: 43
End: 2023-07-24

## 2023-07-24 DIAGNOSIS — Z01.818 PREPROCEDURAL EXAMINATION: Primary | ICD-10-CM

## 2023-07-24 DIAGNOSIS — M27.2 OSTEORADIONECROSIS OF JAW: ICD-10-CM
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
## 2023-07-24 NOTE — TELEPHONE ENCOUNTER
----- Message from Sonia Otoole RN sent at 7/24/2023  3:15 PM EDT -----  Regarding: darion    ----- Message -----  From: Arina Briceño  Sent: 7/24/2023  10:39 AM EDT  To: Liyah PUT IN FOR HER TO HAVE HER EKG AND OTHER HEART TESTS DONE TO CLEAR HER FOR HBO TREATMENT

## 2023-07-25 ENCOUNTER — TELEPHONE (OUTPATIENT)
Dept: WOUND CARE | Age: 43
End: 2023-07-25

## 2023-07-25 ENCOUNTER — HOSPITAL ENCOUNTER (OUTPATIENT)
Age: 43
Discharge: HOME | End: 2023-07-25
Payer: MEDICARE

## 2023-07-25 ENCOUNTER — HOSPITAL ENCOUNTER (OUTPATIENT)
Dept: GENERAL RADIOLOGY | Age: 43
Discharge: HOME | End: 2023-07-25
Payer: MEDICARE
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
## 2023-07-25 DIAGNOSIS — M27.2 OSTEORADIONECROSIS OF JAW: ICD-10-CM

## 2023-07-25 DIAGNOSIS — Z01.818 PREPROCEDURAL EXAMINATION: ICD-10-CM

## 2023-07-25 LAB
EKG ATRIAL RATE: 55 BPM
EKG P AXIS: 38 DEGREES
EKG P-R INTERVAL: 160 MS
EKG Q-T INTERVAL: 428 MS
EKG QRS DURATION: 88 MS
EKG QTC CALCULATION (BAZETT): 409 MS
EKG R AXIS: 27 DEGREES
EKG T AXIS: 42 DEGREES
EKG VENTRICULAR RATE: 55 BPM

## 2023-07-25 PROCEDURE — 93010 ELECTROCARDIOGRAM REPORT: CPT | Performed by: INTERNAL MEDICINE

## 2023-07-25 PROCEDURE — 93005 ELECTROCARDIOGRAM TRACING: CPT

## 2023-07-25 PROCEDURE — 71046 X-RAY EXAM CHEST 2 VIEWS: CPT

## 2023-07-25 NOTE — TELEPHONE ENCOUNTER
1040 am, patient called to ask if orders were placed for her EKG and XRAY for pre HBO testing. Assured patient orders were placed yesterday after she called. Patient verbalizes understanding.

## 2023-07-25 NOTE — TELEPHONE ENCOUNTER
7/24/2023 - 3:35 pm  Patient called regarding her pre testing for hyperbaric oxygen treatments- Chest xray and EKG. She would like the orders put in so she can have them done. She said to got clearance from Psychiatry. Discussed with Ramila Maxwell CNP and she is placing the orders. Updated patient that both the orders are in and she can have them done when she is able to.

## 2023-08-01 ENCOUNTER — TELEPHONE (OUTPATIENT)
Dept: WOUND CARE | Age: 43
End: 2023-08-01

## 2023-08-01 NOTE — TELEPHONE ENCOUNTER
Patient called to verify that her dentist office called to coordinate care with hyperbarics. This was confirmed with patient.

## 2023-08-01 NOTE — TELEPHONE ENCOUNTER
Received a message from ZAIN at [facility] dental regarding patients teeth extractions. A date for extractions was coordinated of 9/20/23. Patient will have 20 treatments prior to extractions. Tentative start date of 8/21/23 for HBO treatments. Dental office needs a letter faxed to [phone] with the dates and tentative date for extractions after 20 treatments. Patient will complete 10 treatments after extractions. Patient made aware of dates and is requesting a DVD player for treatments. Informed patient I will look into this.

## 2023-08-15 ENCOUNTER — TELEMEDICINE (OUTPATIENT)
Dept: PSYCHIATRY | Age: 43
End: 2023-08-15
Payer: MEDICARE

## 2023-08-15 DIAGNOSIS — F43.10 PTSD (POST-TRAUMATIC STRESS DISORDER): ICD-10-CM

## 2023-08-15 DIAGNOSIS — F33.1 MODERATE EPISODE OF RECURRENT MAJOR DEPRESSIVE DISORDER (HCC): Primary | ICD-10-CM

## 2023-08-15 DIAGNOSIS — R41.840 ATTENTION DEFICIT: ICD-10-CM

## 2023-08-15 PROCEDURE — 99214 OFFICE O/P EST MOD 30 MIN: CPT | Performed by: PSYCHIATRY & NEUROLOGY

## 2023-08-15 PROCEDURE — G8427 DOCREV CUR MEDS BY ELIG CLIN: HCPCS | Performed by: PSYCHIATRY & NEUROLOGY

## 2023-08-15 PROCEDURE — 90833 PSYTX W PT W E/M 30 MIN: CPT | Performed by: PSYCHIATRY & NEUROLOGY

## 2023-08-15 RX ORDER — FLUOXETINE HYDROCHLORIDE 40 MG/1
40 CAPSULE ORAL DAILY
Qty: 30 CAPSULE | Refills: 2 | Status: SHIPPED | OUTPATIENT
Start: 2023-08-15

## 2023-08-15 RX ORDER — CLONAZEPAM 0.5 MG/1
0.5 TABLET ORAL 2 TIMES DAILY PRN
Qty: 60 TABLET | Refills: 2 | Status: SHIPPED | OUTPATIENT
Start: 2023-08-15 | End: 2023-11-13

## 2023-08-15 RX ORDER — PRAZOSIN HYDROCHLORIDE 5 MG/1
5 CAPSULE ORAL NIGHTLY
Qty: 30 CAPSULE | Refills: 1 | Status: SHIPPED | OUTPATIENT
Start: 2023-08-15

## 2023-08-15 RX ORDER — PRAZOSIN HYDROCHLORIDE 2 MG/1
2 CAPSULE ORAL NIGHTLY
Qty: 30 CAPSULE | Refills: 1 | Status: SHIPPED | OUTPATIENT
Start: 2023-08-15

## 2023-08-15 RX ORDER — ATOMOXETINE 40 MG/1
40 CAPSULE ORAL DAILY
Qty: 30 CAPSULE | Refills: 2 | Status: SHIPPED | OUTPATIENT
Start: 2023-08-15

## 2023-08-15 NOTE — PROGRESS NOTES
[address] PSYCHIATRY  [address] [facility] [address]  [facility] [id number] [phone]    Progress Note    Patient:  Titus Solares  YOB: 1980  PCP:  Contreras Solitario, EDEN - CNP  Visit Date:  8/15/2023      Chief Complaint   Patient presents with    Follow-up    Medication Check    Depression    Anxiety    Trauma       SUBJECTIVE:    Time in: 8:35am  Time out: 9:06am  Time spent on documentation: 5 minutes    Titus Solares, a 39 y.o. female, presents for a follow up visit. She presents alone. Last saw me in July '22. Felt \"numb\", lack of enjoyment. Sexual side effects. Stopped the Prozac x 3 mos and mood felt worse off it \"sad\". Was having dental issues at the time which makes it difficult to eat anything hot/cold, nerve exposed. Had a lot of pain. Went back on Prozac 3 weeks ago. Started with 20 mg x a week, then 40 mg x a week. Now back on 60 mg dose. \"It's hard to be in this body. \" Needs more help. Discusses health stressors in detail. Trouble with vision from one eye d/t dryness, eyelid won't shut d/t nerve damage. 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 Vestibular problems and balance issues. Was at grocery store and felt disoriented d/t vision/balance issues. Feeling vulnerable, dependent on others. Her friend is going through issues. Notes the neighbor with schizophrenia moved out. House needs very expensive repairs. Floor is sinking in the kitchen. Fears falling through. Slowly moving to the other side of the duplex. Nephew (16) having issues being inappropriate online. More anxiety and stress with health and family stressors. We discussed how she seems to be taking on everyone's problems. Enjoyed lunch with her friend and was able to get everything out. Discusses having to talk to her 's friend who crossed a line noting they are flirty and in theater together. Notes her 's reaction to the situation.    She c/o trouble with

## 2023-08-16 ENCOUNTER — TELEPHONE (OUTPATIENT)
Dept: ENT CLINIC | Age: 43
End: 2023-08-16

## 2023-08-16 ENCOUNTER — TELEPHONE (OUTPATIENT)
Dept: WOUND CARE | Age: 43
End: 2023-08-16

## 2023-08-16 NOTE — TELEPHONE ENCOUNTER
Called patient to inquire about appointment with ENT on Monday as patient is scheduled to start HBOT that morning. Patient states she believes she is getting tubes placed in ears for her treatments. Patient decided to start treatments on Tuesday 8/22/23 so that she can keep the ENT appointment. Patient inquiring about time of treatments. Informed patient that at this time the current opening is at 8:30am. Patient would like afternoon appointments. Informed patient that we have to fill the morning slots first and at this time there are not other patients diving to fill the afternoon. Patient also inquiring about breathing issues and dry mouth lozenges. Informed patient she will go into the chamber with an air mask at all times. Informed patient that lozenges/mints are not allowed due to choking hazards but we can send in a bottle of water. Informed patient she can bring in the lozenges on Tuesday to discuss with the provider. Patient to arrive on Tuesday at 8:30am for first treatment. Called ENT office to inquire about patients visit on Monday. Penny Nunes states patient is scheduled for an ear cleaning that day. Patient is not scheduled for tube placement but if she does not tolerate HBO on Tuesday they may be able to see her that day to place tubes.

## 2023-08-16 NOTE — TELEPHONE ENCOUNTER
Received a call from [facility] stating patient is starting treatments on Tuesday with them. They said patient is seeing Dr Dk Phillips on Monday for an ear cleaning and they were wondering if he would put tubes in at that visit. Told them no, Dr Dk Phillips does not do tubes in the office. Dr Fitz Pugh is the only physician that will place tubes in the office. They then asked when the next opening we would have to get the patient in if she needs tubes for her dives? I looked at the schedule. Patient will be done with her first dive around 11:00 am. Dr Fitz Pugh currently has a small opening at 11:30 we might be able to squeeze patient in if needed immediately. Told them otherwise I could get her in on the following Tuesday, the 29th. They verbalized understanding and said if they needed us they would call. Told them that would be fine.

## 2023-08-21 ENCOUNTER — OFFICE VISIT (OUTPATIENT)
Dept: ENT CLINIC | Age: 43
End: 2023-08-21

## 2023-08-21 ENCOUNTER — HOSPITAL ENCOUNTER (OUTPATIENT)
Dept: AUDIOLOGY | Age: 43
Discharge: HOME | End: 2023-08-21
Payer: MEDICARE

## 2023-08-21 VITALS
SYSTOLIC BLOOD PRESSURE: 142 MMHG | HEIGHT: 68 IN | WEIGHT: 293 LBS | OXYGEN SATURATION: 97 % | DIASTOLIC BLOOD PRESSURE: 84 MMHG | RESPIRATION RATE: 22 BRPM | HEART RATE: 89 BPM | TEMPERATURE: 97.2 F | BODY MASS INDEX: 44.41 KG/M2

## 2023-08-21 DIAGNOSIS — H69.81 DYSFUNCTION OF RIGHT EUSTACHIAN TUBE: Primary | ICD-10-CM

## 2023-08-21 DIAGNOSIS — L59.8 RADIATION-INDUCED FIBROSIS OF SOFT TISSUE FROM THERAPEUTIC PROCEDURE: ICD-10-CM
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
## 2023-08-21 PROBLEM — H69.91 DYSFUNCTION OF RIGHT EUSTACHIAN TUBE: Status: ACTIVE | Noted: 2023-08-21

## 2023-08-21 PROCEDURE — 92567 TYMPANOMETRY: CPT | Performed by: AUDIOLOGIST

## 2023-08-21 NOTE — PROGRESS NOTES
[facility] [place] [facility]  [facility]SE [facility]  [address]  Dept: [phone]  Dept Fax: [phone]  Loc: Keyla Singleton is a 39 y.o. female who was referred by No ref. provider found for:  Chief Complaint   Patient presents with    Follow-up     Patient is here for ear cleaning do to hyperbaric chamber. Patient says she is for a tube placement. She said that she cannot do the hyperbaric chamber without it. HPI:     Gilberto Lin is a 39 y.o. 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 She also has severe eustachian tube dysfunction and chronic serous otitis of her right ear which is ipsilateral to the focus of her radiation therapy for her cancer. She is in the process of getting extensive dental reconstructions and needs hyperbaric oxygen therapy in preparation for that treatment that is scheduled to start tomorrow. She comes in hoping that she can have a pressure equalizing tube placed into her right tympanic membrane to enable her to tolerate the HBO therapy. History:     No Known Allergies  Current Outpatient Medications   Medication Sig Dispense Refill    clonazePAM (KLONOPIN) 0.5 MG tablet Take 1 tablet by mouth 2 times daily as needed for Anxiety for up to 90 days.  60 tablet 2    FLUoxetine (PROZAC) 40 MG capsule Take 1 capsule by mouth daily 30 capsule 2    atomoxetine (STRATTERA) 40 MG capsule Take 1 capsule by mouth daily 30 capsule 2    prazosin (MINIPRESS) 2 MG capsule Take 1 capsule by mouth nightly TAKE ALONG WITH 5 MG CAP FOR TOTAL DOSE 7 MG AT NIGHT 30 capsule 1    prazosin (MINIPRESS) 5 MG capsule Take 1 capsule by mouth nightly TAKE ALONG WITH 2 MG CAP FOR TOTAL DOSE 7 MG AT NIGHT 30 capsule 1    ibuprofen (ADVIL;MOTRIN) 800 MG tablet Take 1 tablet by mouth every 6 hours as needed for Pain 120 tablet 1    SF 5000 PLUS 1.1 % CREA USE

## 2023-08-21 NOTE — PROGRESS NOTES
ACCOUNT #: [de-identified]    TYMPANOMETRY    Reason for Testing:  Testing completed as ordered by Dr. Mojgan Martinez due to dysfunction of right eustachian tube and radiation induced fibrosis of soft tissue from therapeutic procedure. Patient is preparing for hyperbaric therapy. She previous had a PE tube placed in her right ear about 2.5 years ago. 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 The tumor was removed and she did receive radiation. She continues to experience hearing loss in her right ear. 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 Word understanding was good in the left ear and very poor in the right ear. Audiometry revealed a significant increase in the conductive component of the patient's mixed hearing loss in her right ear compared to her 11/08/2019 audigram. Hearing was stable in her left ear. Tympanometry revealed normal middle ear function in the left ear and revealed a large ECV consistent with a patent PE tube in the right ear.       Otoscopy:  clear canal with normal appearing tympanic membrane- left ear  Dry cerumen with PE tube visualized in canal preventing clear view of TM- right ear              TYMPANOGRAMS  RE    LE  []   [x]  Normal compliance    []   []  Reduced mobility  []   [] Hyper mobility  []   [x] Normal middle ear pressure  []   [] Negative middle ear pressure  []   [] Positive middle ear pressure  [x]   [] Flat w/normal ECV  []   [x] Normal ear canal volume  []   [] Patent PE tube  []   [] Non-Patent PE tube  []   [] Could Not Test    Comments:  Tympanometry results are consistent with normal middle ear function in the left ear and revealed a flat tympanogram with normal ear canal volume consistent with middle ear dysfunction/non-patent PE tube in the right ear    Recommendation (s):  Complete follow up with

## 2023-08-22 ENCOUNTER — HOSPITAL ENCOUNTER (OUTPATIENT)
Dept: HYPERBARIC MEDICINE | Age: 43
Discharge: HOME | End: 2023-08-22
Payer: MEDICARE

## 2023-08-22 VITALS
DIASTOLIC BLOOD PRESSURE: 72 MMHG | OXYGEN SATURATION: 98 % | RESPIRATION RATE: 18 BRPM | SYSTOLIC BLOOD PRESSURE: 145 MMHG | HEART RATE: 77 BPM | TEMPERATURE: 96.9 F

## 2023-08-22 DIAGNOSIS — M27.2 OSTEORADIONECROSIS OF JAW: Primary | ICD-10-CM
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
## 2023-08-22 PROCEDURE — G0277 HBOT, FULL BODY CHAMBER, 30M: HCPCS

## 2023-08-22 NOTE — PROGRESS NOTES
[facility]  Hyperbaric Oxygen Therapy   Progress Note      NAME: Savanna De La Garza  MEDICAL RECORD NUMBER:  [id number]  AGE: 39 y.o. GENDER: female  : 1980  EPISODE DATE:  2023     Subjective     HBO Treatment Number: 1 out of Total Treatments: 30    HBO Diagnosis:               Indications: Osteoradionecrosis of the Jaw    Safety checks performed prior to treatment. See doc flowsheets for documentation.     Objective        Patient Active Problem List   Diagnosis Code    Moderate episode of recurrent major depressive disorder ([address]) F33.1    Encounter for removal and reinsertion of intrauterine contraceptive device (IUD) Z30.433    Neck pain M54.2    Balance problem due to vestibular dysfunction of right ear H83.2X1    Pain of left hip joint M25.552    Vitamin D deficiency E55.9    Hypothyroidism E03.9    Oropharyngeal dysphagia R13.12    Adenoid cystic carcinoma ([facility]) C80.1    Sleep apnea G47.30    Cancer of parotid gland ([facility]) C07    Fatigue R53.83    Gait abnormality R26.9    Morbidly obese ([facility]) E66.01    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    Cervical neuralgia M54.12    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, Y84.2    Chronic otitis media after insertion of tympanic ventilation tube, right H66.91, Z96.22    Chronic otorrhea of right ear H92.11    Adenoid cystic carcinoma of parotid gland ([facility]) C07    Headache(784.0) R51    Low vitamin D level R79.89    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

## 2023-08-22 NOTE — PLAN OF CARE
Problem: Physical Regulation:  Goal: Tolerate HBO therapy and barotrauma will be prevented  Description: Tolerate HBO therapy and barotrauma will be prevented  Outcome: Adequate for Discharge   No s/s of seizure noted. No s/s of barotrauma noted. Pt demonstrated equalization of ears during pressurization. Care plan reviewed with patient. Patient verbalize understanding of the plan of care and contribute to goal setting.

## 2023-08-22 NOTE — PROGRESS NOTES
9959- consent signed with patient and Alissa Eron. patient assessed by Dr. Ara Calloway. Lozenges brought in by patient. Provider assessed item and will not allow due to choking hazard. Instructed patient she could use a gel and a bottle of water. Patient cleared for HBO. Provider left. 1761- Call to provider to ask about patient taking in glasses and mouth guard. Provider will not allow due to fire/safety hazard. Informed patient. Patient became upset. Second nurse stepped out to call provider regarding Mirena IUD birth control. Patient upset, climbed out of the end of the [facility] and went to changing room and made a phone call. Second nurse back in room. Provider approved Mirena. Patient came back to the [facility]. Discussed whether patient wanted to proceed with treatment. Patient upset but agrees to treatment. 2075- Patient placed in chamber. Talked with patient during pressurization. No issues. Patient states no complaints with ears. 3715- patient to pressure. Patient want to take an air break. No ordered air break at this time. Obtained PRN air break order. Instructed patient she just got to pressure and to try to deep breath and relax. Patient is visibly anxious, fidgeting, covering her face. Patient upset  states that she was under the impression she would have an oxygen mask not an air mask. Explained to patient she is in 100% oxygen chamber and there is not an oxygen mask that can go into the chambers. offered patient to use the air break mask. Patient declined. States no one listens to her or communicates clearly. Patient upset that she only met Dr. Ara Calloway today before treatment. Re-explained HBO process to patient, discussed other staff members. Patient turned away from this nurse and wrapped blanket around her face. Patient still responding to questions but upset. Instructed patient I would allow her to listen to her movie. Patient agreeable.     8891- Patient fidgeting, second nurse asked patient if she was

## 2023-08-23 ENCOUNTER — TELEPHONE (OUTPATIENT)
Dept: ENT CLINIC | Age: 43
End: 2023-08-23

## 2023-08-23 ENCOUNTER — HOSPITAL ENCOUNTER (OUTPATIENT)
Dept: HYPERBARIC MEDICINE | Age: 43
Discharge: HOME | End: 2023-08-23
Payer: MEDICARE

## 2023-08-23 VITALS
RESPIRATION RATE: 16 BRPM | DIASTOLIC BLOOD PRESSURE: 63 MMHG | TEMPERATURE: 96.4 F | SYSTOLIC BLOOD PRESSURE: 109 MMHG | OXYGEN SATURATION: 100 % | HEART RATE: 55 BPM
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
## 2023-08-23 DIAGNOSIS — M27.2 OSTEORADIONECROSIS OF JAW: Primary | ICD-10-CM

## 2023-08-23 PROCEDURE — G0277 HBOT, FULL BODY CHAMBER, 30M: HCPCS

## 2023-08-23 ASSESSMENT — PAIN SCALES - GENERAL
PAINLEVEL_OUTOF10: 3
PAINLEVEL_OUTOF10: 3

## 2023-08-23 ASSESSMENT — PAIN DESCRIPTION - LOCATION
LOCATION: FACE
LOCATION: FACE

## 2023-08-23 ASSESSMENT — PAIN DESCRIPTION - ORIENTATION
ORIENTATION: RIGHT
ORIENTATION: RIGHT

## 2023-08-23 NOTE — TELEPHONE ENCOUNTER
The patient seemed to do okay during her HBO treatment. I spoke to Dr Socorro Rodriguez and he said we can cancel the orders for the tube placement and for the audio/tympano. He placed both in case were needed but seems as though the patient is handling her HBO.

## 2023-08-24 ENCOUNTER — HOSPITAL ENCOUNTER (OUTPATIENT)
Dept: HYPERBARIC MEDICINE | Age: 43
Discharge: HOME | End: 2023-08-24
Payer: MEDICARE

## 2023-08-24 VITALS
RESPIRATION RATE: 16 BRPM | DIASTOLIC BLOOD PRESSURE: 60 MMHG | HEART RATE: 54 BPM | SYSTOLIC BLOOD PRESSURE: 111 MMHG | TEMPERATURE: 96.6 F | OXYGEN SATURATION: 100 %

## 2023-08-24 DIAGNOSIS — M27.2 OSTEORADIONECROSIS OF JAW: Primary | ICD-10-CM
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
## 2023-08-24 PROCEDURE — G0277 HBOT, FULL BODY CHAMBER, 30M: HCPCS

## 2023-08-24 ASSESSMENT — PAIN DESCRIPTION - ORIENTATION
ORIENTATION: RIGHT
ORIENTATION: RIGHT

## 2023-08-24 ASSESSMENT — PAIN DESCRIPTION - LOCATION
LOCATION: FACE
LOCATION: FACE

## 2023-08-24 ASSESSMENT — PAIN SCALES - GENERAL
PAINLEVEL_OUTOF10: 3
PAINLEVEL_OUTOF10: 3

## 2023-08-24 ASSESSMENT — PAIN DESCRIPTION - DESCRIPTORS
DESCRIPTORS: ACHING
DESCRIPTORS: ACHING

## 2023-08-24 NOTE — PLAN OF CARE
Problem: Physical Regulation:  Goal: Tolerate HBO therapy and barotrauma will be prevented  Description: Tolerate HBO therapy and barotrauma will be prevented  Outcome: Adequate for Discharge     No s/s of seizure noted. No s/s of barotrauma noted. Pt demonstrated equalization of ears during pressurization. Care plan reviewed with patient . Patient verbalize understanding of the plan of care and contribute to goal setting.

## 2023-08-25 ENCOUNTER — HOSPITAL ENCOUNTER (OUTPATIENT)
Dept: HYPERBARIC MEDICINE | Age: 43
Discharge: HOME | End: 2023-08-25
Payer: MEDICARE

## 2023-08-25 VITALS
RESPIRATION RATE: 16 BRPM | OXYGEN SATURATION: 100 % | DIASTOLIC BLOOD PRESSURE: 69 MMHG | HEART RATE: 83 BPM | SYSTOLIC BLOOD PRESSURE: 109 MMHG | TEMPERATURE: 96.8 F

## 2023-08-25 PROCEDURE — G0277 HBOT, FULL BODY CHAMBER, 30M: HCPCS

## 2023-08-25 NOTE — PROGRESS NOTES
[facility]  Hyperbaric Oxygen Therapy   Progress Note      NAME: Tiana Bauman  MEDICAL RECORD NUMBER:  [id number]  AGE: 39 y.o. GENDER: female  : 1980  EPISODE DATE:  2023     Subjective     HBO Treatment Number: 2 out of Total Treatments: 30    HBO Diagnosis:               Indications: Osteoradionecrosis of the Jaw    Safety checks performed prior to treatment. See doc flowsheets for documentation.     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) Z68.43    Unspecified convulsions R56.9    Dysfunction

## 2023-08-28 ENCOUNTER — HOSPITAL ENCOUNTER (OUTPATIENT)
Dept: HYPERBARIC MEDICINE | Age: 43
Discharge: HOME | End: 2023-08-28
Payer: MEDICARE

## 2023-08-28 VITALS
OXYGEN SATURATION: 100 % | SYSTOLIC BLOOD PRESSURE: 127 MMHG | HEART RATE: 58 BPM | RESPIRATION RATE: 18 BRPM | DIASTOLIC BLOOD PRESSURE: 79 MMHG | TEMPERATURE: 96.9 F

## 2023-08-28 DIAGNOSIS — M27.2 OSTEORADIONECROSIS OF JAW: Primary | ICD-10-CM
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
## 2023-08-28 PROCEDURE — G0277 HBOT, FULL BODY CHAMBER, 30M: HCPCS

## 2023-08-28 PROCEDURE — 99183 HYPERBARIC OXYGEN THERAPY: CPT | Performed by: NURSE PRACTITIONER

## 2023-08-28 ASSESSMENT — PAIN DESCRIPTION - LOCATION
LOCATION: FACE
LOCATION: FACE

## 2023-08-28 ASSESSMENT — PAIN SCALES - GENERAL
PAINLEVEL_OUTOF10: 2
PAINLEVEL_OUTOF10: 2

## 2023-08-28 ASSESSMENT — PAIN DESCRIPTION - ORIENTATION
ORIENTATION: RIGHT
ORIENTATION: RIGHT

## 2023-08-28 ASSESSMENT — PAIN DESCRIPTION - DESCRIPTORS: DESCRIPTORS: ACHING

## 2023-08-29 ENCOUNTER — HOSPITAL ENCOUNTER (OUTPATIENT)
Dept: HYPERBARIC MEDICINE | Age: 43
Discharge: HOME | End: 2023-08-29
Payer: MEDICARE

## 2023-08-29 VITALS
SYSTOLIC BLOOD PRESSURE: 111 MMHG | HEART RATE: 67 BPM | TEMPERATURE: 96.9 F | DIASTOLIC BLOOD PRESSURE: 63 MMHG | RESPIRATION RATE: 16 BRPM | OXYGEN SATURATION: 100 %
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
## 2023-08-29 DIAGNOSIS — M27.2 OSTEORADIONECROSIS OF JAW: Primary | ICD-10-CM

## 2023-08-29 PROCEDURE — G0277 HBOT, FULL BODY CHAMBER, 30M: HCPCS

## 2023-08-29 ASSESSMENT — PAIN DESCRIPTION - DESCRIPTORS
DESCRIPTORS: ACHING
DESCRIPTORS: ACHING

## 2023-08-29 ASSESSMENT — PAIN SCALES - GENERAL
PAINLEVEL_OUTOF10: 1
PAINLEVEL_OUTOF10: 1

## 2023-08-29 ASSESSMENT — PAIN DESCRIPTION - LOCATION
LOCATION: FACE
LOCATION: FACE

## 2023-08-29 ASSESSMENT — PAIN DESCRIPTION - ORIENTATION
ORIENTATION: RIGHT
ORIENTATION: RIGHT

## 2023-08-29 NOTE — PROGRESS NOTES
[facility]  Hyperbaric Oxygen Therapy   Progress Note      NAME: Parker Rios  MEDICAL RECORD NUMBER:  [id number]  AGE: 39 y.o. GENDER: female  : 1980  EPISODE DATE:  2023     Subjective     HBO Treatment Number: 6 out of Total Treatments: 30    HBO Diagnosis:               Indications: Osteoradionecrosis of the Jaw    Safety checks performed prior to treatment. See doc flowsheets for documentation.     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    Fatigue R53.83    Gait abnormality R26.9    Morbidly obese ([facility]) E66.01    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    Cervical neuralgia M54.12    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, Y84.2    Chronic otitis media after insertion of tympanic ventilation tube, right H66.91, Z96.22    Chronic otorrhea of right ear H92.11    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    Headache(784.0) R51    Low vitamin D level R79.89    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

## 2023-08-30 ENCOUNTER — HOSPITAL ENCOUNTER (OUTPATIENT)
Dept: HYPERBARIC MEDICINE | Age: 43
Discharge: HOME | End: 2023-08-30
Payer: MEDICARE

## 2023-08-30 VITALS
RESPIRATION RATE: 16 BRPM | SYSTOLIC BLOOD PRESSURE: 102 MMHG | DIASTOLIC BLOOD PRESSURE: 55 MMHG | OXYGEN SATURATION: 100 % | TEMPERATURE: 96.9 F | HEART RATE: 61 BPM
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
## 2023-08-30 DIAGNOSIS — M27.2 OSTEORADIONECROSIS OF JAW: Primary | ICD-10-CM

## 2023-08-30 PROCEDURE — G0277 HBOT, FULL BODY CHAMBER, 30M: HCPCS

## 2023-08-30 ASSESSMENT — PAIN DESCRIPTION - LOCATION
LOCATION: FACE
LOCATION: FACE

## 2023-08-30 ASSESSMENT — PAIN DESCRIPTION - DESCRIPTORS
DESCRIPTORS: ACHING
DESCRIPTORS: ACHING

## 2023-08-30 ASSESSMENT — PAIN SCALES - GENERAL
PAINLEVEL_OUTOF10: 3
PAINLEVEL_OUTOF10: 1

## 2023-08-30 ASSESSMENT — PAIN DESCRIPTION - ORIENTATION
ORIENTATION: RIGHT
ORIENTATION: RIGHT

## 2023-08-31 ENCOUNTER — HOSPITAL ENCOUNTER (OUTPATIENT)
Dept: HYPERBARIC MEDICINE | Age: 43
Discharge: HOME | End: 2023-08-31
Payer: MEDICARE

## 2023-08-31 VITALS
DIASTOLIC BLOOD PRESSURE: 66 MMHG | OXYGEN SATURATION: 100 % | TEMPERATURE: 96.9 F | HEART RATE: 67 BPM | SYSTOLIC BLOOD PRESSURE: 107 MMHG | RESPIRATION RATE: 16 BRPM

## 2023-08-31 DIAGNOSIS — M27.2 OSTEORADIONECROSIS OF JAW: Primary | ICD-10-CM
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
## 2023-08-31 PROCEDURE — G0277 HBOT, FULL BODY CHAMBER, 30M: HCPCS

## 2023-08-31 ASSESSMENT — PAIN SCALES - GENERAL: PAINLEVEL_OUTOF10: 2

## 2023-08-31 ASSESSMENT — PAIN DESCRIPTION - ORIENTATION: ORIENTATION: RIGHT

## 2023-08-31 ASSESSMENT — PAIN DESCRIPTION - LOCATION: LOCATION: FACE

## 2023-08-31 ASSESSMENT — PAIN DESCRIPTION - DESCRIPTORS: DESCRIPTORS: ACHING

## 2023-09-01 ENCOUNTER — HOSPITAL ENCOUNTER (OUTPATIENT)
Dept: HYPERBARIC MEDICINE | Age: 43
Discharge: HOME | End: 2023-09-01
Payer: MEDICARE

## 2023-09-01 VITALS
TEMPERATURE: 96.8 F | SYSTOLIC BLOOD PRESSURE: 112 MMHG | OXYGEN SATURATION: 100 % | RESPIRATION RATE: 16 BRPM | DIASTOLIC BLOOD PRESSURE: 57 MMHG | HEART RATE: 57 BPM
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
## 2023-09-01 DIAGNOSIS — M27.2 OSTEORADIONECROSIS OF JAW: Primary | ICD-10-CM

## 2023-09-01 PROCEDURE — G0277 HBOT, FULL BODY CHAMBER, 30M: HCPCS

## 2023-09-01 ASSESSMENT — PAIN DESCRIPTION - LOCATION
LOCATION: FACE
LOCATION: FACE

## 2023-09-01 ASSESSMENT — PAIN SCALES - GENERAL
PAINLEVEL_OUTOF10: 3
PAINLEVEL_OUTOF10: 2

## 2023-09-01 ASSESSMENT — PAIN DESCRIPTION - ORIENTATION
ORIENTATION: RIGHT
ORIENTATION: RIGHT

## 2023-09-05 ENCOUNTER — HOSPITAL ENCOUNTER (OUTPATIENT)
Dept: HYPERBARIC MEDICINE | Age: 43
Discharge: HOME | End: 2023-09-05
Payer: MEDICARE

## 2023-09-05 VITALS
OXYGEN SATURATION: 100 % | RESPIRATION RATE: 16 BRPM | DIASTOLIC BLOOD PRESSURE: 75 MMHG | TEMPERATURE: 96.9 F | HEART RATE: 72 BPM | SYSTOLIC BLOOD PRESSURE: 119 MMHG

## 2023-09-05 DIAGNOSIS — M27.2 OSTEORADIONECROSIS OF JAW: Primary | ICD-10-CM
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
## 2023-09-05 PROCEDURE — G0277 HBOT, FULL BODY CHAMBER, 30M: HCPCS

## 2023-09-05 ASSESSMENT — PAIN DESCRIPTION - LOCATION
LOCATION: FACE
LOCATION: FACE

## 2023-09-05 ASSESSMENT — PAIN DESCRIPTION - DESCRIPTORS
DESCRIPTORS: ACHING
DESCRIPTORS: ACHING

## 2023-09-05 ASSESSMENT — PAIN DESCRIPTION - ORIENTATION
ORIENTATION: RIGHT
ORIENTATION: RIGHT

## 2023-09-05 ASSESSMENT — PAIN SCALES - GENERAL
PAINLEVEL_OUTOF10: 2
PAINLEVEL_OUTOF10: 2

## 2023-09-05 NOTE — PROGRESS NOTES
[facility]  Hyperbaric Oxygen Therapy   Progress Note      NAME: Izzy Chester  MEDICAL RECORD NUMBER:  [id number]  AGE: 39 y.o. GENDER: female  : 1980  EPISODE DATE:  2023     Subjective     HBO Treatment Number: 10 out of Total Treatments: 30    HBO Diagnosis:               Indications: Osteoradionecrosis of the Jaw    Safety checks performed prior to treatment. See doc flowsheets for documentation.     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    Fatigue R53.83    Gait abnormality R26.9    Morbidly obese ([facility]) E66.01    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    Cervical neuralgia M54.12    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, Y84.2    Chronic otitis media after insertion of tympanic ventilation tube, right H66.91, Z96.22    Chronic otorrhea of right ear H92.11    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    Headache(784.0) R51    Low vitamin D level R79.89    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

## 2023-09-06 ENCOUNTER — HOSPITAL ENCOUNTER (OUTPATIENT)
Dept: HYPERBARIC MEDICINE | Age: 43
Discharge: HOME | End: 2023-09-06
Payer: MEDICARE

## 2023-09-06 VITALS
SYSTOLIC BLOOD PRESSURE: 115 MMHG | OXYGEN SATURATION: 100 % | HEART RATE: 65 BPM | TEMPERATURE: 96.9 F | DIASTOLIC BLOOD PRESSURE: 69 MMHG | RESPIRATION RATE: 16 BRPM

## 2023-09-06 DIAGNOSIS — M27.2 OSTEORADIONECROSIS OF JAW: Primary | ICD-10-CM
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
## 2023-09-06 PROCEDURE — G0277 HBOT, FULL BODY CHAMBER, 30M: HCPCS

## 2023-09-06 ASSESSMENT — PAIN DESCRIPTION - ORIENTATION
ORIENTATION: RIGHT
ORIENTATION: RIGHT

## 2023-09-06 ASSESSMENT — PAIN DESCRIPTION - LOCATION
LOCATION: FACE
LOCATION: FACE

## 2023-09-06 ASSESSMENT — PAIN DESCRIPTION - DESCRIPTORS
DESCRIPTORS: ACHING
DESCRIPTORS: ACHING

## 2023-09-06 ASSESSMENT — PAIN SCALES - GENERAL
PAINLEVEL_OUTOF10: 2
PAINLEVEL_OUTOF10: 2

## 2023-09-07 ENCOUNTER — HOSPITAL ENCOUNTER (OUTPATIENT)
Dept: HYPERBARIC MEDICINE | Age: 43
Discharge: HOME | End: 2023-09-07
Payer: MEDICARE

## 2023-09-07 VITALS
SYSTOLIC BLOOD PRESSURE: 111 MMHG | OXYGEN SATURATION: 100 % | RESPIRATION RATE: 18 BRPM | TEMPERATURE: 96.9 F | DIASTOLIC BLOOD PRESSURE: 68 MMHG | HEART RATE: 59 BPM
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
## 2023-09-07 DIAGNOSIS — M27.2 OSTEORADIONECROSIS OF JAW: Primary | ICD-10-CM

## 2023-09-07 PROCEDURE — G0277 HBOT, FULL BODY CHAMBER, 30M: HCPCS

## 2023-09-07 ASSESSMENT — PAIN SCALES - GENERAL
PAINLEVEL_OUTOF10: 1
PAINLEVEL_OUTOF10: 1

## 2023-09-07 ASSESSMENT — PAIN DESCRIPTION - DESCRIPTORS
DESCRIPTORS: ACHING
DESCRIPTORS: ACHING

## 2023-09-07 ASSESSMENT — PAIN DESCRIPTION - LOCATION
LOCATION: FACE
LOCATION: FACE

## 2023-09-07 ASSESSMENT — PAIN DESCRIPTION - ORIENTATION
ORIENTATION: RIGHT
ORIENTATION: RIGHT

## 2023-09-07 NOTE — PROGRESS NOTES
[facility]  Hyperbaric Oxygen Therapy   Progress Note      NAME: Tiana Bauman  MEDICAL RECORD NUMBER:  [id number]  AGE: 39 y.o. GENDER: female  : 1980  EPISODE DATE:  2023     Subjective     HBO Treatment Number: 11 out of Total Treatments: 30    HBO Diagnosis:               Indications: Osteoradionecrosis of the Jaw    Safety checks performed prior to treatment. See doc flowsheets for documentation.     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    Fatigue R53.83    Gait abnormality R26.9    Morbidly obese ([facility]) E66.01    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    Cervical neuralgia M54.12    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, Y84.2    Chronic otitis media after insertion of tympanic ventilation tube, right H66.91, Z96.22    Chronic otorrhea of right ear H92.11    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    Headache(784.0) R51    Low vitamin D level R79.89    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

## 2023-09-08 ENCOUNTER — HOSPITAL ENCOUNTER (OUTPATIENT)
Dept: HYPERBARIC MEDICINE | Age: 43
Discharge: HOME | End: 2023-09-08
Payer: MEDICARE

## 2023-09-08 VITALS
SYSTOLIC BLOOD PRESSURE: 117 MMHG | TEMPERATURE: 97.3 F | DIASTOLIC BLOOD PRESSURE: 71 MMHG | OXYGEN SATURATION: 100 % | RESPIRATION RATE: 16 BRPM | HEART RATE: 70 BPM

## 2023-09-08 DIAGNOSIS — M27.2 OSTEORADIONECROSIS OF JAW: Primary | ICD-10-CM
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
## 2023-09-08 PROCEDURE — G0277 HBOT, FULL BODY CHAMBER, 30M: HCPCS

## 2023-09-08 ASSESSMENT — PAIN DESCRIPTION - LOCATION
LOCATION: ABDOMEN
LOCATION: ABDOMEN;FACE

## 2023-09-08 ASSESSMENT — PAIN SCALES - GENERAL
PAINLEVEL_OUTOF10: 3
PAINLEVEL_OUTOF10: 5

## 2023-09-08 NOTE — PLAN OF CARE
Problem: Physical Regulation:  Goal: Tolerate HBO therapy and barotrauma will be prevented  Description: Tolerate HBO therapy and barotrauma will be prevented  Outcome: Adequate for Discharge   No s/s of seizure noted. No s/s of barotrauma noted. Pt demonstrated equalization of ears during pressurization. 5342- Patient states she is having stomach pain of 5/10 and increased nausea and wants to come out of chamber. Treatment aborted, provider notified, decompression began. 5040- Patient out of chamber and re-assessed. Provider in the see patient. Care plan reviewed with patient. Patient verbalize understanding of the plan of care and contribute to goal setting.

## 2023-09-12 ENCOUNTER — HOSPITAL ENCOUNTER (OUTPATIENT)
Dept: HYPERBARIC MEDICINE | Age: 43
Discharge: HOME | End: 2023-09-12
Payer: MEDICARE

## 2023-09-12 VITALS
SYSTOLIC BLOOD PRESSURE: 107 MMHG | TEMPERATURE: 96.6 F | OXYGEN SATURATION: 100 % | HEART RATE: 60 BPM | DIASTOLIC BLOOD PRESSURE: 63 MMHG | RESPIRATION RATE: 18 BRPM

## 2023-09-12 DIAGNOSIS — M27.2 OSTEORADIONECROSIS OF JAW: Primary | ICD-10-CM
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
## 2023-09-12 PROCEDURE — G0277 HBOT, FULL BODY CHAMBER, 30M: HCPCS

## 2023-09-12 ASSESSMENT — PAIN SCALES - GENERAL: PAINLEVEL_OUTOF10: 3

## 2023-09-12 ASSESSMENT — PAIN DESCRIPTION - LOCATION: LOCATION: FACE

## 2023-09-12 NOTE — PROGRESS NOTES
[facility]  Hyperbaric Oxygen Therapy   Progress Note      NAME: Savanna De La Garza  MEDICAL RECORD NUMBER:  [id number]  AGE: 39 y.o. GENDER: female  : 1980  EPISODE DATE:  2023     Subjective     HBO Treatment Number: 14 out of Total Treatments: 30    HBO Diagnosis:               Indications: Osteoradionecrosis of the Jaw    Safety checks performed prior to treatment. See doc flowsheets for documentation.     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89    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

## 2023-09-13 ENCOUNTER — HOSPITAL ENCOUNTER (OUTPATIENT)
Dept: HYPERBARIC MEDICINE | Age: 43
Discharge: HOME | End: 2023-09-13
Payer: MEDICARE

## 2023-09-13 VITALS
HEART RATE: 66 BPM | OXYGEN SATURATION: 100 % | SYSTOLIC BLOOD PRESSURE: 106 MMHG | RESPIRATION RATE: 16 BRPM | DIASTOLIC BLOOD PRESSURE: 68 MMHG | TEMPERATURE: 96.8 F
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
## 2023-09-13 DIAGNOSIS — M27.2 OSTEORADIONECROSIS OF JAW: Primary | ICD-10-CM

## 2023-09-13 PROCEDURE — G0277 HBOT, FULL BODY CHAMBER, 30M: HCPCS

## 2023-09-13 ASSESSMENT — PAIN DESCRIPTION - LOCATION
LOCATION: FACE
LOCATION: FACE

## 2023-09-13 ASSESSMENT — PAIN DESCRIPTION - ORIENTATION
ORIENTATION: RIGHT
ORIENTATION: RIGHT

## 2023-09-13 ASSESSMENT — PAIN DESCRIPTION - DESCRIPTORS
DESCRIPTORS: ACHING
DESCRIPTORS: ACHING

## 2023-09-13 ASSESSMENT — PAIN SCALES - GENERAL
PAINLEVEL_OUTOF10: 1
PAINLEVEL_OUTOF10: 1

## 2023-09-13 NOTE — PROGRESS NOTES
[address]  Hyperbaric Oxygen Therapy   Progress Note      NAME: Gema Treviño  MEDICAL RECORD NUMBER:  [id number]  AGE: 39 y.o. GENDER: female  : 1980  EPISODE DATE:  2023     Subjective     HBO Treatment Number: 15 out of Total Treatments: 30    HBO Diagnosis:               Indications: Osteoradionecrosis of the Jaw    Safety checks performed prior to treatment. See doc flowsheets for documentation.     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    Fatigue R53.83    Gait abnormality R26.9    Morbidly obese ([facility]) E66.01    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    Cervical neuralgia M54.12    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, Y84.2    Chronic otitis media after insertion of tympanic ventilation tube, right H66.91, Z96.22    Chronic otorrhea of right ear H92.11    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    Headache(784.0) R51    Low vitamin D level R79.89    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

## 2023-09-15 ENCOUNTER — HOSPITAL ENCOUNTER (OUTPATIENT)
Dept: HYPERBARIC MEDICINE | Age: 43
Discharge: HOME | End: 2023-09-15
Payer: MEDICARE

## 2023-09-15 VITALS
TEMPERATURE: 97 F | SYSTOLIC BLOOD PRESSURE: 114 MMHG | DIASTOLIC BLOOD PRESSURE: 65 MMHG | OXYGEN SATURATION: 100 % | HEART RATE: 63 BPM | RESPIRATION RATE: 16 BRPM

## 2023-09-15 DIAGNOSIS — M27.2 OSTEORADIONECROSIS OF JAW: Primary | ICD-10-CM
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
## 2023-09-15 PROCEDURE — G0277 HBOT, FULL BODY CHAMBER, 30M: HCPCS

## 2023-09-15 ASSESSMENT — PAIN DESCRIPTION - ORIENTATION: ORIENTATION: RIGHT

## 2023-09-15 ASSESSMENT — PAIN DESCRIPTION - DESCRIPTORS: DESCRIPTORS: ACHING

## 2023-09-15 ASSESSMENT — PAIN SCALES - GENERAL: PAINLEVEL_OUTOF10: 2

## 2023-09-15 ASSESSMENT — PAIN DESCRIPTION - LOCATION: LOCATION: NECK

## 2023-09-15 NOTE — PLAN OF CARE
Problem: Physical Regulation:  Goal: Tolerate HBO therapy and barotrauma will be prevented  Description: Tolerate HBO therapy and barotrauma will be prevented  Outcome: Adequate for Discharge  Note: No s/s of seizure noted. No s/s of barotrauma noted. Pt demonstrated equalization of ears during pressurization. Care plan reviewed with patient. Patient verbalize understanding of the plan of care and contribute to goal setting.

## 2023-09-18 ENCOUNTER — HOSPITAL ENCOUNTER (OUTPATIENT)
Dept: HYPERBARIC MEDICINE | Age: 43
Discharge: HOME | End: 2023-09-18
Payer: MEDICARE

## 2023-09-18 VITALS
DIASTOLIC BLOOD PRESSURE: 77 MMHG | OXYGEN SATURATION: 100 % | TEMPERATURE: 96.6 F | SYSTOLIC BLOOD PRESSURE: 124 MMHG | RESPIRATION RATE: 16 BRPM | HEART RATE: 62 BPM

## 2023-09-18 DIAGNOSIS — M27.2 OSTEORADIONECROSIS OF JAW: Primary | ICD-10-CM
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
## 2023-09-18 PROCEDURE — 99183 HYPERBARIC OXYGEN THERAPY: CPT | Performed by: NURSE PRACTITIONER

## 2023-09-18 PROCEDURE — G0277 HBOT, FULL BODY CHAMBER, 30M: HCPCS

## 2023-09-19 ENCOUNTER — HOSPITAL ENCOUNTER (OUTPATIENT)
Dept: HYPERBARIC MEDICINE | Age: 43
Discharge: HOME | End: 2023-09-19
Payer: MEDICARE

## 2023-09-19 VITALS
OXYGEN SATURATION: 95 % | TEMPERATURE: 96.8 F | DIASTOLIC BLOOD PRESSURE: 52 MMHG | SYSTOLIC BLOOD PRESSURE: 91 MMHG | HEART RATE: 72 BPM | RESPIRATION RATE: 16 BRPM
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
## 2023-09-19 DIAGNOSIS — M27.2 OSTEORADIONECROSIS OF JAW: Primary | ICD-10-CM

## 2023-09-19 PROCEDURE — G0277 HBOT, FULL BODY CHAMBER, 30M: HCPCS

## 2023-09-19 ASSESSMENT — PAIN SCALES - GENERAL
PAINLEVEL_OUTOF10: 3
PAINLEVEL_OUTOF10: 2

## 2023-09-19 ASSESSMENT — PAIN DESCRIPTION - ORIENTATION
ORIENTATION: RIGHT
ORIENTATION: RIGHT

## 2023-09-19 ASSESSMENT — PAIN DESCRIPTION - LOCATION
LOCATION: FACE
LOCATION: FACE

## 2023-09-19 NOTE — PROGRESS NOTES
[address]  Hyperbaric Oxygen Therapy   Progress Note      NAME: Juan Luis Morales  MEDICAL RECORD NUMBER:  [id number]  AGE: 39 y.o. GENDER: female  : 1980  EPISODE DATE:  2023     Subjective     HBO Treatment Number: 18 out of Total Treatments: 30    HBO Diagnosis:               Indications: Osteoradionecrosis of the Jaw    Safety checks performed prior to treatment. See doc flowsheets for documentation.     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    Fatigue R53.83    Gait abnormality R26.9    Morbidly obese ([facility]) E66.01    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    Cervical neuralgia M54.12    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, Y84.2    Chronic otitis media after insertion of tympanic ventilation tube, right H66.91, Z96.22    Chronic otorrhea of right ear H92.11    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    Headache(784.0) R51    Low vitamin D level R79.89    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

## 2023-09-21 ENCOUNTER — HOSPITAL ENCOUNTER (OUTPATIENT)
Dept: HYPERBARIC MEDICINE | Age: 43
Discharge: HOME | End: 2023-09-21
Payer: MEDICARE

## 2023-09-21 VITALS
SYSTOLIC BLOOD PRESSURE: 118 MMHG | DIASTOLIC BLOOD PRESSURE: 69 MMHG | HEART RATE: 62 BPM | RESPIRATION RATE: 18 BRPM | OXYGEN SATURATION: 99 % | TEMPERATURE: 96.9 F

## 2023-09-21 DIAGNOSIS — M27.2 OSTEORADIONECROSIS OF JAW: Primary | ICD-10-CM
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
## 2023-09-21 PROCEDURE — G0277 HBOT, FULL BODY CHAMBER, 30M: HCPCS

## 2023-09-21 RX ORDER — HYDROCODONE BITARTRATE AND ACETAMINOPHEN 5; 325 MG/1; MG/1
1 TABLET ORAL EVERY 6 HOURS PRN
COMMUNITY

## 2023-09-21 RX ORDER — AMOXICILLIN AND CLAVULANATE POTASSIUM 250; 125 MG/1; MG/1
1 TABLET, FILM COATED ORAL 3 TIMES DAILY
COMMUNITY

## 2023-09-21 ASSESSMENT — PAIN DESCRIPTION - ORIENTATION
ORIENTATION: RIGHT
ORIENTATION: RIGHT

## 2023-09-21 ASSESSMENT — PAIN DESCRIPTION - DESCRIPTORS
DESCRIPTORS: ACHING
DESCRIPTORS: ACHING

## 2023-09-21 ASSESSMENT — PAIN DESCRIPTION - LOCATION
LOCATION: FACE
LOCATION: FACE

## 2023-09-21 ASSESSMENT — PAIN SCALES - GENERAL
PAINLEVEL_OUTOF10: 6
PAINLEVEL_OUTOF10: 6

## 2023-09-22 ENCOUNTER — APPOINTMENT (OUTPATIENT)
Dept: HYPERBARIC MEDICINE | Age: 43
End: 2023-09-22
Payer: MEDICARE

## 2023-09-22 ENCOUNTER — HOSPITAL ENCOUNTER (OUTPATIENT)
Dept: HYPERBARIC MEDICINE | Age: 43
Discharge: HOME | End: 2023-09-22
Payer: MEDICARE

## 2023-09-22 VITALS
DIASTOLIC BLOOD PRESSURE: 70 MMHG | SYSTOLIC BLOOD PRESSURE: 108 MMHG | TEMPERATURE: 96 F | HEART RATE: 63 BPM | OXYGEN SATURATION: 99 % | RESPIRATION RATE: 18 BRPM

## 2023-09-22 DIAGNOSIS — M27.2 OSTEORADIONECROSIS OF JAW: Primary | ICD-10-CM
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
## 2023-09-22 PROCEDURE — G0277 HBOT, FULL BODY CHAMBER, 30M: HCPCS

## 2023-09-22 ASSESSMENT — PAIN DESCRIPTION - LOCATION
LOCATION: FACE
LOCATION: FACE

## 2023-09-22 ASSESSMENT — PAIN DESCRIPTION - DESCRIPTORS
DESCRIPTORS: ACHING
DESCRIPTORS: ACHING

## 2023-09-22 ASSESSMENT — PAIN DESCRIPTION - ORIENTATION
ORIENTATION: RIGHT
ORIENTATION: RIGHT

## 2023-09-22 ASSESSMENT — PAIN SCALES - GENERAL
PAINLEVEL_OUTOF10: 5
PAINLEVEL_OUTOF10: 4

## 2023-09-25 ENCOUNTER — APPOINTMENT (OUTPATIENT)
Dept: HYPERBARIC MEDICINE | Age: 43
End: 2023-09-25
Payer: MEDICARE

## 2023-09-25 ENCOUNTER — HOSPITAL ENCOUNTER (OUTPATIENT)
Dept: HYPERBARIC MEDICINE | Age: 43
Discharge: HOME | End: 2023-09-25
Payer: MEDICARE

## 2023-09-25 VITALS
OXYGEN SATURATION: 98 % | TEMPERATURE: 96.8 F | HEART RATE: 61 BPM | RESPIRATION RATE: 16 BRPM | DIASTOLIC BLOOD PRESSURE: 77 MMHG | SYSTOLIC BLOOD PRESSURE: 121 MMHG
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
## 2023-09-25 DIAGNOSIS — M27.2 OSTEORADIONECROSIS OF JAW: Primary | ICD-10-CM

## 2023-09-25 PROCEDURE — G0277 HBOT, FULL BODY CHAMBER, 30M: HCPCS

## 2023-09-25 ASSESSMENT — PAIN DESCRIPTION - LOCATION
LOCATION: MOUTH
LOCATION: MOUTH

## 2023-09-25 ASSESSMENT — PAIN DESCRIPTION - DESCRIPTORS
DESCRIPTORS: ACHING
DESCRIPTORS: ACHING

## 2023-09-25 ASSESSMENT — PAIN SCALES - GENERAL
PAINLEVEL_OUTOF10: 2
PAINLEVEL_OUTOF10: 3

## 2023-09-25 NOTE — PLAN OF CARE
Problem: Physical Regulation:  Goal: Tolerate HBO therapy and barotrauma will be prevented  Description: Tolerate HBO therapy and barotrauma will be prevented  Outcome: Progressing   No s/s of seizure noted. No s/s of barotrauma noted. Pt demonstrated equalization of ears during pressurization. Care plan reviewed with patient. Patient verbalize understanding of the plan of care and contribute to goal setting.

## 2023-09-26 ENCOUNTER — APPOINTMENT (OUTPATIENT)
Dept: HYPERBARIC MEDICINE | Age: 43
End: 2023-09-26
Payer: MEDICARE

## 2023-09-26 ENCOUNTER — HOSPITAL ENCOUNTER (OUTPATIENT)
Dept: HYPERBARIC MEDICINE | Age: 43
Discharge: HOME | End: 2023-09-26
Payer: MEDICARE

## 2023-09-26 VITALS
HEART RATE: 61 BPM | TEMPERATURE: 96.8 F | OXYGEN SATURATION: 99 % | RESPIRATION RATE: 18 BRPM | SYSTOLIC BLOOD PRESSURE: 122 MMHG | DIASTOLIC BLOOD PRESSURE: 72 MMHG

## 2023-09-26 DIAGNOSIS — M27.2 OSTEORADIONECROSIS OF JAW: Primary | ICD-10-CM
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
## 2023-09-26 PROCEDURE — G0277 HBOT, FULL BODY CHAMBER, 30M: HCPCS

## 2023-09-26 ASSESSMENT — PAIN DESCRIPTION - ORIENTATION
ORIENTATION: RIGHT
ORIENTATION: RIGHT

## 2023-09-26 ASSESSMENT — PAIN SCALES - GENERAL
PAINLEVEL_OUTOF10: 3
PAINLEVEL_OUTOF10: 3

## 2023-09-26 ASSESSMENT — PAIN DESCRIPTION - LOCATION
LOCATION: MOUTH
LOCATION: MOUTH

## 2023-09-26 ASSESSMENT — PAIN DESCRIPTION - DESCRIPTORS
DESCRIPTORS: ACHING
DESCRIPTORS: ACHING

## 2023-09-26 NOTE — PROGRESS NOTES
[facility]  Hyperbaric Oxygen Therapy   Progress Note      NAME: Gina Shell  MEDICAL RECORD NUMBER:  [id number]  AGE: 39 y.o. GENDER: female  : 1980  EPISODE DATE:  2023     Subjective     HBO Treatment Number: 21 out of Total Treatments: 30    HBO Diagnosis:               Indications: Osteoradionecrosis of the Jaw    Safety checks performed prior to treatment. See doc flowsheets for documentation.     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    Fatigue R53.83    Gait abnormality R26.9    Morbidly obese ([facility]) E66.01    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    Cervical neuralgia M54.12    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, Y84.2    Chronic otitis media after insertion of tympanic ventilation tube, right H66.91, Z96.22    Chronic otorrhea of right ear H92.11    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    Headache(784.0) R51    Low vitamin D level R79.89    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

## 2023-09-27 ENCOUNTER — HOSPITAL ENCOUNTER (OUTPATIENT)
Dept: HYPERBARIC MEDICINE | Age: 43
Discharge: HOME | End: 2023-09-27
Payer: MEDICARE

## 2023-09-27 ENCOUNTER — APPOINTMENT (OUTPATIENT)
Dept: HYPERBARIC MEDICINE | Age: 43
End: 2023-09-27
Payer: MEDICARE

## 2023-09-27 VITALS
RESPIRATION RATE: 16 BRPM | SYSTOLIC BLOOD PRESSURE: 123 MMHG | HEART RATE: 77 BPM | OXYGEN SATURATION: 100 % | DIASTOLIC BLOOD PRESSURE: 85 MMHG | TEMPERATURE: 96.8 F

## 2023-09-27 DIAGNOSIS — M27.2 OSTEORADIONECROSIS OF JAW: Primary | ICD-10-CM
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
## 2023-09-27 PROCEDURE — G0277 HBOT, FULL BODY CHAMBER, 30M: HCPCS

## 2023-09-27 ASSESSMENT — PAIN DESCRIPTION - ORIENTATION
ORIENTATION: RIGHT
ORIENTATION: RIGHT

## 2023-09-27 ASSESSMENT — PAIN SCALES - GENERAL
PAINLEVEL_OUTOF10: 3
PAINLEVEL_OUTOF10: 2

## 2023-09-27 ASSESSMENT — PAIN - FUNCTIONAL ASSESSMENT
PAIN_FUNCTIONAL_ASSESSMENT: ACTIVITIES ARE NOT PREVENTED
PAIN_FUNCTIONAL_ASSESSMENT: ACTIVITIES ARE NOT PREVENTED

## 2023-09-27 ASSESSMENT — PAIN DESCRIPTION - DESCRIPTORS
DESCRIPTORS: ACHING
DESCRIPTORS: ACHING

## 2023-09-27 ASSESSMENT — PAIN DESCRIPTION - LOCATION
LOCATION: MOUTH
LOCATION: MOUTH

## 2023-09-28 ENCOUNTER — APPOINTMENT (OUTPATIENT)
Dept: HYPERBARIC MEDICINE | Age: 43
End: 2023-09-28
Payer: MEDICARE

## 2023-09-28 ENCOUNTER — HOSPITAL ENCOUNTER (OUTPATIENT)
Dept: HYPERBARIC MEDICINE | Age: 43
Discharge: HOME | End: 2023-09-28
Payer: MEDICARE

## 2023-09-28 VITALS
TEMPERATURE: 97.1 F | DIASTOLIC BLOOD PRESSURE: 82 MMHG | HEART RATE: 83 BPM | RESPIRATION RATE: 18 BRPM | SYSTOLIC BLOOD PRESSURE: 152 MMHG | OXYGEN SATURATION: 100 %

## 2023-09-28 DIAGNOSIS — M27.2 OSTEORADIONECROSIS OF JAW: Primary | ICD-10-CM
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
## 2023-09-28 PROCEDURE — G0277 HBOT, FULL BODY CHAMBER, 30M: HCPCS

## 2023-09-28 ASSESSMENT — PAIN DESCRIPTION - DESCRIPTORS: DESCRIPTORS: ACHING

## 2023-09-28 ASSESSMENT — PAIN - FUNCTIONAL ASSESSMENT: PAIN_FUNCTIONAL_ASSESSMENT: PREVENTS OR INTERFERES SOME ACTIVE ACTIVITIES AND ADLS

## 2023-09-28 ASSESSMENT — PAIN DESCRIPTION - LOCATION: LOCATION: BACK

## 2023-09-28 ASSESSMENT — PAIN SCALES - GENERAL
PAINLEVEL_OUTOF10: 3
PAINLEVEL_OUTOF10: 3

## 2023-09-28 NOTE — PROGRESS NOTES
[facility]  Hyperbaric Oxygen Therapy   Progress Note      NAME: Danitza Aguirre  MEDICAL RECORD NUMBER:  [id number]  AGE: 39 y.o. GENDER: female  : 1980  EPISODE DATE:  2023     Subjective     HBO Treatment Number: 24 out of Total Treatments: 30    HBO Diagnosis:               Indications: Osteoradionecrosis of the Jaw    Safety checks performed prior to treatment. See doc flowsheets for documentation.     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    Fatigue R53.83    Gait abnormality R26.9    Morbidly obese ([facility]) E66.01    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    Cervical neuralgia M54.12    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, Y84.2    Chronic otitis media after insertion of tympanic ventilation tube, right H66.91, Z96.22    Chronic otorrhea of right ear H92.11    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    Headache(784.0) R51    Low vitamin D level R79.89    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

## 2023-09-28 NOTE — PROGRESS NOTES
[facility]  Hyperbaric Oxygen Therapy   Progress Note      NAME: Gina Shell  MEDICAL RECORD NUMBER:  [id number]  AGE: 39 y.o. GENDER: female  : 1980  EPISODE DATE:  2023     Subjective     HBO Treatment Number: 23 out of Total Treatments: 30    HBO Diagnosis:               Indications: Osteoradionecrosis of the Jaw    Safety checks performed prior to treatment. See doc flowsheets for documentation.     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    Sleep apnea G47.30    Cancer of parotid gland ([facility]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    Fatigue R53.83    Gait abnormality R26.9    Morbidly obese ([facility]) E66.01    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    Cervical neuralgia M54.12    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, Y84.2    Chronic otitis media after insertion of tympanic ventilation tube, right H66.91, Z96.22    Chronic otorrhea of right ear H92.11    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    Headache(784.0) R51    Low vitamin D level R79.89    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

## 2023-09-29 ENCOUNTER — HOSPITAL ENCOUNTER (OUTPATIENT)
Dept: HYPERBARIC MEDICINE | Age: 43
Discharge: HOME | End: 2023-09-29
Payer: MEDICARE

## 2023-09-29 ENCOUNTER — APPOINTMENT (OUTPATIENT)
Dept: HYPERBARIC MEDICINE | Age: 43
End: 2023-09-29
Payer: MEDICARE

## 2023-09-29 VITALS
DIASTOLIC BLOOD PRESSURE: 56 MMHG | TEMPERATURE: 96.9 F | HEART RATE: 50 BPM | SYSTOLIC BLOOD PRESSURE: 106 MMHG | RESPIRATION RATE: 16 BRPM | OXYGEN SATURATION: 100 %

## 2023-09-29 DIAGNOSIS — M27.2 OSTEORADIONECROSIS OF JAW: Primary | ICD-10-CM
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
## 2023-09-29 PROCEDURE — G0277 HBOT, FULL BODY CHAMBER, 30M: HCPCS

## 2023-09-29 ASSESSMENT — PAIN DESCRIPTION - LOCATION
LOCATION: MOUTH
LOCATION: MOUTH

## 2023-09-29 ASSESSMENT — PAIN SCALES - GENERAL
PAINLEVEL_OUTOF10: 3
PAINLEVEL_OUTOF10: 3

## 2023-09-29 ASSESSMENT — PAIN DESCRIPTION - DESCRIPTORS: DESCRIPTORS: ACHING

## 2023-09-29 NOTE — PROGRESS NOTES
[facility]  Hyperbaric Oxygen Therapy   Progress Note      NAME: Daxa Bernal  MEDICAL RECORD NUMBER:  [id number]  AGE: 39 y.o. GENDER: female  : 1980  EPISODE DATE:  2023     Subjective     HBO Treatment Number: 25 out of Total Treatments: 30    HBO Diagnosis:               Indications: Osteoradionecrosis of the Jaw    Safety checks performed prior to treatment. See doc flowsheets for documentation.     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    Fatigue R53.83    Gait abnormality R26.9    Morbidly obese ([facility]) E66.01    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    Cervical neuralgia M54.12    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, Y84.2    Chronic otitis media after insertion of tympanic ventilation tube, right H66.91, Z96.22    Chronic otorrhea of right ear H92.11    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    Headache(784.0) R51    Low vitamin D level R79.89    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

## 2023-10-02 ENCOUNTER — APPOINTMENT (OUTPATIENT)
Dept: HYPERBARIC MEDICINE | Age: 43
End: 2023-10-02
Payer: MEDICARE

## 2023-10-03 ENCOUNTER — HOSPITAL ENCOUNTER (OUTPATIENT)
Dept: HYPERBARIC MEDICINE | Age: 43
Discharge: HOME | End: 2023-10-03
Payer: MEDICARE

## 2023-10-03 VITALS
HEART RATE: 93 BPM | TEMPERATURE: 96.9 F | DIASTOLIC BLOOD PRESSURE: 95 MMHG | OXYGEN SATURATION: 98 % | SYSTOLIC BLOOD PRESSURE: 132 MMHG | RESPIRATION RATE: 18 BRPM

## 2023-10-03 DIAGNOSIS — M27.2 OSTEORADIONECROSIS OF JAW: Primary | ICD-10-CM
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
## 2023-10-03 PROCEDURE — G0277 HBOT, FULL BODY CHAMBER, 30M: HCPCS

## 2023-10-03 ASSESSMENT — PAIN DESCRIPTION - DESCRIPTORS
DESCRIPTORS: ACHING
DESCRIPTORS: SPASM;STABBING

## 2023-10-03 ASSESSMENT — PAIN SCALES - GENERAL
PAINLEVEL_OUTOF10: 4
PAINLEVEL_OUTOF10: 4

## 2023-10-03 ASSESSMENT — PAIN DESCRIPTION - LOCATION
LOCATION: MOUTH;BACK
LOCATION: BACK

## 2023-10-03 ASSESSMENT — PAIN - FUNCTIONAL ASSESSMENT
PAIN_FUNCTIONAL_ASSESSMENT: PREVENTS OR INTERFERES SOME ACTIVE ACTIVITIES AND ADLS
PAIN_FUNCTIONAL_ASSESSMENT: PREVENTS OR INTERFERES SOME ACTIVE ACTIVITIES AND ADLS

## 2023-10-03 ASSESSMENT — PAIN DESCRIPTION - ORIENTATION
ORIENTATION: LOWER
ORIENTATION: RIGHT

## 2023-10-04 ENCOUNTER — TELEMEDICINE (OUTPATIENT)
Dept: PSYCHIATRY | Age: 43
End: 2023-10-04
Payer: MEDICARE

## 2023-10-04 ENCOUNTER — HOSPITAL ENCOUNTER (OUTPATIENT)
Dept: HYPERBARIC MEDICINE | Age: 43
Discharge: HOME | End: 2023-10-04
Payer: MEDICARE

## 2023-10-04 VITALS
SYSTOLIC BLOOD PRESSURE: 105 MMHG | RESPIRATION RATE: 18 BRPM | HEART RATE: 78 BPM | OXYGEN SATURATION: 99 % | TEMPERATURE: 96.8 F | DIASTOLIC BLOOD PRESSURE: 66 MMHG
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
## 2023-10-04 DIAGNOSIS — F43.10 PTSD (POST-TRAUMATIC STRESS DISORDER): ICD-10-CM

## 2023-10-04 DIAGNOSIS — M27.2 OSTEORADIONECROSIS OF JAW: Primary | ICD-10-CM

## 2023-10-04 DIAGNOSIS — R41.840 ATTENTION DEFICIT: ICD-10-CM

## 2023-10-04 DIAGNOSIS — F33.1 MODERATE EPISODE OF RECURRENT MAJOR DEPRESSIVE DISORDER (HCC): Primary | ICD-10-CM

## 2023-10-04 PROCEDURE — 99213 OFFICE O/P EST LOW 20 MIN: CPT | Performed by: PSYCHIATRY & NEUROLOGY

## 2023-10-04 PROCEDURE — G0277 HBOT, FULL BODY CHAMBER, 30M: HCPCS

## 2023-10-04 PROCEDURE — G8427 DOCREV CUR MEDS BY ELIG CLIN: HCPCS | Performed by: PSYCHIATRY & NEUROLOGY

## 2023-10-04 RX ORDER — ATOMOXETINE 40 MG/1
40 CAPSULE ORAL DAILY
Qty: 30 CAPSULE | Refills: 2 | Status: SHIPPED | OUTPATIENT
Start: 2023-10-04

## 2023-10-04 RX ORDER — CLONAZEPAM 0.5 MG/1
0.5 TABLET ORAL 2 TIMES DAILY PRN
Qty: 60 TABLET | Refills: 1 | Status: SHIPPED | OUTPATIENT
Start: 2023-10-04 | End: 2023-12-03

## 2023-10-04 RX ORDER — PRAZOSIN HYDROCHLORIDE 2 MG/1
2 CAPSULE ORAL NIGHTLY
Qty: 30 CAPSULE | Refills: 1 | Status: SHIPPED | OUTPATIENT
Start: 2023-10-04

## 2023-10-04 RX ORDER — PRAZOSIN HYDROCHLORIDE 5 MG/1
5 CAPSULE ORAL NIGHTLY
Qty: 30 CAPSULE | Refills: 1 | Status: SHIPPED | OUTPATIENT
Start: 2023-10-04

## 2023-10-04 RX ORDER — FLUOXETINE HYDROCHLORIDE 40 MG/1
40 CAPSULE ORAL DAILY
Qty: 30 CAPSULE | Refills: 2 | Status: SHIPPED | OUTPATIENT
Start: 2023-10-04

## 2023-10-04 ASSESSMENT — PAIN DESCRIPTION - ORIENTATION
ORIENTATION: LOWER;LEFT
ORIENTATION: LOWER;LEFT

## 2023-10-04 ASSESSMENT — PAIN DESCRIPTION - LOCATION
LOCATION: BACK
LOCATION: BACK

## 2023-10-04 ASSESSMENT — PAIN SCALES - GENERAL
PAINLEVEL_OUTOF10: 4
PAINLEVEL_OUTOF10: 4

## 2023-10-04 ASSESSMENT — PAIN DESCRIPTION - DESCRIPTORS
DESCRIPTORS: STABBING;SPASM
DESCRIPTORS: SPASM;STABBING

## 2023-10-04 NOTE — PROGRESS NOTES
Romina Fuentes  Hyperbaric Oxygen Therapy   Progress Note      NAME: Demetria Luciano  MEDICAL RECORD NUMBER:  [id number]  AGE: 39 y.o. GENDER: female  : 1980  EPISODE DATE:  10/4/2023     Subjective     HBO Treatment Number: 27 out of Total Treatments: 30    HBO Diagnosis:               Indications: Osteoradionecrosis of the Jaw    Safety checks performed prior to treatment. See doc flowsheets for documentation.     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    Fatigue R53.83    Gait abnormality R26.9    Morbidly obese ([facility]) E66.01    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    Cervical neuralgia M54.12    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, Y84.2    Chronic otitis media after insertion of tympanic ventilation tube, right H66.91, Z96.22    Chronic otorrhea of right ear H92.11    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

## 2023-10-04 NOTE — PROGRESS NOTES
[address] PSYCHIATRY  [address] [facility] [address] [phone]    Progress Note    Patient:  Solis Fontanez  YOB: 1980  PCP:  EDEN Mckeon CNP  Visit Date:  10/4/2023      Chief Complaint   Patient presents with    Follow-up    Medication Check    Depression    Anxiety       SUBJECTIVE:      Solis Fontanez, a 39 y.o. female, presents for a follow up visit. She presents alone. She was 10 minutes. She is irritable, upset it took 10 minutes to log into the virtual visit. Suggested option for office visits going forward. Feels \"more aware of life\". Feels the \"world sucks\". Would rather feel numb. Unsure if feelings of awareness are from starting Strattera or doing hyperbaric oxygen treatments. Doing the oxygen treatments every day. Muscle spasms in her back. Frustrated looking for her keys. Cursing and stops to do deep breaths. Shows me 2 of her 4 cats. 13 yo nephew spending more time with her and that's been overwhelming. Wants to tearch him well. Mood is irritable. Anxiety is ongoing issue. Energy is low. Notes she needs to leave for her hyperbaric appointment and declines to make any med changes today. Med Trials: Klonopin, Prozac, prazosin, Depakote (for HA), Trileptal (for HA), Effexor (severe discontinuation syndrome), Wellbutrin, Strattera    OBJECTIVE:  Vitals: There were no vitals taken for this visit. MENTAL STATUS EXAM:    GENERAL  Build: Overweight    Hygiene:  Appropriate in casual dress   SENSORIUM Orientation: Place, Person, Time, & Situation     Consciousness: Alert    ATTENTION   EasilyDistracted   RELATEDNESS  Cooperative    EYE CONTACT   Good to Fair   PSYCHOMOTOR  Normal    SPEECH Volume: Normal     Rate: Normal rate and angry tone    Amplitude:  Within normal limits, cursing at times   MOOD  Anxious and Angry    AFFECT Range: limited   THOUGHT Process:  Goal-Directed

## 2023-10-05 ENCOUNTER — TELEPHONE (OUTPATIENT)
Dept: HYPERBARIC MEDICINE | Age: 43
End: 2023-10-05

## 2023-10-05 NOTE — TELEPHONE ENCOUNTER
Patient called this morning to cancel her hyperbaric treatment today. She is have severe back spasms and does not think she can make it in today. Today, should have been her last treatment. She states she will come tomorrow for her last day. Reviewed with provider, okay to finish last day tomorrow if patient would like. If patient is not able to make it tomorrow, we would not push  her last treatment to Monday. Her treatment would just be completed as is. Patient aware.

## 2023-10-06 ENCOUNTER — HOSPITAL ENCOUNTER (OUTPATIENT)
Dept: HYPERBARIC MEDICINE | Age: 43
Discharge: HOME | End: 2023-10-06
Payer: MEDICARE

## 2023-10-06 VITALS
RESPIRATION RATE: 16 BRPM | TEMPERATURE: 96.9 F | OXYGEN SATURATION: 100 % | HEART RATE: 64 BPM | SYSTOLIC BLOOD PRESSURE: 123 MMHG | DIASTOLIC BLOOD PRESSURE: 83 MMHG

## 2023-10-06 DIAGNOSIS — M27.2 OSTEORADIONECROSIS OF JAW: Primary | ICD-10-CM
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
## 2023-10-06 PROCEDURE — G0277 HBOT, FULL BODY CHAMBER, 30M: HCPCS

## 2023-10-06 ASSESSMENT — PAIN DESCRIPTION - DESCRIPTORS
DESCRIPTORS: ACHING
DESCRIPTORS: ACHING

## 2023-10-06 ASSESSMENT — PAIN DESCRIPTION - LOCATION
LOCATION: BACK
LOCATION: BACK

## 2023-10-06 ASSESSMENT — PAIN SCALES - GENERAL
PAINLEVEL_OUTOF10: 4
PAINLEVEL_OUTOF10: 3

## 2023-10-06 NOTE — PROGRESS NOTES
[facility]  Hyperbaric Oxygen Therapy   Progress Note      NAME: Yarelis Wheeler  MEDICAL RECORD NUMBER:  [id number]  AGE: 39 y.o. GENDER: female  : 1980  EPISODE DATE:  10/6/2023     Subjective     HBO Treatment Number: 28 out of Total Treatments: 30    HBO Diagnosis:               Indications: Osteoradionecrosis of the Jaw    Safety checks performed prior to treatment. See doc flowsheets for documentation.     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) Z68.43    Unspecified convulsions R56.9

## 2023-11-01 ENCOUNTER — OFFICE VISIT (OUTPATIENT)
Dept: ENT CLINIC | Age: 43
End: 2023-11-01
Payer: MEDICARE

## 2023-11-01 VITALS
TEMPERATURE: 97.3 F | SYSTOLIC BLOOD PRESSURE: 120 MMHG | HEIGHT: 68 IN | WEIGHT: 293 LBS | OXYGEN SATURATION: 95 % | DIASTOLIC BLOOD PRESSURE: 80 MMHG | RESPIRATION RATE: 16 BRPM | BODY MASS INDEX: 44.41 KG/M2 | HEART RATE: 80 BPM

## 2023-11-01 DIAGNOSIS — H61.21 IMPACTED CERUMEN OF RIGHT EAR: ICD-10-CM

## 2023-11-01 DIAGNOSIS — H90.A31 MIXED CONDUCTIVE AND SENSORINEURAL HEARING LOSS OF RIGHT EAR WITH RESTRICTED HEARING OF LEFT EAR: Primary | ICD-10-CM

## 2023-11-01 PROCEDURE — G8417 CALC BMI ABV UP PARAM F/U: HCPCS | Performed by: OTOLARYNGOLOGY

## 2023-11-01 PROCEDURE — 69210 REMOVE IMPACTED EAR WAX UNI: CPT | Performed by: OTOLARYNGOLOGY

## 2023-11-01 PROCEDURE — G8484 FLU IMMUNIZE NO ADMIN: HCPCS | Performed by: OTOLARYNGOLOGY

## 2023-11-01 PROCEDURE — 4004F PT TOBACCO SCREEN RCVD TLK: CPT | Performed by: OTOLARYNGOLOGY

## 2023-11-01 PROCEDURE — 99214 OFFICE O/P EST MOD 30 MIN: CPT | Performed by: OTOLARYNGOLOGY

## 2023-11-01 PROCEDURE — G8427 DOCREV CUR MEDS BY ELIG CLIN: HCPCS | Performed by: OTOLARYNGOLOGY

## 2023-11-01 RX ORDER — MULTIVIT-MIN/IRON/FOLIC ACID/K 18-600-40
CAPSULE ORAL
COMMUNITY

## 2023-11-01 RX ORDER — PILOCARPINE HYDROCHLORIDE 5 MG/1
5 TABLET, FILM COATED ORAL 3 TIMES DAILY
COMMUNITY

## 2023-11-14 ENCOUNTER — TELEMEDICINE (OUTPATIENT)
Dept: PSYCHIATRY | Age: 43
End: 2023-11-14
Payer: MEDICARE

## 2023-11-14 DIAGNOSIS — F33.1 MODERATE EPISODE OF RECURRENT MAJOR DEPRESSIVE DISORDER (HCC): Primary | ICD-10-CM

## 2023-11-14 DIAGNOSIS — F43.10 PTSD (POST-TRAUMATIC STRESS DISORDER): ICD-10-CM

## 2023-11-14 DIAGNOSIS — R41.840 ATTENTION DEFICIT: ICD-10-CM

## 2023-11-14 PROCEDURE — 99214 OFFICE O/P EST MOD 30 MIN: CPT | Performed by: PSYCHIATRY & NEUROLOGY

## 2023-11-14 PROCEDURE — 90833 PSYTX W PT W E/M 30 MIN: CPT | Performed by: PSYCHIATRY & NEUROLOGY

## 2023-11-14 PROCEDURE — G8427 DOCREV CUR MEDS BY ELIG CLIN: HCPCS | Performed by: PSYCHIATRY & NEUROLOGY

## 2023-11-14 RX ORDER — FLUOXETINE HYDROCHLORIDE 40 MG/1
40 CAPSULE ORAL DAILY
Qty: 30 CAPSULE | Refills: 2 | Status: SHIPPED | OUTPATIENT
Start: 2023-11-14

## 2023-11-14 RX ORDER — CLONAZEPAM 0.5 MG/1
0.5 TABLET ORAL 2 TIMES DAILY PRN
Qty: 60 TABLET | Refills: 1 | Status: SHIPPED | OUTPATIENT
Start: 2023-11-14 | End: 2024-01-13

## 2023-11-14 RX ORDER — ATOMOXETINE 80 MG/1
80 CAPSULE ORAL DAILY
Qty: 30 CAPSULE | Refills: 2 | Status: SHIPPED | OUTPATIENT
Start: 2023-11-14

## 2023-11-14 RX ORDER — PRAZOSIN HYDROCHLORIDE 2 MG/1
2 CAPSULE ORAL NIGHTLY
Qty: 30 CAPSULE | Refills: 1 | Status: SHIPPED | OUTPATIENT
Start: 2023-11-14

## 2023-11-14 RX ORDER — PRAZOSIN HYDROCHLORIDE 5 MG/1
5 CAPSULE ORAL NIGHTLY
Qty: 30 CAPSULE | Refills: 1 | Status: SHIPPED | OUTPATIENT
Start: 2023-11-14

## 2023-11-14 NOTE — PROGRESS NOTES
Strattera    OBJECTIVE:  Vitals: There were no vitals taken for this visit. MENTAL STATUS EXAM:    GENERAL  Build: Overweight    Hygiene:  Appropriate   SENSORIUM Orientation: Place, Person, Time, & Situation     Consciousness: Alert    ATTENTION   Focused, mostly   RELATEDNESS  Cooperative    EYE CONTACT   Good    PSYCHOMOTOR  Normal    SPEECH Volume: Normal     Rate: Normal rate and calm tone    Amplitude: Within normal limits   MOOD  \"Okay\"     AFFECT Range: Full   THOUGHT Process:  Goal-Directed    Content: no evidence of psychosis    COGNITION Insight: Good    Judgement:  Intact    MEMORY  no gross deficits, did not test     INTELLIGENCE  Average     Mobility/Gait: Independently     Controlled SubstancesMonitoring: Periodic Controlled Substance Monitoring: Possible medication side effects, risk of tolerance/dependence & alternative treatments discussed., No signs of potential drug abuse or diversion identified. (Katt Elizondo MD)       ASSESSMENT: Will decrease Prozac d/t sexual side effects and will increase Strattera for focus, mood, anxiety. Attention deficit could be d/t trauma hx. Family h/o ADHD. Diagnosis Orders   1. Moderate episode of recurrent major depressive disorder (HCC)  clonazePAM (KLONOPIN) 0.5 MG tablet      2. PTSD (post-traumatic stress disorder)  clonazePAM (KLONOPIN) 0.5 MG tablet      3. Attention deficit            PLAN:     Medications:   Decrease Prozac 60 mg QAM to 40 mg QAM  Prazosin 7 mg HS  Klonopin 0.5 mg BID  Increase Strattera 40 mg QD to 80 mg QD  Rx Depakote (noncompliant) and Trileptal by Neuro for HA, possible mood benefit from those  Medical Cannabis   Therapy: none, previously saw Dr. Paty Fernandez  Labs/Tests/Imaging: none  Records Reviewed: [facility]  Patient advised to call if patient has any difficulties with treatment  Return in about 8 weeks (around 1/9/2024) for follow up, med check. I spent 23 minutes face to face with this patient.  I spent 16 minutes or

## 2024-01-11 ENCOUNTER — TELEMEDICINE (OUTPATIENT)
Dept: PSYCHIATRY | Age: 44
End: 2024-01-11
Payer: MEDICARE

## 2024-01-11 DIAGNOSIS — R41.840 ATTENTION DEFICIT: ICD-10-CM

## 2024-01-11 DIAGNOSIS — Z86.69 HISTORY OF OBSTRUCTIVE SLEEP APNEA: ICD-10-CM

## 2024-01-11 DIAGNOSIS — F43.10 PTSD (POST-TRAUMATIC STRESS DISORDER): ICD-10-CM

## 2024-01-11 DIAGNOSIS — F33.1 MODERATE EPISODE OF RECURRENT MAJOR DEPRESSIVE DISORDER (HCC): Primary | ICD-10-CM

## 2024-01-11 PROCEDURE — G8427 DOCREV CUR MEDS BY ELIG CLIN: HCPCS | Performed by: PSYCHIATRY & NEUROLOGY

## 2024-01-11 PROCEDURE — 99214 OFFICE O/P EST MOD 30 MIN: CPT | Performed by: PSYCHIATRY & NEUROLOGY

## 2024-01-11 PROCEDURE — 90833 PSYTX W PT W E/M 30 MIN: CPT | Performed by: PSYCHIATRY & NEUROLOGY

## 2024-01-11 RX ORDER — PRAZOSIN HYDROCHLORIDE 5 MG/1
5 CAPSULE ORAL NIGHTLY
Qty: 30 CAPSULE | Refills: 2 | Status: SHIPPED | OUTPATIENT
Start: 2024-01-11

## 2024-01-11 RX ORDER — CLONAZEPAM 0.5 MG/1
0.5 TABLET ORAL 2 TIMES DAILY PRN
Qty: 60 TABLET | Refills: 2 | Status: SHIPPED | OUTPATIENT
Start: 2024-01-11 | End: 2024-04-10

## 2024-01-11 RX ORDER — PRAZOSIN HYDROCHLORIDE 2 MG/1
2 CAPSULE ORAL NIGHTLY
Qty: 30 CAPSULE | Refills: 2 | Status: SHIPPED | OUTPATIENT
Start: 2024-01-11

## 2024-01-11 RX ORDER — ATOMOXETINE 100 MG/1
100 CAPSULE ORAL DAILY
Qty: 30 CAPSULE | Refills: 2 | Status: SHIPPED | OUTPATIENT
Start: 2024-01-11

## 2024-01-11 RX ORDER — FLUOXETINE HYDROCHLORIDE 40 MG/1
40 CAPSULE ORAL DAILY
Qty: 30 CAPSULE | Refills: 2 | Status: SHIPPED | OUTPATIENT
Start: 2024-01-11

## 2024-01-11 NOTE — PROGRESS NOTES
[facility] [place] [facility]  [facility] PSYCHIATRY  [address] [facility] [address] [phone]    Progress Note    Patient:  Trudy Freeman  YOB: 1980  PCP:  Barbara Doyle APRN - CNP  Visit Date:  2024      Chief Complaint   Patient presents with    Follow-up    Medication Check    Depression    Anxiety       SUBJECTIVE:    Time in: 10:03am  Time out: 10:33am  Documentation time: 5 min    Trudy Freeman, a 43 y.o. female, presents for a follow up visit.      She presents with her , Fredy, in the background.   Mood is good, stable.   Anxiety is under fair control.   Notes the holidays went okay, a bit stressful. Doesn't want gifts \"things\" due to tendency to misha.   Note her pain is about average, typical lately.   Feels wobbly on foot. Pushes through it.   Shows me her cats. Rescued 2 of them recently. \"Pivotal piece of love in my house.\" Notes this has been the highlight of the past year and improved her mental health.   Did okay with Prozac decrease. Was off meds x a couple weeks noting she procrastinated to refill it . Sexual side effects are maybe improved. \"More willing to try.\" More sensation. Libido improved.   Notes improved focus and \"function\" with Strattera \"here in this world, not in my head\". Organizing and sorting things more easily.   BiPap machine \"\". Has been using her mom's old CPAP. More nightmares, fatigue. Would like Sleep Center Referral.   Notes health stressors. Knee pain limiting physical activity.   Discusses keeping busy with writer's group, enjoys [employer], working on a poem book.   Discussed tx options and we agree to increase Strattera to see if she can gain any further benefit for focus.   No SI or psychosis.   Spent much of session in psychotherapy discussing current stressors, coping skills, strategies for change, supportive therapy, psycheducation.      Med Trials: Klonopin, Prozac, prazosin, Depakote (for

## 2024-02-02 DIAGNOSIS — E03.9 ACQUIRED HYPOTHYROIDISM: ICD-10-CM

## 2024-02-02 RX ORDER — LEVOTHYROXINE SODIUM 0.05 MG/1
50 TABLET ORAL DAILY
Qty: 30 TABLET | Refills: 0 | Status: SHIPPED | OUTPATIENT
Start: 2024-02-02

## 2024-02-02 NOTE — TELEPHONE ENCOUNTER
Request sent from [facility] pharmacy for refill of levothyroxine 50 mcg qd.  Last seen 6/14/23, no future appt scheduled.  Last TFTs done 3/28/23.  Med verified, order pended for #30/NR as she is due for labs soon.

## 2024-03-04 DIAGNOSIS — E03.9 ACQUIRED HYPOTHYROIDISM: ICD-10-CM

## 2024-03-04 RX ORDER — LEVOTHYROXINE SODIUM 0.05 MG/1
50 TABLET ORAL DAILY
Qty: 30 TABLET | Refills: 0 | Status: SHIPPED | OUTPATIENT
Start: 2024-03-04

## 2024-03-04 NOTE — TELEPHONE ENCOUNTER
This medication refill is regarding a electronic request. Refill requested by  [email or web address] .    Requested Prescriptions     Pending Prescriptions Disp Refills    levothyroxine (SYNTHROID) 50 MCG tablet [Pharmacy Med Name: LEVOTHYROXINE 50 MCG TABLET] 30 tablet 0     Sig: take 1 tablet by mouth once daily     Date of last visit: 6/14/2023   Date of next visit: None  Date of last refill: 2/2/24 #30/0    Last Thyroid:    Lab Results   Component Value Date    TSH 4.140 03/28/2023    T4FREE 0.81 (L) 12/06/2018     Rx verified, ordered and set to EP.

## 2024-04-29 ENCOUNTER — TELEMEDICINE (OUTPATIENT)
Dept: PSYCHIATRY | Age: 44
End: 2024-04-29
Payer: MEDICARE

## 2024-04-29 DIAGNOSIS — F43.10 PTSD (POST-TRAUMATIC STRESS DISORDER): ICD-10-CM

## 2024-04-29 DIAGNOSIS — R41.840 ATTENTION DEFICIT: ICD-10-CM

## 2024-04-29 DIAGNOSIS — F33.1 MODERATE EPISODE OF RECURRENT MAJOR DEPRESSIVE DISORDER (HCC): Primary | ICD-10-CM

## 2024-04-29 PROCEDURE — G8427 DOCREV CUR MEDS BY ELIG CLIN: HCPCS | Performed by: PSYCHIATRY & NEUROLOGY

## 2024-04-29 PROCEDURE — 90833 PSYTX W PT W E/M 30 MIN: CPT | Performed by: PSYCHIATRY & NEUROLOGY

## 2024-04-29 PROCEDURE — 99214 OFFICE O/P EST MOD 30 MIN: CPT | Performed by: PSYCHIATRY & NEUROLOGY

## 2024-04-29 RX ORDER — CLONAZEPAM 0.5 MG/1
0.5 TABLET ORAL 2 TIMES DAILY PRN
Qty: 60 TABLET | Refills: 2 | Status: SHIPPED | OUTPATIENT
Start: 2024-04-29 | End: 2024-07-28

## 2024-04-29 RX ORDER — PRAZOSIN HYDROCHLORIDE 2 MG/1
2 CAPSULE ORAL NIGHTLY
Qty: 30 CAPSULE | Refills: 2 | Status: SHIPPED | OUTPATIENT
Start: 2024-04-29

## 2024-04-29 RX ORDER — ATOMOXETINE 40 MG/1
CAPSULE ORAL
Qty: 60 CAPSULE | Refills: 2 | Status: SHIPPED | OUTPATIENT
Start: 2024-04-29

## 2024-04-29 NOTE — PROGRESS NOTES
[facility] [place] [facility]  [facility] PSYCHIATRY  [address] [facility] [address] [phone]    Progress Note    Patient:  Trudy Freeman  YOB: 1980  PCP:  Barbara Doyle APRN - CNP  Visit Date:  2024      Chief Complaint   Patient presents with    Follow-up    Medication Check    Depression    Anxiety       SUBJECTIVE:    Time in: 1:03pm  Time out: 1:30pm  Documentation time: 5 min    Trudy Freeman, a 43 y.o. female, presents for a follow up visit.      She presents alone.   More anxiety lately. Called in to change to virtual visit noting trouble leaving the house.  Last saw me in January.   \"Struggling.\"  Notes insurance changes. Didn't do necessary paperwork.   Has been off several medications d/t that issue.   Last took prazosin end of Feb. Last took Prozac mid March. Ran out of Strattera a week ago. Still taking Klonopin.   Plans to reach out to figure out the insurance situation. Overwhelmed easily. Avoidance d/t anxiety.  She notes their friend Theo had a birthday and he is upset she didn't call to wish him happy bday. Notes he is blind, lives alone, 500 lbs. Has no other support system. Notes he spends a lot of time martine. She can't do that with him noting after her fiancee  several years ago she had WoW addiction.   Mood \"grumpier\" off Prozac.   Strattera helped focus, productivity. \"I was getting more done.\"  Felt \"flat\" with Prozac, \"numb\".   Discussed tx options and we agree to d/c Prozac. Will provide smaller prazosin dose, and will restart Strattera and titrate dose up.   No SI or psychosis.   Spent much of session in psychotherapy discussing current stressors, coping skills, strategies for change, supportive therapy, psycheducation.      Med Trials: Klonopin, Prozac, prazosin, Depakote (for HA), Trileptal (for HA), Effexor (severe discontinuation syndrome), Wellbutrin, Strattera    OBJECTIVE:  Vitals: There were no vitals taken for

## 2024-07-12 DIAGNOSIS — E03.9 ACQUIRED HYPOTHYROIDISM: ICD-10-CM

## 2024-07-12 RX ORDER — ATOMOXETINE 100 MG/1
100 CAPSULE ORAL DAILY
Qty: 30 CAPSULE | Refills: 2 | OUTPATIENT
Start: 2024-07-12

## 2024-07-12 NOTE — TELEPHONE ENCOUNTER
This medication refill is regarding a electronic request. Refill requested by patient.    Requested Prescriptions     Pending Prescriptions Disp Refills    levothyroxine (SYNTHROID) 50 MCG tablet [Pharmacy Med Name: LEVOTHYROXINE 50 MCG TABLET] 30 tablet 0     Sig: take 1 tablet by mouth once daily       Date of last visit: 6/14/2023   Date of next visit: Visit date not found  Date of last refill: 3-4-24 #30/0     Last Thyroid:    Lab Results   Component Value Date    TSH 4.140 03/28/2023    T4FREE 0.81 (L) 12/06/2018       Rx verified, ordered and set to EP.

## 2024-07-15 DIAGNOSIS — E03.9 ACQUIRED HYPOTHYROIDISM: Primary | ICD-10-CM

## 2024-07-15 RX ORDER — LEVOTHYROXINE SODIUM 0.05 MG/1
50 TABLET ORAL DAILY
Qty: 14 TABLET | Refills: 0 | Status: SHIPPED | OUTPATIENT
Start: 2024-07-15

## 2024-07-15 NOTE — TELEPHONE ENCOUNTER
Wll give 14 days of medication, must make an appointment. Also ordered TSH level to be done before next visit

## 2024-09-05 ENCOUNTER — TELEPHONE (OUTPATIENT)
Dept: PSYCHIATRY | Age: 44
End: 2024-09-05

## 2024-09-05 NOTE — TELEPHONE ENCOUNTER
Noted, thanks for the update.   Electronically signed by Francisca Ponce MD on 9/5/2024 at 9:36 AM

## 2024-09-05 NOTE — TELEPHONE ENCOUNTER
Staff contacted [place] [employer] and spoke to Dispatcher aMy. She will send officers to complete well check. They will call back with an update.     [phone]

## 2024-09-05 NOTE — TELEPHONE ENCOUNTER
It is unclear if Trudy is referring to the fact that she tried but missed her appointment yesterday.   However her message is concerning for her safety in general and please call a welfare check to ensure Trudy is okay.   Electronically signed by Francisca Ponce MD on 9/5/2024 at 7:44 AM

## 2024-09-05 NOTE — TELEPHONE ENCOUNTER
Trudy sent the following message after hours:    Trudy SEVERINO [facility] Psychiatry Clinical Staff (supporting Francisca Ponce MD)14 hours ago (5:19 PM)     I tried. I really did.     -I attempted to contact patient, phone goes straight to voicemail. Left message for patient to return my call.
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
## 2024-10-17 ENCOUNTER — TELEMEDICINE (OUTPATIENT)
Dept: PSYCHIATRY | Age: 44
End: 2024-10-17
Payer: MEDICARE

## 2024-10-17 DIAGNOSIS — F33.1 MODERATE EPISODE OF RECURRENT MAJOR DEPRESSIVE DISORDER (HCC): Primary | ICD-10-CM

## 2024-10-17 DIAGNOSIS — F43.10 PTSD (POST-TRAUMATIC STRESS DISORDER): ICD-10-CM

## 2024-10-17 DIAGNOSIS — R41.840 ATTENTION DEFICIT: ICD-10-CM

## 2024-10-17 PROCEDURE — 90833 PSYTX W PT W E/M 30 MIN: CPT | Performed by: PSYCHIATRY & NEUROLOGY

## 2024-10-17 PROCEDURE — 99214 OFFICE O/P EST MOD 30 MIN: CPT | Performed by: PSYCHIATRY & NEUROLOGY

## 2024-10-17 PROCEDURE — G8427 DOCREV CUR MEDS BY ELIG CLIN: HCPCS | Performed by: PSYCHIATRY & NEUROLOGY

## 2024-10-17 RX ORDER — ATOMOXETINE 40 MG/1
CAPSULE ORAL
Qty: 60 CAPSULE | Refills: 2 | Status: SHIPPED | OUTPATIENT
Start: 2024-10-17

## 2024-10-17 RX ORDER — CLONAZEPAM 0.5 MG/1
0.5 TABLET ORAL 2 TIMES DAILY PRN
Qty: 60 TABLET | Refills: 1 | Status: SHIPPED | OUTPATIENT
Start: 2024-10-17 | End: 2024-12-16

## 2024-10-17 ASSESSMENT — PATIENT HEALTH QUESTIONNAIRE - PHQ9
6. FEELING BAD ABOUT YOURSELF - OR THAT YOU ARE A FAILURE OR HAVE LET YOURSELF OR YOUR FAMILY DOWN: MORE THAN HALF THE DAYS
4. FEELING TIRED OR HAVING LITTLE ENERGY: NEARLY EVERY DAY
SUM OF ALL RESPONSES TO PHQ QUESTIONS 1-9: 17
5. POOR APPETITE OR OVEREATING: MORE THAN HALF THE DAYS
2. FEELING DOWN, DEPRESSED OR HOPELESS: SEVERAL DAYS
7. TROUBLE CONCENTRATING ON THINGS, SUCH AS READING THE NEWSPAPER OR WATCHING TELEVISION: NEARLY EVERY DAY
6. FEELING BAD ABOUT YOURSELF - OR THAT YOU ARE A FAILURE OR HAVE LET YOURSELF OR YOUR FAMILY DOWN: MORE THAN HALF THE DAYS
5. POOR APPETITE OR OVEREATING: MORE THAN HALF THE DAYS
1. LITTLE INTEREST OR PLEASURE IN DOING THINGS: MORE THAN HALF THE DAYS
SUM OF ALL RESPONSES TO PHQ QUESTIONS 1-9: 16
7. TROUBLE CONCENTRATING ON THINGS, SUCH AS READING THE NEWSPAPER OR WATCHING TELEVISION: NEARLY EVERY DAY
3. TROUBLE FALLING OR STAYING ASLEEP: SEVERAL DAYS
10. IF YOU CHECKED OFF ANY PROBLEMS, HOW DIFFICULT HAVE THESE PROBLEMS MADE IT FOR YOU TO DO YOUR WORK, TAKE CARE OF THINGS AT HOME, OR GET ALONG WITH OTHER PEOPLE: VERY DIFFICULT
1. LITTLE INTEREST OR PLEASURE IN DOING THINGS: MORE THAN HALF THE DAYS
10. IF YOU CHECKED OFF ANY PROBLEMS, HOW DIFFICULT HAVE THESE PROBLEMS MADE IT FOR YOU TO DO YOUR WORK, TAKE CARE OF THINGS AT HOME, OR GET ALONG WITH OTHER PEOPLE: VERY DIFFICULT
8. MOVING OR SPEAKING SO SLOWLY THAT OTHER PEOPLE COULD HAVE NOTICED. OR THE OPPOSITE - BEING SO FIDGETY OR RESTLESS THAT YOU HAVE BEEN MOVING AROUND A LOT MORE THAN USUAL: MORE THAN HALF THE DAYS
8. MOVING OR SPEAKING SO SLOWLY THAT OTHER PEOPLE COULD HAVE NOTICED. OR THE OPPOSITE, BEING SO FIGETY OR RESTLESS THAT YOU HAVE BEEN MOVING AROUND A LOT MORE THAN USUAL: MORE THAN HALF THE DAYS
SUM OF ALL RESPONSES TO PHQ QUESTIONS 1-9: 17
9. THOUGHTS THAT YOU WOULD BE BETTER OFF DEAD, OR OF HURTING YOURSELF: SEVERAL DAYS
2. FEELING DOWN, DEPRESSED OR HOPELESS: SEVERAL DAYS
SUM OF ALL RESPONSES TO PHQ QUESTIONS 1-9: 17
SUM OF ALL RESPONSES TO PHQ9 QUESTIONS 1 & 2: 3
9. THOUGHTS THAT YOU WOULD BE BETTER OFF DEAD, OR OF HURTING YOURSELF: SEVERAL DAYS
4. FEELING TIRED OR HAVING LITTLE ENERGY: NEARLY EVERY DAY
3. TROUBLE FALLING OR STAYING ASLEEP: SEVERAL DAYS
SUM OF ALL RESPONSES TO PHQ QUESTIONS 1-9: 17

## 2024-10-17 ASSESSMENT — ANXIETY QUESTIONNAIRES
GAD7 TOTAL SCORE: 18
IF YOU CHECKED OFF ANY PROBLEMS ON THIS QUESTIONNAIRE, HOW DIFFICULT HAVE THESE PROBLEMS MADE IT FOR YOU TO DO YOUR WORK, TAKE CARE OF THINGS AT HOME, OR GET ALONG WITH OTHER PEOPLE: VERY DIFFICULT
1. FEELING NERVOUS, ANXIOUS, OR ON EDGE: NEARLY EVERY DAY
7. FEELING AFRAID AS IF SOMETHING AWFUL MIGHT HAPPEN: NEARLY EVERY DAY
2. NOT BEING ABLE TO STOP OR CONTROL WORRYING: MORE THAN HALF THE DAYS
IF YOU CHECKED OFF ANY PROBLEMS ON THIS QUESTIONNAIRE, HOW DIFFICULT HAVE THESE PROBLEMS MADE IT FOR YOU TO DO YOUR WORK, TAKE CARE OF THINGS AT HOME, OR GET ALONG WITH OTHER PEOPLE: VERY DIFFICULT
4. TROUBLE RELAXING: NEARLY EVERY DAY
6. BECOMING EASILY ANNOYED OR IRRITABLE: NEARLY EVERY DAY
3. WORRYING TOO MUCH ABOUT DIFFERENT THINGS: MORE THAN HALF THE DAYS
5. BEING SO RESTLESS THAT IT IS HARD TO SIT STILL: MORE THAN HALF THE DAYS
2. NOT BEING ABLE TO STOP OR CONTROL WORRYING: MORE THAN HALF THE DAYS
5. BEING SO RESTLESS THAT IT IS HARD TO SIT STILL: MORE THAN HALF THE DAYS
4. TROUBLE RELAXING: NEARLY EVERY DAY
3. WORRYING TOO MUCH ABOUT DIFFERENT THINGS: MORE THAN HALF THE DAYS
6. BECOMING EASILY ANNOYED OR IRRITABLE: NEARLY EVERY DAY
7. FEELING AFRAID AS IF SOMETHING AWFUL MIGHT HAPPEN: NEARLY EVERY DAY
1. FEELING NERVOUS, ANXIOUS, OR ON EDGE: NEARLY EVERY DAY

## 2024-10-17 ASSESSMENT — COLUMBIA-SUICIDE SEVERITY RATING SCALE - C-SSRS
1. IN THE PAST MONTH, HAVE YOU WISHED YOU WERE DEAD OR WISHED YOU COULD GO TO SLEEP AND NOT WAKE UP?: YES
3. IN THE PAST MONTH, HAVE YOU BEEN THINKING ABOUT HOW YOU MIGHT KILL YOURSELF?: NO
2. IN THE PAST MONTH, HAVE YOU ACTUALLY HAD ANY THOUGHTS OF KILLING YOURSELF?: YES
6. IN YOUR LIFETIME, HAVE YOU EVER DONE ANYTHING, STARTED TO DO ANYTHING, OR PREPARED TO DO ANYTHING TO END YOUR LIFE?: YES
5. IN THE PAST MONTH, HAVE YOU STARTED TO WORK OUT OR WORKED OUT THE DETAILS OF HOW TO KILL YOURSELF? DO YOU INTEND TO CARRY OUT THIS PLAN?: NO
4. IN THE PAST MONTH, HAVE YOU HAD THESE THOUGHTS AND HAD SOME INTENTION OF ACTING ON THEM?: NO
7. DID THIS OCCUR IN THE LAST THREE MONTHS: NO
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
## 2024-12-20 ENCOUNTER — TELEMEDICINE (OUTPATIENT)
Dept: PSYCHIATRY | Age: 44
End: 2024-12-20
Payer: MEDICARE

## 2024-12-20 DIAGNOSIS — F33.2 SEVERE EPISODE OF RECURRENT MAJOR DEPRESSIVE DISORDER, WITHOUT PSYCHOTIC FEATURES (HCC): ICD-10-CM

## 2024-12-20 DIAGNOSIS — F43.10 PTSD (POST-TRAUMATIC STRESS DISORDER): Primary | ICD-10-CM

## 2024-12-20 DIAGNOSIS — R41.840 ATTENTION DEFICIT: ICD-10-CM

## 2024-12-20 PROCEDURE — 90833 PSYTX W PT W E/M 30 MIN: CPT | Performed by: PSYCHIATRY & NEUROLOGY

## 2024-12-20 PROCEDURE — 99214 OFFICE O/P EST MOD 30 MIN: CPT | Performed by: PSYCHIATRY & NEUROLOGY

## 2024-12-20 PROCEDURE — G8427 DOCREV CUR MEDS BY ELIG CLIN: HCPCS | Performed by: PSYCHIATRY & NEUROLOGY

## 2024-12-20 RX ORDER — ATOMOXETINE 40 MG/1
CAPSULE ORAL
Qty: 60 CAPSULE | Refills: 2 | Status: SHIPPED | OUTPATIENT
Start: 2024-12-20

## 2024-12-20 RX ORDER — CLONAZEPAM 0.5 MG/1
0.5 TABLET ORAL 2 TIMES DAILY PRN
Qty: 60 TABLET | Refills: 1 | Status: SHIPPED | OUTPATIENT
Start: 2024-12-20 | End: 2025-02-18

## 2024-12-20 ASSESSMENT — PATIENT HEALTH QUESTIONNAIRE - PHQ9
5. POOR APPETITE OR OVEREATING: NEARLY EVERY DAY
8. MOVING OR SPEAKING SO SLOWLY THAT OTHER PEOPLE COULD HAVE NOTICED. OR THE OPPOSITE, BEING SO FIGETY OR RESTLESS THAT YOU HAVE BEEN MOVING AROUND A LOT MORE THAN USUAL: NEARLY EVERY DAY
4. FEELING TIRED OR HAVING LITTLE ENERGY: NEARLY EVERY DAY
3. TROUBLE FALLING OR STAYING ASLEEP: NEARLY EVERY DAY
SUM OF ALL RESPONSES TO PHQ QUESTIONS 1-9: 27
10. IF YOU CHECKED OFF ANY PROBLEMS, HOW DIFFICULT HAVE THESE PROBLEMS MADE IT FOR YOU TO DO YOUR WORK, TAKE CARE OF THINGS AT HOME, OR GET ALONG WITH OTHER PEOPLE: EXTREMELY DIFFICULT
10. IF YOU CHECKED OFF ANY PROBLEMS, HOW DIFFICULT HAVE THESE PROBLEMS MADE IT FOR YOU TO DO YOUR WORK, TAKE CARE OF THINGS AT HOME, OR GET ALONG WITH OTHER PEOPLE: EXTREMELY DIFFICULT
9. THOUGHTS THAT YOU WOULD BE BETTER OFF DEAD, OR OF HURTING YOURSELF: NEARLY EVERY DAY
3. TROUBLE FALLING OR STAYING ASLEEP: NEARLY EVERY DAY
SUM OF ALL RESPONSES TO PHQ QUESTIONS 1-9: 27
7. TROUBLE CONCENTRATING ON THINGS, SUCH AS READING THE NEWSPAPER OR WATCHING TELEVISION: NEARLY EVERY DAY
8. MOVING OR SPEAKING SO SLOWLY THAT OTHER PEOPLE COULD HAVE NOTICED. OR THE OPPOSITE - BEING SO FIDGETY OR RESTLESS THAT YOU HAVE BEEN MOVING AROUND A LOT MORE THAN USUAL: NEARLY EVERY DAY
7. TROUBLE CONCENTRATING ON THINGS, SUCH AS READING THE NEWSPAPER OR WATCHING TELEVISION: NEARLY EVERY DAY
1. LITTLE INTEREST OR PLEASURE IN DOING THINGS: NEARLY EVERY DAY
2. FEELING DOWN, DEPRESSED OR HOPELESS: NEARLY EVERY DAY
6. FEELING BAD ABOUT YOURSELF - OR THAT YOU ARE A FAILURE OR HAVE LET YOURSELF OR YOUR FAMILY DOWN: NEARLY EVERY DAY
4. FEELING TIRED OR HAVING LITTLE ENERGY: NEARLY EVERY DAY
9. THOUGHTS THAT YOU WOULD BE BETTER OFF DEAD, OR OF HURTING YOURSELF: NEARLY EVERY DAY
SUM OF ALL RESPONSES TO PHQ QUESTIONS 1-9: 24
2. FEELING DOWN, DEPRESSED OR HOPELESS: NEARLY EVERY DAY
6. FEELING BAD ABOUT YOURSELF - OR THAT YOU ARE A FAILURE OR HAVE LET YOURSELF OR YOUR FAMILY DOWN: NEARLY EVERY DAY
1. LITTLE INTEREST OR PLEASURE IN DOING THINGS: NEARLY EVERY DAY
SUM OF ALL RESPONSES TO PHQ9 QUESTIONS 1 & 2: 6
SUM OF ALL RESPONSES TO PHQ QUESTIONS 1-9: 27
SUM OF ALL RESPONSES TO PHQ QUESTIONS 1-9: 27
5. POOR APPETITE OR OVEREATING: NEARLY EVERY DAY

## 2024-12-20 ASSESSMENT — ANXIETY QUESTIONNAIRES
4. TROUBLE RELAXING: NEARLY EVERY DAY
GAD7 TOTAL SCORE: 21
2. NOT BEING ABLE TO STOP OR CONTROL WORRYING: NEARLY EVERY DAY
6. BECOMING EASILY ANNOYED OR IRRITABLE: NEARLY EVERY DAY
7. FEELING AFRAID AS IF SOMETHING AWFUL MIGHT HAPPEN: NEARLY EVERY DAY
3. WORRYING TOO MUCH ABOUT DIFFERENT THINGS: NEARLY EVERY DAY
1. FEELING NERVOUS, ANXIOUS, OR ON EDGE: NEARLY EVERY DAY
7. FEELING AFRAID AS IF SOMETHING AWFUL MIGHT HAPPEN: NEARLY EVERY DAY
IF YOU CHECKED OFF ANY PROBLEMS ON THIS QUESTIONNAIRE, HOW DIFFICULT HAVE THESE PROBLEMS MADE IT FOR YOU TO DO YOUR WORK, TAKE CARE OF THINGS AT HOME, OR GET ALONG WITH OTHER PEOPLE: EXTREMELY DIFFICULT
2. NOT BEING ABLE TO STOP OR CONTROL WORRYING: NEARLY EVERY DAY
1. FEELING NERVOUS, ANXIOUS, OR ON EDGE: NEARLY EVERY DAY
4. TROUBLE RELAXING: NEARLY EVERY DAY
IF YOU CHECKED OFF ANY PROBLEMS ON THIS QUESTIONNAIRE, HOW DIFFICULT HAVE THESE PROBLEMS MADE IT FOR YOU TO DO YOUR WORK, TAKE CARE OF THINGS AT HOME, OR GET ALONG WITH OTHER PEOPLE: EXTREMELY DIFFICULT
5. BEING SO RESTLESS THAT IT IS HARD TO SIT STILL: NEARLY EVERY DAY
6. BECOMING EASILY ANNOYED OR IRRITABLE: NEARLY EVERY DAY
3. WORRYING TOO MUCH ABOUT DIFFERENT THINGS: NEARLY EVERY DAY
5. BEING SO RESTLESS THAT IT IS HARD TO SIT STILL: NEARLY EVERY DAY

## 2024-12-20 ASSESSMENT — COLUMBIA-SUICIDE SEVERITY RATING SCALE - C-SSRS
2. IN THE PAST MONTH, HAVE YOU ACTUALLY HAD ANY THOUGHTS OF KILLING YOURSELF?: NO
6. IN YOUR LIFETIME, HAVE YOU EVER DONE ANYTHING, STARTED TO DO ANYTHING, OR PREPARED TO DO ANYTHING TO END YOUR LIFE?: YES
7. DID THIS OCCUR IN THE LAST THREE MONTHS: NO
1. IN THE PAST MONTH, HAVE YOU WISHED YOU WERE DEAD OR WISHED YOU COULD GO TO SLEEP AND NOT WAKE UP?: YES

## 2024-12-20 NOTE — PROGRESS NOTES
Remainder of time spent on symptom evaluation and medication management.     Trudy Freeman, was evaluated through a synchronous (real-time) audio-video encounter. The patient (or guardian if applicable) is aware that this is a billable service, which includes applicable co-pays. This Virtual Visit was conducted with patient's (and/or legal guardian's) consent. Patient identification was verified, and a caregiver was present when appropriate.   The patient was located at Home: [address] [facility] [address]  Provider was located at Home (Appt Dept State): [place]  Confirm you are appropriately licensed, registered, or certified to deliver care in the state where the patient is located as indicated above. If you are not or unsure, please re-schedule the visit: Yes, I confirm.      Total time spent for this encounter:  36 minutes    --Francisca Ponce MD on 12/23/2024 at 7:37 AM    An electronic signature was used to authenticate this note.

## 2025-02-05 ASSESSMENT — PATIENT HEALTH QUESTIONNAIRE - PHQ9
SUM OF ALL RESPONSES TO PHQ QUESTIONS 1-9: 26
1. LITTLE INTEREST OR PLEASURE IN DOING THINGS: NEARLY EVERY DAY
8. MOVING OR SPEAKING SO SLOWLY THAT OTHER PEOPLE COULD HAVE NOTICED. OR THE OPPOSITE - BEING SO FIDGETY OR RESTLESS THAT YOU HAVE BEEN MOVING AROUND A LOT MORE THAN USUAL: NEARLY EVERY DAY
8. MOVING OR SPEAKING SO SLOWLY THAT OTHER PEOPLE COULD HAVE NOTICED. OR THE OPPOSITE, BEING SO FIGETY OR RESTLESS THAT YOU HAVE BEEN MOVING AROUND A LOT MORE THAN USUAL: NEARLY EVERY DAY
4. FEELING TIRED OR HAVING LITTLE ENERGY: NEARLY EVERY DAY
SUM OF ALL RESPONSES TO PHQ QUESTIONS 1-9: 24
SUM OF ALL RESPONSES TO PHQ QUESTIONS 1-9: 26
2. FEELING DOWN, DEPRESSED OR HOPELESS: NEARLY EVERY DAY
9. THOUGHTS THAT YOU WOULD BE BETTER OFF DEAD, OR OF HURTING YOURSELF: MORE THAN HALF THE DAYS
SUM OF ALL RESPONSES TO PHQ QUESTIONS 1-9: 26
5. POOR APPETITE OR OVEREATING: NEARLY EVERY DAY
3. TROUBLE FALLING OR STAYING ASLEEP: NEARLY EVERY DAY
SUM OF ALL RESPONSES TO PHQ9 QUESTIONS 1 & 2: 6
1. LITTLE INTEREST OR PLEASURE IN DOING THINGS: NEARLY EVERY DAY
6. FEELING BAD ABOUT YOURSELF - OR THAT YOU ARE A FAILURE OR HAVE LET YOURSELF OR YOUR FAMILY DOWN: NEARLY EVERY DAY
SUM OF ALL RESPONSES TO PHQ QUESTIONS 1-9: 26
6. FEELING BAD ABOUT YOURSELF - OR THAT YOU ARE A FAILURE OR HAVE LET YOURSELF OR YOUR FAMILY DOWN: NEARLY EVERY DAY
10. IF YOU CHECKED OFF ANY PROBLEMS, HOW DIFFICULT HAVE THESE PROBLEMS MADE IT FOR YOU TO DO YOUR WORK, TAKE CARE OF THINGS AT HOME, OR GET ALONG WITH OTHER PEOPLE: EXTREMELY DIFFICULT
4. FEELING TIRED OR HAVING LITTLE ENERGY: NEARLY EVERY DAY
7. TROUBLE CONCENTRATING ON THINGS, SUCH AS READING THE NEWSPAPER OR WATCHING TELEVISION: NEARLY EVERY DAY
9. THOUGHTS THAT YOU WOULD BE BETTER OFF DEAD, OR OF HURTING YOURSELF: MORE THAN HALF THE DAYS
2. FEELING DOWN, DEPRESSED OR HOPELESS: NEARLY EVERY DAY
5. POOR APPETITE OR OVEREATING: NEARLY EVERY DAY
3. TROUBLE FALLING OR STAYING ASLEEP: NEARLY EVERY DAY
10. IF YOU CHECKED OFF ANY PROBLEMS, HOW DIFFICULT HAVE THESE PROBLEMS MADE IT FOR YOU TO DO YOUR WORK, TAKE CARE OF THINGS AT HOME, OR GET ALONG WITH OTHER PEOPLE: EXTREMELY DIFFICULT
7. TROUBLE CONCENTRATING ON THINGS, SUCH AS READING THE NEWSPAPER OR WATCHING TELEVISION: NEARLY EVERY DAY

## 2025-02-05 ASSESSMENT — ANXIETY QUESTIONNAIRES
6. BECOMING EASILY ANNOYED OR IRRITABLE: MORE THAN HALF THE DAYS
IF YOU CHECKED OFF ANY PROBLEMS ON THIS QUESTIONNAIRE, HOW DIFFICULT HAVE THESE PROBLEMS MADE IT FOR YOU TO DO YOUR WORK, TAKE CARE OF THINGS AT HOME, OR GET ALONG WITH OTHER PEOPLE: EXTREMELY DIFFICULT
GAD7 TOTAL SCORE: 20
5. BEING SO RESTLESS THAT IT IS HARD TO SIT STILL: NEARLY EVERY DAY
2. NOT BEING ABLE TO STOP OR CONTROL WORRYING: NEARLY EVERY DAY
3. WORRYING TOO MUCH ABOUT DIFFERENT THINGS: NEARLY EVERY DAY
IF YOU CHECKED OFF ANY PROBLEMS ON THIS QUESTIONNAIRE, HOW DIFFICULT HAVE THESE PROBLEMS MADE IT FOR YOU TO DO YOUR WORK, TAKE CARE OF THINGS AT HOME, OR GET ALONG WITH OTHER PEOPLE: EXTREMELY DIFFICULT
6. BECOMING EASILY ANNOYED OR IRRITABLE: MORE THAN HALF THE DAYS
2. NOT BEING ABLE TO STOP OR CONTROL WORRYING: NEARLY EVERY DAY
1. FEELING NERVOUS, ANXIOUS, OR ON EDGE: NEARLY EVERY DAY
7. FEELING AFRAID AS IF SOMETHING AWFUL MIGHT HAPPEN: NEARLY EVERY DAY
5. BEING SO RESTLESS THAT IT IS HARD TO SIT STILL: NEARLY EVERY DAY
4. TROUBLE RELAXING: NEARLY EVERY DAY
1. FEELING NERVOUS, ANXIOUS, OR ON EDGE: NEARLY EVERY DAY
7. FEELING AFRAID AS IF SOMETHING AWFUL MIGHT HAPPEN: NEARLY EVERY DAY
4. TROUBLE RELAXING: NEARLY EVERY DAY
3. WORRYING TOO MUCH ABOUT DIFFERENT THINGS: NEARLY EVERY DAY

## 2025-02-05 ASSESSMENT — COLUMBIA-SUICIDE SEVERITY RATING SCALE - C-SSRS
6. IN YOUR LIFETIME, HAVE YOU EVER DONE ANYTHING, STARTED TO DO ANYTHING, OR PREPARED TO DO ANYTHING TO END YOUR LIFE?: YES
3. IN THE PAST MONTH, HAVE YOU BEEN THINKING ABOUT HOW YOU MIGHT KILL YOURSELF?: NO
4. IN THE PAST MONTH, HAVE YOU HAD THESE THOUGHTS AND HAD SOME INTENTION OF ACTING ON THEM?: NO
7. DID THIS OCCUR IN THE LAST THREE MONTHS: NO
1. IN THE PAST MONTH, HAVE YOU WISHED YOU WERE DEAD OR WISHED YOU COULD GO TO SLEEP AND NOT WAKE UP?: YES
2. IN THE PAST MONTH, HAVE YOU ACTUALLY HAD ANY THOUGHTS OF KILLING YOURSELF?: YES
5. IN THE PAST MONTH, HAVE YOU STARTED TO WORK OUT OR WORKED OUT THE DETAILS OF HOW TO KILL YOURSELF? DO YOU INTEND TO CARRY OUT THIS PLAN?: NO

## 2025-02-06 ENCOUNTER — TELEMEDICINE (OUTPATIENT)
Dept: PSYCHIATRY | Age: 45
End: 2025-02-06
Payer: MEDICARE

## 2025-02-06 DIAGNOSIS — F33.2 SEVERE EPISODE OF RECURRENT MAJOR DEPRESSIVE DISORDER, WITHOUT PSYCHOTIC FEATURES (HCC): Primary | ICD-10-CM

## 2025-02-06 DIAGNOSIS — F43.10 PTSD (POST-TRAUMATIC STRESS DISORDER): ICD-10-CM

## 2025-02-06 DIAGNOSIS — R41.840 ATTENTION DEFICIT: ICD-10-CM

## 2025-02-06 DIAGNOSIS — G89.29 OTHER CHRONIC PAIN: ICD-10-CM

## 2025-02-06 PROCEDURE — 99214 OFFICE O/P EST MOD 30 MIN: CPT | Performed by: PSYCHIATRY & NEUROLOGY

## 2025-02-06 PROCEDURE — 90833 PSYTX W PT W E/M 30 MIN: CPT | Performed by: PSYCHIATRY & NEUROLOGY

## 2025-02-06 PROCEDURE — G8427 DOCREV CUR MEDS BY ELIG CLIN: HCPCS | Performed by: PSYCHIATRY & NEUROLOGY

## 2025-02-06 RX ORDER — ATOMOXETINE 40 MG/1
CAPSULE ORAL
Qty: 60 CAPSULE | Refills: 2 | Status: SHIPPED | OUTPATIENT
Start: 2025-02-06

## 2025-02-06 RX ORDER — CLONAZEPAM 0.5 MG/1
0.5 TABLET ORAL 2 TIMES DAILY PRN
Qty: 60 TABLET | Refills: 1 | Status: SHIPPED | OUTPATIENT
Start: 2025-02-06 | End: 2025-05-07

## 2025-02-06 NOTE — PROGRESS NOTES
[facility] [place] [facility]  [facility] PS[facility]  [address] [facility] [id number] [phone]    Progress Note    Patient:  Trudy Freeman  YOB: 1980  PCP:  Barbara Doyle, EDEN - CNP  Visit Date:  2025      Chief Complaint   Patient presents with    Follow-up    Medication Check    Depression    Anxiety    ADHD       SUBJECTIVE:    Time in: 1:04pm  Time out: 1:37pm  Documentation time: 5 min    Trudy Freeman, a 44 y.o. female, presents for a follow up visit.      She presents with her SO, Theo Daniel, in the background.   I'm \"okay\".   Her cats are doing well. One had 2 kittens. Shows me them all. Dotes on them.   Keeping an eye on all of them and being more present. Brings her tiago.   Notes disorganization, \"my misha\" noting the need to clean it up.   Mood \"sad\" it's gotten to that point and doesn't have the energy to address it.  Mentally \"Scattered.\" Trouble focusing.   [place] comforted by her piles of clothes and toys on her bed growing up \"all around me\" and would sleep with that and made it her feel \"held\".   Chronic pain is limiting, draining. Fluctuates.   Hasn't gotten Rx from pharmacy. Not currently taking Strattera nor Klonopin.  Anhedonia. Hasn't been writing or attending her writing events.   Pain with muscle cramping. Doesn't feel like being around people.   Doing less in winter.   Out of touch with pleasurable parts of her body. Only feels in touch with the pain.   Cat brought a bird into the house and was sad \"lost myself\".   Bird was still alive but . \"Feel heavy in my body.\"  \"I'm trying to callous my heart. The world is so dark right now.\" Concerns about the environment, politics.   Her close friend is planning to leave. Having mental health issues, sadness. Overwhelmed by her friend's messages. Hard to be around her.   Appt with [facility] agency on aging getting connected with services through them.   Financial stressors.   No suicidal

## 2025-03-09 SDOH — ECONOMIC STABILITY: INCOME INSECURITY: IN THE LAST 12 MONTHS, WAS THERE A TIME WHEN YOU WERE NOT ABLE TO PAY THE MORTGAGE OR RENT ON TIME?: NO

## 2025-03-09 SDOH — ECONOMIC STABILITY: TRANSPORTATION INSECURITY
IN THE PAST 12 MONTHS, HAS THE LACK OF TRANSPORTATION KEPT YOU FROM MEDICAL APPOINTMENTS OR FROM GETTING MEDICATIONS?: YES

## 2025-03-09 SDOH — ECONOMIC STABILITY: FOOD INSECURITY: WITHIN THE PAST 12 MONTHS, THE FOOD YOU BOUGHT JUST DIDN'T LAST AND YOU DIDN'T HAVE MONEY TO GET MORE.: OFTEN TRUE

## 2025-03-09 SDOH — ECONOMIC STABILITY: FOOD INSECURITY: WITHIN THE PAST 12 MONTHS, YOU WORRIED THAT YOUR FOOD WOULD RUN OUT BEFORE YOU GOT MONEY TO BUY MORE.: OFTEN TRUE

## 2025-03-09 SDOH — ECONOMIC STABILITY: TRANSPORTATION INSECURITY
IN THE PAST 12 MONTHS, HAS LACK OF TRANSPORTATION KEPT YOU FROM MEETINGS, WORK, OR FROM GETTING THINGS NEEDED FOR DAILY LIVING?: YES

## 2025-03-10 ENCOUNTER — OFFICE VISIT (OUTPATIENT)
Dept: FAMILY MEDICINE CLINIC | Age: 45
End: 2025-03-10
Payer: MEDICARE

## 2025-03-10 VITALS
RESPIRATION RATE: 16 BRPM | WEIGHT: 293 LBS | DIASTOLIC BLOOD PRESSURE: 70 MMHG | TEMPERATURE: 97.9 F | BODY MASS INDEX: 48.44 KG/M2 | SYSTOLIC BLOOD PRESSURE: 120 MMHG | HEART RATE: 72 BPM

## 2025-03-10 DIAGNOSIS — E66.813 CLASS 3 SEVERE OBESITY WITH BODY MASS INDEX (BMI) OF 45.0 TO 49.9 IN ADULT, UNSPECIFIED OBESITY TYPE, UNSPECIFIED WHETHER SERIOUS COMORBIDITY PRESENT: ICD-10-CM

## 2025-03-10 DIAGNOSIS — Z13.1 ENCOUNTER FOR SCREENING FOR DIABETES MELLITUS: ICD-10-CM

## 2025-03-10 DIAGNOSIS — J34.89 NASAL OBSTRUCTION: ICD-10-CM

## 2025-03-10 DIAGNOSIS — H61.301 ACQUIRED STENOSIS OF RIGHT EXTERNAL EAR CANAL: ICD-10-CM

## 2025-03-10 DIAGNOSIS — R26.9 GAIT ABNORMALITY: ICD-10-CM

## 2025-03-10 DIAGNOSIS — Z12.4 PAP SMEAR FOR CERVICAL CANCER SCREENING: ICD-10-CM

## 2025-03-10 DIAGNOSIS — G51.0 PARTIAL FACIAL NERVE PALSY: ICD-10-CM

## 2025-03-10 DIAGNOSIS — H81.91 BALANCE PROBLEM DUE TO VESTIBULAR DYSFUNCTION OF RIGHT EAR: ICD-10-CM

## 2025-03-10 DIAGNOSIS — L73.2 HIDRADENITIS SUPPURATIVA: ICD-10-CM

## 2025-03-10 DIAGNOSIS — E55.9 VITAMIN D DEFICIENCY, UNSPECIFIED: ICD-10-CM

## 2025-03-10 DIAGNOSIS — E66.01 CLASS 3 SEVERE OBESITY WITH BODY MASS INDEX (BMI) OF 45.0 TO 49.9 IN ADULT, UNSPECIFIED OBESITY TYPE, UNSPECIFIED WHETHER SERIOUS COMORBIDITY PRESENT: ICD-10-CM

## 2025-03-10 DIAGNOSIS — Z12.11 COLON CANCER SCREENING: ICD-10-CM

## 2025-03-10 DIAGNOSIS — E03.9 ACQUIRED HYPOTHYROIDISM: ICD-10-CM

## 2025-03-10 DIAGNOSIS — M54.2 NECK PAIN: ICD-10-CM

## 2025-03-10 DIAGNOSIS — Z74.1 REQUIRES ASSISTANCE WITH ACTIVITIES OF DAILY LIVING (ADL): ICD-10-CM

## 2025-03-10 DIAGNOSIS — Z12.31 BREAST CANCER SCREENING BY MAMMOGRAM: ICD-10-CM

## 2025-03-10 DIAGNOSIS — Z11.59 NEED FOR HEPATITIS C SCREENING TEST: ICD-10-CM

## 2025-03-10 DIAGNOSIS — R79.9 ABNORMAL FINDING OF BLOOD CHEMISTRY, UNSPECIFIED: ICD-10-CM

## 2025-03-10 DIAGNOSIS — F18.10 ABUSE OF SMOKED SUBSTANCE (HCC): ICD-10-CM

## 2025-03-10 DIAGNOSIS — K11.7 XEROSTOMIA: Primary | ICD-10-CM

## 2025-03-10 DIAGNOSIS — M54.12 CERVICAL NEURALGIA: Chronic | ICD-10-CM

## 2025-03-10 DIAGNOSIS — C07 CANCER OF PAROTID GLAND (HCC): ICD-10-CM

## 2025-03-10 DIAGNOSIS — H90.A31 MIXED CONDUCTIVE AND SENSORINEURAL HEARING LOSS OF RIGHT EAR WITH RESTRICTED HEARING OF LEFT EAR: ICD-10-CM

## 2025-03-10 PROBLEM — R56.9 UNSPECIFIED CONVULSIONS (HCC): Status: RESOLVED | Noted: 2023-04-06 | Resolved: 2025-03-10

## 2025-03-10 PROCEDURE — G8427 DOCREV CUR MEDS BY ELIG CLIN: HCPCS | Performed by: NURSE PRACTITIONER

## 2025-03-10 PROCEDURE — 99214 OFFICE O/P EST MOD 30 MIN: CPT | Performed by: NURSE PRACTITIONER

## 2025-03-10 PROCEDURE — G8417 CALC BMI ABV UP PARAM F/U: HCPCS | Performed by: NURSE PRACTITIONER

## 2025-03-10 PROCEDURE — 4004F PT TOBACCO SCREEN RCVD TLK: CPT | Performed by: NURSE PRACTITIONER

## 2025-03-10 RX ORDER — PILOCARPINE HYDROCHLORIDE 5 MG/1
TABLET, FILM COATED ORAL
Qty: 90 TABLET | Refills: 3 | Status: SHIPPED | OUTPATIENT
Start: 2025-03-10

## 2025-03-10 ASSESSMENT — ENCOUNTER SYMPTOMS
COUGH: 0
SORE THROAT: 0
ABDOMINAL PAIN: 0
BLOOD IN STOOL: 0
RHINORRHEA: 0
DIARRHEA: 0
NAUSEA: 0
ANAL BLEEDING: 0
ABDOMINAL DISTENTION: 0
EYE DISCHARGE: 0
CONSTIPATION: 0
SHORTNESS OF BREATH: 0
COLOR CHANGE: 0
EYE REDNESS: 0

## 2025-03-10 NOTE — PROGRESS NOTES
[facility]  [facility]  [address] [facility] [address]  Dept: [phone]  Loc: [phone]      Visit Date: 3/10/2025    Trudy Freeman is a 44 y.o. female who presents today for:  Chief Complaint   Patient presents with    Follow-up     Pt has a few things that she would like to discuss. Pt has a list of things that she is needing help with.      HPI:     Last visit 6/14/2023:  Seeing Dr. Ponce with psych - had visit 2/6/25    Partner is currently acting as caregiver -  he is assisting with daily activities - assisting with hygeeine, set up medications. Wondering about a home health aid, therapy, nurse - needs for assistance with ADL's - showering, clippings nails, reaching for things - becomes dizzy, balance issues (physical therapy), neck pain (Pt).     Issues with balance, strength,a nd gait due to vestibular dysfunction secondary to cancer treatment/radiation. Ambulates with cane for balance.     Needs referral for sleep apnea supplies - has not been updated since 2020    Handicap placard - trouble walking long distances without stopping to rest.     HPI  Health Maintenance   Topic Date Due    Hepatitis C screen  Never done    Hepatitis B vaccine (1 of 3 - 19+ 3-dose series) Never done    Pneumococcal 0-49 years Vaccine (2 of 2 - PCV) 04/30/2013    Cervical cancer screen  02/21/2022    Breast cancer screen  09/27/2023    Annual Wellness Visit (Medicare)  04/06/2024    Colorectal Cancer Screen  Never done    Flu vaccine (1) 03/10/2026 (Originally 8/1/2024)    COVID-19 Vaccine (3 - 2024-25 season) 03/10/2026 (Originally 9/1/2024)    Lipids  06/17/2025    Depression Monitoring  02/05/2026    DTaP/Tdap/Td vaccine (2 - Td or Tdap) 10/06/2027    HIV screen  Completed    Hepatitis A vaccine  Aged Out    Hib vaccine  Aged Out    HPV vaccine  Aged Out    Polio vaccine  Aged Out    Meningococcal (ACWY) vaccine  Aged Out    Meningococcal B vaccine  Aged Out    Diabetes

## 2025-03-10 NOTE — PATIENT INSTRUCTIONS
[place] [employer] - Financial Resources*  (Please call [address]/211 if need more resources)    Utility  [facility] Community Action Partnership:  What they do: Help pay rent, utilities & internet bills.  Phone Number: [phone]  Website: https://[employer]/emergency-services/rent-assistance    [facility] [place]:  What they do: They offer Utility assistance  Phone Number: 938.278.5512   Website: https://[employer] MetricsUN[facility]/[place]/equip-families    Medical  Alzheimer's Association: [phone]  American Cancer Society: [phone]  American Heart Association: [phone]  American Lung Association: [phone]  Arthritis Foundation: [phone]  [place] [employer] Services for Visually Impaired: [phone]  Kidney Foundation: [phone]     [facility]:  What they offer: Assist in finding resources to help pay hospital bills.  Phone Number: 1-329.223.8041  Website: https://www[employer]/patient-resources/financial-assistance    [facility] [employer] Family Services:  What they offer: Medical coverage assistance for children, pregnant women, elderly, individuals with disabilities and those looking for long term care and/or in home waiver care.   Phone Number:  [phone]  Website: https://[employer]/services/medical-assistance    [facility] Agency on Aging:  What they offer: Aging and disability resource center, care management/coordination, transportation, wellness and prevention.  Phone Number: [phone]          [place] [employer]:  What they offer: Temporary cash assistance to families to pay immediate needs while the adults of the families prepare and search for jobs.   Phone Number: 134.555.5296  Website: https://[employer]/services/cash-assistance                                       [place] [employer]*  (Call [employer]/211 if need more resources.)    Home Delivered Meals:  [employer]: [phone] for people and pets  [employer] Meals:  Phone:

## 2025-03-11 ENCOUNTER — TELEPHONE (OUTPATIENT)
Dept: FAMILY MEDICINE CLINIC | Age: 45
End: 2025-03-11

## 2025-03-11 NOTE — TELEPHONE ENCOUNTER
Call received from Jyotsna at [facility].  States that they did receive the order for services but would not be able to see based on the primary diagnosis. Would have to have a skilled need.  If only needing help with ADLs--would need to go somewhere like [facility] or [facility] and may have to be self pay. Patient also has to be homebound.    If services needed for PT/OT, primary dx would still need to be updated and cannot be just a symptom such as dizziness.    Please review.

## 2025-03-11 NOTE — TELEPHONE ENCOUNTER
Will adjust the referral and print with additional diagnosis for physical therapy.  Please notify patient that [facility] will not provide a home health aide without a need for skilled nursing services.    Advised patient at visit yesterday that she will need to call her insurance to see who is covered for home health aide agencies, can explain to her that an additional order will be needed for an aide and will place that as soon as she gets us an agency that is covered.      Also, can we please call [facility] health services and get a clear definition of \" skilled nursing need\" and what the qualifying parameters are.

## 2025-04-30 ENCOUNTER — TELEMEDICINE (OUTPATIENT)
Dept: PSYCHIATRY | Age: 45
End: 2025-04-30

## 2025-04-30 ENCOUNTER — TELEPHONE (OUTPATIENT)
Dept: PSYCHIATRY | Age: 45
End: 2025-04-30

## 2025-04-30 ENCOUNTER — OFFICE VISIT (OUTPATIENT)
Dept: ENT CLINIC | Age: 45
End: 2025-04-30
Payer: MEDICARE

## 2025-04-30 VITALS
SYSTOLIC BLOOD PRESSURE: 128 MMHG | WEIGHT: 293 LBS | HEART RATE: 94 BPM | RESPIRATION RATE: 18 BRPM | OXYGEN SATURATION: 99 % | DIASTOLIC BLOOD PRESSURE: 74 MMHG | BODY MASS INDEX: 44.41 KG/M2 | HEIGHT: 68 IN | TEMPERATURE: 97.2 F
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
## 2025-04-30 DIAGNOSIS — G47.30 SLEEP APNEA, UNSPECIFIED TYPE: Primary | ICD-10-CM

## 2025-04-30 DIAGNOSIS — G89.29 OTHER CHRONIC PAIN: ICD-10-CM

## 2025-04-30 DIAGNOSIS — H90.A31 MIXED CONDUCTIVE AND SENSORINEURAL HEARING LOSS OF RIGHT EAR WITH RESTRICTED HEARING OF LEFT EAR: ICD-10-CM

## 2025-04-30 DIAGNOSIS — H61.21 IMPACTED CERUMEN OF RIGHT EAR: ICD-10-CM

## 2025-04-30 DIAGNOSIS — L59.8 RADIATION-INDUCED FIBROSIS OF SOFT TISSUE FROM THERAPEUTIC PROCEDURE: ICD-10-CM

## 2025-04-30 DIAGNOSIS — H69.91 DYSFUNCTION OF RIGHT EUSTACHIAN TUBE: ICD-10-CM

## 2025-04-30 DIAGNOSIS — F33.2 SEVERE EPISODE OF RECURRENT MAJOR DEPRESSIVE DISORDER, WITHOUT PSYCHOTIC FEATURES (HCC): Primary | ICD-10-CM

## 2025-04-30 DIAGNOSIS — R41.840 ATTENTION DEFICIT: ICD-10-CM

## 2025-04-30 DIAGNOSIS — F43.10 PTSD (POST-TRAUMATIC STRESS DISORDER): ICD-10-CM

## 2025-04-30 PROCEDURE — 69210 REMOVE IMPACTED EAR WAX UNI: CPT

## 2025-04-30 PROCEDURE — 4004F PT TOBACCO SCREEN RCVD TLK: CPT

## 2025-04-30 PROCEDURE — G8417 CALC BMI ABV UP PARAM F/U: HCPCS

## 2025-04-30 PROCEDURE — G8427 DOCREV CUR MEDS BY ELIG CLIN: HCPCS

## 2025-04-30 PROCEDURE — 99213 OFFICE O/P EST LOW 20 MIN: CPT

## 2025-04-30 RX ORDER — FLUTICASONE PROPIONATE 50 MCG
1 SPRAY, SUSPENSION (ML) NASAL DAILY
Qty: 16 G | Refills: 2 | Status: SHIPPED | OUTPATIENT
Start: 2025-04-30

## 2025-04-30 RX ORDER — ATOMOXETINE 40 MG/1
CAPSULE ORAL
Qty: 60 CAPSULE | Refills: 2 | Status: SHIPPED | OUTPATIENT
Start: 2025-04-30

## 2025-04-30 RX ORDER — LORATADINE 10 MG/1
10 TABLET ORAL DAILY
Qty: 30 TABLET | Refills: 3 | Status: SHIPPED | OUTPATIENT
Start: 2025-04-30

## 2025-04-30 RX ORDER — CLONAZEPAM 0.5 MG/1
0.5 TABLET ORAL 2 TIMES DAILY PRN
Qty: 60 TABLET | Refills: 1 | Status: SHIPPED | OUTPATIENT
Start: 2025-04-30 | End: 2025-04-30 | Stop reason: SDUPTHER

## 2025-04-30 RX ORDER — CLONAZEPAM 0.5 MG/1
0.5 TABLET ORAL 2 TIMES DAILY PRN
Qty: 60 TABLET | Refills: 1 | Status: SHIPPED | OUTPATIENT
Start: 2025-04-30 | End: 2025-06-29

## 2025-04-30 ASSESSMENT — ANXIETY QUESTIONNAIRES
5. BEING SO RESTLESS THAT IT IS HARD TO SIT STILL: MORE THAN HALF THE DAYS
IF YOU CHECKED OFF ANY PROBLEMS ON THIS QUESTIONNAIRE, HOW DIFFICULT HAVE THESE PROBLEMS MADE IT FOR YOU TO DO YOUR WORK, TAKE CARE OF THINGS AT HOME, OR GET ALONG WITH OTHER PEOPLE: VERY DIFFICULT
5. BEING SO RESTLESS THAT IT IS HARD TO SIT STILL: MORE THAN HALF THE DAYS
IF YOU CHECKED OFF ANY PROBLEMS ON THIS QUESTIONNAIRE, HOW DIFFICULT HAVE THESE PROBLEMS MADE IT FOR YOU TO DO YOUR WORK, TAKE CARE OF THINGS AT HOME, OR GET ALONG WITH OTHER PEOPLE: VERY DIFFICULT
6. BECOMING EASILY ANNOYED OR IRRITABLE: MORE THAN HALF THE DAYS
1. FEELING NERVOUS, ANXIOUS, OR ON EDGE: MORE THAN HALF THE DAYS
7. FEELING AFRAID AS IF SOMETHING AWFUL MIGHT HAPPEN: MORE THAN HALF THE DAYS
3. WORRYING TOO MUCH ABOUT DIFFERENT THINGS: MORE THAN HALF THE DAYS
2. NOT BEING ABLE TO STOP OR CONTROL WORRYING: MORE THAN HALF THE DAYS
1. FEELING NERVOUS, ANXIOUS, OR ON EDGE: MORE THAN HALF THE DAYS
4. TROUBLE RELAXING: MORE THAN HALF THE DAYS
3. WORRYING TOO MUCH ABOUT DIFFERENT THINGS: MORE THAN HALF THE DAYS
2. NOT BEING ABLE TO STOP OR CONTROL WORRYING: MORE THAN HALF THE DAYS
4. TROUBLE RELAXING: MORE THAN HALF THE DAYS
6. BECOMING EASILY ANNOYED OR IRRITABLE: MORE THAN HALF THE DAYS
7. FEELING AFRAID AS IF SOMETHING AWFUL MIGHT HAPPEN: MORE THAN HALF THE DAYS
GAD7 TOTAL SCORE: 14

## 2025-04-30 ASSESSMENT — PATIENT HEALTH QUESTIONNAIRE - PHQ9
4. FEELING TIRED OR HAVING LITTLE ENERGY: MORE THAN HALF THE DAYS
4. FEELING TIRED OR HAVING LITTLE ENERGY: MORE THAN HALF THE DAYS
9. THOUGHTS THAT YOU WOULD BE BETTER OFF DEAD, OR OF HURTING YOURSELF: SEVERAL DAYS
3. TROUBLE FALLING OR STAYING ASLEEP: MORE THAN HALF THE DAYS
6. FEELING BAD ABOUT YOURSELF - OR THAT YOU ARE A FAILURE OR HAVE LET YOURSELF OR YOUR FAMILY DOWN: MORE THAN HALF THE DAYS
8. MOVING OR SPEAKING SO SLOWLY THAT OTHER PEOPLE COULD HAVE NOTICED. OR THE OPPOSITE, BEING SO FIGETY OR RESTLESS THAT YOU HAVE BEEN MOVING AROUND A LOT MORE THAN USUAL: MORE THAN HALF THE DAYS
10. IF YOU CHECKED OFF ANY PROBLEMS, HOW DIFFICULT HAVE THESE PROBLEMS MADE IT FOR YOU TO DO YOUR WORK, TAKE CARE OF THINGS AT HOME, OR GET ALONG WITH OTHER PEOPLE: VERY DIFFICULT
SUM OF ALL RESPONSES TO PHQ QUESTIONS 1-9: 16
SUM OF ALL RESPONSES TO PHQ QUESTIONS 1-9: 17
1. LITTLE INTEREST OR PLEASURE IN DOING THINGS: MORE THAN HALF THE DAYS
9. THOUGHTS THAT YOU WOULD BE BETTER OFF DEAD, OR OF HURTING YOURSELF: SEVERAL DAYS
3. TROUBLE FALLING OR STAYING ASLEEP: MORE THAN HALF THE DAYS
SUM OF ALL RESPONSES TO PHQ QUESTIONS 1-9: 17
2. FEELING DOWN, DEPRESSED OR HOPELESS: MORE THAN HALF THE DAYS
6. FEELING BAD ABOUT YOURSELF - OR THAT YOU ARE A FAILURE OR HAVE LET YOURSELF OR YOUR FAMILY DOWN: MORE THAN HALF THE DAYS
SUM OF ALL RESPONSES TO PHQ QUESTIONS 1-9: 17
7. TROUBLE CONCENTRATING ON THINGS, SUCH AS READING THE NEWSPAPER OR WATCHING TELEVISION: MORE THAN HALF THE DAYS
SUM OF ALL RESPONSES TO PHQ QUESTIONS 1-9: 17
2. FEELING DOWN, DEPRESSED OR HOPELESS: MORE THAN HALF THE DAYS
5. POOR APPETITE OR OVEREATING: MORE THAN HALF THE DAYS
1. LITTLE INTEREST OR PLEASURE IN DOING THINGS: MORE THAN HALF THE DAYS
8. MOVING OR SPEAKING SO SLOWLY THAT OTHER PEOPLE COULD HAVE NOTICED. OR THE OPPOSITE - BEING SO FIDGETY OR RESTLESS THAT YOU HAVE BEEN MOVING AROUND A LOT MORE THAN USUAL: MORE THAN HALF THE DAYS
10. IF YOU CHECKED OFF ANY PROBLEMS, HOW DIFFICULT HAVE THESE PROBLEMS MADE IT FOR YOU TO DO YOUR WORK, TAKE CARE OF THINGS AT HOME, OR GET ALONG WITH OTHER PEOPLE: VERY DIFFICULT
7. TROUBLE CONCENTRATING ON THINGS, SUCH AS READING THE NEWSPAPER OR WATCHING TELEVISION: MORE THAN HALF THE DAYS
5. POOR APPETITE OR OVEREATING: MORE THAN HALF THE DAYS

## 2025-04-30 ASSESSMENT — COLUMBIA-SUICIDE SEVERITY RATING SCALE - C-SSRS
2. IN THE PAST MONTH, HAVE YOU ACTUALLY HAD ANY THOUGHTS OF KILLING YOURSELF?: NO
6. IN YOUR LIFETIME, HAVE YOU EVER DONE ANYTHING, STARTED TO DO ANYTHING, OR PREPARED TO DO ANYTHING TO END YOUR LIFE?: NO
1. IN THE PAST MONTH, HAVE YOU WISHED YOU WERE DEAD OR WISHED YOU COULD GO TO SLEEP AND NOT WAKE UP?: YES

## 2025-04-30 NOTE — TELEPHONE ENCOUNTER
Sent new Rx stating that is a 60 day supply.   Electronically signed by Francisca Ponce MD on 4/30/2025 at 12:59 PM

## 2025-04-30 NOTE — PROGRESS NOTES
[facility] [place] [facility]  [facility]  [address]  [address]  [facility] [address]  Dept: [phone]  Dept Fax: [phone]  Loc: [phone]    Trudy Freeman is a 44 y.o. female who was referred by Barbara oDyle APRN* and I reviewed their note for:  Chief Complaint   Patient presents with    Follow-up     Mixed conductive and sensorineural hearing loss of right ear with restricted hearing of left ear as well as nasal obstruction. Patient states specific concerns is that she has issues with her bipap machine. She isn't able to get in until October. She feels like her nostril on the R side feels like a thickening or that something is there and she would like that evaluated as well. She has been using Mucinex but she would like to know if she could be prescribed something.   .     HPI:     Trudy Freeman is a 44 y.o. female presenting with xerostomia, mixed hearing loss, and nasal obstruction.  She also would like a referral to pulmonary for a new CPAP/BiPAP as h was previously established in [place] but her BiPAP machine is broken at the moment.    Her main concerns are her R ear wax and R nasal congestion. Previous with Dr. Sandoval their was a discussion of considering BAHA. Pt still cannot here our out R ear and also has a hx of cerumen impactions. It was suggested she use sweet oil but does not like the sensation of this. L ear tenderness about 1 week ago which has subsided. She denies drainage out of ears, ear pain, and tinnitus.    R nasal obstruction, nasal thickening within the last few months that has worsened since it was previously noted s/p radiation. She feels like air does not move through her R nostril.  No rinses or nasal mist have been used. She denies bad smell, nasal drianage, pain, sinus congestion, and recurrent sinusitis. Has tried mucinex but does not seem to help.     Previous visit with Dr. Sandoval 11/1/2023:  Trudy Freeman is a 43 y.o.

## 2025-04-30 NOTE — TELEPHONE ENCOUNTER
[facility] pharmacy is on the phone needing clarification on a RX that was sent in for CHERYL Freeman Clonopin 60 tablets take 1 tablet twice daily as needed with one refill but it says up to 90 days on it

## 2025-04-30 NOTE — PROGRESS NOTES
(HCC)        2. PTSD (post-traumatic stress disorder)  DISCONTINUED: clonazePAM (KLONOPIN) 0.5 MG tablet      3. Attention deficit        4. Other chronic pain            PLAN:     Medications:   Klonopin 0.5 mg BID prn anxiety  Start Strattera 40 mg daily x 1 week, then increase to 80 mg daily - discussed r/b/se/a  Rx Depakote (noncompliant) and Trileptal by Neuro for HA, possible mood benefit from those  Medical Cannabis   Therapy: none, previously saw Dr. Figueroa  Labs/Tests/Imaging: none  Records Reviewed: [facility]  Patient advised to call if patient has any difficulties with treatment  Return in about 6 weeks (around 6/11/2025) for med check.   I spent 29 minutes face to face with this patient. I spent 16 minutes or longer in psychotherapy discussing current stressors, grief, coping skills, strategies for change, supportive therapy, psycheducation. Remainder of time spent on symptom evaluation and medication management.     Trudy VIVAS Pete, was evaluated through a synchronous (real-time) audio-video encounter. The patient (or guardian if applicable) is aware that this is a billable service, which includes applicable co-pays. This Virtual Visit was conducted with patient's (and/or legal guardian's) consent. Patient identification was verified, and a caregiver was present when appropriate.   The patient was located at Home: [address] [facility] [address]  Provider was located at Home (Appt Dept State): [place]  Confirm you are appropriately licensed, registered, or certified to deliver care in the state where the patient is located as indicated above. If you are not or unsure, please re-schedule the visit: Yes, I confirm.      Total time spent for this encounter:  34 minutes    --Francisca Ponce MD on 5/2/2025 at 8:56 AM    An electronic signature was used to authenticate this note.

## 2025-05-14 ENCOUNTER — OFFICE VISIT (OUTPATIENT)
Dept: FAMILY MEDICINE CLINIC | Age: 45
End: 2025-05-14
Payer: MEDICARE

## 2025-05-14 VITALS
HEART RATE: 84 BPM | WEIGHT: 293 LBS | DIASTOLIC BLOOD PRESSURE: 88 MMHG | BODY MASS INDEX: 44.41 KG/M2 | RESPIRATION RATE: 16 BRPM | SYSTOLIC BLOOD PRESSURE: 122 MMHG | HEIGHT: 68 IN | TEMPERATURE: 98.1 F

## 2025-05-14 DIAGNOSIS — G51.0 FACIAL PALSY: ICD-10-CM

## 2025-05-14 DIAGNOSIS — G51.0 PARTIAL FACIAL NERVE PALSY: ICD-10-CM

## 2025-05-14 DIAGNOSIS — G89.4 CHRONIC PAIN SYNDROME: ICD-10-CM

## 2025-05-14 DIAGNOSIS — L90.5: ICD-10-CM

## 2025-05-14 DIAGNOSIS — G47.33 OBSTRUCTIVE SLEEP APNEA SYNDROME: Primary | ICD-10-CM

## 2025-05-14 DIAGNOSIS — F18.10 ABUSE OF SMOKED SUBSTANCE (HCC): ICD-10-CM

## 2025-05-14 DIAGNOSIS — M54.2 NECK PAIN: ICD-10-CM

## 2025-05-14 DIAGNOSIS — C07 ADENOID CYSTIC CARCINOMA OF PAROTID GLAND (HCC): ICD-10-CM

## 2025-05-14 DIAGNOSIS — R53.83 FATIGUE, UNSPECIFIED TYPE: ICD-10-CM

## 2025-05-14 DIAGNOSIS — R51.9 NONINTRACTABLE HEADACHE, UNSPECIFIED CHRONICITY PATTERN, UNSPECIFIED HEADACHE TYPE: ICD-10-CM

## 2025-05-14 PROCEDURE — G8427 DOCREV CUR MEDS BY ELIG CLIN: HCPCS | Performed by: NURSE PRACTITIONER

## 2025-05-14 PROCEDURE — G8417 CALC BMI ABV UP PARAM F/U: HCPCS | Performed by: NURSE PRACTITIONER

## 2025-05-14 PROCEDURE — 4004F PT TOBACCO SCREEN RCVD TLK: CPT | Performed by: NURSE PRACTITIONER

## 2025-05-14 PROCEDURE — 99214 OFFICE O/P EST MOD 30 MIN: CPT | Performed by: NURSE PRACTITIONER

## 2025-05-14 ASSESSMENT — ENCOUNTER SYMPTOMS
BLOOD IN STOOL: 0
DIARRHEA: 0
SHORTNESS OF BREATH: 1
ABDOMINAL PAIN: 0
NAUSEA: 0
ANAL BLEEDING: 0
EYE REDNESS: 0
COLOR CHANGE: 0
COUGH: 0
TROUBLE SWALLOWING: 1
CONSTIPATION: 0
EYE DISCHARGE: 0
RHINORRHEA: 0
SORE THROAT: 0
ABDOMINAL DISTENTION: 0

## 2025-05-14 NOTE — PROGRESS NOTES
[facility]  [address]  [facility] [address]  Dept: [phone]  Loc: [phone]      Visit Date: 5/14/2025    Trudy Freeman is a 44 y.o. female who presents today for:  Chief Complaint   Patient presents with    Other     Patient would like to discuss Rx for Air conditioner with remote due to breathing dx of sleep apnea. Would also like to update you on pulmonary referral. Inquiring on therapy for her muscles in throat including tongue. Is getting cramps. See Dr. Sandoval in July for cancer follow up    Pain     C/O pain getting worse. Affecting day to day living. May need another referral for pain management.      HPI:     Right now - sleep medicine is booked until October - wondering about a new referral for earlier appt. needs all new supplies for her sleep apnea machine, sleep quality is suffered significantly since being without this.  Last sleep study was back in 2016, she thinks through [facility].  She is working on getting these records.    Would like an order for an air condition with remote - due to hx of sleep apnea and pulmonary issues - the hot weather makes it difficult to breath.  Send order to: Drew Christianson - [place] [facility] - fax # [phone]. Phone [phone]    Use to see  for chronic migraine, headaches, and neck muscle pain -would like a new referral to get back in there for Botox injections.  It did provide some relief at the time when she was getting it.    Wonders about a referral for any kind of facial reconstruction/plastic surgery.  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  She finds her still being very self-conscious about this, causes a strain on her mental health.  HPI  Health Maintenance   Topic Date Due    Hepatitis C screen  Never done    Hepatitis B vaccine (1 of 3 - 19+ 3-dose series) Never done    Pneumococcal

## 2025-05-21 ENCOUNTER — LAB (OUTPATIENT)
Dept: LAB | Age: 45
End: 2025-05-21

## 2025-05-21 DIAGNOSIS — E66.813 CLASS 3 SEVERE OBESITY WITH BODY MASS INDEX (BMI) OF 45.0 TO 49.9 IN ADULT, UNSPECIFIED OBESITY TYPE, UNSPECIFIED WHETHER SERIOUS COMORBIDITY PRESENT (HCC): ICD-10-CM

## 2025-05-21 DIAGNOSIS — L73.2 HIDRADENITIS SUPPURATIVA: ICD-10-CM

## 2025-05-21 DIAGNOSIS — E55.9 VITAMIN D DEFICIENCY, UNSPECIFIED: ICD-10-CM

## 2025-05-21 DIAGNOSIS — E03.9 ACQUIRED HYPOTHYROIDISM: ICD-10-CM

## 2025-05-21 DIAGNOSIS — C07 CANCER OF PAROTID GLAND (HCC): ICD-10-CM

## 2025-05-21 DIAGNOSIS — Z11.59 NEED FOR HEPATITIS C SCREENING TEST: ICD-10-CM

## 2025-05-21 DIAGNOSIS — Z12.11 COLON CANCER SCREENING: ICD-10-CM

## 2025-05-21 DIAGNOSIS — G51.0 PARTIAL FACIAL NERVE PALSY: ICD-10-CM

## 2025-05-21 DIAGNOSIS — Z12.31 BREAST CANCER SCREENING BY MAMMOGRAM: ICD-10-CM

## 2025-05-21 DIAGNOSIS — R79.9 ABNORMAL FINDING OF BLOOD CHEMISTRY, UNSPECIFIED: ICD-10-CM

## 2025-05-21 DIAGNOSIS — M54.12 CERVICAL NEURALGIA: Chronic | ICD-10-CM

## 2025-05-21 DIAGNOSIS — H61.301 ACQUIRED STENOSIS OF RIGHT EXTERNAL EAR CANAL: ICD-10-CM

## 2025-05-21 DIAGNOSIS — Z13.1 ENCOUNTER FOR SCREENING FOR DIABETES MELLITUS: ICD-10-CM

## 2025-05-21 LAB
25(OH)D3 SERPL-MCNC: 19 NG/ML (ref 30–100)
ALBUMIN SERPL BCG-MCNC: 3.8 G/DL (ref 3.4–4.9)
ALP SERPL-CCNC: 78 U/L (ref 38–126)
ALT SERPL W/O P-5'-P-CCNC: 15 U/L (ref 10–35)
ANION GAP SERPL CALC-SCNC: 7 MEQ/L (ref 8–16)
AST SERPL-CCNC: 20 U/L (ref 10–35)
BASOPHILS ABSOLUTE: 0.1 THOU/MM3 (ref 0–0.1)
BASOPHILS NFR BLD AUTO: 0.7 %
BILIRUB SERPL-MCNC: 0.3 MG/DL (ref 0.3–1.2)
BUN SERPL-MCNC: 9 MG/DL (ref 8–23)
CALCIUM SERPL-MCNC: 9 MG/DL (ref 8.6–10)
CHLORIDE SERPL-SCNC: 101 MEQ/L (ref 98–111)
CHOLEST SERPL-MCNC: 184 MG/DL (ref 100–199)
CO2 SERPL-SCNC: 28 MEQ/L (ref 22–29)
CREAT SERPL-MCNC: 0.9 MG/DL (ref 0.5–0.9)
DEPRECATED RDW RBC AUTO: 43.1 FL (ref 35–45)
EOSINOPHIL NFR BLD AUTO: 1.4 %
EOSINOPHILS ABSOLUTE: 0.1 THOU/MM3 (ref 0–0.4)
ERYTHROCYTE [DISTWIDTH] IN BLOOD BY AUTOMATED COUNT: 13.9 % (ref 11.5–14.5)
GFR SERPL CREATININE-BSD FRML MDRD: 80 ML/MIN/1.73M2
GLUCOSE SERPL-MCNC: 103 MG/DL (ref 74–109)
HCT VFR BLD AUTO: 42.5 % (ref 37–47)
HCV IGG SERPL QL IA: NONREACTIVE
HDLC SERPL-MCNC: 38 MG/DL
HGB BLD-MCNC: 13.8 GM/DL (ref 12–16)
IMM GRANULOCYTES # BLD AUTO: 0.04 THOU/MM3 (ref 0–0.07)
IMM GRANULOCYTES NFR BLD AUTO: 0.4 %
LDLC SERPL CALC-MCNC: 113 MG/DL
LYMPHOCYTES ABSOLUTE: 1.8 THOU/MM3 (ref 1–4.8)
LYMPHOCYTES NFR BLD AUTO: 19.8 %
MCH RBC QN AUTO: 27.9 PG (ref 26–33)
MCHC RBC AUTO-ENTMCNC: 32.5 GM/DL (ref 32.2–35.5)
MCV RBC AUTO: 86 FL (ref 81–99)
MONOCYTES ABSOLUTE: 0.4 THOU/MM3 (ref 0.4–1.3)
MONOCYTES NFR BLD AUTO: 4.6 %
NEUTROPHILS ABSOLUTE: 6.7 THOU/MM3 (ref 1.8–7.7)
NEUTROPHILS NFR BLD AUTO: 73.1 %
NRBC BLD AUTO-RTO: 0 /100 WBC
PLATELET # BLD AUTO: 260 THOU/MM3 (ref 130–400)
PMV BLD AUTO: 9.2 FL (ref 9.4–12.4)
POTASSIUM SERPL-SCNC: 4.7 MEQ/L (ref 3.5–5.2)
PROT SERPL-MCNC: 6.8 G/DL (ref 6.4–8.3)
RBC # BLD AUTO: 4.94 MILL/MM3 (ref 4.2–5.4)
SODIUM SERPL-SCNC: 136 MEQ/L (ref 135–145)
T4 FREE SERPL-MCNC: 0.6 NG/DL (ref 0.92–1.68)
TRIGL SERPL-MCNC: 164 MG/DL (ref 0–199)
TSH SERPL DL<=0.05 MIU/L-ACNC: 4.3 UIU/ML (ref 0.27–4.2)
WBC # BLD AUTO: 9.1 THOU/MM3 (ref 4.8–10.8)

## 2025-05-23 ENCOUNTER — TELEPHONE (OUTPATIENT)
Dept: FAMILY MEDICINE CLINIC | Age: 45
End: 2025-05-23

## 2025-05-23 ENCOUNTER — RESULTS FOLLOW-UP (OUTPATIENT)
Dept: FAMILY MEDICINE CLINIC | Age: 45
End: 2025-05-23

## 2025-05-23 DIAGNOSIS — R79.89 LOW VITAMIN D LEVEL: Primary | ICD-10-CM

## 2025-05-23 DIAGNOSIS — E03.9 ACQUIRED HYPOTHYROIDISM: ICD-10-CM

## 2025-05-23 RX ORDER — LEVOTHYROXINE SODIUM 25 UG/1
25 TABLET ORAL DAILY
Qty: 30 TABLET | Refills: 1 | Status: SHIPPED | OUTPATIENT
Start: 2025-05-23

## 2025-05-23 RX ORDER — LEVOTHYROXINE SODIUM 25 UG/1
50 TABLET ORAL DAILY
Qty: 30 TABLET | Refills: 1 | Status: SHIPPED | OUTPATIENT
Start: 2025-05-23 | End: 2025-05-23 | Stop reason: SDUPTHER

## 2025-05-23 NOTE — TELEPHONE ENCOUNTER
Patient notified of results. She stated that she has not been taking synthroid because she was under the impression that she was supposed to hold medication until after lab results were reviewed and she could be prescribed correct dosing of Synthroid. Please advise.   Patient also request Vitamin D3 and synthroid to be sent to [facility].     Result Note:  Barbara Doyle, APRN - CNP  [facility] Clinical Staff  Tsh slightly elevated at 4.30 and t4 low at 0.60- please see if she is taking her synthroid daily    Vitamin D low at 19 - recommend 5000 iu d3 daily

## 2025-06-02 ENCOUNTER — OFFICE VISIT (OUTPATIENT)
Dept: PHYSICAL MEDICINE AND REHAB | Age: 45
End: 2025-06-02
Payer: MEDICARE

## 2025-06-02 VITALS
SYSTOLIC BLOOD PRESSURE: 134 MMHG | BODY MASS INDEX: 44.41 KG/M2 | HEIGHT: 68 IN | WEIGHT: 293 LBS | DIASTOLIC BLOOD PRESSURE: 70 MMHG

## 2025-06-02 DIAGNOSIS — G43.711 INTRACTABLE CHRONIC MIGRAINE WITHOUT AURA AND WITH STATUS MIGRAINOSUS: Primary | ICD-10-CM

## 2025-06-02 DIAGNOSIS — G89.4 CHRONIC PAIN SYNDROME: ICD-10-CM

## 2025-06-02 DIAGNOSIS — M79.18 MYOFASCIAL PAIN: ICD-10-CM

## 2025-06-02 DIAGNOSIS — M54.2 NECK PAIN: ICD-10-CM

## 2025-06-02 DIAGNOSIS — M54.12 CERVICAL NEURALGIA: Chronic | ICD-10-CM

## 2025-06-02 DIAGNOSIS — G51.0 FACIAL PALSY: ICD-10-CM

## 2025-06-02 PROCEDURE — G8427 DOCREV CUR MEDS BY ELIG CLIN: HCPCS | Performed by: NURSE PRACTITIONER

## 2025-06-02 PROCEDURE — 4004F PT TOBACCO SCREEN RCVD TLK: CPT | Performed by: NURSE PRACTITIONER

## 2025-06-02 PROCEDURE — G8417 CALC BMI ABV UP PARAM F/U: HCPCS | Performed by: NURSE PRACTITIONER

## 2025-06-02 PROCEDURE — 99215 OFFICE O/P EST HI 40 MIN: CPT | Performed by: NURSE PRACTITIONER

## 2025-06-02 NOTE — PROGRESS NOTES
Referrals:  [facility] physical therapy    Prescriptions Written This Visit:   None    Follow-up:   With Dr. Avila for migraine Botox    It was my pleasure to evaluate Trudy Freeman today.  I spent over 50 minutes evaluating this patient, reviewing previous notes and images and completing documentation.       EDEN Faria - CNP   Interventional Pain Management/PM&R   [facility] [place]

## 2025-06-09 ENCOUNTER — OFFICE VISIT (OUTPATIENT)
Dept: PALLATIVE CARE | Age: 45
End: 2025-06-09
Payer: MEDICARE

## 2025-06-09 VITALS
OXYGEN SATURATION: 98 % | HEIGHT: 68 IN | SYSTOLIC BLOOD PRESSURE: 134 MMHG | WEIGHT: 293 LBS | DIASTOLIC BLOOD PRESSURE: 88 MMHG | HEART RATE: 91 BPM | BODY MASS INDEX: 44.41 KG/M2 | RESPIRATION RATE: 17 BRPM
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
## 2025-06-09 DIAGNOSIS — Z51.5 PALLIATIVE CARE PATIENT: ICD-10-CM

## 2025-06-09 DIAGNOSIS — M79.2 NEUROPATHIC PAIN DUE TO RADIATION: ICD-10-CM

## 2025-06-09 DIAGNOSIS — Z85.818 HISTORY OF PAROTID CANCER: ICD-10-CM

## 2025-06-09 DIAGNOSIS — L59.8 RADIATION FIBROSIS OF SOFT TISSUE FROM THERAPEUTIC PROCEDURE: Primary | ICD-10-CM

## 2025-06-09 PROCEDURE — 99204 OFFICE O/P NEW MOD 45 MIN: CPT | Performed by: STUDENT IN AN ORGANIZED HEALTH CARE EDUCATION/TRAINING PROGRAM

## 2025-06-09 PROCEDURE — 4004F PT TOBACCO SCREEN RCVD TLK: CPT | Performed by: STUDENT IN AN ORGANIZED HEALTH CARE EDUCATION/TRAINING PROGRAM

## 2025-06-09 PROCEDURE — G8417 CALC BMI ABV UP PARAM F/U: HCPCS | Performed by: STUDENT IN AN ORGANIZED HEALTH CARE EDUCATION/TRAINING PROGRAM

## 2025-06-09 PROCEDURE — G8427 DOCREV CUR MEDS BY ELIG CLIN: HCPCS | Performed by: STUDENT IN AN ORGANIZED HEALTH CARE EDUCATION/TRAINING PROGRAM

## 2025-06-09 RX ORDER — CYCLOBENZAPRINE HCL 5 MG
5 TABLET ORAL 3 TIMES DAILY PRN
Qty: 30 TABLET | Refills: 2 | Status: SHIPPED | OUTPATIENT
Start: 2025-06-09 | End: 2025-07-09

## 2025-06-09 RX ORDER — LIDOCAINE 50 MG/G
1 PATCH TOPICAL DAILY
Qty: 10 PATCH | Refills: 0 | Status: SHIPPED | OUTPATIENT
Start: 2025-06-09 | End: 2025-06-19

## 2025-06-09 RX ORDER — GABAPENTIN 300 MG/1
300 CAPSULE ORAL 3 TIMES DAILY
Qty: 90 CAPSULE | Refills: 0 | Status: SHIPPED | OUTPATIENT
Start: 2025-06-09 | End: 2025-07-09

## 2025-06-09 NOTE — PATIENT INSTRUCTIONS
Continue current plan of care for acute and chronic conditions per primary and specialist teams  Start Gabapentin 300 mg three times a day scheduled for  neuropathic pain  Start Cyclobenzaprine 5 mg every 8 hours as needed for breakthrough muscle spasms  Start lidocaine patches  Please call the office if your symptoms worsen or if you were to develop new symptoms  Please call the palliative care office when you need refills

## 2025-06-12 ENCOUNTER — TELEMEDICINE (OUTPATIENT)
Dept: PSYCHIATRY | Age: 45
End: 2025-06-12
Payer: MEDICARE

## 2025-06-12 DIAGNOSIS — F33.2 SEVERE EPISODE OF RECURRENT MAJOR DEPRESSIVE DISORDER, WITHOUT PSYCHOTIC FEATURES (HCC): Primary | ICD-10-CM

## 2025-06-12 DIAGNOSIS — G89.29 OTHER CHRONIC PAIN: ICD-10-CM

## 2025-06-12 DIAGNOSIS — R41.840 ATTENTION DEFICIT: ICD-10-CM

## 2025-06-12 DIAGNOSIS — F43.10 PTSD (POST-TRAUMATIC STRESS DISORDER): ICD-10-CM

## 2025-06-12 PROCEDURE — 90833 PSYTX W PT W E/M 30 MIN: CPT | Performed by: PSYCHIATRY & NEUROLOGY

## 2025-06-12 PROCEDURE — 99214 OFFICE O/P EST MOD 30 MIN: CPT | Performed by: PSYCHIATRY & NEUROLOGY

## 2025-06-12 PROCEDURE — G8427 DOCREV CUR MEDS BY ELIG CLIN: HCPCS | Performed by: PSYCHIATRY & NEUROLOGY

## 2025-06-12 ASSESSMENT — ANXIETY QUESTIONNAIRES
5. BEING SO RESTLESS THAT IT IS HARD TO SIT STILL: NEARLY EVERY DAY
6. BECOMING EASILY ANNOYED OR IRRITABLE: MORE THAN HALF THE DAYS
3. WORRYING TOO MUCH ABOUT DIFFERENT THINGS: NEARLY EVERY DAY
1. FEELING NERVOUS, ANXIOUS, OR ON EDGE: MORE THAN HALF THE DAYS
1. FEELING NERVOUS, ANXIOUS, OR ON EDGE: MORE THAN HALF THE DAYS
5. BEING SO RESTLESS THAT IT IS HARD TO SIT STILL: NEARLY EVERY DAY
7. FEELING AFRAID AS IF SOMETHING AWFUL MIGHT HAPPEN: NEARLY EVERY DAY
IF YOU CHECKED OFF ANY PROBLEMS ON THIS QUESTIONNAIRE, HOW DIFFICULT HAVE THESE PROBLEMS MADE IT FOR YOU TO DO YOUR WORK, TAKE CARE OF THINGS AT HOME, OR GET ALONG WITH OTHER PEOPLE: EXTREMELY DIFFICULT
7. FEELING AFRAID AS IF SOMETHING AWFUL MIGHT HAPPEN: NEARLY EVERY DAY
IF YOU CHECKED OFF ANY PROBLEMS ON THIS QUESTIONNAIRE, HOW DIFFICULT HAVE THESE PROBLEMS MADE IT FOR YOU TO DO YOUR WORK, TAKE CARE OF THINGS AT HOME, OR GET ALONG WITH OTHER PEOPLE: EXTREMELY DIFFICULT
2. NOT BEING ABLE TO STOP OR CONTROL WORRYING: MORE THAN HALF THE DAYS
GAD7 TOTAL SCORE: 18
4. TROUBLE RELAXING: NEARLY EVERY DAY
6. BECOMING EASILY ANNOYED OR IRRITABLE: MORE THAN HALF THE DAYS
2. NOT BEING ABLE TO STOP OR CONTROL WORRYING: MORE THAN HALF THE DAYS
3. WORRYING TOO MUCH ABOUT DIFFERENT THINGS: NEARLY EVERY DAY
4. TROUBLE RELAXING: NEARLY EVERY DAY

## 2025-06-12 ASSESSMENT — PATIENT HEALTH QUESTIONNAIRE - PHQ9
10. IF YOU CHECKED OFF ANY PROBLEMS, HOW DIFFICULT HAVE THESE PROBLEMS MADE IT FOR YOU TO DO YOUR WORK, TAKE CARE OF THINGS AT HOME, OR GET ALONG WITH OTHER PEOPLE: VERY DIFFICULT
1. LITTLE INTEREST OR PLEASURE IN DOING THINGS: MORE THAN HALF THE DAYS
4. FEELING TIRED OR HAVING LITTLE ENERGY: NEARLY EVERY DAY
5. POOR APPETITE OR OVEREATING: MORE THAN HALF THE DAYS
2. FEELING DOWN, DEPRESSED OR HOPELESS: MORE THAN HALF THE DAYS
1. LITTLE INTEREST OR PLEASURE IN DOING THINGS: MORE THAN HALF THE DAYS
SUM OF ALL RESPONSES TO PHQ QUESTIONS 1-9: 17
4. FEELING TIRED OR HAVING LITTLE ENERGY: NEARLY EVERY DAY
10. IF YOU CHECKED OFF ANY PROBLEMS, HOW DIFFICULT HAVE THESE PROBLEMS MADE IT FOR YOU TO DO YOUR WORK, TAKE CARE OF THINGS AT HOME, OR GET ALONG WITH OTHER PEOPLE: VERY DIFFICULT
SUM OF ALL RESPONSES TO PHQ QUESTIONS 1-9: 17
5. POOR APPETITE OR OVEREATING: MORE THAN HALF THE DAYS
SUM OF ALL RESPONSES TO PHQ QUESTIONS 1-9: 17
6. FEELING BAD ABOUT YOURSELF - OR THAT YOU ARE A FAILURE OR HAVE LET YOURSELF OR YOUR FAMILY DOWN: MORE THAN HALF THE DAYS
8. MOVING OR SPEAKING SO SLOWLY THAT OTHER PEOPLE COULD HAVE NOTICED. OR THE OPPOSITE - BEING SO FIDGETY OR RESTLESS THAT YOU HAVE BEEN MOVING AROUND A LOT MORE THAN USUAL: NOT AT ALL
7. TROUBLE CONCENTRATING ON THINGS, SUCH AS READING THE NEWSPAPER OR WATCHING TELEVISION: MORE THAN HALF THE DAYS
SUM OF ALL RESPONSES TO PHQ QUESTIONS 1-9: 15
3. TROUBLE FALLING OR STAYING ASLEEP: MORE THAN HALF THE DAYS
2. FEELING DOWN, DEPRESSED OR HOPELESS: MORE THAN HALF THE DAYS
9. THOUGHTS THAT YOU WOULD BE BETTER OFF DEAD, OR OF HURTING YOURSELF: MORE THAN HALF THE DAYS
SUM OF ALL RESPONSES TO PHQ QUESTIONS 1-9: 17
3. TROUBLE FALLING OR STAYING ASLEEP: MORE THAN HALF THE DAYS
7. TROUBLE CONCENTRATING ON THINGS, SUCH AS READING THE NEWSPAPER OR WATCHING TELEVISION: MORE THAN HALF THE DAYS
6. FEELING BAD ABOUT YOURSELF - OR THAT YOU ARE A FAILURE OR HAVE LET YOURSELF OR YOUR FAMILY DOWN: MORE THAN HALF THE DAYS
8. MOVING OR SPEAKING SO SLOWLY THAT OTHER PEOPLE COULD HAVE NOTICED. OR THE OPPOSITE, BEING SO FIGETY OR RESTLESS THAT YOU HAVE BEEN MOVING AROUND A LOT MORE THAN USUAL: NOT AT ALL
9. THOUGHTS THAT YOU WOULD BE BETTER OFF DEAD, OR OF HURTING YOURSELF: MORE THAN HALF THE DAYS

## 2025-06-12 ASSESSMENT — COLUMBIA-SUICIDE SEVERITY RATING SCALE - C-SSRS
2. IN THE PAST MONTH, HAVE YOU ACTUALLY HAD ANY THOUGHTS OF KILLING YOURSELF?: YES
3. IN THE PAST MONTH, HAVE YOU BEEN THINKING ABOUT HOW YOU MIGHT KILL YOURSELF?: NO
1. IN THE PAST MONTH, HAVE YOU WISHED YOU WERE DEAD OR WISHED YOU COULD GO TO SLEEP AND NOT WAKE UP?: YES
5. IN THE PAST MONTH, HAVE YOU STARTED TO WORK OUT OR WORKED OUT THE DETAILS OF HOW TO KILL YOURSELF? DO YOU INTEND TO CARRY OUT THIS PLAN?: NO
7. DID THIS OCCUR IN THE LAST THREE MONTHS: NO
6. IN YOUR LIFETIME, HAVE YOU EVER DONE ANYTHING, STARTED TO DO ANYTHING, OR PREPARED TO DO ANYTHING TO END YOUR LIFE?: YES
4. IN THE PAST MONTH, HAVE YOU HAD THESE THOUGHTS AND HAD SOME INTENTION OF ACTING ON THEM?: NO

## 2025-06-12 NOTE — PROGRESS NOTES
[facility] [place] [facility]  [facility]  [address] [id number] [phone]    Progress Note    Patient:  Trudy Freeman  YOB: 1980  PCP:  Allyn Willson MD  Visit Date:  6/12/2025      Chief Complaint   Patient presents with    Follow-up    Medication Check    Depression    Anxiety    Chronic Pain       SUBJECTIVE:    Time in: 4:10pm  Time out: 4:35pm  Documentation time: 5 min    Trudy Freeman, a 45 y.o. female, presents for a follow up visit.      She presents alone.   Doing well. \"Good stuff.\"  Working with [employer] program to get a home health aide which is going to be her SO.   He's been doing the role for years now. Notes he will be making good money for things he was already doing.   His first paycheck comes in this week.   Thankful for this program noting how much more income they will have, tripled. \"Sea change.\"  Has $10,000 in funds to use to make her home more accessible. Notes this will be a major positive for their lives.  Dislikes the precheck visit. Notes it's triggering. Asking for information about trauma, suicidal thoughts. I encouraged her to give feedback to [facility]. Chronic thoughts of death. Denies suicidal intent or plan. No psychosis.  Picked up medications 3 days ago. Compliance issues.   Started Strattera 3 days ago.   PCP referred her to palliave care who is Rx her Flexeril and Neurontin for pain.   Planning for Botox.   Used Klonopin with the Neurontin and Flexeril. \"Knocked me out\". Advised she cut Klonopin dose in half and use only if needed.   Advised that Strattera could help anxiety and will take 4-6 weeks to show full benefit.  Family stressors. Nephew is in hospital with DKA.   Growing veggies in a raised bed this year. Discusses an author she loves.   Discussed tx options and we agree to increase Strattera to 80 mg after 1 week of 40 mg dose.   Spent much of session in psychotherapy discussing

## 2025-06-19 NOTE — ED PROVIDER NOTES
[address]       Chief Complaint   Patient presents with    Cough     Fatigue, bodyaches,         Nurses Notes reviewed and I agree except as noted in the HPI. HISTORY OF PRESENT ILLNESS   Trudy Chowdhury is a 36 y.o. female who presents to urgent care with complaint of, nasal congestion, generalized body aches, fever, chills and fatigue that have been ongoing since 12/7/2022. Patient denies other symptoms including chest pain, shortness of breath, nausea, vomiting or diarrhea. Patient states that she has tried multiple over-the-counter medications that have helped some with her symptoms. REVIEW OF SYSTEMS     Review of Systems   Constitutional:  Positive for chills, fatigue and fever. Negative for appetite change and unexpected weight change. HENT:  Positive for congestion. Negative for ear pain, rhinorrhea and sore throat. Eyes:  Negative for pain and visual disturbance. Respiratory:  Positive for cough. Negative for shortness of breath and wheezing. Cardiovascular:  Negative for chest pain, palpitations and leg swelling. Gastrointestinal:  Negative for abdominal pain, blood in stool, constipation, diarrhea, nausea and vomiting. Genitourinary:  Negative for dysuria, frequency and hematuria. Musculoskeletal:  Positive for myalgias. Negative for arthralgias, joint swelling and neck stiffness. Skin:  Negative for rash. Neurological:  Negative for dizziness, syncope, weakness, light-headedness and headaches. Hematological:  Does not bruise/bleed easily.      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 CURRENT MEDICATIONS       Discharge Medication List as of 12/14/2022 10:44 AM        CONTINUE these medications which have NOT CHANGED    Details   levothyroxine (SYNTHROID) 50 MCG tablet Take 1 tablet by mouth once daily, Disp-30 tablet, R-2Normal      !! prazosin (MINIPRESS) 5 MG capsule take 1 capsule by mouth nightly ALONG WITH 2 MG FOR 7 MG DOSE, Disp-30 capsule, R-2Normal      !! prazosin (MINIPRESS) 2 MG capsule take 1 capsule by mouth nightly ALONG WITH 5 MG FOR 7 MG DOSE, Disp-30 capsule, R-2Normal      !! FLUoxetine (PROZAC) 40 MG capsule take 1 capsule by mouth once daily along with 20 mg cap for total dose 60 mg once daily, Disp-30 capsule, R-2Normal      !! FLUoxetine (PROZAC) 20 MG capsule take 1 capsule by mouth once daily along with 40 mg cap for total dose 60 mg once daily, Disp-30 capsule, R-2Normal      clonazePAM (KLONOPIN) 0.5 MG tablet Take 1 tablet by mouth 2 times daily as needed for Anxiety for up to 90 days. , Disp-60 tablet, R-2Normal      valACYclovir (VALTREX) 1 g tablet take 1 tablet by mouth three times a day for 7 daysHistorical Med      neomycin-polymyxin-dexameth 3.5-37435-4.1 OINT apply A THIN 1/4 INCH RIBBON into right eye once daily at bedtime, Historical Med      moxifloxacin (VIGAMOX) 0.5 % ophthalmic solution instill 1 drop into right eye EVERY HOUR WHILE AWAKE FOR 7 DAYSHistorical Med      divalproex (DEPAKOTE ER) 500 MG extended release tablet take 1 tablet by mouth once daily, Disp-30 tablet, R-3Normal      MAGNESIUM PO Take by mouth dailyHistorical Med      Cyanocobalamin (B-12 PO) Take by mouth daily Historical Med      CPAP Machine MISC NIGHTLY, Historical Med      Omega-3 Fatty Acids (RA FISH OIL) 1000 MG CAPS take 1 capsule by mouth once daily, Disp-30 capsule, R-11Normal      vitamin D (RA VITAMIN D-3) 125 MCG (5000 UT) CAPS capsule take 1 capsule by mouth once daily, Disp-30 capsule, R-11Normal      !! Misc. Devices (BATHTUB SAFETY RAIL) MISC Disp-1 each, R-0, PrintUse daily with showers      !! Misc. Devices MISC Disp-1 Device, R-0, PrintShower chair - Use daily when showering      OXcarbazepine (TRILEPTAL) 150 MG tablet Take 1 tablet by mouth 2 times daily, Disp-60 tablet, R-3Normal      pilocarpine (SALAGEN) 5 MG tablet take 1 tablet by mouth three times a day, Disp-90 tablet, R-3Normal      Handicap Placard MISC Starting Mon 8/3/2020, Disp-1 each,R-0, PrintExpires on 8/3/2022      acetaminophen (TYLENOL) 500 MG tablet Take 1,000 mg by mouth every 6 hours as needed for PainHistorical Med      ibuprofen (ADVIL;MOTRIN) 200 MG tablet Take 600 mg by mouth every 6 hours as needed for PainHistorical Med       !! - Potential duplicate medications found. Please discuss with provider. ALLERGIES     Patient is has No Known Allergies. FAMILY HISTORY     Patient'sfamily history includes Arthritis in her mother; Breast Cancer in her paternal aunt and paternal aunt; Depression in her brother and mother; Diabetes in her mother; No Known Problems in her father; Obesity in her mother; Uterine Cancer in her maternal grandmother. SOCIAL HISTORY     Patient  reports that she has been smoking cigarettes and cigars. She started smoking about 23 years ago. She has a 10.00 pack-year smoking history. She has never used smokeless tobacco. She reports that she does not currently use alcohol. She reports that she does not currently use drugs after having used the following drugs: Marijuana Se[employer]ity [employer]ge). Frequency: 5.00 times per week. PHYSICAL EXAM     ED TRIAGE VITALS  BP: (!) 162/82, Temp: 97.2 °F (36.2 °C), Heart Rate: 82, Resp: 16, SpO2: 96 %  Physical Exam  Vitals and nursing note reviewed. Constitutional:       Appearance: She is well-developed.    HENT:      Head: Normocephalic and atraumatic. Eyes:      Conjunctiva/sclera: Conjunctivae normal.      Pupils: Pupils are equal, round, and reactive to light. Cardiovascular:      Rate and Rhythm: Normal rate and regular rhythm. Heart sounds: Normal heart sounds. No murmur heard. No gallop. Pulmonary:      Effort: Pulmonary effort is normal. No respiratory distress. Breath sounds: Normal breath sounds. No stridor. No decreased breath sounds, wheezing, rhonchi or rales. Chest:      Chest wall: No tenderness. Musculoskeletal:         General: Normal range of motion. Cervical back: Normal range of motion and neck supple. Skin:     General: Skin is warm and dry. Neurological:      Mental Status: She is alert and oriented to person, place, and time. DIAGNOSTIC RESULTS   Labs:No results found for this visit on 12/14/22. IMAGING:  No orders to display     URGENT CARE COURSE:        MDM      Patient presents to urgent care with complaint of, nasal congestion, generalized body aches, fever, chills and fatigue that have been ongoing since 12/7/2022. Patient symptoms are consistent with either COVID-19 or influenza. Patient is out of the window for treatment with antivirals and therefore testing for these would not be beneficial.  Patient be treated symptomatically. Patient to follow-up with primary care. Patient instructed go to ER for worsening symptoms, chest pain, inability to keep liquids down, inability to urinate for greater than 8 hours or difficulty breathing. Follow-up with your primary care provider. May take Tylenol or ibuprofen as needed for pain or fever. Medications - No data to display  PROCEDURES:    Procedures    FINALIMPRESSION      1.  Viral URI with cough        DISPOSITION/PLAN   DISPOSITION Decision To Discharge 12/14/2022 10:42:37 AM    PATIENT REFERRED TO:  Spenser Hollins, APRN - CNP  [address]    In 2 days    DISCHARGE MEDICATIONS:  Discharge Medication List as of 12/14/2022 10:44 AM        START taking these medications    Details   promethazine-dextromethorphan (PROMETHAZINE-DM) 6.25-15 MG/5ML syrup Take 5 mLs by mouth 4 times daily as needed for Cough, Disp-200 mL, R-0Normal      guaiFENesin (MUCINEX) 600 MG extended release tablet Take 1 tablet by mouth 2 times daily for 15 days, Disp-30 tablet, R-0Normal      benzonatate (TESSALON PERLES) 100 MG capsule Take 2 capsules by mouth 3 times daily as needed for Cough, Disp-60 capsule, R-0Normal           Discharge Medication List as of 12/14/2022 10:44 AM          EDEN Landis CNP, APRN - CNP  12/14/22 1051 clear

## 2025-06-30 ENCOUNTER — OFFICE VISIT (OUTPATIENT)
Dept: PHYSICAL MEDICINE AND REHAB | Age: 45
End: 2025-06-30
Payer: MEDICARE

## 2025-06-30 VITALS
DIASTOLIC BLOOD PRESSURE: 86 MMHG | BODY MASS INDEX: 44.41 KG/M2 | SYSTOLIC BLOOD PRESSURE: 118 MMHG | HEIGHT: 68 IN | WEIGHT: 293 LBS

## 2025-06-30 DIAGNOSIS — G51.0 FACIAL PALSY: ICD-10-CM

## 2025-06-30 DIAGNOSIS — G89.4 CHRONIC PAIN SYNDROME: ICD-10-CM

## 2025-06-30 DIAGNOSIS — M54.12 CERVICAL NEURALGIA: ICD-10-CM

## 2025-06-30 DIAGNOSIS — G43.E19 INTRACTABLE CHRONIC MIGRAINE WITH AURA AND WITHOUT STATUS MIGRAINOSUS: Primary | ICD-10-CM

## 2025-06-30 DIAGNOSIS — M79.18 MYOFASCIAL PAIN: ICD-10-CM

## 2025-06-30 PROCEDURE — G8427 DOCREV CUR MEDS BY ELIG CLIN: HCPCS | Performed by: ANESTHESIOLOGY

## 2025-06-30 PROCEDURE — G8417 CALC BMI ABV UP PARAM F/U: HCPCS | Performed by: ANESTHESIOLOGY

## 2025-06-30 PROCEDURE — 4004F PT TOBACCO SCREEN RCVD TLK: CPT | Performed by: ANESTHESIOLOGY

## 2025-06-30 PROCEDURE — 64615 CHEMODENERV MUSC MIGRAINE: CPT | Performed by: ANESTHESIOLOGY

## 2025-06-30 PROCEDURE — 99215 OFFICE O/P EST HI 40 MIN: CPT | Performed by: ANESTHESIOLOGY

## 2025-06-30 NOTE — PROGRESS NOTES
Functionality Assessment/Goals Worksheet     On a scale of 0 (Does not Interfere) to 10 (Completely Interferes)     1.  Which number describes how during the past week pain has interfered with           the following:  A.  General Activity:  10  B.  Mood: 9  C.  Walking Ability:  9  D.  Normal Work (Includes both work outside the home and housework):  10  E.  Relations with Other People:   9  F.  Sleep:   8  G.  Enjoyment of Life:   10    2.  Patient Prefers to Take their Pain Medications:     []  On a regular basis   [x]  Only when necessary    []  Does not take pain medications    3.  What are the Patient's Goals/Expectations for Visiting Pain Management?     []  Learn about my pain    [x]  Receive Medication   [x]  Physical Therapy     []  Treat Depression   [x]  Receive Injections    []  Treat Sleep   []  Deal with Anxiety and Stress   []  Treat Opoid Dependence/Addiction   []  Other:

## 2025-06-30 NOTE — PROGRESS NOTES
Pre-operative Diagnosis: Chronic Migraine Headaches     Post-operative Diagnosis: Chronic Migraine Headaches     Procedure: Botox for migraine headaches     Procedure Description:  Patient was reclined on the examination table. Botox was drawn up into a tuberculin syringes, to a concentration of 5 units per 0.1 mL with a 30-gauge needle. Skin was prepped with alcohol wipes. The following muscles were injected after negative aspiration; The frontalis muscle bilaterally a total of 4 sites (20 units), The  muscle bilaterally a total of 1 sites (5 units), the procerus one site (5 units), the occipitalis bilaterally a total of 6 sites (30 units), The temporalis muscle bilaterally a total of 8 sites (40 units), the trapezius bilaterally a total of 6 sites (30 units), and cervical paraspinal muscle groups a total of 4 sites (20 units), and an additional 50 units in the right sternocleidomastoid muscle. This was a total of 200 units. The patient tolerated the procedure well.    Medications: 4 mL in 200 U Botox drawn up in 4 separate 1 mL TB syringes = 5 U per 0.1 mL syringe    Amount medication wasted: 0 units        Procedural Complications: None        Mp Avila DO  Interventional Pain Management/PM&R   [facility]

## 2025-06-30 NOTE — PROGRESS NOTES
Chronic Pain/PM&R Clinic Note     Encounter Date: 6/30/25    Subjective:   Chief Complaint:   Chief Complaint   Patient presents with    Botox Injection     200 units migraine        History of Present Illness:   Trudy Freeman is a 45 y.o. female seen in the office on 03/23/22 for her management of migraines.  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  She describes the majority of her migraines to be on the right side of her head but states that sometimes they can be severe enough to be on both sides.  Patient reports migraines that last greater than 4 hours in duration interfering with everyday activities including work and sleep.  Patient reports >15 migraine attacks in the last month.  Patient reports failing multiple medications for migraine headaches including ibuprofen, aspirin, propranolol, Topamax, gabapentin, amitriptyline, Depakote, and Trileptal.  She states that all these have been ineffective and she would like to stick with injection therapy to help out with her migraines.    Today, 6/30/2025, patient presents for planned follow-up for management ongoing chronic migraine headaches.  She states that she was lost to follow-up and would like to get reinitiated with her Botox therapy.  She states that she has noticed a lot more pain along her right side of her neck along her scar tissue and is wondering if Botox can be utilized in this area.  She states that she does struggle a lot with this neck range of motion and also with vertigo from her radiation.  She states that she did respond to her Botox in the past in 2022 and thinks that this will be an ongoing treatment that she would like to continue.  She denies any other changes since her last office visit.    Past Medical History:   Diagnosis Date

## 2025-07-07 ENCOUNTER — OFFICE VISIT (OUTPATIENT)
Dept: PALLATIVE CARE | Age: 45
End: 2025-07-07
Payer: MEDICARE

## 2025-07-07 VITALS
HEART RATE: 71 BPM | BODY MASS INDEX: 44.41 KG/M2 | RESPIRATION RATE: 17 BRPM | OXYGEN SATURATION: 98 % | WEIGHT: 293 LBS | HEIGHT: 68 IN | SYSTOLIC BLOOD PRESSURE: 106 MMHG | DIASTOLIC BLOOD PRESSURE: 68 MMHG

## 2025-07-07 DIAGNOSIS — L59.8 RADIATION FIBROSIS OF SOFT TISSUE FROM THERAPEUTIC PROCEDURE: Primary | ICD-10-CM

## 2025-07-07 DIAGNOSIS — M79.2 NEUROPATHIC PAIN DUE TO RADIATION: ICD-10-CM

## 2025-07-07 DIAGNOSIS — Z51.5 PALLIATIVE CARE PATIENT: ICD-10-CM
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
## 2025-07-07 DIAGNOSIS — Z85.818 HISTORY OF PAROTID CANCER: ICD-10-CM

## 2025-07-07 PROCEDURE — G8427 DOCREV CUR MEDS BY ELIG CLIN: HCPCS | Performed by: STUDENT IN AN ORGANIZED HEALTH CARE EDUCATION/TRAINING PROGRAM

## 2025-07-07 PROCEDURE — 4004F PT TOBACCO SCREEN RCVD TLK: CPT | Performed by: STUDENT IN AN ORGANIZED HEALTH CARE EDUCATION/TRAINING PROGRAM

## 2025-07-07 PROCEDURE — 99214 OFFICE O/P EST MOD 30 MIN: CPT | Performed by: STUDENT IN AN ORGANIZED HEALTH CARE EDUCATION/TRAINING PROGRAM

## 2025-07-07 PROCEDURE — G8417 CALC BMI ABV UP PARAM F/U: HCPCS | Performed by: STUDENT IN AN ORGANIZED HEALTH CARE EDUCATION/TRAINING PROGRAM

## 2025-07-07 RX ORDER — LIDOCAINE 4 G/G
1 PATCH TOPICAL DAILY
COMMUNITY

## 2025-07-07 NOTE — PROGRESS NOTES
neuropathic pain  Continue Cyclobenzaprine 5 mg every 8 hours as needed for breakthrough muscle spasms  Continue lidocaine patches  Please call the office if your symptoms worsen or if you were to develop new symptoms  Please call the palliative care office when you need refills    Return in about 3 months (around 10/7/2025) for Pain.    Medications Prescribed:  No orders of the defined types were placed in this encounter.      Future Appointments   Date Time Provider Department Center   7/9/2025 10:00 AM Doni Sandoval MD N ENT [facility] [place]   7/24/2025  4:00 PM Francisca Ponce MD N [facility] PSYCH [facility] [place]   8/19/2025  2:40 PM Allyn Willson MD [facility]   9/25/2025  9:20 AM Mp Avila DO N [facility] Pain [facility] [place]   10/6/2025  2:00 PM William Espinoza MD N [facility] PALLI [facility] [place]   10/15/2025  8:30 AM Keven Marcos MD [facility] SLE OV [facility] [place]       Patient given educational materials - see patient instructions.  Discussed use, benefit, and side effects of prescribed medications.  All patient questions answered.  Patient voiced understanding.  Patient agreed with treatment plan. Follow up as directed.         Electronically signed by William Espinoza MD on 7/7/2025 at 3:06 PM    Parts of this note may have been dictated by use of voice recognition software and electronically transcribed. The note may contain errors not detected in proofreading.

## 2025-07-07 NOTE — PATIENT INSTRUCTIONS
Continue current plan of care for acute and chronic conditions per primary and specialist teams  Continue Gabapentin 300 mg three times a day scheduled for  neuropathic pain  Continue Cyclobenzaprine 5 mg every 8 hours as needed for breakthrough muscle spasms  Continue lidocaine patches  Please call the office if your symptoms worsen or if you were to develop new symptoms  Please call the palliative care office when you need refills

## 2025-07-09 ENCOUNTER — OFFICE VISIT (OUTPATIENT)
Dept: ENT CLINIC | Age: 45
End: 2025-07-09
Payer: MEDICARE

## 2025-07-09 VITALS
SYSTOLIC BLOOD PRESSURE: 112 MMHG | HEART RATE: 82 BPM | TEMPERATURE: 98.3 F | BODY MASS INDEX: 44.41 KG/M2 | OXYGEN SATURATION: 98 % | DIASTOLIC BLOOD PRESSURE: 72 MMHG | WEIGHT: 293 LBS | HEIGHT: 68 IN
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
## 2025-07-09 DIAGNOSIS — H69.91 DYSFUNCTION OF RIGHT EUSTACHIAN TUBE: Primary | ICD-10-CM

## 2025-07-09 DIAGNOSIS — G51.0 FACIAL PARALYSIS ON RIGHT SIDE: ICD-10-CM

## 2025-07-09 DIAGNOSIS — H90.A31 MIXED CONDUCTIVE AND SENSORINEURAL HEARING LOSS OF RIGHT EAR WITH RESTRICTED HEARING OF LEFT EAR: ICD-10-CM

## 2025-07-09 DIAGNOSIS — L59.8 RADIATION-INDUCED FIBROSIS OF SOFT TISSUE FROM THERAPEUTIC PROCEDURE: ICD-10-CM

## 2025-07-09 DIAGNOSIS — G47.30 SLEEP APNEA, UNSPECIFIED TYPE: ICD-10-CM

## 2025-07-09 PROCEDURE — G8427 DOCREV CUR MEDS BY ELIG CLIN: HCPCS | Performed by: OTOLARYNGOLOGY

## 2025-07-09 PROCEDURE — G8417 CALC BMI ABV UP PARAM F/U: HCPCS | Performed by: OTOLARYNGOLOGY

## 2025-07-09 PROCEDURE — 99213 OFFICE O/P EST LOW 20 MIN: CPT | Performed by: OTOLARYNGOLOGY

## 2025-07-09 PROCEDURE — 4004F PT TOBACCO SCREEN RCVD TLK: CPT | Performed by: OTOLARYNGOLOGY

## 2025-07-24 ENCOUNTER — TELEMEDICINE (OUTPATIENT)
Dept: PSYCHIATRY | Age: 45
End: 2025-07-24
Payer: MEDICARE

## 2025-07-24 DIAGNOSIS — R41.840 ATTENTION DEFICIT: ICD-10-CM

## 2025-07-24 DIAGNOSIS — G89.29 OTHER CHRONIC PAIN: ICD-10-CM

## 2025-07-24 DIAGNOSIS — F43.10 PTSD (POST-TRAUMATIC STRESS DISORDER): ICD-10-CM

## 2025-07-24 DIAGNOSIS — F33.2 SEVERE EPISODE OF RECURRENT MAJOR DEPRESSIVE DISORDER, WITHOUT PSYCHOTIC FEATURES (HCC): Primary | ICD-10-CM

## 2025-07-24 PROCEDURE — G8427 DOCREV CUR MEDS BY ELIG CLIN: HCPCS | Performed by: PSYCHIATRY & NEUROLOGY

## 2025-07-24 PROCEDURE — 99215 OFFICE O/P EST HI 40 MIN: CPT | Performed by: PSYCHIATRY & NEUROLOGY

## 2025-07-24 RX ORDER — CLONAZEPAM 0.5 MG/1
0.5 TABLET ORAL 2 TIMES DAILY PRN
Qty: 60 TABLET | Refills: 1 | Status: SHIPPED | OUTPATIENT
Start: 2025-07-24 | End: 2025-09-22

## 2025-07-24 RX ORDER — ATOMOXETINE 40 MG/1
CAPSULE ORAL
Qty: 60 CAPSULE | Refills: 2 | Status: SHIPPED | OUTPATIENT
Start: 2025-07-24

## 2025-07-24 ASSESSMENT — PATIENT HEALTH QUESTIONNAIRE - PHQ9
7. TROUBLE CONCENTRATING ON THINGS, SUCH AS READING THE NEWSPAPER OR WATCHING TELEVISION: NEARLY EVERY DAY
5. POOR APPETITE OR OVEREATING: NEARLY EVERY DAY
10. IF YOU CHECKED OFF ANY PROBLEMS, HOW DIFFICULT HAVE THESE PROBLEMS MADE IT FOR YOU TO DO YOUR WORK, TAKE CARE OF THINGS AT HOME, OR GET ALONG WITH OTHER PEOPLE: EXTREMELY DIFFICULT
2. FEELING DOWN, DEPRESSED OR HOPELESS: NEARLY EVERY DAY
3. TROUBLE FALLING OR STAYING ASLEEP: NEARLY EVERY DAY
4. FEELING TIRED OR HAVING LITTLE ENERGY: NEARLY EVERY DAY
1. LITTLE INTEREST OR PLEASURE IN DOING THINGS: NEARLY EVERY DAY
4. FEELING TIRED OR HAVING LITTLE ENERGY: NEARLY EVERY DAY
SUM OF ALL RESPONSES TO PHQ QUESTIONS 1-9: 27
1. LITTLE INTEREST OR PLEASURE IN DOING THINGS: NEARLY EVERY DAY
8. MOVING OR SPEAKING SO SLOWLY THAT OTHER PEOPLE COULD HAVE NOTICED. OR THE OPPOSITE - BEING SO FIDGETY OR RESTLESS THAT YOU HAVE BEEN MOVING AROUND A LOT MORE THAN USUAL: NEARLY EVERY DAY
6. FEELING BAD ABOUT YOURSELF - OR THAT YOU ARE A FAILURE OR HAVE LET YOURSELF OR YOUR FAMILY DOWN: NEARLY EVERY DAY
SUM OF ALL RESPONSES TO PHQ QUESTIONS 1-9: 27
7. TROUBLE CONCENTRATING ON THINGS, SUCH AS READING THE NEWSPAPER OR WATCHING TELEVISION: NEARLY EVERY DAY
6. FEELING BAD ABOUT YOURSELF - OR THAT YOU ARE A FAILURE OR HAVE LET YOURSELF OR YOUR FAMILY DOWN: NEARLY EVERY DAY
3. TROUBLE FALLING OR STAYING ASLEEP: NEARLY EVERY DAY
2. FEELING DOWN, DEPRESSED OR HOPELESS: NEARLY EVERY DAY
SUM OF ALL RESPONSES TO PHQ QUESTIONS 1-9: 27
9. THOUGHTS THAT YOU WOULD BE BETTER OFF DEAD, OR OF HURTING YOURSELF: NEARLY EVERY DAY
9. THOUGHTS THAT YOU WOULD BE BETTER OFF DEAD, OR OF HURTING YOURSELF: NEARLY EVERY DAY
5. POOR APPETITE OR OVEREATING: NEARLY EVERY DAY
SUM OF ALL RESPONSES TO PHQ QUESTIONS 1-9: 27
10. IF YOU CHECKED OFF ANY PROBLEMS, HOW DIFFICULT HAVE THESE PROBLEMS MADE IT FOR YOU TO DO YOUR WORK, TAKE CARE OF THINGS AT HOME, OR GET ALONG WITH OTHER PEOPLE: EXTREMELY DIFFICULT
8. MOVING OR SPEAKING SO SLOWLY THAT OTHER PEOPLE COULD HAVE NOTICED. OR THE OPPOSITE, BEING SO FIGETY OR RESTLESS THAT YOU HAVE BEEN MOVING AROUND A LOT MORE THAN USUAL: NEARLY EVERY DAY
SUM OF ALL RESPONSES TO PHQ QUESTIONS 1-9: 24

## 2025-07-24 ASSESSMENT — ANXIETY QUESTIONNAIRES
GAD7 TOTAL SCORE: 21
IF YOU CHECKED OFF ANY PROBLEMS ON THIS QUESTIONNAIRE, HOW DIFFICULT HAVE THESE PROBLEMS MADE IT FOR YOU TO DO YOUR WORK, TAKE CARE OF THINGS AT HOME, OR GET ALONG WITH OTHER PEOPLE: EXTREMELY DIFFICULT
7. FEELING AFRAID AS IF SOMETHING AWFUL MIGHT HAPPEN: NEARLY EVERY DAY
6. BECOMING EASILY ANNOYED OR IRRITABLE: NEARLY EVERY DAY
3. WORRYING TOO MUCH ABOUT DIFFERENT THINGS: NEARLY EVERY DAY
2. NOT BEING ABLE TO STOP OR CONTROL WORRYING: NEARLY EVERY DAY
3. WORRYING TOO MUCH ABOUT DIFFERENT THINGS: NEARLY EVERY DAY
5. BEING SO RESTLESS THAT IT IS HARD TO SIT STILL: NEARLY EVERY DAY
5. BEING SO RESTLESS THAT IT IS HARD TO SIT STILL: NEARLY EVERY DAY
IF YOU CHECKED OFF ANY PROBLEMS ON THIS QUESTIONNAIRE, HOW DIFFICULT HAVE THESE PROBLEMS MADE IT FOR YOU TO DO YOUR WORK, TAKE CARE OF THINGS AT HOME, OR GET ALONG WITH OTHER PEOPLE: EXTREMELY DIFFICULT
4. TROUBLE RELAXING: NEARLY EVERY DAY
2. NOT BEING ABLE TO STOP OR CONTROL WORRYING: NEARLY EVERY DAY
1. FEELING NERVOUS, ANXIOUS, OR ON EDGE: NEARLY EVERY DAY
4. TROUBLE RELAXING: NEARLY EVERY DAY
6. BECOMING EASILY ANNOYED OR IRRITABLE: NEARLY EVERY DAY
1. FEELING NERVOUS, ANXIOUS, OR ON EDGE: NEARLY EVERY DAY
7. FEELING AFRAID AS IF SOMETHING AWFUL MIGHT HAPPEN: NEARLY EVERY DAY

## 2025-07-24 ASSESSMENT — COLUMBIA-SUICIDE SEVERITY RATING SCALE - C-SSRS
7. DID THIS OCCUR IN THE LAST THREE MONTHS: NO
3. IN THE PAST MONTH, HAVE YOU BEEN THINKING ABOUT HOW YOU MIGHT KILL YOURSELF?: YES
4. IN THE PAST MONTH, HAVE YOU HAD THESE THOUGHTS AND HAD SOME INTENTION OF ACTING ON THEM?: NO
6. IN YOUR LIFETIME, HAVE YOU EVER DONE ANYTHING, STARTED TO DO ANYTHING, OR PREPARED TO DO ANYTHING TO END YOUR LIFE?: YES
5. IN THE PAST MONTH, HAVE YOU STARTED TO WORK OUT OR WORKED OUT THE DETAILS OF HOW TO KILL YOURSELF? DO YOU INTEND TO CARRY OUT THIS PLAN?: NO
2. IN THE PAST MONTH, HAVE YOU ACTUALLY HAD ANY THOUGHTS OF KILLING YOURSELF?: YES
1. IN THE PAST MONTH, HAVE YOU WISHED YOU WERE DEAD OR WISHED YOU COULD GO TO SLEEP AND NOT WAKE UP?: YES

## 2025-07-24 NOTE — PROGRESS NOTES
[facility] [place] [facility]  [facility]  [address] [place] [address] [phone]    Progress Note    Patient:  Trudy Freeman  YOB: 1980  PCP:  Allyn Willson MD  Visit Date:  7/24/2025      Chief Complaint   Patient presents with    Follow-up    Medication Check    Depression    Anxiety    Chronic Pain       SUBJECTIVE:    Time in: 4:10pm  Time out: 4:45pm  Documentation time: 5 min    Trudy Freeman, a 45 y.o. female, presents for a follow up visit.      She presents with her partner, Don Daniel, in the background.    Discusses various family stressors.   Her mom suffered a cervical fx 11 days ago after a fall. She was in ICU and now rehab. Worried about her health noting it's difficult to see her unwell. Notes she's typically strong \"[place]\".   Her brother's family is sick noting bringing them groceries. Helped with their kids while her brother was with her mom. Starting to feel ill herself.   Notes h/o trauma \"I've been through so much. When it happens I have ADHD and it's a superpower. I can focus on tasks that need done. Work in short bursts. Be able to work logically and understand how things are going on.\"  Feeling thankful for her  getting paid to care for her. Was able to get an air conditioner free with her TAIWO.   Notes compliance issues with her medications. Hard to remember, has been busier.  Trouble sleeping. \"Sleeping as much as I can.\"   Mood \"down but fine\".   Denies any intent or plan to hurt herself. Dislikes the precheck questions and \"I'm going to answer the way I want until they change it.\" Feels it's a reminder of her trauma.   Friend Sally was suicidal in contact with Trudy prior to her mom's injury. Notes she's not in a 28 day facility.   Notes in times of crisis rising to the challenge but worries about crashing after.   Issues with self care noting taking her first shower in 10 days. We discussed setting

## 2025-07-24 NOTE — PROGRESS NOTES
[facility] [place] [facility]  [facility]  [address]  [address]  [place] [id number]  Dept: [phone]  Dept Fax: [phone]  Loc: [phone]    Trudy Freeman is a 45 y.o. female who was referred by No ref. provider found for:  Chief Complaint   Patient presents with    Other     Patient here for evaluation of her Xerostomia, mixed hearing loss and sleep apnea.        HPI:       History of Present Illness  Trudy Freeman is a 45 y.o. female who is here for a follow-up of multiple problems associated with radiation therapy for parotid gland cancer. She is accompanied by her partner.    She has been unable to locate sweet oil locally and has been using a different oil to massage her scar, which she reports as effective. She describes her skin as highly sensitive. She has been applying paper tape to her eyelid and using medical grade Medipore tape in her mouth due to sleep apnea, which causes her mask to dislodge during sleep. This has led to some eye dryness.    She experiences food particles entering her nasal passages while eating and was advised to seek speech therapy. She also reports difficulty in inflating balloons.    She reports a sensation of fullness in her ear and nose. During her last visit with [facility], it was suggested that one of her turbinates might have collapsed. She has been using an allergy spray, which causes her to choke and cough. She is seeking advice on how to prepare her sinuses for this treatment.    She is receiving Botox injections from her pain doctor for her neck and head, which she finds helpful. The injections have made the area softer and less painful, although the cramping persists. She believes that increasing the dosage to 300 units would be beneficial.        History:     No Known Allergies  Current Outpatient Medications   Medication Sig Dispense Refill    lidocaine 4 % external patch Place 1 patch onto the skin daily      gabapentin

## 2025-09-03 ENCOUNTER — OFFICE VISIT (OUTPATIENT)
Dept: FAMILY MEDICINE CLINIC | Age: 45
End: 2025-09-03
Payer: MEDICARE

## 2025-09-03 VITALS
WEIGHT: 293 LBS | DIASTOLIC BLOOD PRESSURE: 76 MMHG | HEART RATE: 84 BPM | BODY MASS INDEX: 44.41 KG/M2 | RESPIRATION RATE: 18 BRPM | SYSTOLIC BLOOD PRESSURE: 134 MMHG | TEMPERATURE: 98.4 F | HEIGHT: 68 IN

## 2025-09-03 DIAGNOSIS — Z85.818 HISTORY OF MALIGNANT NEOPLASM OF PAROTID GLAND: ICD-10-CM

## 2025-09-03 DIAGNOSIS — F43.10 PTSD (POST-TRAUMATIC STRESS DISORDER): ICD-10-CM

## 2025-09-03 DIAGNOSIS — G89.4 CHRONIC PAIN SYNDROME: ICD-10-CM

## 2025-09-03 DIAGNOSIS — E03.9 ACQUIRED HYPOTHYROIDISM: ICD-10-CM

## 2025-09-03 DIAGNOSIS — R51.9 NONINTRACTABLE HEADACHE, UNSPECIFIED CHRONICITY PATTERN, UNSPECIFIED HEADACHE TYPE: ICD-10-CM

## 2025-09-03 DIAGNOSIS — Z12.11 COLON CANCER SCREENING: ICD-10-CM

## 2025-09-03 DIAGNOSIS — G51.0 PARTIAL FACIAL NERVE PALSY: ICD-10-CM

## 2025-09-03 DIAGNOSIS — E55.9 VITAMIN D DEFICIENCY: ICD-10-CM

## 2025-09-03 DIAGNOSIS — Z12.31 ENCOUNTER FOR SCREENING MAMMOGRAM FOR MALIGNANT NEOPLASM OF BREAST: ICD-10-CM

## 2025-09-03 DIAGNOSIS — E66.813 OBESITY, CLASS 3 (HCC): ICD-10-CM

## 2025-09-03 DIAGNOSIS — Z00.00 MEDICARE ANNUAL WELLNESS VISIT, SUBSEQUENT: Primary | ICD-10-CM

## 2025-09-03 DIAGNOSIS — F33.2 SEVERE EPISODE OF RECURRENT MAJOR DEPRESSIVE DISORDER, WITHOUT PSYCHOTIC FEATURES (HCC): ICD-10-CM

## 2025-09-03 PROCEDURE — G8427 DOCREV CUR MEDS BY ELIG CLIN: HCPCS | Performed by: STUDENT IN AN ORGANIZED HEALTH CARE EDUCATION/TRAINING PROGRAM

## 2025-09-03 PROCEDURE — G2211 COMPLEX E/M VISIT ADD ON: HCPCS | Performed by: STUDENT IN AN ORGANIZED HEALTH CARE EDUCATION/TRAINING PROGRAM

## 2025-09-03 PROCEDURE — 4004F PT TOBACCO SCREEN RCVD TLK: CPT | Performed by: STUDENT IN AN ORGANIZED HEALTH CARE EDUCATION/TRAINING PROGRAM

## 2025-09-03 PROCEDURE — G8417 CALC BMI ABV UP PARAM F/U: HCPCS | Performed by: STUDENT IN AN ORGANIZED HEALTH CARE EDUCATION/TRAINING PROGRAM

## 2025-09-03 PROCEDURE — G0439 PPPS, SUBSEQ VISIT: HCPCS | Performed by: STUDENT IN AN ORGANIZED HEALTH CARE EDUCATION/TRAINING PROGRAM

## 2025-09-03 PROCEDURE — 99214 OFFICE O/P EST MOD 30 MIN: CPT | Performed by: STUDENT IN AN ORGANIZED HEALTH CARE EDUCATION/TRAINING PROGRAM

## 2025-09-03 RX ORDER — LEVOTHYROXINE SODIUM 25 UG/1
25 TABLET ORAL DAILY
Qty: 30 TABLET | Refills: 1 | Status: SHIPPED | OUTPATIENT
Start: 2025-09-03

## 2025-09-03 SDOH — HEALTH STABILITY: PHYSICAL HEALTH: ON AVERAGE, HOW MANY DAYS PER WEEK DO YOU ENGAGE IN MODERATE TO STRENUOUS EXERCISE (LIKE A BRISK WALK)?: 0 DAYS

## 2025-09-03 SDOH — HEALTH STABILITY: PHYSICAL HEALTH: ON AVERAGE, HOW MANY MINUTES DO YOU ENGAGE IN EXERCISE AT THIS LEVEL?: 0 MIN

## 2025-09-03 ASSESSMENT — LIFESTYLE VARIABLES
HOW OFTEN DURING THE LAST YEAR HAVE YOU BEEN UNABLE TO REMEMBER WHAT HAPPENED THE NIGHT BEFORE BECAUSE YOU HAD BEEN DRINKING: NEVER
HAVE YOU OR SOMEONE ELSE BEEN INJURED AS A RESULT OF YOUR DRINKING: NO
HAS A RELATIVE, FRIEND, DOCTOR, OR ANOTHER HEALTH PROFESSIONAL EXPRESSED CONCERN ABOUT YOUR DRINKING OR SUGGESTED YOU CUT DOWN: NO
HOW MANY STANDARD DRINKS CONTAINING ALCOHOL DO YOU HAVE ON A TYPICAL DAY: 1 OR 2
HOW OFTEN DO YOU HAVE A DRINK CONTAINING ALCOHOL: MONTHLY OR LESS
HOW OFTEN DO YOU HAVE A DRINK CONTAINING ALCOHOL: 2
HOW OFTEN DURING THE LAST YEAR HAVE YOU FAILED TO DO WHAT WAS NORMALLY EXPECTED FROM YOU BECAUSE OF DRINKING: NEVER
HOW OFTEN DURING THE LAST YEAR HAVE YOU HAD A FEELING OF GUILT OR REMORSE AFTER DRINKING: NEVER
HOW OFTEN DURING THE LAST YEAR HAVE YOU HAD A FEELING OF GUILT OR REMORSE AFTER DRINKING: NEVER
HOW OFTEN DURING THE LAST YEAR HAVE YOU FOUND THAT YOU WERE NOT ABLE TO STOP DRINKING ONCE YOU HAD STARTED: NEVER
HOW OFTEN DURING THE LAST YEAR HAVE YOU BEEN UNABLE TO REMEMBER WHAT HAPPENED THE NIGHT BEFORE BECAUSE YOU HAD BEEN DRINKING: NEVER
HOW MANY STANDARD DRINKS CONTAINING ALCOHOL DO YOU HAVE ON A TYPICAL DAY: 1
HOW OFTEN DO YOU HAVE SIX OR MORE DRINKS ON ONE OCCASION: 2
HOW OFTEN DURING THE LAST YEAR HAVE YOU NEEDED AN ALCOHOLIC DRINK FIRST THING IN THE MORNING TO GET YOURSELF GOING AFTER A NIGHT OF HEAVY DRINKING: NEVER
HOW OFTEN DURING THE LAST YEAR HAVE YOU FAILED TO DO WHAT WAS NORMALLY EXPECTED FROM YOU BECAUSE OF DRINKING: NEVER
HOW OFTEN DURING THE LAST YEAR HAVE YOU FOUND THAT YOU WERE NOT ABLE TO STOP DRINKING ONCE YOU HAD STARTED: NEVER
HOW OFTEN DURING THE LAST YEAR HAVE YOU NEEDED AN ALCOHOLIC DRINK FIRST THING IN THE MORNING TO GET YOURSELF GOING AFTER A NIGHT OF HEAVY DRINKING: NEVER
HAVE YOU OR SOMEONE ELSE BEEN INJURED AS A RESULT OF YOUR DRINKING: NO
HAS A RELATIVE, FRIEND, DOCTOR, OR ANOTHER HEALTH PROFESSIONAL EXPRESSED CONCERN ABOUT YOUR DRINKING OR SUGGESTED YOU CUT DOWN: NO

## 2025-09-03 ASSESSMENT — PATIENT HEALTH QUESTIONNAIRE - PHQ9
1. LITTLE INTEREST OR PLEASURE IN DOING THINGS: NEARLY EVERY DAY
9. THOUGHTS THAT YOU WOULD BE BETTER OFF DEAD, OR OF HURTING YOURSELF: NEARLY EVERY DAY
8. MOVING OR SPEAKING SO SLOWLY THAT OTHER PEOPLE COULD HAVE NOTICED. OR THE OPPOSITE, BEING SO FIGETY OR RESTLESS THAT YOU HAVE BEEN MOVING AROUND A LOT MORE THAN USUAL: NEARLY EVERY DAY
6. FEELING BAD ABOUT YOURSELF - OR THAT YOU ARE A FAILURE OR HAVE LET YOURSELF OR YOUR FAMILY DOWN: NEARLY EVERY DAY
3. TROUBLE FALLING OR STAYING ASLEEP: NEARLY EVERY DAY
10. IF YOU CHECKED OFF ANY PROBLEMS, HOW DIFFICULT HAVE THESE PROBLEMS MADE IT FOR YOU TO DO YOUR WORK, TAKE CARE OF THINGS AT HOME, OR GET ALONG WITH OTHER PEOPLE: EXTREMELY DIFFICULT
SUM OF ALL RESPONSES TO PHQ QUESTIONS 1-9: 26
SUM OF ALL RESPONSES TO PHQ QUESTIONS 1-9: 26
7. TROUBLE CONCENTRATING ON THINGS, SUCH AS READING THE NEWSPAPER OR WATCHING TELEVISION: NEARLY EVERY DAY
4. FEELING TIRED OR HAVING LITTLE ENERGY: NEARLY EVERY DAY
SUM OF ALL RESPONSES TO PHQ QUESTIONS 1-9: 23
5. POOR APPETITE OR OVEREATING: MORE THAN HALF THE DAYS
SUM OF ALL RESPONSES TO PHQ QUESTIONS 1-9: 26
2. FEELING DOWN, DEPRESSED OR HOPELESS: NEARLY EVERY DAY

## (undated) DEVICE — CATHETER,URETHRAL,REDRUBBER,STRL,14FR: Brand: MEDLINE INDUSTRIES, INC.

## (undated) DEVICE — GOWN,SIRUS,NONRNF,SETINSLV,XL,20/CS: Brand: MEDLINE

## (undated) DEVICE — SOLUTION IV 1000ML 0.9% SOD CHL PH 5 INJ USP VIAFLX PLAS

## (undated) DEVICE — DRAPE,UNDERBUTTOCKS,PCH,STERILE: Brand: MEDLINE

## (undated) DEVICE — Z DISCONTINUED BY MEDLINE USE 2711682 TRAY SKIN PREP DRY W/ PREM GLV

## (undated) DEVICE — GOWN,SIRUS,NON REINFRCD,LARGE,SET IN SL: Brand: MEDLINE

## (undated) DEVICE — GLOVE ORANGE PI 8   MSG9080

## (undated) DEVICE — GAUZE,SPONGE,8"X4",12PLY,XRAY,STRL,LF: Brand: MEDLINE

## (undated) DEVICE — PACK,SET UP,NO DRAPES: Brand: MEDLINE

## (undated) DEVICE — GLOVE SURG SZ 6 THK91MIL LTX FREE SYN POLYISOPRENE ANTI

## (undated) DEVICE — CHLORAPREP 26ML ORANGE

## (undated) DEVICE — PAD,SANITARY,11 IN,MAXI,W/WINGS,N-STRL: Brand: MEDLINE

## (undated) DEVICE — PAD,NON-ADHERENT,3X8,STERILE,LF,1/PK: Brand: MEDLINE

## (undated) DEVICE — NEEDLE SPNL 22 GAX5 IN HUB QNCKE FUNNEL SHP STYL BLK SPINCAN

## (undated) DEVICE — SURE SET SINGLE BASIN-LF: Brand: MEDLINE INDUSTRIES, INC.

## (undated) DEVICE — SOLUTION SCRB 4OZ 4% CHG H2O AIDED FOR PREOPERATIVE SKIN